# Patient Record
Sex: FEMALE | Race: WHITE | NOT HISPANIC OR LATINO | Employment: FULL TIME | URBAN - METROPOLITAN AREA
[De-identification: names, ages, dates, MRNs, and addresses within clinical notes are randomized per-mention and may not be internally consistent; named-entity substitution may affect disease eponyms.]

---

## 2021-07-22 ENCOUNTER — OFFICE VISIT (OUTPATIENT)
Dept: OBGYN | Facility: CLINIC | Age: 41
End: 2021-07-22
Payer: COMMERCIAL

## 2021-07-22 VITALS
WEIGHT: 240.2 LBS | HEART RATE: 109 BPM | BODY MASS INDEX: 40.02 KG/M2 | SYSTOLIC BLOOD PRESSURE: 138 MMHG | HEIGHT: 65 IN | DIASTOLIC BLOOD PRESSURE: 88 MMHG

## 2021-07-22 DIAGNOSIS — Z12.4 CERVICAL CANCER SCREENING: ICD-10-CM

## 2021-07-22 DIAGNOSIS — E11.69 TYPE 2 DIABETES MELLITUS WITH OTHER SPECIFIED COMPLICATION, WITHOUT LONG-TERM CURRENT USE OF INSULIN (H): ICD-10-CM

## 2021-07-22 DIAGNOSIS — Z01.419 WELL WOMAN EXAM WITH ROUTINE GYNECOLOGICAL EXAM: Primary | ICD-10-CM

## 2021-07-22 DIAGNOSIS — Z12.31 ENCOUNTER FOR SCREENING MAMMOGRAM FOR BREAST CANCER: ICD-10-CM

## 2021-07-22 DIAGNOSIS — N97.9 FEMALE INFERTILITY: ICD-10-CM

## 2021-07-22 PROCEDURE — 99386 PREV VISIT NEW AGE 40-64: CPT | Performed by: OBSTETRICS & GYNECOLOGY

## 2021-07-22 PROCEDURE — 87624 HPV HI-RISK TYP POOLED RSLT: CPT | Performed by: OBSTETRICS & GYNECOLOGY

## 2021-07-22 PROCEDURE — G0123 SCREEN CERV/VAG THIN LAYER: HCPCS | Performed by: OBSTETRICS & GYNECOLOGY

## 2021-07-22 RX ORDER — GLIPIZIDE 5 MG/1
5 TABLET ORAL
COMMUNITY
End: 2022-01-26

## 2021-07-22 RX ORDER — METFORMIN HYDROCHLORIDE 500 MG/5ML
SOLUTION ORAL
COMMUNITY
End: 2022-02-04

## 2021-07-22 ASSESSMENT — MIFFLIN-ST. JEOR: SCORE: 1755.42

## 2021-07-22 NOTE — PROGRESS NOTES
Sandy is a 41 year old G0 who presents for annual exam.     Menses are regular q 28-30 days and normal lasting 5 days.  Menses flow: normal.  Patient's last menstrual period was 2021 (exact date).. Using none for contraception.  She is currently considering pregnancy.  She would like to discuss getting pregnant.  Her and her  have been having regular unprotected intercourse since 2016.    Patient sees internal medicine physician at [facility] for her diabetes management. Patient los her mother recently to Bile duct cancer, which has been difficult for her and visit to [facility] today triggered a lot of emotions for her.    GYNECOLOGIC HISTORY:  Menarche: 15  Age at first intercourse: 17  Sandy is sexually active with male partner(s) and is currently in monogamous relationship with .    History sexually transmitted infections:No STD history and HPV  STI testing offered?  Declined  NICANOR exposure: No  History of abnormal Pap smear: Yes, had HPV when she was 21 years old.  Family history of breast CA: NOPlease explain):   Family history of uterine/ovarian CA: No    Family history of colon CA: No    HEALTH MAINTENANCE:  Cholesterol: (No results found for: CHOL History of abnormal lipids: No  Mammo: never . History of abnormal Mammo:no  Regular Self Breast Exams: No  Calcium/Vitamin D intake: source:  dietary supplement(s) Adequate? Yes  TSH: states she had normal testing recently through [facility]    HISTORY:  OB History    Para Term  AB Living   0 0 0 0 0 0   SAB TAB Ectopic Multiple Live Births   0 0 0 0 0     Past Medical History:   Diagnosis Date     Abnormal Pap smear of cervix      Diabetes (H)      History of human papillomavirus infection      Past Surgical History:   Procedure Laterality Date     COLPOSCOPY       TONSILLECTOMY       Family History   Problem Relation Age of Onset     Cancer Mother      Diabetes Mother      Heart Disease Mother      Diabetes Father      Heart Disease  "Father      Cancer Father      Cancer Maternal Grandmother      Diabetes Maternal Grandmother      Diabetes Paternal Grandmother      Social History   Denies any tobacco or drug use.  Consumes 1-2 drinks per month      Current Outpatient Medications:      glipiZIDE (GLUCOTROL) 5 MG tablet, Take 5 mg by mouth 2 times daily (before meals), Disp: , Rfl:      metFORMIN (GLUCOPHAGE) 500 MG/5ML SOLN solution, 1000 BID, Disp: , Rfl:      Semaglutide (RYBELSUS PO), ONCE A DAY, Disp: , Rfl:    No Known Allergies    Past medical, surgical, social and family history were reviewed and updated in [facility].      EXAM:  /88 (BP Location: Right arm, Patient Position: Sitting, Cuff Size: Adult Regular)   Pulse 109   Ht 1.651 m (5' 5\")   Wt 109 kg (240 lb 3.2 oz)   LMP 07/21/2021 (Exact Date)   BMI 39.97 kg/m     BMI: Body mass index is 39.97 kg/m .  Constitutional: healthy, alert and no distress  Head: Normocephalic. No masses, lesions, tenderness or abnormalities  Neck: Neck supple. Trachea midline. No adenopathy. Thyroid symmetric  Cardiovascular: RRR.   Respiratory: clear to auscultation bilatearlly  Breast: deferred  Gastrointestinal: Abdomen soft, non-tender, non-distended.   :  Normal appearing external genitalia, normal appearing vaginal mucosa, normal appearing cervix.    Musculoskeletal: extremities normal  Skin: no suspicious lesions or rashes  Psychiatric: Affect appropriate, cooperative,mentation appears normal.     ASSESSMENT:  41 year old female with satisfactory annual exam    (Z01.419) Well woman exam with routine gynecological exam  (primary encounter diagnosis)  COUNSELING:   Reviewed preventive health counseling, as reflected in patient instructions       Regular exercise       Healthy diet/nutrition       Family planning   reports that she has never smoked. She has never used smokeless tobacco.    Body mass index is 39.97 kg/m .  Weight management plan: Discussed healthy diet and exercise " guidelines    (Z12.4) Cervical cancer screening  Comment: patient due for pap smear, collected  Plan: Pap imaged thin layer screen with HPV -         recommended age 30 - 65 years    (N97.9) Female infertility  Comment: Patient meets criteria for infertility as she has had regular timed intercourse >6 months without conception.  Discussed age related fertility changes.  Patient and partner are very certain they would like to get pregnant, thus recommended consultation with reproductive endocrinology and infertility specialist.  Plan: Infertility/AI Referral    (E11.69) Type 2 diabetes mellitus with other specified complication, without long-term current use of insulin (H)  Comment: Patient's diabetes is managed by internal medicine at [facility].  Discussed that her age, diabetes, and elevated body mass index would put her in the category of high risk pregnancy.  Discussed pre-conception counseling with MFM to discuss further details, which she is interested in.  Plan: [facility] REFERRAL-PRECONCEPTION        Continue management of diabetes with internal medicine    (Z12.31) Encounter for screening mammogram for breast cancer  Comment: patient due for mammogram  Plan: *MA Screening Digital Bilateral    Anabell Elliott MD

## 2021-07-22 NOTE — PROGRESS NOTES
"[facility]- OBGYN    CC:     S:***Sandy Person is a 41 year old G***P*** who presents today for ***.  Patient reports ***.      OBGYN Hx:   G***P***  Patient's last menstrual period was 07/21/2021 (exact date).  Menses:***  Sexually active with ***  STD Hx:***  Pap hx:***    PMH: ***  PSH:***  Meds:***  Allergies:***  SH:***  FH:***    ROS:   C:     NEGATIVE for fever, chills, change in weight  I:       NEGATIVE for worrisome rashes, moles or lesions  E:     NEGATIVE for vision changes or irritation  E/M: NEGATIVE for ear, mouth and throat problems  R:     NEGATIVE for significant cough or SOB  CV:   NEGATIVE for chest pain, palpitations or peripheral edema  GI:     NEGATIVE for nausea, abdominal pain, heartburn, or change in bowel habits  :   NEGATIVE for frequency, dysuria, hematuria, vaginal discharge, or irregular bleeding  M:     NEGATIVE for significant arthralgias or myalgia  N:      NEGATIVE for weakness, dizziness or paresthesias  E:      NEGATIVE for temperature intolerance, skin/hair changes  P:      NEGATIVE for changes in mood or affect.    O: Patient Vitals for the past 24 hrs:   BP Pulse Weight   07/22/21 1556 (!) 143/91 109 109 kg (240 lb 3.2 oz)   ]  Exam:  {female exam complete:517953::\"GENERAL: healthy, alert and no distress\",\"EYES: Eyes grossly normal to inspection, PERRL and conjunctivae and sclerae normal\",\"HENT: ear canals and TM's normal, nose and mouth without ulcers or lesions\",\"NECK: no adenopathy, no asymmetry, masses, or scars and thyroid normal to palpation\",\"RESP: lungs clear to auscultation - no rales, rhonchi or wheezes\",\"BREAST: normal without masses, tenderness or nipple discharge and no palpable axillary masses or adenopathy\",\"CV: regular rate and rhythm, normal S1 S2, no S3 or S4, no murmur, click or rub, no peripheral edema and peripheral pulses strong\",\"ABDOMEN: soft, nontender, no hepatosplenomegaly, no masses and bowel sounds normal\",\"MS: no gross " "musculoskeletal defects noted, no edema\",\"SKIN: no suspicious lesions or rashes\",\"NEURO: Normal strength and tone, mentation intact and speech normal\",\"PSYCH: mentation appears normal, affect normal/bright\"}    Imaging and Labs:***    A&P:       "

## 2021-07-22 NOTE — PATIENT INSTRUCTIONS
[facility]  [email or web address]    [place] Location:  [address], [address]  [place] [address]     [place] Location:  [address], [address]  [place], [place] [address] [phone]     *Dr. Deepak Lima and Dr. Cathy Burgos      [facility]   [email or web address]     [place] location:  [facility]  [address], [address]  [place], [place] [id number] [phone]     [place] location:  [facility]  [address], [address]  [place], [place] [id number] [phone]  __________________________________________________    [facility]  [address] [place] [address], [address]  [place]  [address]  [phone]  __________________________________________________    Patient Education     Prevention Guidelines, Women Ages 40 to 49  Screening tests and vaccines are an important part of managing your health. A screening test is done to find diseases in people who don't have any symptoms. The goal is to find a disease early so lifestyle changes and checkups can reduce the risk of disease. Or the goal may be to detect it early to treat it most effectively. Screening tests are not used to diagnose a disease. But they are used to see if more testing is needed. Health counseling is important, too. Below are guidelines for these, for women ages 40 to 49. Talk with your healthcare provider to make sure you re up-to-date on what you need.  Screening Who needs it How often   Type 2 diabetes or prediabetes All women beginning at age 45 and women without symptoms at any age who are overweight or obese and have 1 or more additional risk factors for diabetes At least every 3 years1   Type 2 diabetes or prediabetes All women diagnosed with gestational diabetes Lifelong testing every 3 years   Type 2 diabetes All women with prediabetes Every year   Alcohol misuse All women in  this age group At routine exams   Blood pressure All women in this age group Yearly checkup if your blood pressure is normal  Normal blood pressure is less than 120/80 mm Hg  If your blood pressure reading is higher than normal, follow the advice of your healthcare provider   Breast cancer All women at average risk in this age group Screening with a mammogram can start at age 40.2 Talk with your healthcare provider to help you decide when to start screening. At age 45 start yearly mammograms.3   Cervical cancer All women in this age group, except women who have had a complete hysterectomy Pap test every 3 years or Pap test plus human papilloma virus (HPV) test every 5 years   Colorectal cancer Women age 45 years and older at average risk Multiple tests are available and are used at different times. Possible tests include:    Flexible sigmoidoscopy every 5 years, or    Colonoscopy every 10 years, or    CT colonography (virtual colonoscopy) every 5 years, or    Yearly fecal occult blood test, or    Yearly fecal immunochemical test every year, or    Stool DNA test, every 3 years  If you choose a test other than a colonoscopy and have an abnormal test result, you will need to follow-up with a colonoscopy. Screening advice varies among expert groups. Talk with your healthcare provider about which tests are best for you.  Some people should be screened using a different schedule because of their personal or family health history. Talk with your healthcare provider about your health history.   Chlamydia Women at increased risk for infection At routine exams if you're at risk or have symptoms   Depression All women in this age group At routine exams   Gonorrhea Sexually active women at increased risk for infection At routine exams   Hepatitis C Anyone at increased risk; 1 time for those born between 1945 and 1965 At routine exams   High cholesterol or triglycerides All women ages 45 and older who are at risk for coronary  artery disease; younger women, talk with your healthcare provider At least every 5 years   HIV All women At routine exams. Those with risk factors for HIV should be tested at least annually.   Obesity All women in this age group At routine exams   Syphilis Women at increased risk for infection-talk with your healthcare provider At routine exams   Tuberculosis Women at increased risk for infection-talk with your healthcare provider Ask your healthcare provider   Vision All women in this age group Complete exam at age 40 and eye exams every 2 to 4 years. If you have a chronic disease, ask your healthcare provider how often you should have your eyes examined.4   Vaccine Who needs it How often   Chickenpox (varicella) All women in this age group who have no record of this infection or vaccine 2 doses; the second dose should be given at least 4 weeks after the first dose   Hepatitis A Women at increased risk for infection-talk with your healthcare provider 2 doses given 6 months apart   Hepatitis B Women at increased risk for infection-talk with your healthcare provider 3 doses over 6 months; second dose should be given 1 month after the first dose; the third dose should be given at least 2 months after the second dose and at least 4 months after the first dose   Haemophilus influenzae Type B (HIB) Women at increased risk 1 to 3 doses   Influenza (flu) All women in this age group Once a year   Measles, mumps, rubella (MMR) All women in this age group who have no record of these infections or vaccines 1 or 2 doses   Meningococcal Women at increased risk for infection-talk with your healthcare provider 1 or more doses   Pneumococcal conjugate vaccine (PCV13) and pneumococcal polysaccharide vaccine (PPSV23) Women at increased risk for infection-talk with your healthcare provider 1 or 2 doses   Tetanus/diphtheria/pertussis (Td/Tdap) booster All women in this age group A 1-time dose of Tdap instead of a Td booster after age  18, then Td every 10 years   Counseling Who needs it How often   BRCA gene mutation testing for breast and ovarian cancer susceptibility Women with increased risk for having gene mutation When your risk is known   Breast cancer and chemoprevention Women at high risk for breast cancer When your risk is known   Diet and exercise Women who are overweight or obese When diagnosed, and then at routine exams   Domestic violence Women at the age in which they are able to have children At routine exams   Sexually transmitted infection prevention Women at increased risk for infection-talk with your healthcare provider At routine exams   Use of tobacco and the health effects it can cause All women in this age group Every exam   1 American Diabetes Association  2 American College of Obstetricians and Gynecologists   3 American Cancer Society  4 American Academy of Ophthalmology  [employer] last reviewed this educational content on 11/1/2017 2000-2021 [employer]. All rights reserved. This information is not intended as a substitute for professional medical care. Always follow your healthcare professional's instructions.

## 2021-07-23 ENCOUNTER — CARE COORDINATION (OUTPATIENT)
Dept: MATERNAL FETAL MEDICINE | Facility: CLINIC | Age: 41
End: 2021-07-23

## 2021-07-28 LAB
BKR LAB AP GYN ADEQUACY: NORMAL
BKR LAB AP GYN INTERPRETATION: NORMAL
BKR LAB AP HPV REFLEX: NORMAL
BKR LAB AP LMP: NORMAL
BKR LAB AP PREVIOUS ABNORMAL: NORMAL
PATH REPORT.COMMENTS IMP SPEC: NORMAL
PATH REPORT.RELEVANT HX SPEC: NORMAL

## 2021-07-29 LAB
HUMAN PAPILLOMA VIRUS 16 DNA: NEGATIVE
HUMAN PAPILLOMA VIRUS 18 DNA: NEGATIVE
HUMAN PAPILLOMA VIRUS FINAL DIAGNOSIS: NORMAL
HUMAN PAPILLOMA VIRUS OTHER HR: NEGATIVE

## 2021-08-02 ENCOUNTER — TRANSCRIBE ORDERS (OUTPATIENT)
Dept: MATERNAL FETAL MEDICINE | Facility: CLINIC | Age: 41
End: 2021-08-02

## 2021-08-02 DIAGNOSIS — Z31.69 ENCOUNTER FOR PRECONCEPTION CONSULTATION: Primary | ICD-10-CM

## 2021-08-07 ENCOUNTER — HEALTH MAINTENANCE LETTER (OUTPATIENT)
Age: 41
End: 2021-08-07

## 2021-09-03 ENCOUNTER — TRANSFERRED RECORDS (OUTPATIENT)
Dept: HEALTH INFORMATION MANAGEMENT | Facility: CLINIC | Age: 41
End: 2021-09-03
Payer: COMMERCIAL

## 2021-09-11 ENCOUNTER — MEDICAL CORRESPONDENCE (OUTPATIENT)
Dept: HEALTH INFORMATION MANAGEMENT | Facility: CLINIC | Age: 41
End: 2021-09-11

## 2021-09-21 ENCOUNTER — MEDICAL CORRESPONDENCE (OUTPATIENT)
Dept: HEALTH INFORMATION MANAGEMENT | Facility: CLINIC | Age: 41
End: 2021-09-21

## 2021-10-02 ENCOUNTER — HEALTH MAINTENANCE LETTER (OUTPATIENT)
Age: 41
End: 2021-10-02

## 2021-11-01 ENCOUNTER — TELEPHONE (OUTPATIENT)
Dept: MATERNAL FETAL MEDICINE | Facility: CLINIC | Age: 41
End: 2021-11-01

## 2021-11-01 NOTE — TELEPHONE ENCOUNTER
MFM received preconception referral from [facility]. Pt cancelled scheduled appt. Schedulers made attempts to get pt rescheduled and left messages. No return call from pt. Referring clinic notified. Removing order.     - Tj MAC

## 2021-11-08 ENCOUNTER — MEDICAL CORRESPONDENCE (OUTPATIENT)
Dept: HEALTH INFORMATION MANAGEMENT | Facility: CLINIC | Age: 41
End: 2021-11-08
Payer: COMMERCIAL

## 2021-11-08 ENCOUNTER — TRANSFERRED RECORDS (OUTPATIENT)
Dept: HEALTH INFORMATION MANAGEMENT | Facility: CLINIC | Age: 41
End: 2021-11-08
Payer: COMMERCIAL

## 2021-11-09 ENCOUNTER — TRANSCRIBE ORDERS (OUTPATIENT)
Dept: OTHER | Age: 41
End: 2021-11-09
Payer: COMMERCIAL

## 2021-11-09 DIAGNOSIS — E11.9 DIABETES MELLITUS, TYPE 2 (H): Primary | ICD-10-CM

## 2021-11-27 ENCOUNTER — HEALTH MAINTENANCE LETTER (OUTPATIENT)
Age: 41
End: 2021-11-27

## 2021-11-30 ENCOUNTER — ALLIED HEALTH/NURSE VISIT (OUTPATIENT)
Dept: EDUCATION SERVICES | Facility: CLINIC | Age: 41
End: 2021-11-30
Attending: INTERNAL MEDICINE
Payer: COMMERCIAL

## 2021-11-30 DIAGNOSIS — E11.9 DIABETES MELLITUS, TYPE 2 (H): ICD-10-CM

## 2021-11-30 PROCEDURE — G0108 DIAB MANAGE TRN  PER INDIV: HCPCS | Performed by: REGISTERED NURSE

## 2021-11-30 NOTE — TELEPHONE ENCOUNTER
RECORDS RECEIVED FROM: External   DATE RECEIVED: 12.9.21   NOTES (FOR ALL VISITS) STATUS DETAILS   OFFICE NOTES from referring provider recieved 11.30.21 Amirah Valera   OFFICE NOTES from other specialist recieved 12.6.21 amirah    ED NOTES N/A    OPERATIVE REPORT  (thyroid, pituitary, adrenal, parathyroid) N/A    MEDICATION LIST Internal    IMAGING      DEXASCAN N/A    MRI (BRAIN) N/A    XR (Chest) N/A    CT (HEAD/NECK/CHEST/ABDOMEN) N/A    NUCLEAR  N/A    ULTRASOUND (HEAD/NECK) N/A    LABS     DIABETES: HBGA1C, CREATININE, FASTING LIPIDS, MICROALBUMIN URINE, POTASSIUM, TSH, T4    THYROID: TSH, T4, CBC, THYRODLONULIN, TOTAL T3, FREE T4, CALCITONIN, CEA Internal/recieved Amirah- 9.3.21         Action 11.30.21 MJ   Action Taken Sent request to Amirah for all related med recs.     12.2.21 sv- 2nd request sent to Amirah   12.6.21 sv- 3rd Request sent- called and LVM about records request

## 2021-11-30 NOTE — PROGRESS NOTES
Diabetes Self-Management Education & Support    Sandy Person presents today for education related to Type 2 diabetes    Patient is being treated with:  oral agents, diet and exercise  She is accompanied by self    Year of diagnosis: Thinks she has had diabetes for about 10 years.  Referring provider:  Marquita Valera MD at [facility]  Living Situation: Lives with   Employment: She is a  at the [employer].  Works fulltime.      PATIENT CONCERNS RELATED TO DIABETES SELF MANAGEMENT:   Beginning IVF procedures.  Expecting first implantation at the end of January, 2022.   She has worked really hard to get her A1C under control and her most recent one was 5.7% within the past month    ASSESSMENT:    Taking Medication:     Current Diabetes Management per Patient:  Taking diabetes medications?   yes:     Diabetes Medication(s)     Biguanides       metFORMIN (GLUCOPHAGE) 500 MG/5ML SOLN solution    1000 BID    Sulfonylureas       glipiZIDE (GLUCOTROL) 5 MG tablet    Take 5 mg by mouth 2 times daily (before meals)    Incretin Mimetic Agents (GLP-1 Receptor Agonists)       Semaglutide (RYBELSUS PO)    ONCE A DAY        Dr. Valera stopped the Rybelsus at her most recent visit, in preparation for pregnancy.     Monitoring    Patient glucose self monitoring as follows: 1-2 times per day  BG meter: True Result  meter  Has only checked a couple of times.  Had stopped checking altogether when A1C dropped to 5.7%, but now noticing that since the Rybelsus was stopped, her fasting glucose seems to be higher and she just had a reading of 200 mg/dL about 2 hours after eating.      Patient's most recent No results found for: A1C   Patient's A1C goal: <7.0    Activity:  For the past six months to a year, she has been Gnosticism about working out every morning for about an hour and has been working with a .  Because she is starting hormone therapy, she's been advised to back off the most  strenuous exercise, so she's been riding a stationery bike and walking instead.  She feels that her glucoses have started to rise even in the past couple of days related to less exercise and stopping the Rybelsus.      Healthy Eating:   Patient currently eats 3 meals 3 snacks per day.  Breakfast usually consists of eggs and/or egg whites + 1 slice WW bread and some meier.  Lunch is generally meat, rice and vegetables, or an open face sandwich.  Dinner is generally some sort of protein, some starch, usually rice (but limits to 1/2 to 1 cup), and a vegetable.  She generally tries to include protein in her snacks and often will have a protein shake as a snack.  She is wondering how much carbohydrate she should be eating.  Her tendency is to try to eat low carb high protein, but finds that if she has too little carbohydrate, she often feels very low energy later in the evening.      Problem Solving:      Patient is at risk of hypoglycemia?: YES  Hospitalizations for hyper or hypoglycemia: No    EDUCATION and INSTRUCTION PROVIDED AT THIS VISIT:       Discussed typical plan for pregnant women with pre-existing type 2 diabetes.  Explained that she will likely be started on insulin early in the pregnancy and that she will be taking two types of insulin, long acting and rapid acting.  Discussed that her current eating pattern (meal, snack, meal, snack, meal, snack) is fine for now, but when insulin is started, she should consider consolidating her food into three meals, which can be covered with rapid acting insulin.      Discussed current dietary intake, which is fairly well balanced.  She is not drinking any sugared beverages.  Encouraged her to add more vegetables to her diet, and suggested that she try to aim for about 130-150 grams of CHO daily.  Explained that a visit with our dietitian would be a good idea, but she may want to wait until she is actually pregnant.  She is taking a pre- vitamin.      Discussed  testing.  Suggested that prior to her appointment with our endocrinologist on December 9, that she check her fasting and 2 hours after each meal, before a snack.  Also asked her to record her food prior to this appointment.  She and her  are planning to go to [place] for a long vacation prior to IVF implantation.      Patient-stated goal written and given to Sandy Person.  Verbalized and demonstrated understanding of instructions.     PLAN:  See patient instructions  AVS printed and given to patient    FOLLOW-UP:        Time spent with patient at today's visit was 60 minutes.      Any diabetes medication dose changes were made via the CDE Protocol and Collaborative Practice Agreement with [place] and [facility].  A copy of this encounter was provided to patient's referring provider.

## 2021-12-07 ENCOUNTER — MYC MEDICAL ADVICE (OUTPATIENT)
Dept: ENDOCRINOLOGY | Facility: CLINIC | Age: 41
End: 2021-12-07
Payer: COMMERCIAL

## 2021-12-07 DIAGNOSIS — E11.9 TYPE 2 DIABETES MELLITUS WITHOUT COMPLICATION, WITHOUT LONG-TERM CURRENT USE OF INSULIN (H): Primary | ICD-10-CM

## 2021-12-08 NOTE — PROGRESS NOTES
Sandy is a 41 year old who is being evaluated via a billable video visit.      How would you like to obtain your AVS? Bukupe  If the video visit is dropped, the invitation should be resent by: Send to e-mail at: [email or web address]  Will anyone else be joining your video visit? No    Outcome for 12/08/21 12:26 PM: Glucose Readings sent via [employer]

## 2021-12-09 ENCOUNTER — PRE VISIT (OUTPATIENT)
Dept: ENDOCRINOLOGY | Facility: CLINIC | Age: 41
End: 2021-12-09

## 2021-12-09 ENCOUNTER — VIRTUAL VISIT (OUTPATIENT)
Dept: ENDOCRINOLOGY | Facility: CLINIC | Age: 41
End: 2021-12-09
Payer: COMMERCIAL

## 2021-12-09 DIAGNOSIS — E11.9 TYPE 2 DIABETES MELLITUS WITHOUT COMPLICATION, WITHOUT LONG-TERM CURRENT USE OF INSULIN (H): Primary | ICD-10-CM

## 2021-12-09 DIAGNOSIS — N97.9 FEMALE INFERTILITY: ICD-10-CM

## 2021-12-09 DIAGNOSIS — Z31.83 PATIENT UNDERGOING IN VITRO FERTILIZATION: ICD-10-CM

## 2021-12-09 PROCEDURE — 99205 OFFICE O/P NEW HI 60 MIN: CPT | Mod: 95 | Performed by: STUDENT IN AN ORGANIZED HEALTH CARE EDUCATION/TRAINING PROGRAM

## 2021-12-09 NOTE — LETTER
12/9/2021       RE: Sandy Person  [address] [facility] [address]     Dear Colleague,    Thank you for referring your patient, Sandy Person, to the [facility] ENDOCRINOLOGY CLINIC [place] at [facility]. Please see a copy of my visit note below.    Sandy is a 41 year old who is being evaluated via a billable video visit.      How would you like to obtain your AVS? Owned it  If the video visit is dropped, the invitation should be resent by: Send to e-mail at: [email or web address]  Will anyone else be joining your video visit? No    Outcome for 12/08/21 12:26 PM: Glucose Readings sent via [employer]nex[employer]        Endocrinology Clinic Visit 12/9/2021    NAME:  Sandy Person  PCP:  Lisa Hutson  MRN:  [id number]  Reason for Consult: DM2  Requesting Provider:  Marquita Valera    Video-Visit Details     Type of service:  Video Visit     Video Start Time: 8:08 am  Video End Time: 8:56 am     I spent a total of 90 minutes on the date of encounter reviewing medical records, evaluating the patient, coordinating care and documenting in the EHR, as detailed above.        Platform used for Video Visit: [email or web address]      Chief Complaint     DM2 management    History of Present Illness     Sandy Person is a 41 year old female who is seen in clinic for DM2 management prior to IVF.      The patient was diagnosed with type 2 diabetes for about 10 years.      Previous A1C in records reviewed: she reported A1c usually in good range 6-7 except during covid pandemic.She had a1c of 8.7% during covid pandemic, she did lifestyle changes with diet and exercise. March 2021, also started on rybelsus,. She lost 20lbs. Rybelsus was stopped 2 weeks ago by her fertility provider. a1c 5/2021 was 6.7%. Most recent a1c was 9/2021 5.8% while on metofrmin, glipizide and Rybelsus.  Other DM meds that she tried bydureon for 2-3 years.    She is planning for IVF Jan 2022 ( implantation), first time. She  and her  has been trying to get pregnant for 5 years. She had normal regular periods off OCP. She reported that her work up at the IVF clinic is negative. She was told in her 20s she might have PCOS but nothing was done.    Weight is : stable for the past few month:  gained 10 lbs during covid and lost 20 lbs in preparation for IVF while on rybelsus. No readings in our records.  Wt Readings from Last 4 Encounters:   07/22/21 109 kg (240 lb 3.2 oz)       Diabetes Care/Complications:   Eyes: no DR last eye exam a year ago  Kidneys: last checked 5/2021, no DN, last BMP wnl 2/2021  Nerves: no symptoms  Smoking: no  Blood Pressure:  Controlled, 120/87 at home  Lipids: lpids checked on 2/2021. . contraindication to statin, trying to get pregnant  Macrovascular: no  Hypoglycemia: she was having lows on 5 mg glipizide      Previous DM related Hospitalization:No    Current treatment strategy:   - metformin 2g daily  - glipizide 2.5 mg, increase to 5 mg this week    Blood Glucose Monitoring:     She has been checkin 2-3 times a day. Fasting and 2 hours post meal. She reported that her fasting has always been high 150s.    12/8/21 7:34 AM Glucose Fasting/Wake Up 207 mg/dL  12/7/21 3:59 PM Glucose After Lunch 133 mg/dL  12/7/21 7:36 AM Glucose Fasting/Wake Up 166 mg/dL  12/6/21 2:22 PM Glucose Before Lunch 129 mg/dL went for a short walk before eating  12/6/21 1:00 PM Glucose Before Lunch 197 mg/dL  12/6/21 7:21 AM Glucose Fasting/Wake Up 158 mg/dL  12/5/21 7:44 AM Glucose Fasting/Wake Up 157 mg/dL  12/4/21 11:01 AM Glucose After Breakfast 185 mg/dL  11/30/21 7:23 AM Glucose Fasting/Wake Up 178 mg/dL  11/29/21 9:21 PM Glucose Before Bed 132 mg/dL  11/29/21 9:20 PM Glucose After Breakfast 138 mg/dL  11/28/21 10:31 PM Glucose Before Bed 201 mg/dL  11/28/21 5:09 PM Glucose Before Snack 200 mg/dL  11/27/21 12:31 PM Glucose After Lunch 138 mg/dL  11/19/21 6:21 AM Glucose Fasting/Wake Up 171 mg/dL  11/18/21 9:17 PM  Glucose Before Bed 106 mg/dL  11/18/21 12:18 PM Glucose Before Lunch 117 mg/dL  11/14/21 12:31 PM Glucose Before Lunch 109 mg/dL  11/11/21 6:46 AM Glucose Fasting/Wake Up 141 mg/dL    Diet: 6 meals a day. A lot of rice (  is   Breakfast: banana, coffee then eggs and meier with toast after work out  Snack: string cheese , peanut butter, crackers, yogurt  Lunch: rice with meat and veggies  Dinner: it varies, meat with veggies and rice  Snack:no bedtime snack    Exercise: she was working out 5 times a day but now on hormones she is tired and she is on ly biking or walking 15 minutes couple days a week    Social: She is a  at the [employer]. Lives with her . No kids. Does not smoke , no excessive alcohol.    Problem List     Infertility  DM2  obesity    Medications     Current Outpatient Medications   Medication     glipiZIDE (GLUCOTROL) 5 MG tablet     metFORMIN (GLUCOPHAGE) 500 MG/5ML SOLN solution     Semaglutide (RYBELSUS PO)     No current facility-administered medications for this visit.        Allergies     No Known Allergies    Medical / Surgical History     Past Medical History:   Diagnosis Date     Abnormal Pap smear of cervix      Diabetes (H)      History of human papillomavirus infection      Past Surgical History:   Procedure Laterality Date     COLPOSCOPY       TONSILLECTOMY         Social History     Social History     Socioeconomic History     Marital status: Single     Spouse name: Not on file     Number of children: Not on file     Years of education: Not on file     Highest education level: Not on file   Occupational History     Not on file   Tobacco Use     Smoking status: Never Smoker     Smokeless tobacco: Never Used   Vaping Use     Vaping Use: Never used   Substance and Sexual Activity     Alcohol use: Yes     Comment: SOCIAL     Drug use: Never     Sexual activity: Yes     Birth control/protection: None   Other Topics Concern     Not on file   Social History Narrative      Not on file     Social Determinants of Health     Financial Resource Strain: Not on file   Food Insecurity: Not on file   Transportation Needs: Not on file   Physical Activity: Not on file   Stress: Not on file   Social Connections: Not on file   Intimate Partner Violence: Not on file   Housing Stability: Not on file       Family History   Reviewed with patient  Family History   Problem Relation Age of Onset     Cancer Mother      Diabetes Mother      Heart Disease Mother      Diabetes Father      Heart Disease Father      Cancer Father      Cancer Maternal Grandmother      Diabetes Maternal Grandmother      Diabetes Paternal Grandmother        ROS     12 ROS completed, pertinent positive and negative in HPI    Physical Exam   There were no vitals taken for this visit.     GENERAL: Healthy, alert and no distress  EYES: Eyes grossly normal to inspection.  No discharge or erythema, or obvious scleral/conjunctival abnormalities.  RESP: No audible wheeze, cough, or visible cyanosis.  No visible retractions or increased work of breathing.    SKIN: Visible skin clear. No significant rash, abnormal pigmentation or lesions.  NEURO: Cranial nerves grossly intact.  Mentation and speech appropriate for age.  PSYCH: Mentation appears normal, affect normal/bright, judgement and insight intact, normal speech and appearance well-groomed.    Labs/Imaging     Pertinent Labs were reviewed and updated in [facility].  Radiology Results were  reviewed and updated in EPIC.    Summary of recent findings:   No results found for: A1C    No results found for: TSH, T4    No results found for: CR    No results for input(s): CHOL, HDL, LDL, TRIG, CHOLHDLRATIO in the last 50456 hours.    No results found for: [id number], [id number], [id number]    I personally reviewed the patient's outside records from [facility] EMR, Care Everywhere and faxed records. Summary of pertinent findings in [facility].    Impression / Plan     1.  Controlled DM 2  2.  Morbid  obesity  2.  Patient in process of IVF  3.  I infertility    Sandy is aiming for today glycemic control during her IVF process and prior to implantation in pregnancy.  She had controlled type 2 diabetes and morbid obesity with BMI of 42.  GLP-1 has been stopped in preparation for pregnancy.  She is currently on Metformin and glipizide only.  I reviewed her blood sugar logs with her, probably reflective of an A1c closer to 7.  Last A1c was 5.7% on September 2021 reported by patient.  Worsening glycemic control is due to decrease in exercise activity change in diet and stopping GLP-1a.  I reviewed with her blood sugar targets and nonpregnant type 2 diabetes versus pregnant with type 2 diabetes.  We discussed CGM use, she reported that she might have a high co-pay to get a CGM now.  We reviewed general nutrition recommendation in type 2 diabetes, we discussed low-carb diet, she is motivated to work with nutrition and CDE.    Plan:  -Continue Metformin 2 g daily  -Increase glipizide to 10 mg daily  -Continue blood sugar logs      Test and/or medications prescribed today:  Orders Placed This Encounter   Procedures     Hemoglobin A1c         Follow up: 1 month      Ishan Duenas MD  Endocrinology, Diabetes and Metabolism  [facility]    Review of the result(s) of each unique test - refer to HPI  90 minutes spent on the date of the encounter doing chart review, history and exam, documentation and further activities per the note

## 2021-12-09 NOTE — PROGRESS NOTES
Endocrinology Clinic Visit 12/9/2021    NAME:  Sandy Person  PCP:  Lisa Hutson  MRN:  [id number]  Reason for Consult: DM2  Requesting Provider:  Marquita Valera    Video-Visit Details     Type of service:  Video Visit     Video Start Time: 8:08 am  Video End Time: 8:56 am     I spent a total of 90 minutes on the date of encounter reviewing medical records, evaluating the patient, coordinating care and documenting in the EHR, as detailed above.        Platform used for Video Visit: BrightLine      Chief Complaint     DM2 management    History of Present Illness     Sandy Person is a 41 year old female who is seen in clinic for DM2 management prior to IVF.      The patient was diagnosed with type 2 diabetes for about 10 years.      Previous A1C in records reviewed: she reported A1c usually in good range 6-7 except during covid pandemic.She had a1c of 8.7% during covid pandemic, she did lifestyle changes with diet and exercise. March 2021, also started on rybelsus,. She lost 20lbs. Rybelsus was stopped 2 weeks ago by her fertility provider. a1c 5/2021 was 6.7%. Most recent a1c was 9/2021 5.8% while on metofrmin, glipizide and Rybelsus.  Other DM meds that she tried bydureon for 2-3 years.    She is planning for IVF Jan 2022 ( implantation), first time. She and her  has been trying to get pregnant for 5 years. She had normal regular periods off OCP. She reported that her work up at the IVF clinic is negative. She was told in her 20s she might have PCOS but nothing was done.    Weight is : stable for the past few month:  gained 10 lbs during covid and lost 20 lbs in preparation for IVF while on rybelsus. No readings in our records.  Wt Readings from Last 4 Encounters:   07/22/21 109 kg (240 lb 3.2 oz)       Diabetes Care/Complications:   Eyes: no DR last eye exam a year ago  Kidneys: last checked 5/2021, no DN, last BMP wnl 2/2021  Nerves: no symptoms  Smoking: no  Blood Pressure:  Controlled, 120/87 at  home  Lipids: lpids checked on 2/2021. . contraindication to statin, trying to get pregnant  Macrovascular: no  Hypoglycemia: she was having lows on 5 mg glipizide      Previous DM related Hospitalization:No    Current treatment strategy:   - metformin 2g daily  - glipizide 2.5 mg, increase to 5 mg this week    Blood Glucose Monitoring:     She has been checkin 2-3 times a day. Fasting and 2 hours post meal. She reported that her fasting has always been high 150s.    12/8/21 7:34 AM Glucose Fasting/Wake Up 207 mg/dL  12/7/21 3:59 PM Glucose After Lunch 133 mg/dL  12/7/21 7:36 AM Glucose Fasting/Wake Up 166 mg/dL  12/6/21 2:22 PM Glucose Before Lunch 129 mg/dL went for a short walk before eating  12/6/21 1:00 PM Glucose Before Lunch 197 mg/dL  12/6/21 7:21 AM Glucose Fasting/Wake Up 158 mg/dL  12/5/21 7:44 AM Glucose Fasting/Wake Up 157 mg/dL  12/4/21 11:01 AM Glucose After Breakfast 185 mg/dL  11/30/21 7:23 AM Glucose Fasting/Wake Up 178 mg/dL  11/29/21 9:21 PM Glucose Before Bed 132 mg/dL  11/29/21 9:20 PM Glucose After Breakfast 138 mg/dL  11/28/21 10:31 PM Glucose Before Bed 201 mg/dL  11/28/21 5:09 PM Glucose Before Snack 200 mg/dL  11/27/21 12:31 PM Glucose After Lunch 138 mg/dL  11/19/21 6:21 AM Glucose Fasting/Wake Up 171 mg/dL  11/18/21 9:17 PM Glucose Before Bed 106 mg/dL  11/18/21 12:18 PM Glucose Before Lunch 117 mg/dL  11/14/21 12:31 PM Glucose Before Lunch 109 mg/dL  11/11/21 6:46 AM Glucose Fasting/Wake Up 141 mg/dL    Diet: 6 meals a day. A lot of rice (  is   Breakfast: banana, coffee then eggs and meier with toast after work out  Snack: string cheese , peanut butter, crackers, yogurt  Lunch: rice with meat and veggies  Dinner: it varies, meat with veggies and rice  Snack:no bedtime snack    Exercise: she was working out 5 times a day but now on hormones she is tired and she is on ly biking or walking 15 minutes couple days a week    Social: She is a  at [employer] . Lives  with her . No kids. Does not smoke , no excessive alcohol.    Problem List     Infertility  DM2  obesity    Medications     Current Outpatient Medications   Medication     glipiZIDE (GLUCOTROL) 5 MG tablet     metFORMIN (GLUCOPHAGE) 500 MG/5ML SOLN solution     Semaglutide (RYBELSUS PO)     No current facility-administered medications for this visit.        Allergies     No Known Allergies    Medical / Surgical History     Past Medical History:   Diagnosis Date     Abnormal Pap smear of cervix      Diabetes (H)      History of human papillomavirus infection      Past Surgical History:   Procedure Laterality Date     COLPOSCOPY       TONSILLECTOMY         Social History     Social History     Socioeconomic History     Marital status: Single     Spouse name: Not on file     Number of children: Not on file     Years of education: Not on file     Highest education level: Not on file   Occupational History     Not on file   Tobacco Use     Smoking status: Never Smoker     Smokeless tobacco: Never Used   Vaping Use     Vaping Use: Never used   Substance and Sexual Activity     Alcohol use: Yes     Comment: SOCIAL     Drug use: Never     Sexual activity: Yes     Birth control/protection: None   Other Topics Concern     Not on file   Social History Narrative     Not on file     Social Determinants of Health     Financial Resource Strain: Not on file   Food Insecurity: Not on file   Transportation Needs: Not on file   Physical Activity: Not on file   Stress: Not on file   Social Connections: Not on file   Intimate Partner Violence: Not on file   Housing Stability: Not on file       Family History   Reviewed with patient  Family History   Problem Relation Age of Onset     Cancer Mother      Diabetes Mother      Heart Disease Mother      Diabetes Father      Heart Disease Father      Cancer Father      Cancer Maternal Grandmother      Diabetes Maternal Grandmother      Diabetes Paternal Grandmother        ROS     12 ROS  completed, pertinent positive and negative in HPI    Physical Exam   There were no vitals taken for this visit.     GENERAL: Healthy, alert and no distress  EYES: Eyes grossly normal to inspection.  No discharge or erythema, or obvious scleral/conjunctival abnormalities.  RESP: No audible wheeze, cough, or visible cyanosis.  No visible retractions or increased work of breathing.    SKIN: Visible skin clear. No significant rash, abnormal pigmentation or lesions.  NEURO: Cranial nerves grossly intact.  Mentation and speech appropriate for age.  PSYCH: Mentation appears normal, affect normal/bright, judgement and insight intact, normal speech and appearance well-groomed.    Labs/Imaging     Pertinent Labs were reviewed and updated in [facility].  Radiology Results were  reviewed and updated in EPIC.    Summary of recent findings:   No results found for: A1C    No results found for: TSH, T4    No results found for: CR    No results for input(s): CHOL, HDL, LDL, TRIG, CHOLHDLRATIO in the last 61537 hours.    No results found for: [id number], [id number], [id number]    I personally reviewed the patient's outside records from [facility] EMR, Care Everywhere and faxed records. Summary of pertinent findings in \[facility]\"".    Impression / Plan     1.  Controlled DM 2  2.  Morbid obesity  2.  Patient in process of IVF  3.  I infertility    Sandy is aiming for today glycemic control during her IVF process and prior to implantation in pregnancy.  She had controlled type 2 diabetes and morbid obesity with BMI of 42.  GLP-1 has been stopped in preparation for pregnancy.  She is currently on Metformin and glipizide only.  I reviewed her blood sugar logs with her, probably reflective of an A1c closer to 7.  Last A1c was 5.7% on September 2021 reported by patient.  Worsening glycemic control is due to decrease in exercise activity change in diet and stopping GLP-1a.  I reviewed with her blood sugar targets and nonpregnant type 2 diabetes versus pregnant  with type 2 diabetes.  We discussed CGM use, she reported that she might have a high co-pay to get a CGM now.  We reviewed general nutrition recommendation in type 2 diabetes, we discussed low-carb diet, she is motivated to work with nutrition and CDE.    Plan:  -Continue Metformin 2 g daily  -Increase glipizide to 10 mg daily  -Continue blood sugar logs      Test and/or medications prescribed today:  Orders Placed This Encounter   Procedures     Hemoglobin A1c         Follow up: 1 month      Ishan Duenas MD  Endocrinology, Diabetes and Metabolism  [facility]    Review of the result(s) of each unique test - refer to HPI  90 minutes spent on the date of the encounter doing chart review, history and exam, documentation and further activities per the note

## 2021-12-09 NOTE — PATIENT INSTRUCTIONS
It was nice meeting you!      Continue metformin 2 g daily  Increase glipizide to 10 mg daily  Continue working on diet and exercise  Get a1c checked at your convenience. You need a lab appointment.   cp

## 2021-12-10 RX ORDER — GLIPIZIDE 10 MG/1
10 TABLET, FILM COATED, EXTENDED RELEASE ORAL DAILY
Qty: 90 TABLET | Refills: 1 | Status: SHIPPED | OUTPATIENT
Start: 2021-12-10 | End: 2022-05-31

## 2021-12-16 ENCOUNTER — LAB (OUTPATIENT)
Dept: LAB | Facility: CLINIC | Age: 41
End: 2021-12-16
Payer: COMMERCIAL

## 2021-12-16 DIAGNOSIS — E11.9 TYPE 2 DIABETES MELLITUS WITHOUT COMPLICATION, WITHOUT LONG-TERM CURRENT USE OF INSULIN (H): ICD-10-CM

## 2021-12-16 LAB — HBA1C MFR BLD: 7.2 % (ref 0–5.6)

## 2021-12-16 PROCEDURE — 83036 HEMOGLOBIN GLYCOSYLATED A1C: CPT

## 2021-12-16 PROCEDURE — 36415 COLL VENOUS BLD VENIPUNCTURE: CPT

## 2022-01-26 ENCOUNTER — OFFICE VISIT (OUTPATIENT)
Dept: DERMATOLOGY | Facility: CLINIC | Age: 42
End: 2022-01-26
Payer: COMMERCIAL

## 2022-01-26 DIAGNOSIS — L91.8 SKIN TAG: ICD-10-CM

## 2022-01-26 DIAGNOSIS — R21 RASH: ICD-10-CM

## 2022-01-26 DIAGNOSIS — L82.0 INFLAMED SEBORRHEIC KERATOSIS: Primary | ICD-10-CM

## 2022-01-26 PROCEDURE — 17110 DESTRUCTION B9 LES UP TO 14: CPT | Performed by: DERMATOLOGY

## 2022-01-26 PROCEDURE — 11200 RMVL SKIN TAGS UP TO&INC 15: CPT | Mod: XS | Performed by: DERMATOLOGY

## 2022-01-26 PROCEDURE — 99202 OFFICE O/P NEW SF 15 MIN: CPT | Mod: 25 | Performed by: DERMATOLOGY

## 2022-01-26 ASSESSMENT — PAIN SCALES - GENERAL: PAINLEVEL: NO PAIN (0)

## 2022-01-26 NOTE — LETTER
1/26/2022       RE: Sandy Person  [address] [facility] [address]     Dear Colleague,    Thank you for referring your patient, Sandy Person, to the [facility] DERMATOLOGY CLINIC [place] at [facility]. Please see a copy of my visit note below.    [facility] Dermatology Note  Encounter Date: Jan 26, 2022  Office Visit     Dermatology Problem List:  #. Pink patch right shin, previously had been on left as well. Ddx psoriasis v eczema   #. Inflamed acrochordons s/p cryo 1/26/2022  #. ISKs 2x, right cheek s/p cryo 1/26/2022  ____________________________________________    Assessment & Plan:    # Pink patch right shin, previously had been on left as well.  Based on the patient's description, certainly could be psoriasis. Ddx eczema. Advised her to reach out if this reoccurs.    # Inflamed acrochordons, 2x around left eye and 1x around right eye  - Cryo today, see procedure note below    # Inflamed seborrheic keratosis 2x, right cheek  Discussed the natural history and benign nature of this lesion. Reassurance provided that no additional treatment is necessary.   - Cryo today, see procedure note below.    Procedures Performed:   - Cryotherapy procedure note, location(s): see above. After verbal consent and discussion of risks and benefits including, but not limited to, dyspigmentation/scar, blister, and pain, 3 skin tags and 2 ISKs were treated with 1-2 mm freeze border for 1-2 cycles with liquid nitrogen. Post cryotherapy instructions were provided.    Follow-up: prn for new or changing lesions    Staff and Scribe:     Provider Disclosure:   The documentation recorded by the scribe accurately reflects the services I personally performed and the decisions made by me.    Ivanna Owen MD    Department of Dermatology  [facility]: Phone:  [phone], Fax: [phone]  1/26/2022       Scribe Disclosure:  I, Yani Rojas, am serving as a scribe to document services personally performed by Ivanna Owen MD based on data collection and the provider's statements to me.   ____________________________________________    CC: Derm Problem (possible psoriasis on the shin, skin tags around the eyes, new spots on the face)    HPI:  Ms. Sandy Person is a(n) 41 year old female who presents today as a new patient for a skin check.     Today, the patient reports two pink patches on her bilateral shin for two years which she assumed to be psoriasis and triggered by stress. She reports that the patches on her left shin is milder than her right shin. Denies any pain and interventions. However, it was itchy, dry and cracking.    Additionally, the patient reports new skin tags on her bilateral eyes and spots on her right cheek which she associated with her periods. Denies any family history of skin cancer.Patient has diabetes. Mother has a history myasthenia gravis.    Patient is otherwise feeling well, without additional skin concerns.    Labs Reviewed:  N/A    Physical Exam:  Vitals: There were no vitals taken for this visit.  SKIN: Focused examination of face and lower legs was performed.  - Faint pink patch at site of prior rash on the right shin   - There are inflamed skin colored pedunculated papules around the left eye x2 and right x1.  - There two waxy stuck on tan to brown papules with overlying erythema on the right cheek.   - No other lesions of concern on areas examined.     Medications:  Current Outpatient Medications   Medication     glipiZIDE (GLIPIZIDE XL) 10 MG 24 hr tablet     metFORMIN (GLUCOPHAGE) 500 MG/5ML SOLN solution     glipiZIDE (GLUCOTROL) 5 MG tablet     Semaglutide (RYBELSUS PO)     No current facility-administered medications for this visit.      Past Medical History:   There is no problem list on file for this patient.    Past  Medical History:   Diagnosis Date     Abnormal Pap smear of cervix      Diabetes (H)      History of human papillomavirus infection         CC Referred Self, MD  No address on file on close of this encounter.

## 2022-01-26 NOTE — NURSING NOTE
Dermatology Rooming Note    Sandy Person's goals for this visit include:   Chief Complaint   Patient presents with     Derm Problem     possible psoriasis on the shin, skin tags around the eyes, new spots on the face     Miladys [place] CMA on 1/26/2022 at 7:47 AM

## 2022-01-26 NOTE — PROGRESS NOTES
[facility] Dermatology Note  Encounter Date: Jan 26, 2022  Office Visit     Dermatology Problem List:  #. Pink patch right shin, previously had been on left as well. Ddx psoriasis v eczema   #. Inflamed acrochordons s/p cryo 1/26/2022  #. ISKs 2x, right cheek s/p cryo 1/26/2022  ____________________________________________    Assessment & Plan:    # Pink patch right shin, previously had been on left as well.  Based on the patient's description, certainly could be psoriasis. Ddx eczema. Advised her to reach out if this reoccurs.    # Inflamed acrochordons, 2x around left eye and 1x around right eye  - Cryo today, see procedure note below    # Inflamed seborrheic keratosis 2x, right cheek  Discussed the natural history and benign nature of this lesion. Reassurance provided that no additional treatment is necessary.   - Cryo today, see procedure note below.    Procedures Performed:   - Cryotherapy procedure note, location(s): see above. After verbal consent and discussion of risks and benefits including, but not limited to, dyspigmentation/scar, blister, and pain, 3 skin tags and 2 ISKs were treated with 1-2 mm freeze border for 1-2 cycles with liquid nitrogen. Post cryotherapy instructions were provided.    Follow-up: prn for new or changing lesions    Staff and Scribe:     Provider Disclosure:   The documentation recorded by the scribe accurately reflects the services I personally performed and the decisions made by me.    Ivanna Owen MD    Department of Dermatology  [facility]: Phone: [phone], Fax: [phone]  1/26/2022       Scribe Disclosure:  I, Yani Rojas, am serving as a scribe to document services personally performed by Ivanna Owen MD based on data collection and the provider's statements to me.   ____________________________________________    CC: Derm Problem (possible  psoriasis on the shin, skin tags around the eyes, new spots on the face)    HPI:  Ms. Sandy Person is a(n) 41 year old female who presents today as a new patient for a skin check.     Today, the patient reports two pink patches on her bilateral shin for two years which she assumed to be psoriasis and triggered by stress. She reports that the patches on her left shin is milder than her right shin. Denies any pain and interventions. However, it was itchy, dry and cracking.    Additionally, the patient reports new skin tags on her bilateral eyes and spots on her right cheek which she associated with her periods. Denies any family history of skin cancer.Patient has diabetes. Mother has a history myasthenia gravis.    Patient is otherwise feeling well, without additional skin concerns.    Labs Reviewed:  N/A    Physical Exam:  Vitals: There were no vitals taken for this visit.  SKIN: Focused examination of face and lower legs was performed.  - Faint pink patch at site of prior rash on the right shin   - There are inflamed skin colored pedunculated papules around the left eye x2 and right x1.  - There two waxy stuck on tan to brown papules with overlying erythema on the right cheek.   - No other lesions of concern on areas examined.     Medications:  Current Outpatient Medications   Medication     glipiZIDE (GLIPIZIDE XL) 10 MG 24 hr tablet     metFORMIN (GLUCOPHAGE) 500 MG/5ML SOLN solution     glipiZIDE (GLUCOTROL) 5 MG tablet     Semaglutide (RYBELSUS PO)     No current facility-administered medications for this visit.      Past Medical History:   There is no problem list on file for this patient.    Past Medical History:   Diagnosis Date     Abnormal Pap smear of cervix      Diabetes (H)      History of human papillomavirus infection         CC Referred Self, MD  No address on file on close of this encounter.

## 2022-01-31 NOTE — PROGRESS NOTES
Sandy is a 41 year old who is being evaluated via a billable video visit.      How would you like to obtain your AVS? [employer]  If the video visit is dropped, the invitation should be resent by: Text to cell phone: [phone]  Will anyone else be joining your video visit? No      Outcome for 01/31/22 4:22 PM: [employer] message sent  SIMON Humphreys  Outcome for 02/03/22 9:11 AM: Left Voicemail     Outcome for 02/03/22 9:59 AM: Glucose Readings sent via [employer]  SIMON Humphreys

## 2022-02-04 ENCOUNTER — VIRTUAL VISIT (OUTPATIENT)
Dept: ENDOCRINOLOGY | Facility: CLINIC | Age: 42
End: 2022-02-04
Payer: COMMERCIAL

## 2022-02-04 ENCOUNTER — TELEPHONE (OUTPATIENT)
Dept: ENDOCRINOLOGY | Facility: CLINIC | Age: 42
End: 2022-02-04

## 2022-02-04 DIAGNOSIS — E11.9 TYPE 2 DIABETES MELLITUS WITHOUT COMPLICATION, WITHOUT LONG-TERM CURRENT USE OF INSULIN (H): Primary | ICD-10-CM

## 2022-02-04 PROCEDURE — 99215 OFFICE O/P EST HI 40 MIN: CPT | Mod: 95 | Performed by: PHYSICIAN ASSISTANT

## 2022-02-04 RX ORDER — METFORMIN HYDROCHLORIDE 500 MG/5ML
SOLUTION ORAL
Status: CANCELLED | OUTPATIENT
Start: 2022-02-04

## 2022-02-04 RX ORDER — INSULIN GLARGINE 100 [IU]/ML
INJECTION, SOLUTION SUBCUTANEOUS
Qty: 15 ML | Refills: 3 | Status: SHIPPED | OUTPATIENT
Start: 2022-02-04 | End: 2022-02-09

## 2022-02-04 RX ORDER — PEN NEEDLE, DIABETIC 32GX 5/32"
NEEDLE, DISPOSABLE MISCELLANEOUS
Qty: 100 EACH | Refills: 3 | Status: SHIPPED | OUTPATIENT
Start: 2022-02-04 | End: 2022-10-12

## 2022-02-04 NOTE — PROGRESS NOTES
Due to the COVID 19 pandemic this visit was converted to a video visit in order to help prevent spread of infection in this patient and the general population.    Time of start: 10:30 am  Time of end: 10:50 am  Total duration of video visit:  20 minutes.    HPI  Sandy Person is a 41 year old female with type 2 diabetes mellitus. Video visit for diabetes follow up today.  Pt was seen by Dr. Ishan Duenas in Dec 2021.  Pt gives a hx of type 2 diabetes dx 10 years ago.  She has no known retinopathy, nephropathy or neuropathy.  She has a hx of obesity and is undergoing IVF.  She had been taking Rybelsus which was discontinued in late Nov 2021 by her fertility provider.   She was instructed to remain on Metformin 1000 mg BID and Glipizide 10 mg each am.  Her most recent A1C was 7.2 % on 12/16/2021.  For her diabetes, she is currently taking Metformin 500 mg 2 tab po BID and Glipizide 10 mg in am.  I do not have her glucose meter today, but she provided me with blood sugar readings today which I scanned in her note below.  Her FBS values are in the 180 range.  No hypoglycemia.  On ROS today, she states she has been working on decreasing carbs in her diet and eating healthy and remaining active. Tearful during our visit today.  She is undergoing IVF and states she will be having another stim test and  harvest done . She hopes to have an embryo implanted in May 2022.  LMP was 1/25/2022.  Pt denies blurred vision, n/v, SOB at rest, cough, fever or chills.  She denies chest pain, abd pain, diarrhea, dysuria or hematuria.  No sx of neuropathy and she denies foot ulcers.  Sandy received the COVID vaccines and COVID booster.        Diabetes Care  Retinopathy: none; pt seen by Oph in Spring 2021 without retinopathy per patient.  Nephropathy:none; will check urine microalbuminuria.  Neuropathy:none.  Foot Exam:no exam.  Taking aspirin: no.  Lipids: no LDL.  CAD: no.  Mental health: no hx of depression.  Insulin: starting basal  insulin at hs today.  DM meds: Metformin and Glipizide.  Testing: glucose meter. Will order Freestyle Libre2 sensor today since she will be starting on insulin    ROS  See under HPI.    Allergies  No Known Allergies    Medications  Current Outpatient Medications   Medication Sig Dispense Refill     glipiZIDE (GLIPIZIDE XL) 10 MG 24 hr tablet Take 1 tablet (10 mg) by mouth daily 90 tablet 1   Metformin 500 mg 2 tab po BID.      Family History  family history includes Cancer in her father, maternal grandmother, and mother; Diabetes in her father, maternal grandmother, mother, and paternal grandmother; Heart Disease in her father and mother.    Social History   reports that she has never smoked. She has never used smokeless tobacco. She reports current alcohol use. She reports that she does not use drugs.     Past Medical History  Past Medical History:   Diagnosis Date     Abnormal Pap smear of cervix      Diabetes (H)      History of human papillomavirus infection    Obesity.    Past Surgical History:   Procedure Laterality Date     COLPOSCOPY       TONSILLECTOMY         Physical Exam    See under HPI.    RESULTS  Hemoglobin A1C   Date Value Ref Range Status   12/16/2021 7.2 (H) 0.0 - 5.6 % Final     Comment:     Normal <5.7%   Prediabetes 5.7-6.4%    Diabetes 6.5% or higher     Note: Adopted from ADA consensus guidelines.           ASSESSMENT/PLAN:    1.  TYPE 2 DIABETES MELLITUS: Sandy's fasting blood sugars are high. She states she has a long hx of high FBS values.  I add Lantus 4 units subcutaneous at hs today. Suspect this dose will need to be increased.  Pt instructed to send me her blood sugar readings next Tuesday 2/8/2022 for review and Lantus will be increased if needed.  She is to remain on Metformin 500 mg 2 tab po BID and Glipizide 10 mg in am.  Since she is now using insulin, I placed an order for a Freestyle Libre2 sensor today and asked her to check her fasting blood sugar each am and bs premeals  daily.  She has been working hard on reducing carbs in her diet and eating healthy and remaining active. I reassured her that she is doing a good job and that by adding Lantus at bedtime, her blood sugar values should improved.  I also asked Chaparrita JOSEPH to reach out to patient since I started her on insulin today.  Sandy was seen by Oph in Spring 2021 without retinopathy per patient.  She denies hx of nephropathy. Will check urine microalbuminuria and creat/GFR.  No sx of neuropathy.  I have no vitals today.  Sandy received the COVID vaccines and COVID booster.  She also received the flu vaccine this season.    2.  WEIGHT: Encouraged her to continue to make healthy food choices and remain active.    3.  INFERTILITY: Undergoing IVF treatments as above.  LMP was 1/25/2022.    4. FOLLOW UP: with me on 3/18/2022 at 9:30 am.  She is to send me her blood sugar readings via [employer] next Tuesday 2/8/2022 and Lantus dose will be increased if needed.  A1C,creat/GFR, urine microalbuminuria, TSH and AST/ALT ordered today.    Time spent reviewing chart,labs and glucose data today = 10 minutes.  Time for video visit today = 20 minutes.  Time for documentation today = 15 minutes.    TOTAL TIME FOR VISIT TODAY = 45 minutes.    Vesta Arceo PA-C      Answers for HPI/ROS submitted by the patient on 1/31/2022  General Symptoms: No  Skin Symptoms: No  HENT Symptoms: No  EYE SYMPTOMS: No  HEART SYMPTOMS: No  LUNG SYMPTOMS: No  INTESTINAL SYMPTOMS: No  URINARY SYMPTOMS: No  GYNECOLOGIC SYMPTOMS: No  BREAST SYMPTOMS: No  SKELETAL SYMPTOMS: No  BLOOD SYMPTOMS: No  NERVOUS SYSTEM SYMPTOMS: No  MENTAL HEALTH SYMPTOMS: No

## 2022-02-04 NOTE — TELEPHONE ENCOUNTER
PA Initiation    Medication: Freestyle Althea 2 PA Pending  Insurance Company: [employer] - Phone [phone] Fax [phone]  Pharmacy Filling the Rx:    Filling Pharmacy Phone:    Filling Pharmacy Fax:    Start Date: 2/4/2022

## 2022-02-09 ENCOUNTER — MYC MEDICAL ADVICE (OUTPATIENT)
Dept: EDUCATION SERVICES | Facility: CLINIC | Age: 42
End: 2022-02-09
Payer: COMMERCIAL

## 2022-02-09 DIAGNOSIS — E11.9 TYPE 2 DIABETES MELLITUS WITHOUT COMPLICATION, WITHOUT LONG-TERM CURRENT USE OF INSULIN (H): ICD-10-CM

## 2022-02-09 DIAGNOSIS — E11.9 DIABETES MELLITUS, TYPE 2 (H): Primary | ICD-10-CM

## 2022-02-09 RX ORDER — INSULIN GLARGINE 100 [IU]/ML
INJECTION, SOLUTION SUBCUTANEOUS
Qty: 15 ML | Refills: 3 | COMMUNITY
Start: 2022-02-09 | End: 2022-03-18

## 2022-02-09 NOTE — TELEPHONE ENCOUNTER
Might be covered as DME. I'll send the scripts in [place] to [facility] to look into this.

## 2022-02-09 NOTE — TELEPHONE ENCOUNTER
PRIOR AUTHORIZATION DENIED    Medication: Freestyle Althea 2 PA Denied    Denial Date: 2/4/2022    Denial Rational:     Appeal Information:

## 2022-02-18 ENCOUNTER — TELEPHONE (OUTPATIENT)
Dept: ENDOCRINOLOGY | Facility: CLINIC | Age: 42
End: 2022-02-18
Payer: COMMERCIAL

## 2022-02-18 ENCOUNTER — CARE COORDINATION (OUTPATIENT)
Dept: EDUCATION SERVICES | Facility: CLINIC | Age: 42
End: 2022-02-18
Payer: COMMERCIAL

## 2022-02-18 DIAGNOSIS — E11.9 DIABETES MELLITUS, TYPE 2 (H): Primary | ICD-10-CM

## 2022-02-18 NOTE — TELEPHONE ENCOUNTER
Diabetes Educator Note:     Called Sandy about some problems she's been having with getting the Althea 2 covered.      She states that she got a call from [facility] and they are sending the reader and the sensors.     Discussed whether or not she wants to use the reader or would prefer to use her phone.  She states that she would prefer to use her phone.  Discussed that if she is using her phone, she would not be able to use her reader to scan the same sensor.  The Althea 2 can only be scanned with one device per sensor.      Discussed skin adhesion.  Sending order for Sure Prep to pharmacy for coverage.     She states that she bought a [employer] blood glucose meter before her insurance changed and is wondering if she can get a better quality meter.  Order for testing supplies also sent to pharmacy for meter that is covered by insurance.      Instructions sent for linking her phone morena with the [employer] cloud.      She'll call if she has any other questions.       LULI PazN, RN, [facility]  Certified Diabetes Care and   [facility] and Diabetes   Clinics and Surgery [place]  Clinic [id number]  Phone [phone]

## 2022-02-18 NOTE — PROGRESS NOTES
Diabetes Educator Note:     Sure Prep not carried by [facility].   Substitute Skin Tac for better sensor adhesion.     Chaparrita De Jesus, BSN, RN, [facility]  Certified Diabetes Care and   [facility] and Diabetes  [facility] [place]  Clinic 6-370  Phone [phone]

## 2022-03-06 ENCOUNTER — LAB (OUTPATIENT)
Dept: LAB | Facility: CLINIC | Age: 42
End: 2022-03-06
Payer: COMMERCIAL

## 2022-03-06 DIAGNOSIS — E11.9 TYPE 2 DIABETES MELLITUS WITHOUT COMPLICATION, WITHOUT LONG-TERM CURRENT USE OF INSULIN (H): ICD-10-CM

## 2022-03-06 LAB
ALT SERPL W P-5'-P-CCNC: 37 U/L (ref 0–50)
AST SERPL W P-5'-P-CCNC: 23 U/L (ref 0–45)
CREAT SERPL-MCNC: 0.66 MG/DL (ref 0.52–1.04)
CREAT UR-MCNC: 58 MG/DL
GFR SERPL CREATININE-BSD FRML MDRD: >90 ML/MIN/1.73M2
HBA1C MFR BLD: 6.8 % (ref 0–5.6)
MICROALBUMIN UR-MCNC: 7 MG/L
MICROALBUMIN/CREAT UR: 12.07 MG/G CR (ref 0–25)
TSH SERPL DL<=0.005 MIU/L-ACNC: 1.17 MU/L (ref 0.4–4)

## 2022-03-06 PROCEDURE — 84460 ALANINE AMINO (ALT) (SGPT): CPT

## 2022-03-06 PROCEDURE — 83036 HEMOGLOBIN GLYCOSYLATED A1C: CPT

## 2022-03-06 PROCEDURE — 84443 ASSAY THYROID STIM HORMONE: CPT

## 2022-03-06 PROCEDURE — 82043 UR ALBUMIN QUANTITATIVE: CPT

## 2022-03-06 PROCEDURE — 82565 ASSAY OF CREATININE: CPT

## 2022-03-06 PROCEDURE — 36415 COLL VENOUS BLD VENIPUNCTURE: CPT

## 2022-03-06 PROCEDURE — 84450 TRANSFERASE (AST) (SGOT): CPT

## 2022-03-11 ASSESSMENT — ENCOUNTER SYMPTOMS
NAIL CHANGES: 0
POOR WOUND HEALING: 0
SKIN CHANGES: 0

## 2022-03-17 NOTE — PROGRESS NOTES
[facility] Dermatology Note  Encounter Date: Mar 18, 2022  Office Visit     Dermatology Problem List:  1. Pink patch right shin, previously had been on left as well. Ddx psoriasis v eczema   2. Inflamed acrochordons s/p cryo  3. ISKs 2x, right cheek s/p cryo  ____________________________________________    Assessment & Plan:     # Inflamed acrochordons, x1 around left eye and x1 right eye  - Cryo today, see procedure note below    # ISK, left lower eyelid  - Cryo today, see procedure below    Procedures Performed:   - Cryotherapy procedure note, location(s): see above. After verbal consent and discussion of risks and benefits including, but not limited to, dyspigmentation/scar, blister, and pain, 3 lesion(s) was(were) treated with 1-2 mm freeze border for 1-2 cycles with liquid nitrogen. Post cryotherapy instructions were provided.    Follow-up: prn for new or changing lesions    Staff and Scribe:     Scribe Disclosure:  I, Manjinder Hendrickson, am serving as a scribe to document services personally performed by Ivanna Owen MD based on data collection and the provider's statements to me.     Provider Disclosure:   The documentation recorded by the scribe accurately reflects the services I personally performed and the decisions made by me.    Ivanna Owen MD    Department of Dermatology  [facility]: Phone: [phone], Fax: [phone]  3/21/2022     ____________________________________________    CC: RECHECK (Sandy is following up for skin tags around the eyes, reports no changes)    HPI:  Ms. Sandy Person is a(n) 41 year old female who presents today as a return patient for follow-up. Last seen by me on 1/26/2021, at which time patient underwent cryo for treatment of inflamed acrochordons and inflamed seborrheic keratoses.    Today, patient reports that her seborrheic keratoses went away  after freezing, but the skin tags are still present. Maybe a little smaller.    Patient is otherwise feeling well, without additional skin concerns.    Labs Reviewed:  N/A    Physical Exam:  Vitals: There were no vitals taken for this visit.  SKIN: Focused examination of face was performed.  - There are inflamed skin colored pedunculated papules around the left eye x1 and around the right eye x1.  - There is a tan to brown waxy stuck on papule with surrounding erythema on the left lower eyelid.    - No other lesions of concern on areas examined.     Medications:  Current Outpatient Medications   Medication     blood glucose (NO BRAND SPECIFIED) lancets standard     blood glucose (NO BRAND SPECIFIED) test strip     blood glucose monitoring (NO BRAND SPECIFIED) meter device kit     Continuous Blood Gluc Sensor (FREESTYLE SATURNINO 2 SENSOR) MISC     glipiZIDE (GLIPIZIDE XL) 10 MG 24 hr tablet     insulin glargine (LANTUS SOLOSTAR) 100 UNIT/ML pen     insulin pen needle (BD MELISSA U/F) 32G X 4 MM miscellaneous     metFORMIN (GLUCOPHAGE) 500 MG tablet     Ostomy Supplies (SKIN TAC ADHESIVE BARRIER WIPE) MISC     No current facility-administered medications for this visit.      Past Medical History:   There is no problem list on file for this patient.    Past Medical History:   Diagnosis Date     Abnormal Pap smear of cervix      Diabetes (H)      History of human papillomavirus infection         CC No referring provider defined for this encounter. on close of this encounter.

## 2022-03-18 ENCOUNTER — OFFICE VISIT (OUTPATIENT)
Dept: DERMATOLOGY | Facility: CLINIC | Age: 42
End: 2022-03-18
Payer: COMMERCIAL

## 2022-03-18 ENCOUNTER — VIRTUAL VISIT (OUTPATIENT)
Dept: ENDOCRINOLOGY | Facility: CLINIC | Age: 42
End: 2022-03-18
Payer: COMMERCIAL

## 2022-03-18 DIAGNOSIS — L82.0 INFLAMED SEBORRHEIC KERATOSIS: ICD-10-CM

## 2022-03-18 DIAGNOSIS — E11.9 TYPE 2 DIABETES MELLITUS WITHOUT COMPLICATION, WITHOUT LONG-TERM CURRENT USE OF INSULIN (H): ICD-10-CM

## 2022-03-18 DIAGNOSIS — L91.8 INFLAMED SKIN TAG: Primary | ICD-10-CM

## 2022-03-18 PROCEDURE — 17110 DESTRUCTION B9 LES UP TO 14: CPT | Performed by: DERMATOLOGY

## 2022-03-18 PROCEDURE — 99215 OFFICE O/P EST HI 40 MIN: CPT | Mod: 95 | Performed by: PHYSICIAN ASSISTANT

## 2022-03-18 RX ORDER — INSULIN GLARGINE 100 [IU]/ML
INJECTION, SOLUTION SUBCUTANEOUS
Qty: 15 ML | Refills: 3 | Status: SHIPPED | OUTPATIENT
Start: 2022-03-18 | End: 2022-05-12

## 2022-03-18 ASSESSMENT — PAIN SCALES - GENERAL: PAINLEVEL: NO PAIN (0)

## 2022-03-18 NOTE — PATIENT INSTRUCTIONS
Cryotherapy    What is it?    Use of a very cold liquid, such as liquid nitrogen, to freeze and destroy abnormal skin cells that need to be removed    What should I expect?    Tenderness and redness    A small blister that might grow and fill with dark purple blood. There may be crusting.    More than one treatment may be needed if the lesions do not go away.    How do I care for the treated area?    Gently wash the area with your hands when bathing.    Use a thin layer of Vaseline to help with healing. You may use a Band-Aid.     The area should heal within 7-10 days and may leave behind a pink or lighter color.     Do not use an antibiotic or Neosporin ointment.     You may take acetaminophen (Tylenol) for pain.     Call your doctor if you have:    Severe pain    Signs of infection (warmth, redness, cloudy yellow drainage, and or a bad smell)    Questions or concerns    Who should I call with questions?       [facility]: [phone]       [facility]: [phone]       For urgent needs outside of business hours call the [facility] at [phone] and ask for the dermatology resident on call

## 2022-03-18 NOTE — LETTER
3/18/2022       RE: Sandy Person  [address] [place] [facility] [address]     Dear Colleague,    Thank you for referring your patient, Sandy Person, to the [facility] [place] [facility]. Please see a copy of my visit note below.    [facility] Dermatology Note  Encounter Date: Mar 18, 2022  Office Visit     Dermatology Problem List:  1. Pink patch right shin, previously had been on left as well. Ddx psoriasis v eczema   2. Inflamed acrochordons s/p cryo  3. ISKs 2x, right cheek s/p cryo  ____________________________________________    Assessment & Plan:     # Inflamed acrochordons, x1 around left eye and x1 right eye  - Cryo today, see procedure note below    # ISK, left lower eyelid  - Cryo today, see procedure below    Procedures Performed:   - Cryotherapy procedure note, location(s): see above. After verbal consent and discussion of risks and benefits including, but not limited to, dyspigmentation/scar, blister, and pain, 3 lesion(s) was(were) treated with 1-2 mm freeze border for 1-2 cycles with liquid nitrogen. Post cryotherapy instructions were provided.    Follow-up: prn for new or changing lesions    Staff and Scribe:     Scribe Disclosure:  I, Manjinder Hendrickson, am serving as a scribe to document services personally performed by Ivanna Owen MD based on data collection and the provider's statements to me.     Provider Disclosure:   The documentation recorded by the scribe accurately reflects the services I personally performed and the decisions made by me.    Ivanna Owen MD    Department of Dermatology  [facility]: Phone: [phone], Fax: [phone]  3/21/2022     ____________________________________________    CC: RECHECK (Sandy is following up for skin tags around the eyes, reports no  changes)    HPI:  Ms. Sandy Person is a(n) 41 year old female who presents today as a return patient for follow-up. Last seen by me on 1/26/2021, at which time patient underwent cryo for treatment of inflamed acrochordons and inflamed seborrheic keratoses.    Today, patient reports that her seborrheic keratoses went away after freezing, but the skin tags are still present. Maybe a little smaller.    Patient is otherwise feeling well, without additional skin concerns.    Labs Reviewed:  N/A    Physical Exam:  Vitals: There were no vitals taken for this visit.  SKIN: Focused examination of face was performed.  - There are inflamed skin colored pedunculated papules around the left eye x1 and around the right eye x1.  - There is a tan to brown waxy stuck on papule with surrounding erythema on the left lower eyelid.    - No other lesions of concern on areas examined.     Medications:  Current Outpatient Medications   Medication     blood glucose (NO BRAND SPECIFIED) lancets standard     blood glucose (NO BRAND SPECIFIED) test strip     blood glucose monitoring (NO BRAND SPECIFIED) meter device kit     Continuous Blood Gluc Sensor (FREESTYLE SATURNINO 2 SENSOR) MISC     glipiZIDE (GLIPIZIDE XL) 10 MG 24 hr tablet     insulin glargine (LANTUS SOLOSTAR) 100 UNIT/ML pen     insulin pen needle (BD MELISSA U/F) 32G X 4 MM miscellaneous     metFORMIN (GLUCOPHAGE) 500 MG tablet     Ostomy Supplies (SKIN TAC ADHESIVE BARRIER WIPE) MISC     No current facility-administered medications for this visit.      Past Medical History:   There is no problem list on file for this patient.    Past Medical History:   Diagnosis Date     Abnormal Pap smear of cervix      Diabetes (H)      History of human papillomavirus infection         CC No referring provider defined for this encounter. on close of this encounter.

## 2022-03-18 NOTE — PROGRESS NOTES
Due to the COVID 19 pandemic this visit was converted to a video visit in order to help prevent spread of infection in this patient and the general population.    Time of start: 9:30 am  Time of end: 9:48 am  Total duration of video visit: 18 minutes.    HPI  Sandy Person is a 41 year old female with type 2 diabetes mellitus. Video visit for diabetes follow up today.  Pt was seen by Dr. Ishan Duenas in Dec 2021 and me in 2/2022.  Pt gives a hx of type 2 diabetes dx 10 years ago.  She has no known retinopathy, nephropathy or neuropathy.  She has a hx of obesity and is undergoing IVF.  She had been taking Rybelsus which was discontinued in late Nov 2021 by her fertility provider.   She was instructed to remain on Metformin 1000 mg BID and Glipizide 10 mg each am.  Last visit, I added low dose Lantus at bedtime due to high overnight/fasting blood sugar values which has been helpful.  For her diabetes, she is currently taking Metformin 500 mg 2 tab po BID, Glipizide 10 mg in am and Lantus 7 units subcutaneous at bedtime.  Most recent A1C was 6.8 % on 3/6/2022. Previous A1C was 7.2 % on 12/16/2021.  I reviewed and scanned her Freestyle Libre2 sensor download data in her her chart below.  Her blood sugar control is good at this time with an average glucose 135 with SD 37 and estimated A1C 6.5 %.  Her blood sugar is in target 88 % of the time and above target 12 % of the time. No hypoglycemia.  On ROS today, she states she has been working on decreasing carbs in her diet and eating healthy and remaining active.   Pt was in good spirits during our visit today.  She is undergoing IVF. She hopes to have an embryo implanted in May 2022.  LMP was 2/24/2022.  She had mild rash in neck area that has resolved.  Pt denies blurred vision, n/v, SOB at rest, cough, fever or chills.  She denies chest pain, abd pain, diarrhea, dysuria or hematuria.  No sx of neuropathy and she denies foot ulcers.  Sandy received the COVID vaccines and  COVID booster.    Diabetes Care  Retinopathy: none; pt seen by [facility] in Spring 2021 without retinopathy per patient.  Nephropathy:none; urine microalbuminuria negative in 3/2022.  Neuropathy:none.  Foot Exam:no exam.  Taking aspirin: no.  Lipids: no LDL.  CAD: no.  Mental health: no hx of depression.  Insulin: Basal insulin.  DM meds: Metformin and Glipizide.  Testing:  Freestyle Libre2 sensor.            ROS  See under HPI.    Allergies  No Known Allergies    Medications  Current Outpatient Medications   Medication Sig Dispense Refill     blood glucose (NO BRAND SPECIFIED) lancets standard Use to test blood sugar 4 times daily or as directed. 100 each 3     blood glucose (NO BRAND SPECIFIED) test strip Use to test blood sugar 4 times daily or as directed. 400 strip 3     blood glucose monitoring (NO BRAND SPECIFIED) meter device kit Use to test blood sugar 4 times daily or as directed. 1 kit 0     Continuous Blood Gluc Sensor (FREESTYLE SATURNINO 2 SENSOR) MISC 1 each every 14 days 1 each every 14 days. Change every 14 days. 2 each 5     glipiZIDE (GLIPIZIDE XL) 10 MG 24 hr tablet Take 1 tablet (10 mg) by mouth daily 90 tablet 1     insulin glargine (LANTUS SOLOSTAR) 100 UNIT/ML pen Inject 5 units subcutaneous at hs. 15 mL 3     insulin pen needle (BD MELISSA U/F) 32G X 4 MM miscellaneous Use with insulin. 100 each 3     metFORMIN (GLUCOPHAGE) 500 MG tablet Take 2 tabs po BID with food. 360 tablet 1     Ostomy Supplies (SKIN TAC ADHESIVE BARRIER WIPE) MISC 1 each every 14 days Apply to skin prior to placing Saturnino sensor. 50 each 3   Metformin 500 mg 2 tab po BID.      Family History  family history includes Cancer in her father, maternal grandmother, and mother; Diabetes in her father, maternal grandmother, mother, and paternal grandmother; Heart Disease in her father and mother.    Social History   reports that she has never smoked. She has never used smokeless tobacco. She reports current alcohol use. She reports that she  does not use drugs.     Past Medical History  Past Medical History:   Diagnosis Date     Abnormal Pap smear of cervix      Diabetes (H)      History of human papillomavirus infection    Obesity.    Past Surgical History:   Procedure Laterality Date     COLPOSCOPY       TONSILLECTOMY         Physical Exam    See under HPI.    RESULTS  Creatinine   Date Value Ref Range Status   03/06/2022 0.66 0.52 - 1.04 mg/dL Final     GFR Estimate   Date Value Ref Range Status   03/06/2022 >90 >60 mL/min/1.73m2 Final     Comment:     Effective December 21, 2021 eGFRcr in adults is calculated using the 2021 CKD-EPI creatinine equation which includes age and gender (Morteza et al., NEJ, DOI: 10.1056/VUAKuy2012613)     Hemoglobin A1C   Date Value Ref Range Status   03/06/2022 6.8 (H) 0.0 - 5.6 % Final     Comment:     Normal <5.7%   Prediabetes 5.7-6.4%    Diabetes 6.5% or higher     Note: Adopted from ADA consensus guidelines.     ALT   Date Value Ref Range Status   03/06/2022 37 0 - 50 U/L Final     AST   Date Value Ref Range Status   03/06/2022 23 0 - 45 U/L Final     TSH   Date Value Ref Range Status   03/06/2022 1.17 0.40 - 4.00 mU/L Final         ASSESSMENT/PLAN:    1.  TYPE 2 DIABETES MELLITUS: Sandy's glycemic control is good at this time.  A1C 6.8 % on 3/6/2022.  Continue Lantus 7 units subcutaneous at bedtime and current dose of Glipizide and Metformin.  Most recent creat was 0.66 with GFR > 90 mL/min in March 2022.   She will continue to monitor her blood sugar with her Freestyle Libre2 sensor.  Sandy was seen by Oph in Spring 2021 without retinopathy per patient.  She denies hx of nephropathy. Her urine microalbuminuria and creat/GFR were normal in 3/2022.  No sx of neuropathy. Denies foot ulcers.  I have no vitals today.  Sandy received the COVID vaccines and COVID booster.  She also received the flu vaccine this season.    2.  WEIGHT: Encouraged her to continue to make healthy food choices and remain active.    3.   INFERTILITY: Undergoing IVF treatments as above.  LMP was 2/24/2022.    4. FOLLOW UP: with me in 3 months.      Time spent reviewing chart,labs and Freestyle Althea sensor download today = 10 minutes.  Time for video visit today = 18 minutes.  Time for documentation today = 15 minutes.    TOTAL TIME FOR VISIT TODAY = 43 minutes.    Vesta Arceo PA-C      Answers for HPI/ROS submitted by the patient on 3/11/2022  General Symptoms: No  Skin Symptoms: Yes  HENT Symptoms: No  EYE SYMPTOMS: No  HEART SYMPTOMS: No  LUNG SYMPTOMS: No  INTESTINAL SYMPTOMS: No  URINARY SYMPTOMS: No  GYNECOLOGIC SYMPTOMS: No  BREAST SYMPTOMS: No  SKELETAL SYMPTOMS: No  BLOOD SYMPTOMS: No  NERVOUS SYSTEM SYMPTOMS: No  MENTAL HEALTH SYMPTOMS: No  Changes in hair: No  Changes in moles/birth marks: No  Itching: Yes  Rashes: Yes  Changes in nails: No  Acne: No  Hair in places you don't want it: No  Change in facial hair: No  Warts: No  Non-healing sores: No  Scarring: No  Flaking of skin: No  Color changes of hands/feet in cold : No  Sun sensitivity: No  Skin thickening: No

## 2022-03-18 NOTE — PROGRESS NOTES
Sandy is a 41 year old who is being evaluated via a billable video visit.      How would you like to obtain your AVS? Senor Sirloinhart  If the video visit is dropped, the invitation should be resent by: Text to cell phone: [phone]   Will anyone else be joining your video visit? No

## 2022-03-18 NOTE — NURSING NOTE
Dermatology Rooming Note    Sandy Person's goals for this visit include:   Chief Complaint   Patient presents with     RECHECK     Sandy is following up for skin tags around the eyes, reports no changes     Carly Owens, EMT

## 2022-03-18 NOTE — LETTER
3/18/2022       RE: Sandy Person  [address] [facility] [address]     Dear Colleague,    Thank you for referring your patient, Sandy Person, to the [facility] [place] at [facility]. Please see a copy of my visit note below.    Outcome for 03/15/22 12:09 PM: Althea emailed to provider    SIMON Salmon    Due to the COVID 19 pandemic this visit was converted to a video visit in order to help prevent spread of infection in this patient and the general population.    Time of start: 9:30 am  Time of end: 9:48 am  Total duration of video visit: 18 minutes.    HPI  Sandy Person is a 41 year old female with type 2 diabetes mellitus. Video visit for diabetes follow up today.  Pt was seen by Dr. Ishan Duenas in Dec 2021 and me in 2/2022.  Pt gives a hx of type 2 diabetes dx 10 years ago.  She has no known retinopathy, nephropathy or neuropathy.  She has a hx of obesity and is undergoing IVF.  She had been taking Rybelsus which was discontinued in late Nov 2021 by her fertility provider.   She was instructed to remain on Metformin 1000 mg BID and Glipizide 10 mg each am.  Last visit, I added low dose Lantus at bedtime due to high overnight/fasting blood sugar values which has been helpful.  For her diabetes, she is currently taking Metformin 500 mg 2 tab po BID, Glipizide 10 mg in am and Lantus 7 units subcutaneous at bedtime.  Most recent A1C was 6.8 % on 3/6/2022. Previous A1C was 7.2 % on 12/16/2021.  I reviewed and scanned her Freestyle Libre2 sensor download data in her her chart below.  Her blood sugar control is good at this time with an average glucose 135 with SD 37 and estimated A1C 6.5 %.  Her blood sugar is in target 88 % of the time and above target 12 % of the time. No hypoglycemia.  On ROS today, she states she has been working on decreasing carbs in her diet and eating healthy and remaining active.   Pt was in good  spirits during our visit today.  She is undergoing IVF. She hopes to have an embryo implanted in May 2022.  LMP was 2/24/2022.  She had mild rash in neck area that has resolved.  Pt denies blurred vision, n/v, SOB at rest, cough, fever or chills.  She denies chest pain, abd pain, diarrhea, dysuria or hematuria.  No sx of neuropathy and she denies foot ulcers.  Sandy received the COVID vaccines and COVID booster.    Diabetes Care  Retinopathy: none; pt seen by O[facility] in Spring 2021 without retinopathy per patient.  Nephropathy:none; urine microalbuminuria negative in 3/2022.  Neuropathy:none.  Foot Exam:no exam.  Taking aspirin: no.  Lipids: no LDL.  CAD: no.  Mental health: no hx of depression.  Insulin: Basal insulin.  DM meds: Metformin and Glipizide.  Testing:  Freestyle Libre2 sensor.            ROS  See under HPI.    Allergies  No Known Allergies    Medications  Current Outpatient Medications   Medication Sig Dispense Refill     blood glucose (NO BRAND SPECIFIED) lancets standard Use to test blood sugar 4 times daily or as directed. 100 each 3     blood glucose (NO BRAND SPECIFIED) test strip Use to test blood sugar 4 times daily or as directed. 400 strip 3     blood glucose monitoring (NO BRAND SPECIFIED) meter device kit Use to test blood sugar 4 times daily or as directed. 1 kit 0     Continuous Blood Gluc Sensor (FREESTYLE SATURNINO 2 SENSOR) MISC 1 each every 14 days 1 each every 14 days. Change every 14 days. 2 each 5     glipiZIDE (GLIPIZIDE XL) 10 MG 24 hr tablet Take 1 tablet (10 mg) by mouth daily 90 tablet 1     insulin glargine (LANTUS SOLOSTAR) 100 UNIT/ML pen Inject 5 units subcutaneous at hs. 15 mL 3     insulin pen needle (BD MELISSA U/F) 32G X 4 MM miscellaneous Use with insulin. 100 each 3     metFORMIN (GLUCOPHAGE) 500 MG tablet Take 2 tabs po BID with food. 360 tablet 1     Ostomy Supplies (SKIN TAC ADHESIVE BARRIER WIPE) MISC 1 each every 14 days Apply to skin prior to placing Saturnino sensor. 50 each 3    Metformin 500 mg 2 tab po BID.      Family History  family history includes Cancer in her father, maternal grandmother, and mother; Diabetes in her father, maternal grandmother, mother, and paternal grandmother; Heart Disease in her father and mother.    Social History   reports that she has never smoked. She has never used smokeless tobacco. She reports current alcohol use. She reports that she does not use drugs.     Past Medical History  Past Medical History:   Diagnosis Date     Abnormal Pap smear of cervix      Diabetes (H)      History of human papillomavirus infection    Obesity.    Past Surgical History:   Procedure Laterality Date     COLPOSCOPY       TONSILLECTOMY         Physical Exam    See under HPI.    RESULTS  Creatinine   Date Value Ref Range Status   03/06/2022 0.66 0.52 - 1.04 mg/dL Final     GFR Estimate   Date Value Ref Range Status   03/06/2022 >90 >60 mL/min/1.73m2 Final     Comment:     Effective December 21, 2021 eGFRcr in adults is calculated using the 2021 CKD-EPI creatinine equation which includes age and gender (Morteza et al., NE, DOI: 10.1056/QKRWtn0986480)     Hemoglobin A1C   Date Value Ref Range Status   03/06/2022 6.8 (H) 0.0 - 5.6 % Final     Comment:     Normal <5.7%   Prediabetes 5.7-6.4%    Diabetes 6.5% or higher     Note: Adopted from ADA consensus guidelines.     ALT   Date Value Ref Range Status   03/06/2022 37 0 - 50 U/L Final     AST   Date Value Ref Range Status   03/06/2022 23 0 - 45 U/L Final     TSH   Date Value Ref Range Status   03/06/2022 1.17 0.40 - 4.00 mU/L Final         ASSESSMENT/PLAN:    1.  TYPE 2 DIABETES MELLITUS: Sandy's glycemic control is good at this time.  A1C 6.8 % on 3/6/2022.  Continue Lantus 7 units subcutaneous at bedtime and current dose of Glipizide and Metformin.  Most recent creat was 0.66 with GFR > 90 mL/min in March 2022.   She will continue to monitor her blood sugar with her Freestyle Libre2 sensor.  Sandy was seen by Oph in Spring 2021  without retinopathy per patient.  She denies hx of nephropathy. Her urine microalbuminuria and creat/GFR were normal in 3/2022.  No sx of neuropathy. Denies foot ulcers.  I have no vitals today.  Sandy received the COVID vaccines and COVID booster.  She also received the flu vaccine this season.    2.  WEIGHT: Encouraged her to continue to make healthy food choices and remain active.    3.  INFERTILITY: Undergoing IVF treatments as above.  LMP was 2/24/2022.    4. FOLLOW UP: with me in 3 months.      Time spent reviewing chart,labs and Freestyle Althea sensor download today = 10 minutes.  Time for video visit today = 18 minutes.  Time for documentation today = 15 minutes.    TOTAL TIME FOR VISIT TODAY = 43 minutes.    Vesta Arceo PA-C      Answers for HPI/ROS submitted by the patient on 3/11/2022  General Symptoms: No  Skin Symptoms: Yes  HENT Symptoms: No  EYE SYMPTOMS: No  HEART SYMPTOMS: No  LUNG SYMPTOMS: No  INTESTINAL SYMPTOMS: No  URINARY SYMPTOMS: No  GYNECOLOGIC SYMPTOMS: No  BREAST SYMPTOMS: No  SKELETAL SYMPTOMS: No  BLOOD SYMPTOMS: No  NERVOUS SYSTEM SYMPTOMS: No  MENTAL HEALTH SYMPTOMS: No  Changes in hair: No  Changes in moles/birth marks: No  Itching: Yes  Rashes: Yes  Changes in nails: No  Acne: No  Hair in places you don't want it: No  Change in facial hair: No  Warts: No  Non-healing sores: No  Scarring: No  Flaking of skin: No  Color changes of hands/feet in cold : No  Sun sensitivity: No  Skin thickening: No        Sandy is a 41 year old who is being evaluated via a billable video visit.      How would you like to obtain your AVS? MyChart  If the video visit is dropped, the invitation should be resent by: Text to cell phone: [phone]   Will anyone else be joining your video visit? No

## 2022-03-18 NOTE — NURSING NOTE
Patient denies any changes since echeck-in regarding medication and allergies and states all information entered during echeck-in remains accurate.

## 2022-03-21 ENCOUNTER — TELEPHONE (OUTPATIENT)
Dept: ENDOCRINOLOGY | Facility: CLINIC | Age: 42
End: 2022-03-21
Payer: COMMERCIAL

## 2022-03-22 DIAGNOSIS — E11.9 TYPE 2 DIABETES MELLITUS WITHOUT COMPLICATION, WITHOUT LONG-TERM CURRENT USE OF INSULIN (H): Primary | ICD-10-CM

## 2022-03-22 RX ORDER — INSULIN GLARGINE 100 [IU]/ML
INJECTION, SOLUTION SUBCUTANEOUS
Qty: 10 ML | Refills: 3 | Status: SHIPPED | OUTPATIENT
Start: 2022-03-22 | End: 2022-10-12

## 2022-05-11 ENCOUNTER — OFFICE VISIT (OUTPATIENT)
Dept: URGENT CARE | Facility: URGENT CARE | Age: 42
End: 2022-05-11
Payer: COMMERCIAL

## 2022-05-11 VITALS
SYSTOLIC BLOOD PRESSURE: 148 MMHG | OXYGEN SATURATION: 96 % | WEIGHT: 230 LBS | DIASTOLIC BLOOD PRESSURE: 98 MMHG | BODY MASS INDEX: 38.27 KG/M2 | HEART RATE: 97 BPM | TEMPERATURE: 97.9 F

## 2022-05-11 DIAGNOSIS — U07.1 INFECTION DUE TO 2019 NOVEL CORONAVIRUS: ICD-10-CM

## 2022-05-11 DIAGNOSIS — Z20.822 PERSON UNDER INVESTIGATION FOR COVID-19: Primary | ICD-10-CM

## 2022-05-11 DIAGNOSIS — R05.9 COUGH: ICD-10-CM

## 2022-05-11 LAB
DEPRECATED S PYO AG THROAT QL EIA: NEGATIVE
FLUAV AG SPEC QL IA: NEGATIVE
FLUBV AG SPEC QL IA: NEGATIVE
GROUP A STREP BY PCR: NOT DETECTED
SARS-COV-2 RNA RESP QL NAA+PROBE: POSITIVE

## 2022-05-11 PROCEDURE — 99203 OFFICE O/P NEW LOW 30 MIN: CPT | Mod: CS | Performed by: PHYSICIAN ASSISTANT

## 2022-05-11 PROCEDURE — 87804 INFLUENZA ASSAY W/OPTIC: CPT | Performed by: PHYSICIAN ASSISTANT

## 2022-05-11 PROCEDURE — U0005 INFEC AGEN DETEC AMPLI PROBE: HCPCS | Performed by: PHYSICIAN ASSISTANT

## 2022-05-11 PROCEDURE — 87651 STREP A DNA AMP PROBE: CPT | Performed by: PHYSICIAN ASSISTANT

## 2022-05-11 PROCEDURE — U0003 INFECTIOUS AGENT DETECTION BY NUCLEIC ACID (DNA OR RNA); SEVERE ACUTE RESPIRATORY SYNDROME CORONAVIRUS 2 (SARS-COV-2) (CORONAVIRUS DISEASE [COVID-19]), AMPLIFIED PROBE TECHNIQUE, MAKING USE OF HIGH THROUGHPUT TECHNOLOGIES AS DESCRIBED BY CMS-2020-01-R: HCPCS | Performed by: PHYSICIAN ASSISTANT

## 2022-05-11 NOTE — PATIENT INSTRUCTIONS
Follow up immediately with severe headache, chest pain, or shortness of breath    Rest, isolate for results, hydrate, follow up if worsening or new symptoms  Unvaccinated household members to isolate until test results, if positive isolate for 2 weeks and follow up for testing if symptoms occur         Patient Education     Coronavirus Disease 2019 (COVID-19): Caring for Yourself or Others   If you or a household member have symptoms of COVID-19, follow the guidelines below. This will help you manage symptoms and keep the virus from spreading.  If you have symptoms of COVID-19  Stay home and contact your care team. They will tell you what to do.  Don t panic. Keep in mind that other illnesses can cause similar symptoms.  Stay away from work, school, and public places.  Limit physical contact with others in your home. Limit visitors. No kissing.  Clean surfaces you touch with disinfectant.  If you need to cough or sneeze, do it into a tissue. Then throw the tissue into the trash. If you don't have tissues, cough or sneeze into the bend of your elbow.  Don t share food or personal items with people in your household. This includes items like eating and drinking utensils, towels, and bedding.  Wear a cloth face mask around other people. During a public health emergency, medical face masks may be reserved for healthcare workers. You may need to make a cloth face mask of your own. You can do this using a bandana, T-shirt, or other cloth. The CDC has instructions on how to make a face mask. Wear the mask so that it covers both your nose and mouth.  If you need to go to a hospital or clinic, call ahead to let them know. Expect the care team to wear masks, gowns, gloves, and eye protection. You may need to come to a different entrance or wait in a separate room.  Follow all instructions from your care team.    If you ve been diagnosed with COVID-19  Stay home and away from others, including other people in your home. (This  is called self-isolation.) Don t leave home unless you need to get medical care. Don t go to work, school, or public places. Don t use buses, taxis, or other public transportation.  Follow all instructions from your care team.  If you need to go to a hospital or clinic, call ahead to let them know. Expect the care team to wear masks, gowns, gloves, and eye protection. You may need to come to a different entrance or wait in a separate room.  Wear a face mask over your nose and mouth. This is to protect others from your germs. If you can t wear a mask, your caregivers should wear one. You may need to make your own mask using a bandana, T-shirt, or other cloth. See the CDC s instructions on how to make a face mask.  Have no contact with pets and other animals.  Don t share food or personal items with people in your household. This includes items like eating and drinking utensils, towels, and bedding.  If you need to cough or sneeze, do it into a tissue. Then throw the tissue into the trash. If you don't have tissues, cough or sneeze into the bend of your elbow.  Wash your hands often.    Self-care at home   At this time, there is no medicine approved to prevent or treat COVID-19. The FDA has approved an antiviral medicine (called remdesivir) for people being treated in the hospital. This is for people 12 years and older who weigh more than about 88 pounds (40 kgs). In certain cases, it may also be used for people younger than 12 years or who weigh less than about 88 pounds (40 kgs)..  Currently, treatment is mostly aimed at helping your body while it fights the virus.  Getting extra rest.  Drink extra fluids 6 to 8 glasses of liquids each day), unless a doctor has told you not to. Ask your care team which fluids are best for you. Avoid fluids that contain caffeine or alcohol.  Taking over-the-counter (OTC) medicine to reduce pain and fever. Your care team will tell you which medicine to use.  If you ve been in the  hospital for COVID-19, follow your care team s instructions. They will tell you when to stop self-isolation. They may also have you change positions to help your breathing (such as lying on your belly).  If a test showed that you have COVID-19, you may be asked to donate plasma after you ve recovered. (This is called COVID-19 convalescent plasma donation.) The plasma may contain antibodies to help fight the virus in others. Experts don't know if the plasma will work well as a treatment. Research continues, and the FDA has approved it for emergency use in certain people with serious or life-threatening COVID-19. For more information, talk to your care team.  Caring for a sick person   Follow all instructions from the care team.  Wash your hands often.  Wear protective clothing as advised.  Make sure the sick person wears a mask. If they can't wear a mask, don t stay in the same room with them. If you must be in the same room, wear a face mask. Make sure the mask covers both the nose and mouth.  Keep track of the sick person s symptoms.  Clean surfaces often with disinfectant. This includes phones, kitchen counters, fridge door handles, bathroom surfaces, and others.  Don t let anyone share household items with the sick person. This includes eating and drinking tools, towels, sheets, or blankets.  Clean fabrics and laundry well.  Keep other people and pets away from the sick person.    When you can stop self-isolation  When you are sick with COVID-19, you should stay away from other people. This is called self-isolation. The rules for ending self-isolation depend on your health in general.  If you are normally healthy:  You can stop self-isolation when all 3 of these are true:  You ve had no fever--and no medicine that reduces fever--for at least 24 hours. And   Your symptoms are better, such as cough or trouble breathing. And   At least 10 days have passed since your symptoms first started.  Talk with your care team  before you leave home. They may tell you it s okay to leave, or they may give you different advice. You do not need to be re-tested.  If you have a weak immune system, or you ve had severe COVID-19:  Follow your care team s instructions. You may be asked to self-isolate for 10 days to 20 days after your symptoms first started. Your care team may want to re-test you for COVID-19.  Note: If you re being treated for cancer, have an immune disorder (such as HIV), or have had a transplant (organ or bone marrow), you may have a weak immune system.  If you've already had COVID-19 once:  If you had the virus over 3 months ago, and you ve been exposed again, treat it like you've never had COVID-19. Stay home, limit your contact with others, call your care team, and watch for symptoms.  If it s been less than 3 months since you had the virus, you re symptom-free, and you ve been exposed again: You don t need to self-isolate or be re-tested. But if you develop new symptoms that can t be linked to another illness, please self-isolate and contact your care team.  When you return to public settings  When you are well enough to go outside your home, follow the CDC s guidance on cloth face masks.  Anyone over age 2 should wear a face mask in public, especially when it's hard to socially distance. This includes public transit, public protests and marches, and crowded stores, bars, and restaurants.  Face masks are most likely to reduce the spread of COVID-19 when they are widely used by people who are out in the public.  Certain people should not wear a face covering. These include:  Children under 2 years old  Anyone with a health, developmental, or mental health condition that can be made worse by wearing a mask  Anyone who is unconscious or unable to remove the face covering without help. See the CDC's guidance on who should not wear a face mask.  When to call your care team  Call your care team right away if a sick person has any  of these:  Trouble breathing  Pain or pressure in chest  If a sick person has any of these, call 911:  Trouble breathing that gets worse  Pain or pressure in chest that gets worse  Blue tint to lips or face  Fast or irregular heartbeat  Confusion or trouble waking  Fainting or loss of consciousness  Coughing up blood  Going home from the hospital   If you have COVID-19 and were recently in the hospital:  Follow the instructions above for self-care and isolation.  Follow the hospital care team s instructions.  Ask questions if anything is unclear to you. Write down answers so you remember them.  Date last modified: 11/23/2020  [employer] last reviewed this educational content on 4/1/2020  This information has been modified by your health care provider with permission from the publisher.    7515-2049 [employer]. [address] [place] [address], [place] [address]. All rights reserved. This information is not intended as a substitute for professional medical care. Always follow your healthcare professional's instructions.

## 2022-05-11 NOTE — PROGRESS NOTES
SUBJECTIVE:   Sandy Person is a 42 year old female presenting with a chief complaint of chills and sore throat.  Onset of symptoms was 1 day(s) ago.  Course of illness is same.    Severity moderate  Current and Associated symptoms: nausea, cough  Treatment measures tried include Fluids and Rest.  Predisposing factors include None.    Past Medical History:   Diagnosis Date     Abnormal Pap smear of cervix      Diabetes (H)      History of human papillomavirus infection      Current Outpatient Medications   Medication Sig Dispense Refill     blood glucose (NO BRAND SPECIFIED) lancets standard Use to test blood sugar 4 times daily or as directed. 100 each 3     blood glucose (NO BRAND SPECIFIED) test strip Use to test blood sugar 4 times daily or as directed. 400 strip 3     blood glucose monitoring (NO BRAND SPECIFIED) meter device kit Use to test blood sugar 4 times daily or as directed. 1 kit 0     Continuous Blood Gluc Sensor (FREESTYLE SATURNINO 2 SENSOR) MISC 1 each every 14 days 1 each every 14 days. Change every 14 days. 2 each 5     insulin glargine (SEMGLEE) 100 UNIT/ML pen Inject 7 units subcutaneous at hs. Please dispense VIAL 10 mL 3     insulin pen needle (BD MELISSA U/F) 32G X 4 MM miscellaneous Use with insulin. 100 each 3     metFORMIN (GLUCOPHAGE) 500 MG tablet Take 2 tabs po BID with food. 360 tablet 1     Ostomy Supplies (SKIN TAC ADHESIVE BARRIER WIPE) MISC 1 each every 14 days Apply to skin prior to placing Saturnino sensor. 50 each 3     fish oil-omega-3 fatty acids 1000 MG capsule Take 2 g by mouth daily       glipiZIDE (GLIPIZIDE XL) 10 MG 24 hr tablet Take 1 tablet (10 mg) by mouth daily 90 tablet 1     nirmatrelvir and ritonavir (PAXLOVID) therapy pack Take 3 tablets by mouth 2 times daily 30 each 0     Prenatal Vit-Fe Fumarate-FA (PRENATAL VITAMIN) 27-0.8 MG TABS        Social History     Tobacco Use     Smoking status: Never Smoker     Smokeless tobacco: Never Used   Substance Use Topics      Alcohol use: Yes     Comment: 1-2 drinks per month       ROS:  Review of systems negative except as stated above.    OBJECTIVE:  BP (!) 148/98 (BP Location: Right arm, Patient Position: Sitting, Cuff Size: Adult Regular)   Pulse 97   Temp 97.9  F (36.6  C) (Oral)   Wt 104.3 kg (230 lb)   SpO2 96%   BMI 38.27 kg/m    GENERAL APPEARANCE: healthy, alert and no distress  HENT: ear canals and TM's normal.  Nose and mouth without ulcers, erythema or lesions  RESP: lungs clear to auscultation - no rales, rhonchi or wheezes  CV: regular rates and rhythm, normal S1 S2, no murmur noted  ABDOMEN:  soft, nontender, no HSM or masses and bowel sounds normal  NEURO: Normal strength and tone, sensory exam grossly normal,  normal speech and mentation  SKIN: no suspicious lesions or rashes      Results for orders placed or performed in visit on 05/11/22   Symptomatic; Unknown COVID-19 Virus (Coronavirus) by PCR Nose     Status: Abnormal    Specimen: Nose; Swab   Result Value Ref Range    SARS CoV2 PCR Positive (A) Negative, Testing sent to reference lab. Results will be returned via unsolicited result    Narrative    Testing was performed using the Aptima SARS-CoV-2 Assay on the  [employer] Instrument System. Additional information about this  Emergency Use Authorization (EUA) assay can be found via the Lab  Guide. This test should be ordered for the detection of SARS-CoV-2 in  individuals who meet SARS-CoV-2 clinical and/or epidemiological  criteria. Test performance is unknown in asymptomatic patients. This  test is for in vitro diagnostic use under the FDA EUA for  laboratories certified under CLIA to perform high complexity testing.  This test has not been FDA cleared or approved. A negative result  does not rule out the presence of PCR inhibitors in the specimen or  target RNA in concentration below the limit of detection for the  assay. The possibility of a false negative should be considered if  the patient's recent exposure  or clinical presentation suggests  COVID-19. This test was validated by the [facility]. This laboratory is certified under  the Clinical Laboratory Improvement Amendments of 1988 (CLIA-88) as  qualified to perform high complexity laboratory testing.   Streptococcus A Rapid Screen w/Reflex to PCR - Clinic Collect     Status: Normal    Specimen: Throat; Swab   Result Value Ref Range    Group A Strep antigen Negative Negative   Group A Streptococcus PCR Throat Swab     Status: Normal    Specimen: Throat; Swab   Result Value Ref Range    Group A strep by PCR Not Detected Not Detected    Narrative    The Xpert Xpress Strep A test, performed on the [employer], is a rapid, qualitative in vitro diagnostic test for the detection of Streptococcus pyogenes (Group A ß-hemolytic Streptococcus, Strep A) in throat swab specimens from patients with signs and symptoms of pharyngitis. The Xpert Xpress Strep A test can be used as an aid in the diagnosis of Group A Streptococcal pharyngitis. The assay is not intended to monitor treatment for Group A Streptococcus infections. The Xpert Xpress Strep A test utilizes an automated real-time polymerase chain reaction (PCR) to detect Streptococcus pyogenes DNA.   Influenza A/B antigen     Status: Normal    Specimen: Nasopharyngeal; Swab   Result Value Ref Range    Influenza A antigen Negative Negative    Influenza B antigen Negative Negative    Narrative    Test results must be correlated with clinical data. If necessary, results should be confirmed by a molecular assay or viral culture.       ASSESSMENT:  (Z20.822) Person under investigation for COVID-19  (primary encounter diagnosis)  (U07.1) Infection due to 2019 novel coronavirus  (R05.9) Cough  Plan: Streptococcus A Rapid Screen w/Reflex to PCR -         Clinic Collect, Symptomatic; Unknown COVID-19         Virus (Coronavirus) by PCR Nose, Group A         Streptococcus  PCR Throat Swab, Influenza A/B         antigen      Covid-19  Pt was evaluated during a global COVID-19 pandemic, which necessitated consideration that the patient might be at risk for infection with the SARS-CoV-2 virus that causes COVID-19.   Applicable protocols for evaluation were followed during the patient's care.   COVID-19 was considered as part of the patient's evaluation. The plan for testing is:  a test was ordered during this visit.    No severe headache, chest pain, shortness of breath  No additional infectious symptoms  Rest, isolate for results, hydrate, follow up if worsening or new symptoms  Unvaccinated HH members to isolate until test results, if positive isolate for 2 weeks and follow up for testing if symptoms occur  Red flags and emergent follow up discussed, and understood by patient  Follow up with PCP if symptoms worsen or fail to improve    Surgical mask, shield, gloves worn by provider      Patient Instructions   Follow up immediately with severe headache, chest pain, or shortness of breath    Rest, isolate for results, hydrate, follow up if worsening or new symptoms  Unvaccinated household members to isolate until test results, if positive isolate for 2 weeks and follow up for testing if symptoms occur         Patient Education     Coronavirus Disease 2019 (COVID-19): Caring for Yourself or Others   If you or a household member have symptoms of COVID-19, follow the guidelines below. This will help you manage symptoms and keep the virus from spreading.  If you have symptoms of COVID-19    Stay home and contact your care team. They will tell you what to do.    Don t panic. Keep in mind that other illnesses can cause similar symptoms.    Stay away from work, school, and public places.    Limit physical contact with others in your home. Limit visitors. No kissing.  Clean surfaces you touch with disinfectant.  If you need to cough or sneeze, do it into a tissue. Then throw the tissue into the  trash. If you don't have tissues, cough or sneeze into the bend of your elbow.  Don t share food or personal items with people in your household. This includes items like eating and drinking utensils, towels, and bedding.  Wear a cloth face mask around other people. During a public health emergency, medical face masks may be reserved for healthcare workers. You may need to make a cloth face mask of your own. You can do this using a bandana, T-shirt, or other cloth. The [facility] has instructions on how to make a face mask. Wear the mask so that it covers both your nose and mouth.  If you need to go to a hospital or clinic, call ahead to let them know. Expect the care team to wear masks, gowns, gloves, and eye protection. You may need to come to a different entrance or wait in a separate room.  Follow all instructions from your care team.    If you ve been diagnosed with COVID-19    Stay home and away from others, including other people in your home. (This is called self-isolation.) Don t leave home unless you need to get medical care. Don t go to work, school, or public places. Don t use buses, taxis, or other public transportation.    Follow all instructions from your care team.    If you need to go to a hospital or clinic, call ahead to let them know. Expect the care team to wear masks, gowns, gloves, and eye protection. You may need to come to a different entrance or wait in a separate room.    Wear a face mask over your nose and mouth. This is to protect others from your germs. If you can t wear a mask, your caregivers should wear one. You may need to make your own mask using a bandana, T-shirt, or other cloth. See the CDC s instructions on how to make a face mask.    Have no contact with pets and other animals.    Don t share food or personal items with people in your household. This includes items like eating and drinking utensils, towels, and bedding.    If you need to cough or sneeze, do it into a tissue. Then  throw the tissue into the trash. If you don't have tissues, cough or sneeze into the bend of your elbow.    Wash your hands often.    Self-care at home   At this time, there is no medicine approved to prevent or treat COVID-19. The FDA has approved an antiviral medicine (called remdesivir) for people being treated in the hospital. This is for people 12 years and older who weigh more than about 88 pounds (40 kgs). In certain cases, it may also be used for people younger than 12 years or who weigh less than about 88 pounds (40 kgs)..  Currently, treatment is mostly aimed at helping your body while it fights the virus.    Getting extra rest.    Drink extra fluids 6 to 8 glasses of liquids each day), unless a doctor has told you not to. Ask your care team which fluids are best for you. Avoid fluids that contain caffeine or alcohol.    Taking over-the-counter (OTC) medicine to reduce pain and fever. Your care team will tell you which medicine to use.  If you ve been in the hospital for COVID-19, follow your care team s instructions. They will tell you when to stop self-isolation. They may also have you change positions to help your breathing (such as lying on your belly).  If a test showed that you have COVID-19, you may be asked to donate plasma after you ve recovered. (This is called COVID-19 convalescent plasma donation.) The plasma may contain antibodies to help fight the virus in others. Experts don't know if the plasma will work well as a treatment. Research continues, and the FDA has approved it for emergency use in certain people with serious or life-threatening COVID-19. For more information, talk to your care team.  Caring for a sick person     Follow all instructions from the care team.    Wash your hands often.    Wear protective clothing as advised.    Make sure the sick person wears a mask. If they can't wear a mask, don t stay in the same room with them. If you must be in the same room, wear a face mask.  Make sure the mask covers both the nose and mouth.    Keep track of the sick person s symptoms.    Clean surfaces often with disinfectant. This includes phones, kitchen counters, fridge door handles, bathroom surfaces, and others.    Don t let anyone share household items with the sick person. This includes eating and drinking tools, towels, sheets, or blankets.    Clean fabrics and laundry well.    Keep other people and pets away from the sick person.    When you can stop self-isolation  When you are sick with COVID-19, you should stay away from other people. This is called self-isolation. The rules for ending self-isolation depend on your health in general.  If you are normally healthy:  You can stop self-isolation when all 3 of these are true:    You ve had no fever--and no medicine that reduces fever--for at least 24 hours. And     Your symptoms are better, such as cough or trouble breathing. And     At least 10 days have passed since your symptoms first started.  Talk with your care team before you leave home. They may tell you it s okay to leave, or they may give you different advice. You do not need to be re-tested.  If you have a weak immune system, or you ve had severe COVID-19:  Follow your care team s instructions. You may be asked to self-isolate for 10 days to 20 days after your symptoms first started. Your care team may want to re-test you for COVID-19.  Note: If you re being treated for cancer, have an immune disorder (such as HIV), or have had a transplant (organ or bone marrow), you may have a weak immune system.  If you've already had COVID-19 once:  If you had the virus over 3 months ago, and you ve been exposed again, treat it like you've never had COVID-19. Stay home, limit your contact with others, call your care team, and watch for symptoms.  If it s been less than 3 months since you had the virus, you re symptom-free, and you ve been exposed again: You don t need to self-isolate or be  re-tested. But if you develop new symptoms that can t be linked to another illness, please self-isolate and contact your care team.  When you return to public settings  When you are well enough to go outside your home, follow the CDC s guidance on cloth face masks.    Anyone over age 2 should wear a face mask in public, especially when it's hard to socially distance. This includes public transit, public protests and marches, and crowded stores, bars, and restaurants.    Face masks are most likely to reduce the spread of COVID-19 when they are widely used by people who are out in the public.  Certain people should not wear a face covering. These include:    Children under 2 years old    Anyone with a health, developmental, or mental health condition that can be made worse by wearing a mask    Anyone who is unconscious or unable to remove the face covering without help. See the CDC's guidance on who should not wear a face mask.  When to call your care team  Call your care team right away if a sick person has any of these:    Trouble breathing    Pain or pressure in chest  If a sick person has any of these, call 911:    Trouble breathing that gets worse    Pain or pressure in chest that gets worse    Blue tint to lips or face    Fast or irregular heartbeat    Confusion or trouble waking    Fainting or loss of consciousness    Coughing up blood  Going home from the hospital   If you have COVID-19 and were recently in the hospital:    Follow the instructions above for self-care and isolation.    Follow the hospital care team s instructions.    Ask questions if anything is unclear to you. Write down answers so you remember them.  Date last modified: 11/23/2020  [employer] last reviewed this educational content on 4/1/2020  This information has been modified by your health care provider with permission from the publisher.    8821-7946 [email or web address]. [address] [place] [id number], [place] [address]. All rights reserved.  This information is not intended as a substitute for professional medical care. Always follow your healthcare professional's instructions.

## 2022-05-12 ENCOUNTER — VIRTUAL VISIT (OUTPATIENT)
Dept: FAMILY MEDICINE | Facility: CLINIC | Age: 42
End: 2022-05-12
Payer: COMMERCIAL

## 2022-05-12 ENCOUNTER — TELEPHONE (OUTPATIENT)
Dept: NURSING | Facility: CLINIC | Age: 42
End: 2022-05-12
Payer: COMMERCIAL

## 2022-05-12 DIAGNOSIS — U07.1 INFECTION DUE TO 2019 NOVEL CORONAVIRUS: Primary | ICD-10-CM

## 2022-05-12 PROCEDURE — 99203 OFFICE O/P NEW LOW 30 MIN: CPT | Mod: 95 | Performed by: PHYSICIAN ASSISTANT

## 2022-05-12 RX ORDER — CHLORAL HYDRATE 500 MG
2 CAPSULE ORAL DAILY
COMMUNITY
End: 2022-10-12

## 2022-05-12 RX ORDER — PNV NO.95/FERROUS FUM/FOLIC AC 28MG-0.8MG
1 TABLET ORAL DAILY
Status: ON HOLD | COMMUNITY
End: 2023-01-31

## 2022-05-12 NOTE — PATIENT INSTRUCTIONS
The FDA has authorized the emergency use of certain medications for patients who are at high risk for progression to severe illness or death from COVID 19. These medications are investigational, and therefore, information is limited as they are still being studied.        COVID-19 Outpatient Treatments    Important: You can NOT be prescribed Molnupiravir or PAXLOVID if you will start taking the medicine more than 5 days after your symptoms have started.      PAXLOVID: https://www.fda.gov/media/608171/download      Please isolate according to CDC guidelines:  https://www.cdc.gov/coronavirus/2019-ncov/your-health/quarantine-isolation.html      PAXLOVID (nimatrelvir/ritonavir)  How it works  Two medicines (nirmatrelvir and ritonavir) are taken together. They stop the virus from growing. Less amount of virus is easier for your body to fight.  Benefits  Lowers risk of hospitalization and death from COVID-19.  How to take  Medicine comes in a daily container with both medicine tablets. Take by mouth twice daily (once in the morning, once at night) for 5 days.  The number of tablets to take varies by patient.  Don't chew or break capsules. Swallow hole.  When to take  Take as soon as possible after positive COVID-19 test result, and within 5 days of your first symptoms.  Who can take it  Patients must be 18 years or older, weigh at least 88 pounds and have tested positive for COVID-19.  Possible side effects  Can cause altered sense of taste, diarhea (loose, watery stools), high blood pressure, muscle aches.  Medication interactions  Several medicines may interact with PAXLOVID and may cause serious side effects.  Tell your care team about all the medicines you take, including prescription and over-the-counter medicines, vitamins and herbal supplements.  Your provider will review your medicines to make sure that you can safely take PAXLOVID.  Cautions  PAXLOVID is not recommended for patients with severe kidney or liver  disease. If you have mild kidney disease, the dose may need to be changed.  If you are pregnant or breastfeeding, talk to your care team about your options.  If you are sexually active, use effective birth control while taking PAXLOVID.          For informational purposes only. Not to replace the advice of your health care provider. Copyright   2022 [facility]. All rights reserved. Clinically reviewed by Charlotte Hernandez. [employer] 544256 - 01/22.

## 2022-05-12 NOTE — TELEPHONE ENCOUNTER
Coronavirus (COVID-19) Notification    Caller Name (Patient, parent, daughter/son, grandparent, etc)  patient    Reason for call  Notify of Positive Coronavirus (COVID-19) lab results, assess symptoms,  review [facility] recommendations    Lab Result    Lab test:  2019-nCoV rRt-PCR or SARS-CoV-2 PCR    Oropharyngeal AND/OR nasopharyngeal swabs is POSITIVE for 2019-nCoV RNA/SARS-COV-2 PCR (COVID-19 virus)      Gather patient reported symptoms   Assessment   Current Symptoms at time of phone call, reported by patient: (if no symptoms, document: No symptoms] Fatigue, sore throat, shortness of breath, cough- refused triage nurse   Date of symptom(s) onset (if applicable) 5/10/22     If at time of call, Patients symptoms have worsened, the Patient should contact 911 or have someone drive them to Emergency Dept promptly:      If Patient calling 911, inform 911 personal that you have tested positive for the Coronavirus (COVID-19).  Place mask on and await 911 to arrive.    If Emergency Dept, If possible, please have another adult drive you to the Emergency Dept but you need to wear mask when in contact with other people.      Treatment Options:   Patient classified as COVID treatment eligible by Epic high risk algorithm: Yes  Is the patient symptomatic at the time of result notification? Yes. Was the onset of symptoms within the last 5 days? Yes.   There are now oral medications available for the treatment of COVID-19.  Taking one of these medications within the first five days of symptoms (when people may not yet feel severely ill) has been shown to make people feel better, prevent them from getting sicker, and preventing hospitalization and death.   Does the patient agree to have a visit with a provider to discuss treatment options? Yes. Is the patient seen at [facility]?  No: Warm transfer caller to [phone] to be scheduled with a virtual urgent provider.  During transfer, instruct  on  appropriate time frame for visit     Review information with Patient    Your result was positive. This means you have COVID-19 (coronavirus).    How can I protect others?    These guidelines are for isolating before returning to work, school or .    If you DO have symptoms    Stay home and away from others     For at least 5 days after your symptoms started, AND    You are fever free for 24 hours (with no medicine that reduces fever), AND    Your other symptoms are better    Wear a mask for 10 full days anytime you are around others    If you DON'T have symptoms    Stay home and away from others for at least 5 days after your positive test    Wear a mask for 10 full days anytime you are around others    There may be different guidelines for healthcare facilities.  Please check with the specific sites before arriving.    If you have been told by a doctor that you were severely ill with COVID-19 or are immunocompromised, you should isolate for at least 10 days.    You should not go back to work until you meet the guidelines above for ending your home isolation. You don't need to be retested for COVID-19 before going back to work--studies show that you won't spread the virus if it's been at least 10 days since your symptoms started (or 20 days, if you have a weak immune system).    Employers, schools, and daycares: This is an official notice for this person's medical guidelines for returning in-person.  They must meet the above guidelines before going back to work, school or  in person.    You will receive a positive COVID-19 letter via [employer] or the mail soon with additional self-care information (exception, no letters will be sent to presurgical/preprocedure patients).    Would you like me to review some of that information with you now?  No    If you were tested for an upcoming procedure, please contact your provider for next steps.    Noelle Lamb

## 2022-05-12 NOTE — PROGRESS NOTES
Sandy is a 41 year old who is being evaluated via a billable video visit.      How would you like to obtain your AVS? MyChart  If the video visit is dropped, the invitation should be resent byWill anyone else be joining your video visit?      Video Start Time: 2:33 PM    Assessment and Plan:     (U07.1) Infection due to 2019 novel coronavirus  (primary encounter diagnosis)  Comment: mild symptoms, fully vaccinated w/booster, higher risk due to DM and BMI, currently undergoing fertility treatment  Plan: nirmatrelvir and ritonavir (PAXLOVID) therapy         pack  Discussed when to be seen promptly  She will discuss potential implications for fertility treatment and timing of next IVF with her ob    MASSBP score: high risk due to DM and BMI  Patient qualifies for COVID-19 treatment intervention.  Appropriate counseling was performed given EUA of drug and investigational phase/limited studies.   Full discussion with patient regarding medication options/risks/benefits/common side effects/potential drug interactions.  Discussed Paxlovid has significant risk for drug interactions. We reviewed all prescription/OTC/supplements patient is taking and patient was asked to HOLD the following: none    Avoid alcohol while on paxlovid (and for three days after last dose) due to increased risk of liver inflammation/jaundice.    Counseling provided on contraceptive management:    Paxlovid may reduce the efficacy of combined hormonal contraceptives and women should use an additional contraceptive method while on medication and for one cycle after last dose.  Please call the pharmacy prior to getting there to ensure they have your medication in stock and so they can review the medication in more detail with you.   Call if any questions/concerns.   If worsening symptoms including but not limited to persistent shortness of breath or chest pain, patient instructed to go to emergency room.         Estefania Galdamez,  JADE Jacobson   Sandy is a 41 year old who presents for the following health issues     HPI     She tested positive for covid yesterday   She is fully vaccinated and boosted  Symptoms started 5/10/22  She has congestion, cough, body aches  Yesterday she felt short of breath but today it has resolved  She denies fever/chills, shortness of breath, leg swelling     COVID-19 Symptom Review  How many days ago did these symptoms start? 3 days    Are any of the following symptoms significant for you?  New or worsening difficulty breathing? Yes    Please describe what kind of difficulty you are having breathing:Mild dyspnea (able to do ADLs without difficulty, mild shortness of breath with activities such as climbing one or two flights of stairs or walking briskly)    Worsening cough? Yes, I am coughing up mucus.    Fever or chills? Yes, I felt feverish or had chills.    Headache: YES    Sore throat: YES    Chest pain: no    Diarrhea: day 1-2, gone since    Body aches? YES    What treatments has patient tried? Acetaminophen, Cough syrup    Does patient live in a nursing home, group home, or shelter? no  Does patient have a way to get food/medications during quarantined? Yes, I have a friend or family member who can help me.            Review of Systems   See above      Objective           Vitals:  No vitals were obtained today due to virtual visit.    Physical Exam   GENERAL: Healthy, alert and no distress  EYES: Eyes grossly normal to inspection.  No discharge or erythema, or obvious scleral/conjunctival abnormalities.  RESP: No audible wheeze, cough, or visible cyanosis.  No visible retractions or increased work of breathing.    SKIN: Visible skin clear. No significant rash, abnormal pigmentation or lesions.  NEURO: Cranial nerves grossly intact.  Mentation and speech appropriate for age.  PSYCH: Mentation appears normal, affect normal/bright, judgement and insight intact, normal speech and appearance  well-groomed.          Video-Visit Details    Type of service:  Video Visit    Video End Time:2:44 PM    Originating Location (pt. Location): Home    Distant Location (provider location):  [facility]     Platform used for Video Visit: Debbie

## 2022-05-31 DIAGNOSIS — E11.9 TYPE 2 DIABETES MELLITUS WITHOUT COMPLICATION, WITHOUT LONG-TERM CURRENT USE OF INSULIN (H): ICD-10-CM

## 2022-05-31 RX ORDER — GLIPIZIDE 10 MG/1
10 TABLET, FILM COATED, EXTENDED RELEASE ORAL DAILY
Qty: 90 TABLET | Refills: 1 | Status: SHIPPED | OUTPATIENT
Start: 2022-05-31 | End: 2022-10-05

## 2022-05-31 NOTE — TELEPHONE ENCOUNTER
glipiZIDE (GLIPIZIDE XL) 10 MG 24 hr tablet    3/18/2022  [facility] Vesta Lopez PA-C    Endocrinology, Diabetes, and Metabolism

## 2022-06-06 NOTE — PROGRESS NOTES
Sandy Person  is being evaluated via a billable video visit.      How would you like to obtain your AVS? MyChart  For the video visit, send the invitation by: Text to cell phone: [phone]  Will anyone else be joining your video visit? No      Outcome for 06/06/22 3:22 PM: Data uploaded on SIMON Knox  Outcome for 06/10/22 9:07 AM: Althea emailed to provider  SIMON Humphreys

## 2022-06-14 ENCOUNTER — TELEPHONE (OUTPATIENT)
Dept: ENDOCRINOLOGY | Facility: CLINIC | Age: 42
End: 2022-06-14

## 2022-06-14 ENCOUNTER — VIRTUAL VISIT (OUTPATIENT)
Dept: ENDOCRINOLOGY | Facility: CLINIC | Age: 42
End: 2022-06-14
Payer: COMMERCIAL

## 2022-06-14 DIAGNOSIS — E11.9 TYPE 2 DIABETES MELLITUS WITHOUT COMPLICATION, WITHOUT LONG-TERM CURRENT USE OF INSULIN (H): Primary | ICD-10-CM

## 2022-06-14 PROCEDURE — 99214 OFFICE O/P EST MOD 30 MIN: CPT | Mod: 95 | Performed by: PHYSICIAN ASSISTANT

## 2022-06-14 NOTE — PROGRESS NOTES
Due to the COVID 19 pandemic this visit was converted to a video visit in order to help prevent spread of infection in this patient and the general population.    Time of start: 7:54 am  Time of end: 8:07 am   Total duration of video visit: 13 minutes.    HPI  Sandy Person is a 42 year old female with type 2 diabetes mellitus. Video visit for diabetes follow up today.  Pt gives a hx of type 2 diabetes dx 10 years ago.  She has no known retinopathy, nephropathy or neuropathy.  She has a hx of obesity and is undergoing IVF.  Since her last visit, she tested + for COVID19  On 5/11/2022 with mild symptoms which she states has resolved.  She had been taking Rybelsus which was discontinued in late Nov 2021 by her fertility provider.   She was instructed to remain on Metformin 1000 mg BID and Glipizide 10 mg each am.  Earlier this year, I  added low dose Lantus at bedtime due to high overnight/fasting blood sugar values which has been helpful.  For her diabetes, she is currently taking Metformin 500 mg 2 tab po BID, Glipizide 10 mg in am and Lantus 7 units subcutaneous at bedtime.  Most recent A1C was 6.8 % on 3/6/2022. Previous A1C was 7.2 % on 12/16/2021.  I reviewed and scanned her Freestyle Libre2 sensor download data in her her chart below.  Her blood sugar control is good at this time with an average glucose 134 with SD 42 and estimated A1C 6.5 %.  Her blood sugar is in target 86 % of the time and above target 12 % of the time.  On ROS today, she states she has been working on decreasing carbs in her diet and eating healthy and active.  She is undergoing IVF. She hopes to have an embryo implanted in Aug 2022.  LMP was 5/16/2022.  Pt denies blurred vision, n/v, SOB at rest, cough, fever or chills.  She denies chest pain, abd pain, diarrhea, dysuria or hematuria.  No sx of neuropathy and she denies foot ulcers.  Sandy received the COVID vaccines and COVID booster.    Diabetes Care  Retinopathy: none; pt seen by O[facility] in  Spring 2021 without retinopathy per patient.  Nephropathy:none; urine microalbuminuria negative in 3/2022.  Neuropathy:none.  Foot Exam:no exam.  Taking aspirin: no.  Lipids: no LDL.  CAD: no.  Mental health: no hx of depression.  Insulin: Basal insulin.  DM meds: Metformin and Glipizide.  Testing:  Freestyle Libre2 sensor.          ROS  See under HPI.    Allergies  No Known Allergies    Medications  Current Outpatient Medications   Medication Sig Dispense Refill     blood glucose (NO BRAND SPECIFIED) lancets standard Use to test blood sugar 4 times daily or as directed. 100 each 3     blood glucose (NO BRAND SPECIFIED) test strip Use to test blood sugar 4 times daily or as directed. 400 strip 3     blood glucose monitoring (NO BRAND SPECIFIED) meter device kit Use to test blood sugar 4 times daily or as directed. 1 kit 0     Continuous Blood Gluc Sensor (FREESTYLE SATURNINO 2 SENSOR) MISC 1 each every 14 days 1 each every 14 days. Change every 14 days. 2 each 5     fish oil-omega-3 fatty acids 1000 MG capsule Take 2 g by mouth daily       glipiZIDE (GLIPIZIDE XL) 10 MG 24 hr tablet Take 1 tablet (10 mg) by mouth daily 90 tablet 1     insulin glargine (SEMGLEE) 100 UNIT/ML pen Inject 7 units subcutaneous at hs. Please dispense VIAL 10 mL 3     insulin pen needle (BD MELISSA U/F) 32G X 4 MM miscellaneous Use with insulin. 100 each 3     metFORMIN (GLUCOPHAGE) 500 MG tablet Take 2 tabs po BID with food. 360 tablet 1     nirmatrelvir and ritonavir (PAXLOVID) therapy pack Take 3 tablets by mouth 2 times daily 30 each 0     Ostomy Supplies (SKIN TAC ADHESIVE BARRIER WIPE) MISC 1 each every 14 days Apply to skin prior to placing Saturnino sensor. 50 each 3     Prenatal Vit-Fe Fumarate-FA (PRENATAL VITAMIN) 27-0.8 MG TABS      Metformin 500 mg 2 tab po BID.      Family History  family history includes Cancer in her father, maternal grandmother, and mother; Diabetes in her father, maternal grandmother, mother, and paternal  grandmother; Heart Disease in her father and mother.    Social History   reports that she has never smoked. She has never used smokeless tobacco. She reports current alcohol use. She reports that she does not use drugs.     Past Medical History  Past Medical History:   Diagnosis Date     Abnormal Pap smear of cervix      Diabetes (H)      History of human papillomavirus infection    Obesity.    Past Surgical History:   Procedure Laterality Date     COLPOSCOPY       TONSILLECTOMY         Physical Exam    See under HPI.    RESULTS  Creatinine   Date Value Ref Range Status   03/06/2022 0.66 0.52 - 1.04 mg/dL Final     GFR Estimate   Date Value Ref Range Status   03/06/2022 >90 >60 mL/min/1.73m2 Final     Comment:     Effective December 21, 2021 eGFRcr in adults is calculated using the 2021 CKD-EPI creatinine equation which includes age and gender (Morteza et al., NE, DOI: 10.1056/PDMQmu3188569)     Hemoglobin A1C   Date Value Ref Range Status   03/06/2022 6.8 (H) 0.0 - 5.6 % Final     Comment:     Normal <5.7%   Prediabetes 5.7-6.4%    Diabetes 6.5% or higher     Note: Adopted from ADA consensus guidelines.     ALT   Date Value Ref Range Status   03/06/2022 37 0 - 50 U/L Final     AST   Date Value Ref Range Status   03/06/2022 23 0 - 45 U/L Final     TSH   Date Value Ref Range Status   03/06/2022 1.17 0.40 - 4.00 mU/L Final         ASSESSMENT/PLAN:    1.  TYPE 2 DIABETES MELLITUS: Sandy's glycemic control is good at this time.  Estimated A1C 6.5 % on her DexcomG6 sensor download data.  If her blood sugar is < 100 at bedtime, I asked her to hold her Lantus.  Continue Lantus 7 units subcutaneous at bedtime and current dose of Glipizide and Metformin.  Most recent creat was 0.66 with GFR > 90 mL/min in March 2022.   She will continue to monitor her blood sugar with her Freestyle Libre2 sensor.  Sandy was seen by Oph in Spring 2021 without retinopathy per patient. Reminded her to schedule her annual diabetic eye exam.  She  denies hx of nephropathy. Her urine microalbuminuria and creat/GFR were normal in 3/2022.  No sx of neuropathy. Denies foot ulcers.  No vitals today.  Sandy received the COVID vaccines and COVID booster.  She also received the flu vaccine this season.    2.  WEIGHT: Encouraged her to continue to make healthy food choices and remain active.    3.  INFERTILITY: Undergoing IVF treatments as above.  LMP was 5/16/2022.    4. FOLLOW UP: with me in 3 months.  A1C ordered today.    Time spent reviewing chart,labs and Freestyle Althea sensor download today = 10 minutes.  Time for video visit today = 13 minutes.  Time for documentation today = 15 minutes.    TOTAL TIME FOR VISIT TODAY = 38  minutes.    Vesta Arceo PA-C            Answers for HPI/ROS submitted by the patient on 6/12/2022  General Symptoms: No  Skin Symptoms: No  HENT Symptoms: No  EYE SYMPTOMS: No  HEART SYMPTOMS: No  LUNG SYMPTOMS: No  INTESTINAL SYMPTOMS: No  URINARY SYMPTOMS: No  GYNECOLOGIC SYMPTOMS: No  BREAST SYMPTOMS: No  SKELETAL SYMPTOMS: No  BLOOD SYMPTOMS: No  NERVOUS SYSTEM SYMPTOMS: No  MENTAL HEALTH SYMPTOMS: No

## 2022-06-14 NOTE — LETTER
6/14/2022       RE: Sandy Person  [address] [facility] [address]     Dear Colleague,    Thank you for referring your patient, Sandy Person, to the [facility] [place] at [facility]. Please see a copy of my visit note below.    Sandy Person  is being evaluated via a billable video visit.      How would you like to obtain your AVS? MyChart  For the video visit, send the invitation by: Text to cell phone: [phone]  Will anyone else be joining your video visit? No      Outcome for 06/06/22 3:22 PM: Data uploaded on SIMON Knox  Outcome for 06/10/22 9:07 AM: Althea emailed to provider  SIMON Humphreys            Due to the COVID 19 pandemic this visit was converted to a video visit in order to help prevent spread of infection in this patient and the general population.    Time of start: 7:54 am  Time of end: 8:07 am   Total duration of video visit: 13 minutes.    HPI  Sandy Person is a 42 year old female with type 2 diabetes mellitus. Video visit for diabetes follow up today.  Pt gives a hx of type 2 diabetes dx 10 years ago.  She has no known retinopathy, nephropathy or neuropathy.  She has a hx of obesity and is undergoing IVF.  Since her last visit, she tested + for COVID19  On 5/11/2022 with mild symptoms which she states has resolved.  She had been taking Rybelsus which was discontinued in late Nov 2021 by her fertility provider.   She was instructed to remain on Metformin 1000 mg BID and Glipizide 10 mg each am.  Earlier this year, I  added low dose Lantus at bedtime due to high overnight/fasting blood sugar values which has been helpful.  For her diabetes, she is currently taking Metformin 500 mg 2 tab po BID, Glipizide 10 mg in am and Lantus 7 units subcutaneous at bedtime.  Most recent A1C was 6.8 % on 3/6/2022. Previous A1C was 7.2 % on 12/16/2021.  I reviewed and scanned her Freestyle Libre2 sensor  download data in her her chart below.  Her blood sugar control is good at this time with an average glucose 134 with SD 42 and estimated A1C 6.5 %.  Her blood sugar is in target 86 % of the time and above target 12 % of the time.  On ROS today, she states she has been working on decreasing carbs in her diet and eating healthy and active.  She is undergoing IVF. She hopes to have an embryo implanted in Aug 2022.  LMP was 5/16/2022.  Pt denies blurred vision, n/v, SOB at rest, cough, fever or chills.  She denies chest pain, abd pain, diarrhea, dysuria or hematuria.  No sx of neuropathy and she denies foot ulcers.  Sandy received the COVID vaccines and COVID booster.    Diabetes Care  Retinopathy: none; pt seen by Oph in Spring 2021 without retinopathy per patient.  Nephropathy:none; urine microalbuminuria negative in 3/2022.  Neuropathy:none.  Foot Exam:no exam.  Taking aspirin: no.  Lipids: no LDL.  CAD: no.  Mental health: no hx of depression.  Insulin: Basal insulin.  DM meds: Metformin and Glipizide.  Testing:  Freestyle Libre2 sensor.          ROS  See under HPI.    Allergies  No Known Allergies    Medications  Current Outpatient Medications   Medication Sig Dispense Refill     blood glucose (NO BRAND SPECIFIED) lancets standard Use to test blood sugar 4 times daily or as directed. 100 each 3     blood glucose (NO BRAND SPECIFIED) test strip Use to test blood sugar 4 times daily or as directed. 400 strip 3     blood glucose monitoring (NO BRAND SPECIFIED) meter device kit Use to test blood sugar 4 times daily or as directed. 1 kit 0     Continuous Blood Gluc Sensor (FREESTYLE SATURNINO 2 SENSOR) [facility] 1 each every 14 days 1 each every 14 days. Change every 14 days. 2 each 5     fish oil-omega-3 fatty acids 1000 MG capsule Take 2 g by mouth daily       glipiZIDE (GLIPIZIDE XL) 10 MG 24 hr tablet Take 1 tablet (10 mg) by mouth daily 90 tablet 1     insulin glargine (SEMGLEE) 100 UNIT/ML pen Inject 7 units subcutaneous at  hs. Please dispense VIAL 10 mL 3     insulin pen needle (BD MELISSA U/F) 32G X 4 MM miscellaneous Use with insulin. 100 each 3     metFORMIN (GLUCOPHAGE) 500 MG tablet Take 2 tabs po BID with food. 360 tablet 1     nirmatrelvir and ritonavir (PAXLOVID) therapy pack Take 3 tablets by mouth 2 times daily 30 each 0     Ostomy Supplies (SKIN TAC ADHESIVE BARRIER WIPE) MISC 1 each every 14 days Apply to skin prior to placing Althea sensor. 50 each 3     Prenatal Vit-Fe Fumarate-FA (PRENATAL VITAMIN) 27-0.8 MG TABS      Metformin 500 mg 2 tab po BID.      Family History  family history includes Cancer in her father, maternal grandmother, and mother; Diabetes in her father, maternal grandmother, mother, and paternal grandmother; Heart Disease in her father and mother.    Social History   reports that she has never smoked. She has never used smokeless tobacco. She reports current alcohol use. She reports that she does not use drugs.     Past Medical History  Past Medical History:   Diagnosis Date     Abnormal Pap smear of cervix      Diabetes (H)      History of human papillomavirus infection    Obesity.    Past Surgical History:   Procedure Laterality Date     COLPOSCOPY       TONSILLECTOMY         Physical Exam    See under HPI.    RESULTS  Creatinine   Date Value Ref Range Status   03/06/2022 0.66 0.52 - 1.04 mg/dL Final     GFR Estimate   Date Value Ref Range Status   03/06/2022 >90 >60 mL/min/1.73m2 Final     Comment:     Effective December 21, 2021 eGFRcr in adults is calculated using the 2021 CKD-EPI creatinine equation which includes age and gender (Morteza et al., NEJ, DOI: 10.1056/XQCHgb8724745)     Hemoglobin A1C   Date Value Ref Range Status   03/06/2022 6.8 (H) 0.0 - 5.6 % Final     Comment:     Normal <5.7%   Prediabetes 5.7-6.4%    Diabetes 6.5% or higher     Note: Adopted from ADA consensus guidelines.     ALT   Date Value Ref Range Status   03/06/2022 37 0 - 50 U/L Final     AST   Date Value Ref Range Status    03/06/2022 23 0 - 45 U/L Final     TSH   Date Value Ref Range Status   03/06/2022 1.17 0.40 - 4.00 mU/L Final         ASSESSMENT/PLAN:    1.  TYPE 2 DIABETES MELLITUS: Sandy's glycemic control is good at this time.  Estimated A1C 6.5 % on her DexcomG6 sensor download data.  If her blood sugar is < 100 at bedtime, I asked her to hold her Lantus.  Continue Lantus 7 units subcutaneous at bedtime and current dose of Glipizide and Metformin.  Most recent creat was 0.66 with GFR > 90 mL/min in March 2022.   She will continue to monitor her blood sugar with her Freestyle Libre2 sensor.  Sandy was seen by Oph in Spring 2021 without retinopathy per patient. Reminded her to schedule her annual diabetic eye exam.  She denies hx of nephropathy. Her urine microalbuminuria and creat/GFR were normal in 3/2022.  No sx of neuropathy. Denies foot ulcers.  No vitals today.  Sandy received the COVID vaccines and COVID booster.  She also received the flu vaccine this season.    2.  WEIGHT: Encouraged her to continue to make healthy food choices and remain active.    3.  INFERTILITY: Undergoing IVF treatments as above.  LMP was 5/16/2022.    4. FOLLOW UP: with me in 3 months.  A1C ordered today.    Time spent reviewing chart,labs and Freestyle Althea sensor download today = 10 minutes.  Time for video visit today = 13 minutes.  Time for documentation today = 15 minutes.    TOTAL TIME FOR VISIT TODAY = 38  minutes.    Vesta Arceo PA-C      Answers for HPI/ROS submitted by the patient on 6/12/2022  General Symptoms: No  Skin Symptoms: No  HENT Symptoms: No  EYE SYMPTOMS: No  HEART SYMPTOMS: No  LUNG SYMPTOMS: No  INTESTINAL SYMPTOMS: No  URINARY SYMPTOMS: No  GYNECOLOGIC SYMPTOMS: No  BREAST SYMPTOMS: No  SKELETAL SYMPTOMS: No  BLOOD SYMPTOMS: No  NERVOUS SYSTEM SYMPTOMS: No  MENTAL HEALTH SYMPTOMS: No

## 2022-06-14 NOTE — NURSING NOTE
Patient denies any changes since echeck-in regarding medication and allergies and states all information entered during echeck-in remains accurate.  Mayelin Riley on 6/14/2022 at 7:41 AM

## 2022-06-14 NOTE — TELEPHONE ENCOUNTER
Attempted to reach patient to schedule follow up in the Endocrinology Clinic. No answer, LM on VM to call office back.    Schedule with EDGAR Branch in 3 months. Mayelin Riley on 6/14/2022 at 10:08 AM

## 2022-06-20 ENCOUNTER — TELEPHONE (OUTPATIENT)
Dept: ENDOCRINOLOGY | Facility: CLINIC | Age: 42
End: 2022-06-20
Payer: COMMERCIAL

## 2022-07-09 ENCOUNTER — HEALTH MAINTENANCE LETTER (OUTPATIENT)
Age: 42
End: 2022-07-09

## 2022-07-29 DIAGNOSIS — E11.9 TYPE 2 DIABETES MELLITUS WITHOUT COMPLICATION, WITHOUT LONG-TERM CURRENT USE OF INSULIN (H): ICD-10-CM

## 2022-08-02 ENCOUNTER — LAB (OUTPATIENT)
Dept: LAB | Facility: CLINIC | Age: 42
End: 2022-08-02
Payer: COMMERCIAL

## 2022-08-02 DIAGNOSIS — E11.9 TYPE 2 DIABETES MELLITUS WITHOUT COMPLICATION, WITHOUT LONG-TERM CURRENT USE OF INSULIN (H): ICD-10-CM

## 2022-08-02 LAB — HBA1C MFR BLD: 6.3 % (ref 0–5.6)

## 2022-08-02 PROCEDURE — 36415 COLL VENOUS BLD VENIPUNCTURE: CPT

## 2022-08-02 PROCEDURE — 83036 HEMOGLOBIN GLYCOSYLATED A1C: CPT

## 2022-08-12 DIAGNOSIS — E11.9 TYPE 2 DIABETES MELLITUS WITHOUT COMPLICATION, WITHOUT LONG-TERM CURRENT USE OF INSULIN (H): ICD-10-CM

## 2022-08-13 NOTE — TELEPHONE ENCOUNTER
"Continuous Blood Gluc Sensor (FREESTYLE SATURNINO 2 SENSOR) [facility]    6/14/2022  [facility] [place]     Vesta Arceo PA-C    Endocrinology, Diabetes, and Metabolism    \" FOLLOW UP: with me in 3 months    Nv: 9/9/22    "

## 2022-08-23 ENCOUNTER — TRANSFERRED RECORDS (OUTPATIENT)
Dept: HEALTH INFORMATION MANAGEMENT | Facility: CLINIC | Age: 42
End: 2022-08-23

## 2022-08-23 LAB — RETINOPATHY: NEGATIVE

## 2022-09-01 ENCOUNTER — TELEPHONE (OUTPATIENT)
Dept: ENDOCRINOLOGY | Facility: CLINIC | Age: 42
End: 2022-09-01

## 2022-09-01 NOTE — PROGRESS NOTES
Sandy Person  is being evaluated via a billable video visit.    How would you like to obtain your AVS? Keelr  For the video visit, send the invitation by: Text to cell phone: [phone]  Will anyone else be joining your video visit? No      Outcome for 09/01/22 8:55 AM: Data uploaded on SIMON Hobson  Outcome for 09/08/22 8:22 AM: Vidable message sent. Need current data. Althea data from 8/31/22.   Mayelin Riley, SIMON  Outcome for 09/08/22 8:43 AM: Althea emailed to provider. Spoke with patient. She has not had any sensors to wear since 8/31 and hasnt checked BG manually.   Mayelin Riley, VF

## 2022-09-01 NOTE — TELEPHONE ENCOUNTER
Eye Exam from Dr Guthrie, OP at [facility]. Copy labeled to scan.   Rina Weller RN on 9/1/2022 at 10:04 AM

## 2022-09-04 ENCOUNTER — HEALTH MAINTENANCE LETTER (OUTPATIENT)
Age: 42
End: 2022-09-04

## 2022-09-09 ENCOUNTER — VIRTUAL VISIT (OUTPATIENT)
Dept: ENDOCRINOLOGY | Facility: CLINIC | Age: 42
End: 2022-09-09
Payer: COMMERCIAL

## 2022-09-09 ENCOUNTER — TELEPHONE (OUTPATIENT)
Dept: ENDOCRINOLOGY | Facility: CLINIC | Age: 42
End: 2022-09-09

## 2022-09-09 DIAGNOSIS — E11.9 TYPE 2 DIABETES MELLITUS WITHOUT COMPLICATION, WITHOUT LONG-TERM CURRENT USE OF INSULIN (H): Primary | ICD-10-CM

## 2022-09-09 PROCEDURE — 99214 OFFICE O/P EST MOD 30 MIN: CPT | Mod: 95 | Performed by: PHYSICIAN ASSISTANT

## 2022-09-09 NOTE — LETTER
9/9/2022       RE: Sandy Person  [address] [facility] [address]     Dear Colleague,    Thank you for referring your patient, Sandy Person, to the [facility] ENDOCRINOLOGY CLINIC [place] at [facility]. Please see a copy of my visit note below.    Sandy Person  is being evaluated via a billable video visit.    How would you like to obtain your AVS? The Editorialist  For the video visit, send the invitation by: Text to cell phone: [phone]  Will anyone else be joining your video visit? No      Outcome for 09/01/22 8:55 AM: Data uploaded on [place]  eBttye Malhotra VF  Outcome for 09/08/22 8:22 AM: [email or web address] message sent. Need current data. Althea data from 8/31/22.   Mayelin Riley VF  Outcome for 09/08/22 8:43 AM: Althea emailed to provider. Spoke with patient. She has not had any sensors to wear since 8/31 and hasnt checked BG manually.   Mayelin Riley, VF            Due to the COVID 19 pandemic this visit was converted to a video visit in order to help prevent spread of infection in this patient and the general population.    Time of start: 7:57 am  Time of end: 8:13 am  Total duration of video visit: 16 minutes.    HPI  Sandy Person is a 42 year old female with type 2 diabetes mellitus. Video visit for diabetes follow up today.  Pt gives a hx of type 2 diabetes dx 10 years ago.  She has no known retinopathy, nephropathy or neuropathy.  She has a hx of obesity and is undergoing IVF.  Sandy tested + for COVID in May 2022 with mild symptoms which she states has resolved.  She had been taking Rybelsus which was discontinued in late Nov 2021 by her fertility provider.   She was instructed to remain on Metformin 1000 mg BID and Glipizide 10 mg each am.  Earlier this year, I  added low dose Lantus at bedtime due to high overnight/fasting blood sugar values which has been helpful.  For her diabetes, she is currently taking Metformin 500 mg 2 tab po BID,  Glipizide 10 mg in am and Semglee 7 units subcutaneous at bedtime.  Most recent A1C was 6.3 % on 8/2/2022 and her A1C was 6.8 % on 3/6/2022. Previous A1C was 7.2 % on 12/16/2021.  I reviewed and scanned her Freestyle Libre2 sensor download data in her her chart below.  Her current RX for Freestyle Libre2 sensors is pending. Not sure if her insurance will continue to cover the sensors.  The Freestyle LIbre2 sensor download data is from 8/18-8/31/2022.  Her average glucose was 124 with SD 23.  Her blood sugar is in target 95 % of the time and above target 5 % of the time. No hypoglycemia.  On ROS today, she states she has been working on decreasing carbs in her diet and eating healthy and remains active.  She walks during the week and hikes on weekends. Also doing some exercises to increase her strength.  She is undergoing IVF. She plans to have an embryo implanted later this month.  LMP was 9/4/2022.  Pt denies blurred vision, n/v, SOB at rest, cough, fever or chills.  She denies chest pain, abd pain, diarrhea, dysuria or hematuria.  No sx of neuropathy and she denies foot ulcers.    Diabetes Care  Retinopathy: none; pt seen by Oph 2 weeks ago. No retinopathy per patient.  Nephropathy:none; urine microalbuminuria negative in 3/2022.  Neuropathy:none.  Foot Exam:no exam.  Taking aspirin: no.  Lipids: no LDL.  CAD: no.  Mental health: no hx of depression.  Insulin: Basal insulin.  DM meds: Metformin and Glipizide.  Testing:  Freestyle Libre2 sensor.          ROS  See under HPI.    Allergies  No Known Allergies    Medications  Current Outpatient Medications   Medication Sig Dispense Refill     blood glucose (NO BRAND SPECIFIED) lancets standard Use to test blood sugar 4 times daily or as directed. 100 each 3     blood glucose (NO BRAND SPECIFIED) test strip Use to test blood sugar 4 times daily or as directed. 400 strip 3     blood glucose monitoring (NO BRAND SPECIFIED) meter device kit Use to test blood sugar 4 times  daily or as directed. 1 kit 0     Continuous Blood Gluc Sensor (FREESTYLE SATURNINO 2 SENSOR) MISC 1 Device every 14 days please keep appt 9/9/22. 2 each 5     fish oil-omega-3 fatty acids 1000 MG capsule Take 2 g by mouth daily       glipiZIDE (GLIPIZIDE XL) 10 MG 24 hr tablet Take 1 tablet (10 mg) by mouth daily 90 tablet 1     insulin glargine (SEMGLEE) 100 UNIT/ML pen Inject 7 units subcutaneous at hs. Please dispense VIAL 10 mL 3     insulin pen needle (BD MELISSA U/F) 32G X 4 MM miscellaneous Use with insulin. 100 each 3     metFORMIN (GLUCOPHAGE) 500 MG tablet Take 2 tablets (1,000 mg) by mouth 2 times daily (with meals) TAKE 2 TABLETS BY MOUTH TWICE DAILY WITH FOOD 360 tablet 0     Ostomy Supplies (SKIN TAC ADHESIVE BARRIER WIPE) MISC 1 each every 14 days Apply to skin prior to placing Saturnino sensor. 50 each 3     Prenatal Vit-Fe Fumarate-FA (PRENATAL VITAMIN) 27-0.8 MG TABS      Metformin 500 mg 2 tab po BID.      Family History  family history includes Cancer in her father, maternal grandmother, and mother; Diabetes in her father, maternal grandmother, mother, and paternal grandmother; Heart Disease in her father and mother.    Social History   reports that she has never smoked. She has never used smokeless tobacco. She reports current alcohol use. She reports that she does not use drugs.     Past Medical History  Past Medical History:   Diagnosis Date     Abnormal Pap smear of cervix      Diabetes (H)      History of human papillomavirus infection    Obesity.    Past Surgical History:   Procedure Laterality Date     COLPOSCOPY       TONSILLECTOMY         Physical Exam    See under HPI.    RESULTS  Creatinine   Date Value Ref Range Status   03/06/2022 0.66 0.52 - 1.04 mg/dL Final     GFR Estimate   Date Value Ref Range Status   03/06/2022 >90 >60 mL/min/1.73m2 Final     Comment:     Effective December 21, 2021 eGFRcr in adults is calculated using the 2021 CKD-EPI creatinine equation which includes age and gender  (Morteza edgar al., [facility], DOI: 10.1056/AGDNxi3504452)     Hemoglobin A1C   Date Value Ref Range Status   08/02/2022 6.3 (H) 0.0 - 5.6 % Final     Comment:     Normal <5.7%   Prediabetes 5.7-6.4%    Diabetes 6.5% or higher     Note: Adopted from ADA consensus guidelines.     ALT   Date Value Ref Range Status   03/06/2022 37 0 - 50 U/L Final     AST   Date Value Ref Range Status   03/06/2022 23 0 - 45 U/L Final     TSH   Date Value Ref Range Status   03/06/2022 1.17 0.40 - 4.00 mU/L Final         ASSESSMENT/PLAN:    1.  TYPE 2 DIABETES MELLITUS: Sandy's glycemic control is good at this time.  Recent A1C was 6.3 % on 8/2/2022.   Continue Semglee 7 units subcutaneous at bedtime and current dose of Glipizide and Metformin.  Most recent creat was 0.66 with GFR > 90 mL/min in March 2022.   She will continue to monitor her blood sugar.  Sandy was seen by Oph  2 weeks ago without retinopathy per patient.   She denies hx of nephropathy. Her urine microalbuminuria and creat/GFR were normal in 3/2022.  No sx of neuropathy. Denies foot ulcers.  No vitals today.    2.  WEIGHT: Encouraged her to continue to make healthy food choices and remain active.    3.  INFERTILITY: Undergoing IVF treatments as above.  LMP was 9/4/2022.    4. FOLLOW UP: with me in 3 months.    Time spent reviewing chart,labs and Freestyle Althea sensor download today = 8 minutes.  Time for video visit today = 16  minutes.  Time for documentation today = 15 minutes.    TOTAL TIME FOR VISIT TODAY = 39 minutes.    Vesta Arceo PA-C      Answers for HPI/ROS submitted by the patient on 9/2/2022  General Symptoms: No  Skin Symptoms: No  HENT Symptoms: No  EYE SYMPTOMS: No  HEART SYMPTOMS: No  LUNG SYMPTOMS: No  INTESTINAL SYMPTOMS: No  URINARY SYMPTOMS: No  GYNECOLOGIC SYMPTOMS: No  BREAST SYMPTOMS: No  SKELETAL SYMPTOMS: No  BLOOD SYMPTOMS: No  NERVOUS SYSTEM SYMPTOMS: No  MENTAL HEALTH SYMPTOMS: No

## 2022-09-09 NOTE — PROGRESS NOTES
Due to the COVID 19 pandemic this visit was converted to a video visit in order to help prevent spread of infection in this patient and the general population.    Time of start: 7:57 am  Time of end: 8:13 am  Total duration of video visit: 16 minutes.    HPI  Sandy Person is a 42 year old female with type 2 diabetes mellitus. Video visit for diabetes follow up today.  Pt gives a hx of type 2 diabetes dx 10 years ago.  She has no known retinopathy, nephropathy or neuropathy.  She has a hx of obesity and is undergoing IVF.  Sandy tested + for COVID in May 2022 with mild symptoms which she states has resolved.  She had been taking Rybelsus which was discontinued in late Nov 2021 by her fertility provider.   She was instructed to remain on Metformin 1000 mg BID and Glipizide 10 mg each am.  Earlier this year, I  added low dose Lantus at bedtime due to high overnight/fasting blood sugar values which has been helpful.  For her diabetes, she is currently taking Metformin 500 mg 2 tab po BID, Glipizide 10 mg in am and Semglee 7 units subcutaneous at bedtime.  Most recent A1C was 6.3 % on 8/2/2022 and her A1C was 6.8 % on 3/6/2022. Previous A1C was 7.2 % on 12/16/2021.  I reviewed and scanned her Freestyle Libre2 sensor download data in her her chart below.  Her current RX for Freestyle Libre2 sensors is pending. Not sure if her insurance will continue to cover the sensors.  The Freestyle LIbre2 sensor download data is from 8/18-8/31/2022.  Her average glucose was 124 with SD 23.  Her blood sugar is in target 95 % of the time and above target 5 % of the time. No hypoglycemia.  On ROS today, she states she has been working on decreasing carbs in her diet and eating healthy and remains active.  She walks during the week and hikes on weekends. Also doing some exercises to increase her strength.  She is undergoing IVF. She plans to have an embryo implanted later this month.  LMP was 9/4/2022.  Pt denies blurred vision, n/v, SOB  at rest, cough, fever or chills.  She denies chest pain, abd pain, diarrhea, dysuria or hematuria.  No sx of neuropathy and she denies foot ulcers.    Diabetes Care  Retinopathy: none; pt seen by Oph 2 weeks ago. No retinopathy per patient.  Nephropathy:none; urine microalbuminuria negative in 3/2022.  Neuropathy:none.  Foot Exam:no exam.  Taking aspirin: no.  Lipids: no LDL.  CAD: no.  Mental health: no hx of depression.  Insulin: Basal insulin.  DM meds: Metformin and Glipizide.  Testing:  Freestyle Libre2 sensor.          ROS  See under HPI.    Allergies  No Known Allergies    Medications  Current Outpatient Medications   Medication Sig Dispense Refill     blood glucose (NO BRAND SPECIFIED) lancets standard Use to test blood sugar 4 times daily or as directed. 100 each 3     blood glucose (NO BRAND SPECIFIED) test strip Use to test blood sugar 4 times daily or as directed. 400 strip 3     blood glucose monitoring (NO BRAND SPECIFIED) meter device kit Use to test blood sugar 4 times daily or as directed. 1 kit 0     Continuous Blood Gluc Sensor (FREESTYLE SATURNINO 2 SENSOR) MISC 1 Device every 14 days please keep appt 9/9/22. 2 each 5     fish oil-omega-3 fatty acids 1000 MG capsule Take 2 g by mouth daily       glipiZIDE (GLIPIZIDE XL) 10 MG 24 hr tablet Take 1 tablet (10 mg) by mouth daily 90 tablet 1     insulin glargine (SEMGLEE) 100 UNIT/ML pen Inject 7 units subcutaneous at hs. Please dispense VIAL 10 mL 3     insulin pen needle (BD MELISSA U/F) 32G X 4 MM miscellaneous Use with insulin. 100 each 3     metFORMIN (GLUCOPHAGE) 500 MG tablet Take 2 tablets (1,000 mg) by mouth 2 times daily (with meals) TAKE 2 TABLETS BY MOUTH TWICE DAILY WITH FOOD 360 tablet 0     Ostomy Supplies (SKIN TAC ADHESIVE BARRIER WIPE) MISC 1 each every 14 days Apply to skin prior to placing Saturnino sensor. 50 each 3     Prenatal Vit-Fe Fumarate-FA (PRENATAL VITAMIN) 27-0.8 MG TABS      Metformin 500 mg 2 tab po BID.      Family  History  family history includes Cancer in her father, maternal grandmother, and mother; Diabetes in her father, maternal grandmother, mother, and paternal grandmother; Heart Disease in her father and mother.    Social History   reports that she has never smoked. She has never used smokeless tobacco. She reports current alcohol use. She reports that she does not use drugs.     Past Medical History  Past Medical History:   Diagnosis Date     Abnormal Pap smear of cervix      Diabetes (H)      History of human papillomavirus infection    Obesity.    Past Surgical History:   Procedure Laterality Date     COLPOSCOPY       TONSILLECTOMY         Physical Exam    See under HPI.    RESULTS  Creatinine   Date Value Ref Range Status   03/06/2022 0.66 0.52 - 1.04 mg/dL Final     GFR Estimate   Date Value Ref Range Status   03/06/2022 >90 >60 mL/min/1.73m2 Final     Comment:     Effective December 21, 2021 eGFRcr in adults is calculated using the 2021 CKD-EPI creatinine equation which includes age and gender (Morteza et al., [facility], DOI: 10.1056/YXDLaf9522143)     Hemoglobin A1C   Date Value Ref Range Status   08/02/2022 6.3 (H) 0.0 - 5.6 % Final     Comment:     Normal <5.7%   Prediabetes 5.7-6.4%    Diabetes 6.5% or higher     Note: Adopted from ADA consensus guidelines.     ALT   Date Value Ref Range Status   03/06/2022 37 0 - 50 U/L Final     AST   Date Value Ref Range Status   03/06/2022 23 0 - 45 U/L Final     TSH   Date Value Ref Range Status   03/06/2022 1.17 0.40 - 4.00 mU/L Final         ASSESSMENT/PLAN:    1.  TYPE 2 DIABETES MELLITUS: Sandy's glycemic control is good at this time.  Recent A1C was 6.3 % on 8/2/2022.   Continue Semglee 7 units subcutaneous at bedtime and current dose of Glipizide and Metformin.  Most recent creat was 0.66 with GFR > 90 mL/min in March 2022.   She will continue to monitor her blood sugar.  Sandy was seen by Oph  2 weeks ago without retinopathy per patient.   She denies hx of nephropathy. Her  urine microalbuminuria and creat/GFR were normal in 3/2022.  No sx of neuropathy. Denies foot ulcers.  No vitals today.    2.  WEIGHT: Encouraged her to continue to make healthy food choices and remain active.    3.  INFERTILITY: Undergoing IVF treatments as above.  LMP was 9/4/2022.    4. FOLLOW UP: with me in 3 months.    Time spent reviewing chart,labs and Freestyle Althea sensor download today = 8 minutes.  Time for video visit today = 16  minutes.  Time for documentation today = 15 minutes.    TOTAL TIME FOR VISIT TODAY = 39 minutes.    Vesta Areco PA-C      Answers for HPI/ROS submitted by the patient on 9/2/2022  General Symptoms: No  Skin Symptoms: No  HENT Symptoms: No  EYE SYMPTOMS: No  HEART SYMPTOMS: No  LUNG SYMPTOMS: No  INTESTINAL SYMPTOMS: No  URINARY SYMPTOMS: No  GYNECOLOGIC SYMPTOMS: No  BREAST SYMPTOMS: No  SKELETAL SYMPTOMS: No  BLOOD SYMPTOMS: No  NERVOUS SYSTEM SYMPTOMS: No  MENTAL HEALTH SYMPTOMS: No

## 2022-10-05 DIAGNOSIS — E11.9 TYPE 2 DIABETES MELLITUS WITHOUT COMPLICATION, WITHOUT LONG-TERM CURRENT USE OF INSULIN (H): Primary | ICD-10-CM

## 2022-10-06 ENCOUNTER — TELEPHONE (OUTPATIENT)
Dept: ENDOCRINOLOGY | Facility: CLINIC | Age: 42
End: 2022-10-06

## 2022-10-06 NOTE — TELEPHONE ENCOUNTER
Spoke to pt over the phone 10/06/2022 and scheduled pt for appointments with Dr. Duenas and JAKE. Appointment with Vesta Arceo scheduled previously.

## 2022-10-06 NOTE — TELEPHONE ENCOUNTER
----- Message from Vesta Arceo PA-C sent at 10/5/2022  4:47 PM CDT -----  Please schedule pt with me- DM who is pregnant.  She will also need to see CDE- she has seen Chaparrita De Jesus CDE in the past.  Please also schedule pt with Dr. Ishan Perea.  Thanks deandre arceo

## 2022-10-09 ASSESSMENT — ENCOUNTER SYMPTOMS
VOMITING: 0
CONSTIPATION: 1
RECTAL PAIN: 0
RECTAL PAIN: 0
BOWEL INCONTINENCE: 0
ABDOMINAL PAIN: 0
HEARTBURN: 0
BLOATING: 1
CONSTIPATION: 1
DIARRHEA: 0
HEARTBURN: 0
NAUSEA: 1
JAUNDICE: 0
BLOOD IN STOOL: 0
ABDOMINAL PAIN: 0
BLOOD IN STOOL: 0
JAUNDICE: 0
DIARRHEA: 0
BLOATING: 1
NAUSEA: 1
BOWEL INCONTINENCE: 0
VOMITING: 0

## 2022-10-11 NOTE — PROGRESS NOTES
Outcome for 10/11/22 11:04 AM: Data uploaded on SIMON [place]  Outcome for 10/17/22 3:08 PM: Althea emailed to provider  SIMON [place]

## 2022-10-12 ENCOUNTER — TELEPHONE (OUTPATIENT)
Dept: ENDOCRINOLOGY | Facility: CLINIC | Age: 42
End: 2022-10-12

## 2022-10-12 ENCOUNTER — VIRTUAL VISIT (OUTPATIENT)
Dept: ENDOCRINOLOGY | Facility: CLINIC | Age: 42
End: 2022-10-12
Payer: COMMERCIAL

## 2022-10-12 DIAGNOSIS — E11.9 TYPE 2 DIABETES MELLITUS WITHOUT COMPLICATION, WITHOUT LONG-TERM CURRENT USE OF INSULIN (H): ICD-10-CM

## 2022-10-12 PROCEDURE — 99215 OFFICE O/P EST HI 40 MIN: CPT | Mod: 95 | Performed by: PHYSICIAN ASSISTANT

## 2022-10-12 RX ORDER — PEN NEEDLE, DIABETIC 32GX 5/32"
NEEDLE, DISPOSABLE MISCELLANEOUS
Qty: 250 EACH | Refills: 3 | Status: SHIPPED | OUTPATIENT
Start: 2022-10-12 | End: 2023-02-22

## 2022-10-12 RX ORDER — ESTRADIOL 0.1 MG/D
FILM, EXTENDED RELEASE TRANSDERMAL
COMMUNITY
Start: 2022-09-07 | End: 2022-11-18

## 2022-10-12 RX ORDER — PROGESTERONE 50 MG/ML
INJECTION, SOLUTION INTRAMUSCULAR
COMMUNITY
Start: 2022-09-22 | End: 2022-11-18

## 2022-10-12 RX ORDER — INSULIN ASPART 100 [IU]/ML
INJECTION, SOLUTION INTRAVENOUS; SUBCUTANEOUS
Qty: 15 ML | Refills: 3 | Status: SHIPPED | OUTPATIENT
Start: 2022-10-12 | End: 2022-11-02

## 2022-10-12 RX ORDER — ASPIRIN 81 MG/1
81 TABLET, CHEWABLE ORAL DAILY
Status: ON HOLD | COMMUNITY
End: 2023-01-31

## 2022-10-12 NOTE — LETTER
10/12/2022       RE: Sandy Person  [address] [facility] [address]     Dear Colleague,    Thank you for referring your patient, Sandy Person, to the [facility] ENDOCRINOLOGY CLINIC [place] at [facility]. Please see a copy of my visit note below.    Outcome for 10/10/22 2:15 PM: Althea emailed to provider  Mayelin Riley, ISMON        Sandy Person is being evaluated via a billable video visit.        How would you like to obtain your AVS? MyChart  For the video visit, send the invitation by: Send to e-mail at: [email or web address]  Will anyone else be joining your video visit? No      Due to the COVID 19 pandemic this visit was converted to a video visit in order to help prevent spread of infection in this patient and the general population.    Time of start: 10:00 am  Time of end: 10:20 am  Total duration of video visit: 20 minutes.    HPI  Sandy Person is a 42 year old female with type 2 diabetes mellitus. Video visit for diabetes follow up today.  Since our last visit, Sandy reports she is currently 5 weeks pregnant following IVF.  Pt gives a hx of type 2 diabetes dx 10 years ago.  She has no known retinopathy, nephropathy or neuropathy.  She has a hx of obesity and questionable hx of PCOS.  Sandy tested + for COVID in May 2022 with mild symptoms which she states has resolved.  She had been taking Rybelsus which was discontinued in late Nov 2021 by her fertility provider.   She was instructed to remain on Metformin 1000 mg BID and Glipizide 10 mg each am.  Earlier this year, I  added low dose Lantus at bedtime due to high overnight/fasting blood sugar values which has been helpful.  For her diabetes, she is currently taking Metformin 500 mg 2 tab po BID and Levemir 10 units subcutaneous at hs.  Most recent A1C was 6.3 % on 8/2/2022 and her A1C was 6.8 % on 3/6/2022. Previous A1C was 7.2 % on 12/16/2021.  I reviewed and scanned her Freestyle Libre2 sensor  download data in her her chart below.  Her blood sugar readings are high at this time.  On ROS today, she states she has been eating very few carbs.  Mild nausea. No vomiting.  No headaches, dysuria or vaginal spotting.  Constipation.  Pt denies blurred vision, SOB at rest, cough, fever or chills.  She denies chest pain, abd pain, diarrhea,  sx of neuropathy and she denies foot ulcers.    Diabetes Care  Retinopathy: none; pt seen by Oph in Aug 2022. No retinopathy per patient.  Nephropathy:none; urine microalbuminuria negative in 3/2022.  Neuropathy:none.  Foot Exam:no exam.  Taking aspirin: no.  Lipids: no LDL.  CAD: no.  Mental health: no hx of depression.  Insulin: Basal insulin.  Increasing basal insulin to BID and starting meal time insulin today.   DM meds: Metformin.  Testing:  Freestyle Libre2 sensor.                  ROS  See under HPI.    Allergies  No Known Allergies    Medications  Current Outpatient Medications   Medication Sig Dispense Refill     aspirin (ASA) 81 MG chewable tablet Take 81 mg by mouth daily       blood glucose (NO BRAND SPECIFIED) lancets standard Use to test blood sugar 4 times daily or as directed. 100 each 3     blood glucose (NO BRAND SPECIFIED) test strip Use to test blood sugar 4 times daily or as directed. 400 strip 3     blood glucose monitoring (NO BRAND SPECIFIED) meter device kit Use to test blood sugar 4 times daily or as directed. 1 kit 0     Continuous Blood Gluc Sensor (FREESTYLE SATURNINO 2 SENSOR) MISC 1 Device every 14 days please keep appt 9/9/22. 2 each 5     estradiol (VIVELLE-DOT) 0.1 MG/24HR bi-weekly patch        insulin detemir (LEVEMIR PEN) 100 UNIT/ML pen Inject 12 units subcutaneous BID. 15 mL 3     insulin pen needle (BD MELISSA U/F) 32G X 4 MM miscellaneous Use with insulin. 250 each 3     metFORMIN (GLUCOPHAGE) 500 MG tablet Take 2 tablets (1,000 mg) by mouth 2 times daily (with meals) TAKE 2 TABLETS BY MOUTH TWICE DAILY WITH FOOD 360 tablet 0     NOVOLOG  FLEXPEN 100 UNIT/ML soln Inject with meals and for correction. Pt pregnant and may use up to 50 units / day. 15 mL 3     Prenatal Vit-Fe Fumarate-FA (PRENATAL VITAMIN) 27-0.8 MG TABS        progesterone (ENDOMETRIN) 100 MG vaginal insert        progesterone, in sesame oil, 50 MG/ML injection      Metformin 500 mg 2 tab po BID.      Family History  family history includes Cancer in her father, maternal grandmother, and mother; Diabetes in her father, maternal grandmother, mother, and paternal grandmother; Heart Disease in her father and mother.    Social History   reports that she has never smoked. She has never used smokeless tobacco. She reports current alcohol use. She reports that she does not use drugs.     Past Medical History  Past Medical History:   Diagnosis Date     Abnormal Pap smear of cervix      Diabetes (H)      History of human papillomavirus infection    Obesity.    Past Surgical History:   Procedure Laterality Date     COLPOSCOPY       TONSILLECTOMY         Physical Exam    See under HPI.    RESULTS  Creatinine   Date Value Ref Range Status   03/06/2022 0.66 0.52 - 1.04 mg/dL Final     GFR Estimate   Date Value Ref Range Status   03/06/2022 >90 >60 mL/min/1.73m2 Final     Comment:     Effective December 21, 2021 eGFRcr in adults is calculated using the 2021 CKD-EPI creatinine equation which includes age and gender (Morteza et al., [place], DOI: 10.1056/LBKJjz3729586)     Hemoglobin A1C   Date Value Ref Range Status   08/02/2022 6.3 (H) 0.0 - 5.6 % Final     Comment:     Normal <5.7%   Prediabetes 5.7-6.4%    Diabetes 6.5% or higher     Note: Adopted from ADA consensus guidelines.     ALT   Date Value Ref Range Status   03/06/2022 37 0 - 50 U/L Final     AST   Date Value Ref Range Status   03/06/2022 23 0 - 45 U/L Final     TSH   Date Value Ref Range Status   03/06/2022 1.17 0.40 - 4.00 mU/L Final         ASSESSMENT/PLAN:    1.  TYPE 2 DIABETES MELLITUS: Sandy's blood sugar values are too high. She is  currently 5 weeks pregnant.  Increase Levemir 12 units BID and start meal time insulin today.  I have asked the CDE staff to see patient today to review use of meal time insulin. I sent an order for Novolog flexpen to pt's pharmacy today.  Continue Metformin for now.  Most recent creat was 0.66 with GFR > 90 mL/min in March 2022.   She will continue to monitor her blood sugar using the Freestyle Libre2 sensor.  I reviewed blood sugar goals during pregnancy with patient today. She is aware that we would like to see her FBS 95 or less, but > 70 and her 1 hr postmeal blood sugar 140 or less and her 2 hr postmeal blood sugar 120 or less.  Sandy was seen by Oph in Aug 2022  without retinopathy per patient.   She denies hx of nephropathy. Her urine microalbuminuria and creat/GFR were normal in 3/2022.  No sx of neuropathy. Denies foot ulcers.  No vitals today. I asked her to check her BP at home and report BP readings to her OB staff.    2.  WEIGHT: Encouraged her to continue to make healthy food choices and remain active.    3.  PREGNANCY: Pt states she is 5 weeks pregnant.    4. FOLLOW UP: Weekly.  Pt to have visit with CDE today.  She has an appt with Dr. Ishan Duenas on 10/18/2022 and Rosalba Merrill on 10/28/2022.  I have requested weekly follow up with me after 10/28/2022.    Time spent reviewing chart,labs and Freestyle Althea sensor download today = 8 minutes.  Time for video visit today = 20 minutes.  Time for documentation today = 15 minutes.    TOTAL TIME FOR VISIT TODAY = 43 minutes.    Vesta Arceo PA-C    Answers for HPI/ROS submitted by the patient on 10/9/2022  General Symptoms: No  Skin Symptoms: No  HENT Symptoms: No  EYE SYMPTOMS: No  HEART SYMPTOMS: No  LUNG SYMPTOMS: No  INTESTINAL SYMPTOMS: Yes  URINARY SYMPTOMS: No  GYNECOLOGIC SYMPTOMS: No  BREAST SYMPTOMS: No  SKELETAL SYMPTOMS: No  BLOOD SYMPTOMS: No  NERVOUS SYSTEM SYMPTOMS: No  MENTAL HEALTH SYMPTOMS: No  Heart burn or indigestion: No  Nausea:  Yes  Vomiting: No  Abdominal pain: No  Bloating: Yes  Constipation: Yes  Diarrhea: No  Blood in stool: No  Black stools: No  Rectal or Anal pain: No  Fecal incontinence: No  Yellowing of skin or eyes: No  Vomit with blood: No  Change in stools: No

## 2022-10-12 NOTE — PROGRESS NOTES
Sandy Person is being evaluated via a billable video visit.        How would you like to obtain your AVS? MyChart  For the video visit, send the invitation by: Send to e-mail at: [email or web address]  Will anyone else be joining your video visit? No

## 2022-10-12 NOTE — TELEPHONE ENCOUNTER
----- Message from Vesta Arceo PA-C sent at 10/12/2022 10:46 AM CDT -----  Please call pt's pharmacy and be sure they fill her RX for Levemir- she needs to use Levemir since she is pregnant.  Thanks deandre arceo

## 2022-10-12 NOTE — PROGRESS NOTES
Due to the COVID 19 pandemic this visit was converted to a video visit in order to help prevent spread of infection in this patient and the general population.    Time of start: 10:00 am  Time of end: 10:20 am  Total duration of video visit: 20 minutes.    HPI  Sandy Person is a 42 year old female with type 2 diabetes mellitus. Video visit for diabetes follow up today.  Since our last visit, Sandy reports she is currently 5 weeks pregnant following IVF.  Pt gives a hx of type 2 diabetes dx 10 years ago.  She has no known retinopathy, nephropathy or neuropathy.  She has a hx of obesity and questionable hx of PCOS.  Sandy tested + for COVID in May 2022 with mild symptoms which she states has resolved.  She had been taking Rybelsus which was discontinued in late Nov 2021 by her fertility provider.   She was instructed to remain on Metformin 1000 mg BID and Glipizide 10 mg each am.  Earlier this year, I  added low dose Lantus at bedtime due to high overnight/fasting blood sugar values which has been helpful.  For her diabetes, she is currently taking Metformin 500 mg 2 tab po BID and Levemir 10 units subcutaneous at hs.  Most recent A1C was 6.3 % on 8/2/2022 and her A1C was 6.8 % on 3/6/2022. Previous A1C was 7.2 % on 12/16/2021.  I reviewed and scanned her Freestyle Libre2 sensor download data in her her chart below.  Her blood sugar readings are high at this time.  On ROS today, she states she has been eating very few carbs.  Mild nausea. No vomiting.  No headaches, dysuria or vaginal spotting.  Constipation.  Pt denies blurred vision, SOB at rest, cough, fever or chills.  She denies chest pain, abd pain, diarrhea,  sx of neuropathy and she denies foot ulcers.    Diabetes Care  Retinopathy: none; pt seen by Oph in Aug 2022. No retinopathy per patient.  Nephropathy:none; urine microalbuminuria negative in 3/2022.  Neuropathy:none.  Foot Exam:no exam.  Taking aspirin: no.  Lipids: no LDL.  CAD: no.  Mental health: no hx  of depression.  Insulin: Basal insulin.  Increasing basal insulin to BID and starting meal time insulin today.   DM meds: Metformin.  Testing:  Freestyle Libre2 sensor.                  ROS  See under HPI.    Allergies  No Known Allergies    Medications  Current Outpatient Medications   Medication Sig Dispense Refill     aspirin (ASA) 81 MG chewable tablet Take 81 mg by mouth daily       blood glucose (NO BRAND SPECIFIED) lancets standard Use to test blood sugar 4 times daily or as directed. 100 each 3     blood glucose (NO BRAND SPECIFIED) test strip Use to test blood sugar 4 times daily or as directed. 400 strip 3     blood glucose monitoring (NO BRAND SPECIFIED) meter device kit Use to test blood sugar 4 times daily or as directed. 1 kit 0     Continuous Blood Gluc Sensor (FREESTYLE SATURNINO 2 SENSOR) MISC 1 Device every 14 days please keep appt 9/9/22. 2 each 5     estradiol (VIVELLE-DOT) 0.1 MG/24HR bi-weekly patch        insulin detemir (LEVEMIR PEN) 100 UNIT/ML pen Inject 12 units subcutaneous BID. 15 mL 3     insulin pen needle (BD MELISSA U/F) 32G X 4 MM miscellaneous Use with insulin. 250 each 3     metFORMIN (GLUCOPHAGE) 500 MG tablet Take 2 tablets (1,000 mg) by mouth 2 times daily (with meals) TAKE 2 TABLETS BY MOUTH TWICE DAILY WITH FOOD 360 tablet 0     NOVOLOG FLEXPEN 100 UNIT/ML soln Inject with meals and for correction. Pt pregnant and may use up to 50 units / day. 15 mL 3     Prenatal Vit-Fe Fumarate-FA (PRENATAL VITAMIN) 27-0.8 MG TABS        progesterone (ENDOMETRIN) 100 MG vaginal insert        progesterone, in sesame oil, 50 MG/ML injection      Metformin 500 mg 2 tab po BID.      Family History  family history includes Cancer in her father, maternal grandmother, and mother; Diabetes in her father, maternal grandmother, mother, and paternal grandmother; Heart Disease in her father and mother.    Social History   reports that she has never smoked. She has never used smokeless tobacco. She reports  current alcohol use. She reports that she does not use drugs.     Past Medical History  Past Medical History:   Diagnosis Date     Abnormal Pap smear of cervix      Diabetes (H)      History of human papillomavirus infection    Obesity.    Past Surgical History:   Procedure Laterality Date     COLPOSCOPY       TONSILLECTOMY         Physical Exam    See under HPI.    RESULTS  Creatinine   Date Value Ref Range Status   03/06/2022 0.66 0.52 - 1.04 mg/dL Final     GFR Estimate   Date Value Ref Range Status   03/06/2022 >90 >60 mL/min/1.73m2 Final     Comment:     Effective December 21, 2021 eGFRcr in adults is calculated using the 2021 CKD-EPI creatinine equation which includes age and gender (Morteza et al., NE, DOI: 10.1056/EMRCkv5146492)     Hemoglobin A1C   Date Value Ref Range Status   08/02/2022 6.3 (H) 0.0 - 5.6 % Final     Comment:     Normal <5.7%   Prediabetes 5.7-6.4%    Diabetes 6.5% or higher     Note: Adopted from ADA consensus guidelines.     ALT   Date Value Ref Range Status   03/06/2022 37 0 - 50 U/L Final     AST   Date Value Ref Range Status   03/06/2022 23 0 - 45 U/L Final     TSH   Date Value Ref Range Status   03/06/2022 1.17 0.40 - 4.00 mU/L Final         ASSESSMENT/PLAN:    1.  TYPE 2 DIABETES MELLITUS: Sandy's blood sugar values are too high. She is currently 5 weeks pregnant.  Increase Levemir 12 units BID and start meal time insulin today.  I have asked the CDE staff to see patient today to review use of meal time insulin. I sent an order for Novolog flexpen to pt's pharmacy today.  Continue Metformin for now.  Most recent creat was 0.66 with GFR > 90 mL/min in March 2022.   She will continue to monitor her blood sugar using the Freestyle Libre2 sensor.  I reviewed blood sugar goals during pregnancy with patient today. She is aware that we would like to see her FBS 95 or less, but > 70 and her 1 hr postmeal blood sugar 140 or less and her 2 hr postmeal blood sugar 120 or less.  Sandy was seen by  Oph in Aug 2022  without retinopathy per patient.   She denies hx of nephropathy. Her urine microalbuminuria and creat/GFR were normal in 3/2022.  No sx of neuropathy. Denies foot ulcers.  No vitals today. I asked her to check her BP at home and report BP readings to her OB staff.    2.  WEIGHT: Encouraged her to continue to make healthy food choices and remain active.    3.  PREGNANCY: Pt states she is 5 weeks pregnant.    4. FOLLOW UP: Weekly.  Pt to have visit with CDE today.  She has an appt with Dr. Ishan Duenas on 10/18/2022 and Rosalba Merrill on 10/28/2022.  I have requested weekly follow up with me after 10/28/2022.    Time spent reviewing chart,labs and Freestyle Althea sensor download today = 8 minutes.  Time for video visit today = 20 minutes.  Time for documentation today = 15 minutes.    TOTAL TIME FOR VISIT TODAY = 43 minutes.    Vesta Arceo PA-C    Answers for HPI/ROS submitted by the patient on 10/9/2022  General Symptoms: No  Skin Symptoms: No  HENT Symptoms: No  EYE SYMPTOMS: No  HEART SYMPTOMS: No  LUNG SYMPTOMS: No  INTESTINAL SYMPTOMS: Yes  URINARY SYMPTOMS: No  GYNECOLOGIC SYMPTOMS: No  BREAST SYMPTOMS: No  SKELETAL SYMPTOMS: No  BLOOD SYMPTOMS: No  NERVOUS SYSTEM SYMPTOMS: No  MENTAL HEALTH SYMPTOMS: No  Heart burn or indigestion: No  Nausea: Yes  Vomiting: No  Abdominal pain: No  Bloating: Yes  Constipation: Yes  Diarrhea: No  Blood in stool: No  Black stools: No  Rectal or Anal pain: No  Fecal incontinence: No  Yellowing of skin or eyes: No  Vomit with blood: No  Change in stools: No

## 2022-10-12 NOTE — TELEPHONE ENCOUNTER
LVM with Pharmacy regarding Levemir . Clinic number provided for questions.   Rina Weller RN on 10/12/2022 at 11:06 AM        Message  Received: Today  Vesta Arceo PA-C [facility] Triage-  Please call pt's pharmacy and be sure they fill her RX for Levemir- she needs to use Levemir since she is pregnant.       RE    Outpatient Medication Detail     Disp Refills Start End TRAY   insulin detemir (LEVEMIR PEN) 100 UNIT/ML pen 15 mL 3 10/12/2022  No   Sig: Inject 12 units subcutaneous BID.   Sent to pharmacy as: Insulin Detemir 100 UNIT/ML Subcutaneous Solution Pen-injector (LEVEMIR PEN)   Class: E-Prescribe   Order: [id number]   E-Prescribing Status: Receipt confirmed by pharmacy (10/12/2022 10:37 AM CDT)     Printout Tracking    External Result Report     Medication Administration Instructions    Inject 12 units subcutaneous BID.     Pharmacy    [id number] - [place] - [address] AT [address] [place] [address]     Pharmacy Contact    Telephone Fax   [phone]     Pharmacy Address and Hours    Address Hours   [address] [address]

## 2022-10-13 ENCOUNTER — VIRTUAL VISIT (OUTPATIENT)
Dept: EDUCATION SERVICES | Facility: CLINIC | Age: 42
End: 2022-10-13
Payer: COMMERCIAL

## 2022-10-13 ENCOUNTER — MYC MEDICAL ADVICE (OUTPATIENT)
Dept: EDUCATION SERVICES | Facility: CLINIC | Age: 42
End: 2022-10-13

## 2022-10-13 DIAGNOSIS — O24.119 PREGNANCY COMPLICATED BY PRE-EXISTING TYPE 2 DIABETES: Primary | ICD-10-CM

## 2022-10-13 PROCEDURE — G0108 DIAB MANAGE TRN  PER INDIV: HCPCS | Mod: 95

## 2022-10-13 RX ORDER — PROCHLORPERAZINE 25 MG/1
1 SUPPOSITORY RECTAL
Qty: 1 EACH | Refills: 4 | Status: SHIPPED | OUTPATIENT
Start: 2022-10-13 | End: 2023-12-05

## 2022-10-13 RX ORDER — THERMOMETER, ELECTRONIC,ORAL
EACH MISCELLANEOUS
Qty: 50 STRIP | Refills: 3 | Status: SHIPPED | OUTPATIENT
Start: 2022-10-13 | End: 2023-05-16

## 2022-10-13 RX ORDER — PROCHLORPERAZINE 25 MG/1
1 SUPPOSITORY RECTAL
Qty: 3 EACH | Refills: 11 | Status: SHIPPED | OUTPATIENT
Start: 2022-10-13 | End: 2023-08-10

## 2022-10-13 NOTE — PROGRESS NOTES
Video-Visit Details    Type of service:  Video Visit  Patient consented to video visit  Video Start Time:  8  Video End Time:   840a  Originating Location (pt. Location): Home  Distant Location (provider location):   [facility] [place] [facility] [place]   Platform used for Video Visit: [facility]      Diabetes Self-Management Training -Pregnancy Complicated by Diabetes    SUBJECTIVE:  Sandy Person presents today for  education related to  Pregnancy complicated by Type 2 Diabetes  She is accompanied by self  Patient concerns: has no specific complaints and has been feeling well.    There were no vitals taken for this visit.    No results found for: GLC    History   Smoking Status     Never   Smokeless Tobacco     Never       SOCIO/ECONOMIC HISTORY:  Lives with: spouse and brother in law  Recent family changes/social stressors: IVF  Language(s) spoken at home: English    ASSESSMENT:       Due date unknown approx 5 weeks gestation [id number]    Previous pregnancies? No    Problems in past pregnancy no past pregnancies  Profession  Researcher [employer]  Prepregnancy weight 230  Height:  5'4    Medical conditions None Family hx of Type has had Type 2 for 10 yrs   Medications Metformin 1000mg BID Progesterone Estradial    Past hospitalizations None  Special dietary considerations (Islam restrictions or food intolerance or allergies)    Exercise habits  Walking 15-20 mins per day exercise limitations due to IVF for 8 weeks    Problems in current pregnancy No             Medications reviewed and updated today.    Educational topics covered today:  Pt had 1x1 virtual visit, pt is 5 weeks gestation after IVF. This is pts first pregnancy, she has Type 2 DM x 10 yrs.  Pt was started on Levemir 12u BID and now today we started pt on Novolog. Pathophysiology of diabetes in pregnancy, Risks and Complications,, Blood Glucose Goals, Logging and Interpreting Glucose Results also discussed. We reviewed carb counting and  label reading. Pt advised to eat 30 grams of carb at breakfast and 45-60 at lunch and dinner and 15 gms at snacks. Pt is using a Althea to monitor BG;s. She is having issues with sensors staying on.  Today we ordered Dexcom G6 to her pharmacy. Pt will start Novolog at meals, pt to take 1 unit for every 15 grams of carb + correction factor of 1 unit for every 50 pts above 150. We discussed site rotation and storage of insulin. Also discussed how to treat a low BG.  Pt will follow up with endo team weekly.        PLAN:  Check glucose 4 times daily, before breakfast and 1 hour after each meal. Or scan sensor  Goals fasting 65-95   1 hour post meals under 140    Start Novolog 1 unit for every 15 grams of carb at meals + correction of 1 unit for every 50pts above 150 (starting at 150).    150-200 +1  201-250 +2  251-300 +3  301-350 +4    Inject Novolog 15 mins before meals    Carb plan:  Breakfast 30 grams  Snack 15 gms  Lunch 45-60 gms  Snack 15 gms  Dinner 45-30 gms  Snack 15 gms    Dexcom G6 sensors/transmitter were sent to your pharmacy       FOLLOW-UP:  Follow up weekly with endo team virtual visits.     Kaylin KLEIN, RN, [facility]  Certified Diabetes Care and   [facility] Endocrinology and Diabetes  [facility]  [address] [place] [address]  Phone [phone]        Time spent was 30 minutes  Encounter type: Individual    Any diabetes medication dose changes were made via the CDE Protocol and Collaborative Practice Agreement with endo team.  A copy of this encounter was provided to patient's referring provider.

## 2022-10-17 ENCOUNTER — TELEPHONE (OUTPATIENT)
Dept: ENDOCRINOLOGY | Facility: CLINIC | Age: 42
End: 2022-10-17

## 2022-10-17 NOTE — TELEPHONE ENCOUNTER
Spoke to pt over the phone 10/17/2022 and scheduled every other visit with Vesta Arceo to be a visit with Treasure Jain as she had openings at similar times which worked for the pt. Pt also scheduled with Dr. Duenas every three months throughout preg.

## 2022-10-18 ENCOUNTER — VIRTUAL VISIT (OUTPATIENT)
Dept: ENDOCRINOLOGY | Facility: CLINIC | Age: 42
End: 2022-10-18
Payer: COMMERCIAL

## 2022-10-18 DIAGNOSIS — O24.111 PRE-EXISTING TYPE 2 DIABETES MELLITUS DURING PREGNANCY IN FIRST TRIMESTER: Primary | ICD-10-CM

## 2022-10-18 PROCEDURE — 99215 OFFICE O/P EST HI 40 MIN: CPT | Mod: 95 | Performed by: STUDENT IN AN ORGANIZED HEALTH CARE EDUCATION/TRAINING PROGRAM

## 2022-10-18 NOTE — LETTER
10/18/2022       RE: Sandy Person  [address] [facility] [address]     Dear Colleague,    Thank you for referring your patient, Sandy Person, to the [facility]OCR[facility] [place] [facility]. Please see a copy of my visit note below.    Outcome for 10/11/22 11:04 AM: Data uploaded on SIMON [place]  Outcome for 10/17/22 3:08 PM: Althea emailed to provider  SIMON Salmon          Sandy is a 42 year old who is being evaluated via a billable video visit.      How would you like to obtain your AVS? MyChart  If the video visit is dropped, the invitation should be resent by: Text to cell phone: [phone]  Will anyone else be joining your video visit? No          Endocrinology Clinic Visit 10/18/2022    Video-Visit Details     Type of service:  Video Visit     Video Start Time: 4:05 pm  Video End Time: 4:35 pm     I spent a total of 50 minutes on the date of encounter reviewing medical records, evaluating the patient, coordinating care and documenting in the EHR, as detailed above.        Patient location: at home  Provider location: off site  Platform used for Video Visit: [facility]    NAME:  Sandy Person  PCP:  Lisa Hutson  MRN:  [id number]  Requesting Provider:  Referred Self    Chief Complaint     Chief Complaint   Patient presents with     Video Visit       History of Present Illness     Sandy Person is a 42 year old female who is seen in video visit for DM2 management in pregnancy. I last saw her 12/2022. She has been following with Vesta meza.      The patient was diagnosed with type 2 diabetes for about 10 years.       Previous A1C in records reviewed: she reported A1c usually in good range 6-7 except during covid pandemic.She had a1c of 8.7% during covid pandemic, she did lifestyle changes with diet and exercise. March 2021, also started on rybelsus,. She lost 20lbs. Rybelsus was stopped 11/2021 by her  fertility provider. a1c 5/2021 was 6.7%.   Other DM meds that she tried bydureon for 2-3 years.    She is s/p IVF , she is now 6 weeks pregnant. She has mild nausea. She is on progesterone and estrogen, following with fertility clinic.    She was following with otilia meza, was started on lantus prior to pregnancy with good control, last a1c 6.3 on 8/2022. She was switched to levemir on 10/7/22 hen she found out about pregnancy.  Last week on 10/13 meal time insulin was added.       Diabetes Care/Complications:   Eyes: no DR last eye exam a year ago  Kidneys: last urne microalb checked 3/2022 negative. Cr 3/2022  Nerves: no symptoms  Smoking: no  Blood Pressure:  Controlled, 120/87 at home  Lipids: lpids checked on 2/2021. .   Macrovascular: no     Current treatment strategy:   levemir 15 unit(s) bid  novolog 1u:15 g of CHO  Correction novolog 1u for 50 above 150.  On average breakfast 3u , lunch 3-5 unit(s), 3-5 unit(s) dinner    Diet: 3 meals, no snack  Drinks: no sugary drinks              Social: She is a  at the  of . Lives with her . No kids. Does not smoke , no excessive alcohol  Problem List     There is no problem list on file for this patient.       Medications     Current Outpatient Medications   Medication     acetone urine (RELION KETONE TEST) test strip     aspirin (ASA) 81 MG chewable tablet     blood glucose (NO BRAND SPECIFIED) lancets standard     blood glucose (NO BRAND SPECIFIED) test strip     blood glucose monitoring (NO BRAND SPECIFIED) meter device kit     Continuous Blood Gluc Sensor (DEXCOM G6 SENSOR) MISC     Continuous Blood Gluc Sensor (FREESTYLE SATURNINO 2 SENSOR) MISC     Continuous Blood Gluc Transmit (DEXCOM G6 TRANSMITTER) MISC     estradiol (VIVELLE-DOT) 0.1 MG/24HR bi-weekly patch     insulin detemir (LEVEMIR PEN) 100 UNIT/ML pen     insulin pen needle (BD MELISSA U/F) 32G X 4 MM miscellaneous     metFORMIN (GLUCOPHAGE) 500 MG tablet     NOVOLOG FLEXPEN  100 UNIT/ML soln     Prenatal Vit-Fe Fumarate-FA (PRENATAL VITAMIN) 27-0.8 MG TABS     progesterone (ENDOMETRIN) 100 MG vaginal insert     progesterone, in sesame oil, 50 MG/ML injection     No current facility-administered medications for this visit.        Allergies     No Known Allergies    Medical / Surgical History     Past Medical History:   Diagnosis Date     Abnormal Pap smear of cervix      Diabetes (H)      History of human papillomavirus infection      Past Surgical History:   Procedure Laterality Date     COLPOSCOPY       TONSILLECTOMY         Social History     Social History     Socioeconomic History     Marital status: Single     Spouse name: Not on file     Number of children: Not on file     Years of education: Not on file     Highest education level: Not on file   Occupational History     Not on file   Tobacco Use     Smoking status: Never     Smokeless tobacco: Never   Vaping Use     Vaping Use: Never used   Substance and Sexual Activity     Alcohol use: Yes     Comment: 1-2 drinks per month     Drug use: Never     Sexual activity: Yes     Birth control/protection: None   Other Topics Concern     Not on file   Social History Narrative     Not on file     Social Determinants of Health     Financial Resource Strain: Not on file   Food Insecurity: Not on file   Transportation Needs: Not on file   Physical Activity: Not on file   Stress: Not on file   Social Connections: Not on file   Intimate Partner Violence: Not on file   Housing Stability: Not on file       Family History     Family History   Problem Relation Age of Onset     Cancer Mother      Diabetes Mother      Heart Disease Mother      Diabetes Father      Heart Disease Father      Cancer Father      Cancer Maternal Grandmother      Diabetes Maternal Grandmother      Diabetes Paternal Grandmother        ROS     12 ROS completed, pertinent positive and negative in HPI  Answers for HPI/ROS submitted by the patient on 10/9/2022  General  Symptoms: No  Skin Symptoms: No  HENT Symptoms: No  EYE SYMPTOMS: No  HEART SYMPTOMS: No  LUNG SYMPTOMS: No  INTESTINAL SYMPTOMS: Yes  URINARY SYMPTOMS: No  GYNECOLOGIC SYMPTOMS: No  BREAST SYMPTOMS: No  SKELETAL SYMPTOMS: No  BLOOD SYMPTOMS: No  NERVOUS SYSTEM SYMPTOMS: No  MENTAL HEALTH SYMPTOMS: No  Heart burn or indigestion: No  Nausea: Yes  Vomiting: No  Abdominal pain: No  Bloating: Yes  Constipation: Yes  Diarrhea: No  Blood in stool: No  Black stools: No  Rectal or Anal pain: No  Fecal incontinence: No  Yellowing of skin or eyes: No  Vomit with blood: No  Change in stools: No      Physical Exam   There were no vitals taken for this visit.   GENERAL: Healthy, alert and no distress  EYES: Eyes grossly normal to inspection.  No discharge or erythema, or obvious scleral/conjunctival abnormalities.  RESP: No audible wheeze, cough, or visible cyanosis.  No visible retractions or increased work of breathing.    SKIN: Visible skin clear. No significant rash, abnormal pigmentation or lesions.  NEURO: Cranial nerves grossly intact.  Mentation and speech appropriate for age.  PSYCH: Mentation appears normal, affect normal/bright, judgement and insight intact, normal speech and appearance well-groomed.     Labs/Imaging     Pertinent Labs were reviewed and updated in EPIC and discussed briefly.  Radiology Results were  reviewed and updated in EPIC and discussed briefly.    Summary of recent findings:   Lab Results   Component Value Date    A1C 6.3 08/02/2022    A1C 6.8 03/06/2022    A1C 7.2 12/16/2021       TSH   Date Value Ref Range Status   03/06/2022 1.17 0.40 - 4.00 mU/L Final       Creatinine   Date Value Ref Range Status   03/06/2022 0.66 0.52 - 1.04 mg/dL Final       No results for input(s): CHOL, HDL, LDL, TRIG, CHOLHDLRATIO in the last 42515 hours.    No results found for: [id number], [id number], [id number]    I personally reviewed the patient's outside records from [employer] EMR. Summary of pertinent findings in  HPI.    Impression / Plan     1. Type 2 DM in first trimester pregnancy    - Glucose Control- none of the numbers are at goal for the past week while on prandial insulin:    Increase levemir to 18 units twice a day  Increase novolog to 1u per 12 g CHO with every meal  Stop metformin    Check your blood sugar fasting, 1 OR 2 hours post your meals      - Reviewed blood sugar goals in pregnancy. Glucose goals during pregnancy according to the American Diabetes Association:  Fasting glucose 70-95 mg/dL AND  One-hour postprandial glucose 110-140 mg/dL or  Two-hour postprandial glucose 100-120 mg/dL  - Encouraged at least 30min of activity daily.  - She is uptodate with ophthalmology evaluation. Instructed to return postpartum  - We reviewed risks of uncontrolled hyperglycemia during pregnancy, including but not limited to: gestational hypertension/preeclampsia, increased cesarian section rate, macrosomia, shoulder dystocia, still birth, and long term risk of development of diabetes in grown child    RTC- every 4 weeks until delivery. Follow weekly with diabetes educator.    Test and/or medications prescribed today:  No orders of the defined types were placed in this encounter.      Ishan Duenas MD  Endocrinology, Diabetes and Metabolism  [facility]       ambulatory

## 2022-10-18 NOTE — PROGRESS NOTES
Endocrinology Clinic Visit 10/18/2022    Video-Visit Details     Type of service:  Video Visit     Video Start Time: 4:05 pm  Video End Time: 4:35 pm     I spent a total of 50 minutes on the date of encounter reviewing medical records, evaluating the patient, coordinating care and documenting in the EHR, as detailed above.        Patient location: at home  Provider location: off site  Platform used for Video Visit: belem    NAME:  Sandy Person  PCP:  Lisa Hutson  MRN:  [id number]  Requesting Provider:  Referred Self    Chief Complaint     Chief Complaint   Patient presents with     Video Visit       History of Present Illness     Sandy Person is a 42 year old female who is seen in video visit for DM2 management in pregnancy. I last saw her 12/2022. She has been following with Vesta meza.      The patient was diagnosed with type 2 diabetes for about 10 years.       Previous A1C in records reviewed: she reported A1c usually in good range 6-7 except during covid pandemic.She had a1c of 8.7% during covid pandemic, she did lifestyle changes with diet and exercise. March 2021, also started on rybelsus,. She lost 20lbs. Rybelsus was stopped 11/2021 by her fertility provider. a1c 5/2021 was 6.7%.   Other DM meds that she tried bydureon for 2-3 years.    She is s/p IVF , she is now 6 weeks pregnant. She has mild nausea. She is on progesterone and estrogen, following with fertility clinic.    She was following with vesta meza, was started on lantus prior to pregnancy with good control, last a1c 6.3 on 8/2022. She was switched to levemir on 10/7/22 hen she found out about pregnancy.  Last week on 10/13 meal time insulin was added.       Diabetes Care/Complications:   Eyes: no DR last eye exam a year ago  Kidneys: last urne microalb checked 3/2022 negative. Cr 3/2022  Nerves: no symptoms  Smoking: no  Blood Pressure:  Controlled, 120/87 at home  Lipids: lpids checked on 2/2021. .   Macrovascular:  no     Current treatment strategy:   levemir 15 unit(s) bid  novolog 1u:15 g of CHO  Correction novolog 1u for 50 above 150.  On average breakfast 3u , lunch 3-5 unit(s), 3-5 unit(s) dinner    Diet: 3 meals, no snack  Drinks: no sugary drinks              Social: She is a  at the [employer]. Lives with her . No kids. Does not smoke , no excessive alcohol  Problem List     There is no problem list on file for this patient.       Medications     Current Outpatient Medications   Medication     acetone urine (RELION KETONE TEST) test strip     aspirin (ASA) 81 MG chewable tablet     blood glucose (NO BRAND SPECIFIED) lancets standard     blood glucose (NO BRAND SPECIFIED) test strip     blood glucose monitoring (NO BRAND SPECIFIED) meter device kit     Continuous Blood Gluc Sensor (DEXCOM G6 SENSOR) MISC     Continuous Blood Gluc Sensor (FREESTYLE SATURNINO 2 SENSOR) MISC     Continuous Blood Gluc Transmit (DEXCOM G6 TRANSMITTER) MISC     estradiol (VIVELLE-DOT) 0.1 MG/24HR bi-weekly patch     insulin detemir (LEVEMIR PEN) 100 UNIT/ML pen     insulin pen needle (BD MELISSA U/F) 32G X 4 MM miscellaneous     metFORMIN (GLUCOPHAGE) 500 MG tablet     NOVOLOG FLEXPEN 100 UNIT/ML soln     Prenatal Vit-Fe Fumarate-FA (PRENATAL VITAMIN) 27-0.8 MG TABS     progesterone (ENDOMETRIN) 100 MG vaginal insert     progesterone, in sesame oil, 50 MG/ML injection     No current facility-administered medications for this visit.        Allergies     No Known Allergies    Medical / Surgical History     Past Medical History:   Diagnosis Date     Abnormal Pap smear of cervix      Diabetes (H)      History of human papillomavirus infection      Past Surgical History:   Procedure Laterality Date     COLPOSCOPY       TONSILLECTOMY         Social History     Social History     Socioeconomic History     Marital status: Single     Spouse name: Not on file     Number of children: Not on file     Years of education: Not on file     Highest  education level: Not on file   Occupational History     Not on file   Tobacco Use     Smoking status: Never     Smokeless tobacco: Never   Vaping Use     Vaping Use: Never used   Substance and Sexual Activity     Alcohol use: Yes     Comment: 1-2 drinks per month     Drug use: Never     Sexual activity: Yes     Birth control/protection: None   Other Topics Concern     Not on file   Social History Narrative     Not on file     Social Determinants of Health     Financial Resource Strain: Not on file   Food Insecurity: Not on file   Transportation Needs: Not on file   Physical Activity: Not on file   Stress: Not on file   Social Connections: Not on file   Intimate Partner Violence: Not on file   Housing Stability: Not on file       Family History     Family History   Problem Relation Age of Onset     Cancer Mother      Diabetes Mother      Heart Disease Mother      Diabetes Father      Heart Disease Father      Cancer Father      Cancer Maternal Grandmother      Diabetes Maternal Grandmother      Diabetes Paternal Grandmother        ROS     12 ROS completed, pertinent positive and negative in HPI  Answers for HPI/ROS submitted by the patient on 10/9/2022  General Symptoms: No  Skin Symptoms: No  HENT Symptoms: No  EYE SYMPTOMS: No  HEART SYMPTOMS: No  LUNG SYMPTOMS: No  INTESTINAL SYMPTOMS: Yes  URINARY SYMPTOMS: No  GYNECOLOGIC SYMPTOMS: No  BREAST SYMPTOMS: No  SKELETAL SYMPTOMS: No  BLOOD SYMPTOMS: No  NERVOUS SYSTEM SYMPTOMS: No  MENTAL HEALTH SYMPTOMS: No  Heart burn or indigestion: No  Nausea: Yes  Vomiting: No  Abdominal pain: No  Bloating: Yes  Constipation: Yes  Diarrhea: No  Blood in stool: No  Black stools: No  Rectal or Anal pain: No  Fecal incontinence: No  Yellowing of skin or eyes: No  Vomit with blood: No  Change in stools: No      Physical Exam   There were no vitals taken for this visit.   GENERAL: Healthy, alert and no distress  EYES: Eyes grossly normal to inspection.  No discharge or erythema, or  obvious scleral/conjunctival abnormalities.  RESP: No audible wheeze, cough, or visible cyanosis.  No visible retractions or increased work of breathing.    SKIN: Visible skin clear. No significant rash, abnormal pigmentation or lesions.  NEURO: Cranial nerves grossly intact.  Mentation and speech appropriate for age.  PSYCH: Mentation appears normal, affect normal/bright, judgement and insight intact, normal speech and appearance well-groomed.     Labs/Imaging     Pertinent Labs were reviewed and updated in EPIC and discussed briefly.  Radiology Results were  reviewed and updated in EPIC and discussed briefly.    Summary of recent findings:   Lab Results   Component Value Date    A1C 6.3 08/02/2022    A1C 6.8 03/06/2022    A1C 7.2 12/16/2021       TSH   Date Value Ref Range Status   03/06/2022 1.17 0.40 - 4.00 mU/L Final       Creatinine   Date Value Ref Range Status   03/06/2022 0.66 0.52 - 1.04 mg/dL Final       No results for input(s): CHOL, HDL, LDL, TRIG, CHOLHDLRATIO in the last 10762 hours.    No results found for: [id number], [id number], [id number]    I personally reviewed the patient's outside records from [employer] EMR. Summary of pertinent findings in HPI.    Impression / Plan     1. Type 2 DM in first trimester pregnancy    - Glucose Control- none of the numbers are at goal for the past week while on prandial insulin:    Increase levemir to 18 units twice a day  Increase novolog to 1u per 12 g CHO with every meal  Stop metformin    Check your blood sugar fasting, 1 OR 2 hours post your meals      - Reviewed blood sugar goals in pregnancy. Glucose goals during pregnancy according to the American Diabetes Association:  Fasting glucose 70-95 mg/dL AND  One-hour postprandial glucose 110-140 mg/dL or  Two-hour postprandial glucose 100-120 mg/dL  - Encouraged at least 30min of activity daily.  - She is uptodate with ophthalmology evaluation. Instructed to return postpartum  - We reviewed risks of uncontrolled  hyperglycemia during pregnancy, including but not limited to: gestational hypertension/preeclampsia, increased cesarian section rate, macrosomia, shoulder dystocia, still birth, and long term risk of development of diabetes in grown child    RTC- every 4 weeks until delivery. Follow weekly with diabetes educator.          Test and/or medications prescribed today:  No orders of the defined types were placed in this encounter.            Ishan Duenas MD  Endocrinology, Diabetes and Metabolism  [facility]

## 2022-10-18 NOTE — PROGRESS NOTES
Sandy is a 42 year old who is being evaluated via a billable video visit.      How would you like to obtain your AVS? Tictailhart  If the video visit is dropped, the invitation should be resent by: Text to cell phone: [phone]  Will anyone else be joining your video visit? No

## 2022-10-18 NOTE — PATIENT INSTRUCTIONS
Increase levemir to 18 units twice a day  Increase novolog to 1u per 12 g CHO with every meal  Stop metformin    Check your blood sugar fasting, 1 OR 2 hours post your meals    Glucose goals during pregnancy according to the American Diabetes Association:  Fasting glucose 70-95 mg/dL AND  One-hour postprandial glucose 110-140 mg/dL or  Two-hour postprandial glucose 100-120 mg/dL

## 2022-10-26 ASSESSMENT — ENCOUNTER SYMPTOMS
DIARRHEA: 0
BLOATING: 0
ABDOMINAL PAIN: 0
NAUSEA: 1
JAUNDICE: 0
HEARTBURN: 0
BLOOD IN STOOL: 0
VOMITING: 0
RECTAL PAIN: 0
CONSTIPATION: 1
BOWEL INCONTINENCE: 0

## 2022-10-26 NOTE — PROGRESS NOTES
Outcome for 10/26/22 8:31 AM: Data uploaded on SIMON Knox  Outcome for 11/01/22 9:42 AM: Althea emailed to provider  SIMON Polo   Outcome for 11/02/22 10:43 AM: Dexcom emailed to provider  SIMON Polo      Sandy Person is being evaluated via a billable video visit.        How would you like to obtain your AVS? [employer]art  For the video visit, send the invitation by: Text to cell phone: [phone]  Will anyone else be joining your video visit? No

## 2022-10-28 ENCOUNTER — VIRTUAL VISIT (OUTPATIENT)
Dept: EDUCATION SERVICES | Facility: CLINIC | Age: 42
End: 2022-10-28
Payer: COMMERCIAL

## 2022-10-28 DIAGNOSIS — O24.119 PREGNANCY COMPLICATED BY PRE-EXISTING TYPE 2 DIABETES: Primary | ICD-10-CM

## 2022-10-28 PROCEDURE — G0108 DIAB MANAGE TRN  PER INDIV: HCPCS | Mod: 95 | Performed by: NUTRITIONIST

## 2022-10-30 NOTE — PROGRESS NOTES
Sandy is a 42 year old who is being evaluated via a billable video visit.      How would you like to obtain your AVS? MyChart  If the video visit is dropped, the invitation should be resent by: Send to e-mail at: [email or web address]  Will anyone else be joining your video visit? No      Video-Visit Details    Video Start Time: 830am    Type of service:  Video Visit    Video End Time:930am    Originating Location (pt. Location): Home        Distant Location (provider location):  Off-site    Platform used for Video Visit: [facility]    Diabetes Self-Management Education & Support    Presents for:  Pregnancy complicated by type 2 diabetes    ASSESSMENT:  Ketones: have been negative.   Althea 2 report:                  Glucose is not at goal fasting or post prandial.    Opportunities for ongoing education and support in diabetes-self management were discussed.    Pt verbalized understanding of concepts discussed and recommendations provided today.       Type 2 Diabetes. Advised to check glucose at least 6 times a day, before each meal and 1 hour after the start of each meal. Patient is using althea 2 sensor and switching tomorrow to dexcom. Advised to check urine ketones daily in the morning until negative for 7 days in a row, then reduce to checking once every 7 days. Ketones have been negative so patient will not test just one time per week. Reviewed carbohydrate counting, label reading, and meal plan of 30-45g grams carb at breakfast, 45-60g grams of carb at lunch, 45-60g grams of carb at dinner, and 15-30g grams of carb at snacks. Patient is following meal plan though wasn't sure if she should be doing snacks or not. She is counting carbs. Encouraged balanced meals, including protein and fat with carb at all meals and snacks. and Reviewed bolus insulin dosing and timing: current regimen is Insulin to carb ratio is 1 unit of insulin to 12 grams at breakfast, 12 grams at lunch, 12 grams at dinner, x grams at snacks. and  Correction factor: 1 unit for every 50 mg/dL >150.     PLAN:  Increase Levemir to 21 BID  Increase insulin to carb ratio to 1:10g  Diet as noted above  Continue sensor for glucose data      See Care Plan for co-developed, patient-stated behavior change goals.    Follow up:  Vesta Arceo 11/2  Kaylin Valdez 11/9  Vesta Arceo 11/18  Rosalba 11/22    SUBJECTIVE/OBJECTIVE:    Patient Problem List and Family Medical History reviewed for relevant medical history, current medical status, and diabetes risk factors.    Vitals:  There were no vitals taken for this visit.    Pre pregnancy weight: 235#, current weight 230 lbs related to watching diet    Estimated Date of Delivery: Data Unavailable    Labs:  Lab Results   Component Value Date    A1C 6.3 08/02/2022     No results found for: GLC  No results found for: LDL  No results found for: HDL]  GFR Estimate   Date Value Ref Range Status   03/06/2022 >90 >60 mL/min/1.73m2 Final     Comment:     Effective December 21, 2021 eGFRcr in adults is calculated using the 2021 CKD-EPI creatinine equation which includes age and gender (Morteza et al., NEJ, DOI: 10.1056/QJBNwk4367854)     No results found for: GFRESTBLACK  Lab Results   Component Value Date    CR 0.66 03/06/2022     No results found for: MICROALBUMIN    Last 3 BP:   BP Readings from Last 3 Encounters:   05/11/22 (!) 148/98   07/22/21 138/88       History   Smoking Status     Never   Smokeless Tobacco     Never       Healthy Eating  Non fat plain yogurt with granola and pb or 2 toast with pb turkey sausage, eggs  Salad for lunch pre packaged kind with chicken (usually lightly breaded) or a sandwich or soup and sandwich  Dinner veggies, quinoa or millet, and a protein  She is getting the recommended carbs, watching them closely.   Having mostly nuts for a snack, almonds    Taking insulin 15 minutes before eating.     Patient would like to keep a food journal for follow up. So will plan to do that.     Taking  Medications  Diabetes Medication(s)     Biguanides       metFORMIN (GLUCOPHAGE) 500 MG tablet    Take 2 tablets (1,000 mg) by mouth 2 times daily (with meals) TAKE 2 TABLETS BY MOUTH TWICE DAILY WITH FOOD    Insulin       insulin detemir (LEVEMIR PEN) 100 UNIT/ML pen    Inject 12 units subcutaneous BID.     NOVOLOG FLEXPEN 100 UNIT/ML soln    Inject with meals and for correction. Pt pregnant and may use up to 50 units / day.        Metformin has been discontinued by Endo    Healthy Coping    Patient Activation Measure Survey Score:  No flowsheet data found.    Time Spent: 60 minutes  Encounter Type: Individual    Any diabetes medication dose changes were made via the CDE Protocol per the patient's referring provider. A copy of this encounter was shared with the provider.

## 2022-11-02 ENCOUNTER — VIRTUAL VISIT (OUTPATIENT)
Dept: ENDOCRINOLOGY | Facility: CLINIC | Age: 42
End: 2022-11-02
Payer: COMMERCIAL

## 2022-11-02 DIAGNOSIS — E11.9 TYPE 2 DIABETES MELLITUS WITHOUT COMPLICATION, WITHOUT LONG-TERM CURRENT USE OF INSULIN (H): ICD-10-CM

## 2022-11-02 PROCEDURE — 99215 OFFICE O/P EST HI 40 MIN: CPT | Mod: 95 | Performed by: PHYSICIAN ASSISTANT

## 2022-11-02 RX ORDER — INSULIN ASPART 100 [IU]/ML
INJECTION, SOLUTION INTRAVENOUS; SUBCUTANEOUS
Qty: 80 ML | Refills: 3 | Status: SHIPPED | OUTPATIENT
Start: 2022-11-02 | End: 2023-02-08

## 2022-11-02 NOTE — NURSING NOTE
Please remove boone 2 sensor and metformin off patients list as she no no longer using them .  Taylor Myhre,VF

## 2022-11-02 NOTE — PROGRESS NOTES
Due to the COVID 19 pandemic this visit was converted to a video visit in order to help prevent spread of infection in this patient and the general population.    Time of start: 10:32 am  Time of end: 10:56 am  Total duration of video visit: 24 minutes.  Provider's location: offsite.    HPI  Sandy Person is a 42 year old female with type 2 diabetes mellitus. Video visit for diabetes follow up today.  Sandy reports she is currently 8 weeks pregnant following IVF.  Her ARNAV is 6/14/2022.  Pt was seen by Dr. Ishan Duenas on 10/18/2023 and Rosalba Merrill RD on 10/28/2022.  Pt gives a hx of type 2 diabetes dx 10 years ago.  She has no known retinopathy, nephropathy or neuropathy.  She has a hx of obesity and questionable hx of PCOS.  Sandy tested + for COVID in May 2022 with mild symptoms which she states has resolved.  She had been taking Rybelsus which was discontinued in late Nov 2021 by her fertility provider.   She was instructed to remain on Metformin 1000 mg BID and Glipizide 10 mg each am.  Earlier this year, I  added low dose Lantus at bedtime due to high overnight/fasting blood sugar values which has been helpful.  Most recently, she found out she was pregnant and I have her take Levemir BID and Novolog for food coverage.  Her Metformin was discontinued on 10/18/2022.  For her diabetes, she is currently taking Levemir 21 units subcutaneous BID and Novolog 1 unit/10 gms CHO with meals with correction insulin ( 1 unit/50 for BG > 150 ).  Most recent A1C was 6.3 % on 8/2/2022 and her A1C was 6.8 % on 3/6/2022. Previous A1C was 7.2 % on 12/16/2021.  I reviewed and scanned her DexcomG6 sensor download data in her her chart below.  Her fasting blood sugars and postprandial blood sugars are above target.  Her FBS was 139 this am and in the 160 range fasting yesterday.  Most of her 1 hr postmeal blood sugars are > 140.  No hypoglycemia.  On ROS today, she states she has been eating very few carbs and walking.   Mild  nausea. No vomiting.  No headaches, dysuria or vaginal spotting.  Constipation.  Breasts are tender.  Pt denies blurred vision, SOB at rest, cough, fever or chills.  She denies chest pain, abd pain, diarrhea,  sx of neuropathy and she denies foot ulcers.    Diabetes Care  Retinopathy: none; pt seen by Oph in Aug 2022. No retinopathy per patient.  Nephropathy:none; urine microalbuminuria negative in 3/2022.  Neuropathy:none.  Foot Exam:no exam.  Taking aspirin: no.  Lipids: no LDL.  CAD: no.  Mental health: no hx of depression.  Insulin: Levemir BID and meal time insulin with correction.   DM meds: none.  Testing:  DexcomG6 sensor.                                                  ROS  See under HPI.    Allergies  No Known Allergies    Medications  Current Outpatient Medications   Medication Sig Dispense Refill     acetone urine (RELION KETONE TEST) test strip Test urine for ketones daily for 1 week if small or negative reduce to 2x week 50 strip 3     aspirin (ASA) 81 MG chewable tablet Take 81 mg by mouth daily       blood glucose (NO BRAND SPECIFIED) lancets standard Use to test blood sugar 4 times daily or as directed. 100 each 3     blood glucose (NO BRAND SPECIFIED) test strip Use to test blood sugar 4 times daily or as directed. 400 strip 3     blood glucose monitoring (NO BRAND SPECIFIED) meter device kit Use to test blood sugar 4 times daily or as directed. 1 kit 0     Continuous Blood Gluc Sensor (DEXCOM G6 SENSOR) MISC 1 each every 10 days Change every 10 days. 3 each 11     Continuous Blood Gluc Transmit (DEXCOM G6 TRANSMITTER) MISC 1 each every 3 months Change every 3 months. 1 each 4     estradiol (VIVELLE-DOT) 0.1 MG/24HR bi-weekly patch        insulin detemir (LEVEMIR PEN) 100 UNIT/ML pen Inject 12 units subcutaneous BID. 15 mL 3     insulin pen needle (BD MELISSA U/F) 32G X 4 MM miscellaneous Use with insulin. 250 each 3     NOVOLOG FLEXPEN 100 UNIT/ML soln Inject with meals and for correction. Pt  pregnant and may use up to 50 units / day. 15 mL 3     Prenatal Vit-Fe Fumarate-FA (PRENATAL VITAMIN) 27-0.8 MG TABS        progesterone (ENDOMETRIN) 100 MG vaginal insert        progesterone, in sesame oil, 50 MG/ML injection        Continuous Blood Gluc Sensor (FREESTYLE SATURNINO 2 SENSOR) MISC 1 Device every 14 days please keep appt 9/9/22. 2 each 5     metFORMIN (GLUCOPHAGE) 500 MG tablet Take 2 tablets (1,000 mg) by mouth 2 times daily (with meals) TAKE 2 TABLETS BY MOUTH TWICE DAILY WITH FOOD (Patient not taking: Reported on 11/2/2022) 360 tablet 0   Metformin 500 mg 2 tab po BID.      Family History  family history includes Cancer in her father, maternal grandmother, and mother; Diabetes in her father, maternal grandmother, mother, and paternal grandmother; Heart Disease in her father and mother.    Social History   reports that she has never smoked. She has never used smokeless tobacco. She reports current alcohol use. She reports that she does not use drugs.     Past Medical History  Past Medical History:   Diagnosis Date     Abnormal Pap smear of cervix      Diabetes (H)      History of human papillomavirus infection    Obesity.    Past Surgical History:   Procedure Laterality Date     COLPOSCOPY       TONSILLECTOMY         Physical Exam    See under HPI.    RESULTS  Creatinine   Date Value Ref Range Status   03/06/2022 0.66 0.52 - 1.04 mg/dL Final     GFR Estimate   Date Value Ref Range Status   03/06/2022 >90 >60 mL/min/1.73m2 Final     Comment:     Effective December 21, 2021 eGFRcr in adults is calculated using the 2021 CKD-EPI creatinine equation which includes age and gender (Morteza et al., NE, DOI: 10.1056/WCSHxt1355127)     Hemoglobin A1C   Date Value Ref Range Status   08/02/2022 6.3 (H) 0.0 - 5.6 % Final     Comment:     Normal <5.7%   Prediabetes 5.7-6.4%    Diabetes 6.5% or higher     Note: Adopted from ADA consensus guidelines.     ALT   Date Value Ref Range Status   03/06/2022 37 0 - 50 U/L  Final     AST   Date Value Ref Range Status   03/06/2022 23 0 - 45 U/L Final     TSH   Date Value Ref Range Status   03/06/2022 1.17 0.40 - 4.00 mU/L Final       ASSESSMENT/PLAN:    1.  TYPE 2 DIABETES MELLITUS: Sandy's blood sugar values remain above target.  Increase Levemir 24 units BID and change Novolog I/C ratio to 1:6 ( was 1:10 ) with correction 1 unit/50 for BG > 140 with meals.  Pt will send me a message to review her DexcomG6 sensor data this coming Friday and next Monday.  She has an appt with the CDE on 11/9/2022.  She will continue to monitor her blood sugar using the DexcomG6 sensor.  I reviewed blood sugar goals during pregnancy with patient today. She is aware that we would like to see her FBS 95 or less, but > 70 and her 1 hr postmeal blood sugar 140 or less and her 2 hr postmeal blood sugar 120 or less.  Sandy was seen by Oph in Aug 2022  without retinopathy per patient.   She denies hx of nephropathy. Her urine microalbuminuria and creat/GFR were normal in 3/2022.  No sx of neuropathy. Denies foot ulcers.  No vitals today. I asked her to check her BP at home and report BP readings to her OB tomorrow. She states she has a visit with the OB RN tomorrow.    2.  WEIGHT: Encouraged her to continue to make healthy food choices and remain active.    3.  PREGNANCY: Pt states she is 8 weeks pregnant.  ARNAV is 6/14/2023.    4. FOLLOW UP:  She is to send me a reminder this Friday to check her DexcomG6 sensor data.  She needs to be seen weekly.    Time spent reviewing chart,labs and DexcomG6 sensor download today = 8 minutes.  Time for video visit today = 24 minutes.  Time for documentation today = 15 minutes.    TOTAL TIME FOR VISIT TODAY = 47 minutes.    Vesta Arceo PA-C    Answers for HPI/ROS submitted by the patient on 10/26/2022  General Symptoms: No  Skin Symptoms: No  HENT Symptoms: No  EYE SYMPTOMS: No  HEART SYMPTOMS: No  LUNG SYMPTOMS: No  INTESTINAL SYMPTOMS: Yes  URINARY SYMPTOMS: No  GYNECOLOGIC  SYMPTOMS: No  BREAST SYMPTOMS: No  SKELETAL SYMPTOMS: No  BLOOD SYMPTOMS: No  NERVOUS SYSTEM SYMPTOMS: No  MENTAL HEALTH SYMPTOMS: No  Heart burn or indigestion: No  Nausea: Yes  Vomiting: No  Abdominal pain: No  Bloating: No  Constipation: Yes  Diarrhea: No  Blood in stool: No  Black stools: No  Rectal or Anal pain: No  Fecal incontinence: No  Yellowing of skin or eyes: No  Vomit with blood: No  Change in stools: No

## 2022-11-02 NOTE — LETTER
11/2/2022       RE: Sandy Person  [address] [facility] [address]     Dear Colleague,    Thank you for referring your patient, Sandy Person, to the [facility] ENDO[facility]OLOG[facility] [place] at [facility]. Please see a copy of my visit note below.    Outcome for 10/26/22 8:31 AM: Data uploaded on SIMON Knox  Outcome for 11/01/22 9:42 AM: Althea emailed to provider  SIMON Polo   Outcome for 11/02/22 10:43 AM: Dexcom emailed to provider  SIMON Polo      Sandy Person is being evaluated via a billable video visit.        How would you like to obtain your AVS? Verenium  For the video visit, send the invitation by: Text to cell phone: [phone]  Will anyone else be joining your video visit? No            Due to the COVID 19 pandemic this visit was converted to a video visit in order to help prevent spread of infection in this patient and the general population.    Time of start: 10:32 am  Time of end: 10:56 am  Total duration of video visit: 24 minutes.  Provider's location: offsite.    HPI  Sandy Person is a 42 year old female with type 2 diabetes mellitus. Video visit for diabetes follow up today.  Sandy reports she is currently 8 weeks pregnant following IVF.  Her ARNAV is 6/14/2022.  Pt was seen by Dr. Ishan Duenas on 10/18/2023 and Rosalba Merrill RD on 10/28/2022.  Pt gives a hx of type 2 diabetes dx 10 years ago.  She has no known retinopathy, nephropathy or neuropathy.  She has a hx of obesity and questionable hx of PCOS.  Sandy tested + for COVID in May 2022 with mild symptoms which she states has resolved.  She had been taking Rybelsus which was discontinued in late Nov 2021 by her fertility provider.   She was instructed to remain on Metformin 1000 mg BID and Glipizide 10 mg each am.  Earlier this year, I  added low dose Lantus at bedtime due to high overnight/fasting blood sugar values which has been  helpful.  Most recently, she found out she was pregnant and I have her take Levemir BID and Novolog for food coverage.  Her Metformin was discontinued on 10/18/2022.  For her diabetes, she is currently taking Levemir 21 units subcutaneous BID and Novolog 1 unit/10 gms CHO with meals with correction insulin ( 1 unit/50 for BG > 150 ).  Most recent A1C was 6.3 % on 8/2/2022 and her A1C was 6.8 % on 3/6/2022. Previous A1C was 7.2 % on 12/16/2021.  I reviewed and scanned her DexcomG6 sensor download data in her her chart below.  Her fasting blood sugars and postprandial blood sugars are above target.  Her FBS was 139 this am and in the 160 range fasting yesterday.  Most of her 1 hr postmeal blood sugars are > 140.  No hypoglycemia.  On ROS today, she states she has been eating very few carbs and walking.   Mild nausea. No vomiting.  No headaches, dysuria or vaginal spotting.  Constipation.  Breasts are tender.  Pt denies blurred vision, SOB at rest, cough, fever or chills.  She denies chest pain, abd pain, diarrhea,  sx of neuropathy and she denies foot ulcers.    Diabetes Care  Retinopathy: none; pt seen by Oph in Aug 2022. No retinopathy per patient.  Nephropathy:none; urine microalbuminuria negative in 3/2022.  Neuropathy:none.  Foot Exam:no exam.  Taking aspirin: no.  Lipids: no LDL.  CAD: no.  Mental health: no hx of depression.  Insulin: Levemir BID and meal time insulin with correction.   DM meds: none.  Testing:  DexcomG6 sensor.                                                  ROS  See under HPI.    Allergies  No Known Allergies    Medications  Current Outpatient Medications   Medication Sig Dispense Refill     acetone urine (RELION KETONE TEST) test strip Test urine for ketones daily for 1 week if small or negative reduce to 2x week 50 strip 3     aspirin (ASA) 81 MG chewable tablet Take 81 mg by mouth daily       blood glucose (NO BRAND SPECIFIED) lancets standard Use to test blood sugar 4 times daily or as  directed. 100 each 3     blood glucose (NO BRAND SPECIFIED) test strip Use to test blood sugar 4 times daily or as directed. 400 strip 3     blood glucose monitoring (NO BRAND SPECIFIED) meter device kit Use to test blood sugar 4 times daily or as directed. 1 kit 0     Continuous Blood Gluc Sensor (DEXCOM G6 SENSOR) MISC 1 each every 10 days Change every 10 days. 3 each 11     Continuous Blood Gluc Transmit (DEXCOM G6 TRANSMITTER) MISC 1 each every 3 months Change every 3 months. 1 each 4     estradiol (VIVELLE-DOT) 0.1 MG/24HR bi-weekly patch        insulin detemir (LEVEMIR PEN) 100 UNIT/ML pen Inject 12 units subcutaneous BID. 15 mL 3     insulin pen needle (BD MELISSA U/F) 32G X 4 MM miscellaneous Use with insulin. 250 each 3     NOVOLOG FLEXPEN 100 UNIT/ML soln Inject with meals and for correction. Pt pregnant and may use up to 50 units / day. 15 mL 3     Prenatal Vit-Fe Fumarate-FA (PRENATAL VITAMIN) 27-0.8 MG TABS        progesterone (ENDOMETRIN) 100 MG vaginal insert        progesterone, in sesame oil, 50 MG/ML injection        Continuous Blood Gluc Sensor (FREESTYLE SATURNINO 2 SENSOR) MISC 1 Device every 14 days please keep appt 9/9/22. 2 each 5     metFORMIN (GLUCOPHAGE) 500 MG tablet Take 2 tablets (1,000 mg) by mouth 2 times daily (with meals) TAKE 2 TABLETS BY MOUTH TWICE DAILY WITH FOOD (Patient not taking: Reported on 11/2/2022) 360 tablet 0   Metformin 500 mg 2 tab po BID.      Family History  family history includes Cancer in her father, maternal grandmother, and mother; Diabetes in her father, maternal grandmother, mother, and paternal grandmother; Heart Disease in her father and mother.    Social History   reports that she has never smoked. She has never used smokeless tobacco. She reports current alcohol use. She reports that she does not use drugs.     Past Medical History  Past Medical History:   Diagnosis Date     Abnormal Pap smear of cervix      Diabetes (H)      History of human papillomavirus  infection    Obesity.    Past Surgical History:   Procedure Laterality Date     COLPOSCOPY       TONSILLECTOMY         Physical Exam    See under HPI.    RESULTS  Creatinine   Date Value Ref Range Status   03/06/2022 0.66 0.52 - 1.04 mg/dL Final     GFR Estimate   Date Value Ref Range Status   03/06/2022 >90 >60 mL/min/1.73m2 Final     Comment:     Effective December 21, 2021 eGFRcr in adults is calculated using the 2021 CKD-EPI creatinine equation which includes age and gender (Morteza et al., NE, DOI: 10.1056/FNIZad4758659)     Hemoglobin A1C   Date Value Ref Range Status   08/02/2022 6.3 (H) 0.0 - 5.6 % Final     Comment:     Normal <5.7%   Prediabetes 5.7-6.4%    Diabetes 6.5% or higher     Note: Adopted from ADA consensus guidelines.     ALT   Date Value Ref Range Status   03/06/2022 37 0 - 50 U/L Final     AST   Date Value Ref Range Status   03/06/2022 23 0 - 45 U/L Final     TSH   Date Value Ref Range Status   03/06/2022 1.17 0.40 - 4.00 mU/L Final       ASSESSMENT/PLAN:    1.  TYPE 2 DIABETES MELLITUS: Sandy's blood sugar values remain above target.  Increase Levemir 24 units BID and change Novolog I/C ratio to 1:6 ( was 1:10 ) with correction 1 unit/50 for BG > 140 with meals.  Pt will send me a message to review her DexcomG6 sensor data this coming Friday and next Monday.  She has an appt with the CDE on 11/9/2022.  She will continue to monitor her blood sugar using the DexcomG6 sensor.  I reviewed blood sugar goals during pregnancy with patient today. She is aware that we would like to see her FBS 95 or less, but > 70 and her 1 hr postmeal blood sugar 140 or less and her 2 hr postmeal blood sugar 120 or less.  Sandy was seen by Oph in Aug 2022  without retinopathy per patient.   She denies hx of nephropathy. Her urine microalbuminuria and creat/GFR were normal in 3/2022.  No sx of neuropathy. Denies foot ulcers.  No vitals today. I asked her to check her BP at home and report BP readings to her OB tomorrow.  She states she has a visit with the OB RN tomorrow.    2.  WEIGHT: Encouraged her to continue to make healthy food choices and remain active.    3.  PREGNANCY: Pt states she is 8 weeks pregnant.  ARNAV is 6/14/2023.    4. FOLLOW UP:  She is to send me a reminder this Friday to check her DexcomG6 sensor data.  She needs to be seen weekly.    Time spent reviewing chart,labs and DexcomG6 sensor download today = 8 minutes.  Time for video visit today = 24 minutes.  Time for documentation today = 15 minutes.    TOTAL TIME FOR VISIT TODAY = 47 minutes.    Vesta Arceo PA-C    Answers for HPI/ROS submitted by the patient on 10/26/2022  General Symptoms: No  Skin Symptoms: No  HENT Symptoms: No  EYE SYMPTOMS: No  HEART SYMPTOMS: No  LUNG SYMPTOMS: No  INTESTINAL SYMPTOMS: Yes  URINARY SYMPTOMS: No  GYNECOLOGIC SYMPTOMS: No  BREAST SYMPTOMS: No  SKELETAL SYMPTOMS: No  BLOOD SYMPTOMS: No  NERVOUS SYSTEM SYMPTOMS: No  MENTAL HEALTH SYMPTOMS: No  Heart burn or indigestion: No  Nausea: Yes  Vomiting: No  Abdominal pain: No  Bloating: No  Constipation: Yes  Diarrhea: No  Blood in stool: No  Black stools: No  Rectal or Anal pain: No  Fecal incontinence: No  Yellowing of skin or eyes: No  Vomit with blood: No  Change in stools: No

## 2022-11-03 ENCOUNTER — PRENATAL OFFICE VISIT (OUTPATIENT)
Dept: NURSING | Facility: CLINIC | Age: 42
End: 2022-11-03
Payer: COMMERCIAL

## 2022-11-03 VITALS — BODY MASS INDEX: 39.27 KG/M2 | HEIGHT: 64 IN | WEIGHT: 230 LBS

## 2022-11-03 DIAGNOSIS — O36.80X0 PREGNANCY WITH INCONCLUSIVE FETAL VIABILITY: Primary | ICD-10-CM

## 2022-11-03 DIAGNOSIS — O09.91 SUPERVISION OF HIGH RISK PREGNANCY IN FIRST TRIMESTER: ICD-10-CM

## 2022-11-03 PROBLEM — O09.90 SUPERVISION OF HIGH-RISK PREGNANCY: Status: ACTIVE | Noted: 2022-11-03

## 2022-11-03 PROCEDURE — 99207 PR NO CHARGE NURSE ONLY: CPT

## 2022-11-03 NOTE — PROGRESS NOTES
SUBJECTIVE:     HPI:    This is a 42 year old female patient,  who presents for her first obstetrical visit.    ARNAV: 2023, by Embryo Transfer.  She is 8w1d weeks. Since her positive pregnancy test, she has experienced  breast tenderness.    Additional History: AMA, IVF, DM 2, sees endo  Checks BP at home most recent 124/80 121/84    Have you travelled during the pregnancy?No  Have your sexual partner(s) travelled during the pregnancy?No    HISTORY:   Planned Pregnancy: Yes-IVF  Marital Status: -Colin  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Living in Household: Spouse    Past History:  Her past medical history   Past Medical History:   Diagnosis Date     Abnormal Pap smear of cervix      Diabetes (H)      History of human papillomavirus infection    .      She has a history of  IVF    Since her last LMP she denies use of alcohol, tobacco and street drugs.    Past medical, surgical, social and family history were reviewed and updated in [facility].        Current Outpatient Medications   Medication     acetone urine (RELION KETONE TEST) test strip     aspirin (ASA) 81 MG chewable tablet     blood glucose (NO BRAND SPECIFIED) lancets standard     blood glucose (NO BRAND SPECIFIED) test strip     blood glucose monitoring (NO BRAND SPECIFIED) meter device kit     Continuous Blood Gluc Sensor (DEXCOM G6 SENSOR) MISC     Continuous Blood Gluc Transmit (DEXCOM G6 TRANSMITTER) MISC     estradiol (VIVELLE-DOT) 0.1 MG/24HR bi-weekly patch     insulin detemir (LEVEMIR PEN) 100 UNIT/ML pen     insulin pen needle (BD MELISSA U/F) 32G X 4 MM miscellaneous     NOVOLOG FLEXPEN 100 UNIT/ML soln     Prenatal Vit-Fe Fumarate-FA (PRENATAL VITAMIN) 27-0.8 MG TABS     progesterone (ENDOMETRIN) 100 MG vaginal insert     progesterone, in sesame oil, 50 MG/ML injection     No current facility-administered medications for this visit.       ROS:   12 point review of systems negative other than symptoms noted below or in the HPI.  Breast:  "Tenderness    Nurse phone visit completed. Prenatal book and folder containing standard educational hand-outs and brochures will be given at the next visit to patient. Information in folder reviewed over the phone and sent via [employer] Questions answered. Information given on optional screening available to assess chromosomal anomalies. Pt unsure if NIPS is needed due to IVF testing. NOT ORDERED. Records from [facility] have been requested. Pt advised to call the clinic if she has any questions or concerns related to her pregnancy. Prenatal labs future ordered.   Berenice Estrada RN on 11/3/2022 at 4:55 PM        No results found for: PAP  PHQ-9 score:    PHQ 11/3/2022   PHQ-9 Total Score 0   Q9: Thoughts of better off dead/self-harm past 2 weeks Not at all               MAKENNA-7 SCORE 11/3/2022   Total Score 1 (minimal anxiety)   Total Score 1             Patient supplied answers from flow sheet for:  Prenatal OB Questionnaire.                                OBJECTIVE:     EXAM:  Ht 1.626 m (5' 4\")   Wt 104.3 kg (230 lb)   BMI 39.48 kg/m   Body mass index is 39.48 kg/m .      "

## 2022-11-04 ENCOUNTER — TELEPHONE (OUTPATIENT)
Dept: ENDOCRINOLOGY | Facility: CLINIC | Age: 42
End: 2022-11-04

## 2022-11-04 NOTE — TELEPHONE ENCOUNTER
11/4/2022  Type 2 DM 8 weeks pregnant.  Called pt to let her know I reviewed her DexcomG6 sensor download data for the past 2 days since insulin changes were made on 11/2/2022.  Current insulin regimen:  Levemir 24 units subcutaneous BID.  Novolog 1 unit/6 gms CHO with meals with correction insulin ( 1 unit/50 for BG > 140 ).    Changes today:   Change Novolog I/C ratio with breakfast to 1:4 ( was 1:6 ).  No other changes.  If her blood sugars are above target she is to notify us.  Pt has appt with CDE next Wednesday.                Vesta Arceo PA-C

## 2022-11-09 ENCOUNTER — VIRTUAL VISIT (OUTPATIENT)
Dept: EDUCATION SERVICES | Facility: CLINIC | Age: 42
End: 2022-11-09
Payer: COMMERCIAL

## 2022-11-09 DIAGNOSIS — O09.91 SUPERVISION OF HIGH RISK PREGNANCY IN FIRST TRIMESTER: Primary | ICD-10-CM

## 2022-11-09 DIAGNOSIS — E11.9 DIABETES MELLITUS, TYPE 2 (H): ICD-10-CM

## 2022-11-09 PROCEDURE — G0108 DIAB MANAGE TRN  PER INDIV: HCPCS | Mod: 95

## 2022-11-09 NOTE — PROGRESS NOTES
Sandy is a 42 year old who is being evaluated via a billable video visit.      How would you like to obtain your AVS? MyChart  If the video visit is dropped, the invitation should be resent by: Send to e-mail at: [email or web address]  Will anyone else be joining your video visit? No      Video-Visit Details    Video Start Time: 11:00 AM    Type of service:  Video Visit    Video End Time:11:30 AM    Originating Location (pt. Location): Home        Distant Location (provider location):  Off-site    Platform used for Video Visit: "[employer]    Diabetes Self-Management Education & Support    Presents for:  Pregnancy complicated by type 2 diabetes    ASSESSMENT:         Type 2 Diabetes. Patient is 9 weeks gestation. Concerns today include elevated BG even with insulin after eating. She is able to lower glucose with 20 minute walk after eating however not always feasible. Current regimen: Levemir 24 units BID, Novolog IC 1:4 for breakfast, 1:6 for lunch and dinner. ISF 1:50>140. Reviewed fasting glucose goal 65-95, 1 hour post prandial <140, 2 hours post prandial <120. Glucose not at goal fasting or post prandial, highs in evening. Breakfast is around 7:30 AM with carb goal of 30 g, lunch=45-50 gram carbs, dinner around 5:30-6:30 with 45-50 gram carbs. Typically does not snack, occasional almonds.     Pt verbalized understanding of concepts discussed and recommendations provided today  Opportunities for ongoing education and support in diabetes-self management were discussed.    PLAN:  *Increase evening Levemir to 30 units from 24 (evening dose only-remain on 24 units for AM dose)  *Change breakfast carb ratio to 1:3 from 1:4. Lunch carb ratio 1:6, change dinner carb ratio to 1:4 from 1:6.  *Follow up with Rosalba Merrill 11/14  *Take Novolog 20-25 minutes prior to eating    Jeanette Salgado, MEHNAZ Diabetes Educator  Diabetes Education Department  [facility]  [phone]      Insulin adjustments completed by  Kaylin Valdez RN CDCES    See Care Plan for co-developed, patient-stated behavior change goals.      SUBJECTIVE/OBJECTIVE:    Patient Problem List and Family Medical History reviewed for relevant medical history, current medical status, and diabetes risk factors.    Vitals:  There were no vitals taken for this visit.    Pre pregnancy weight: 235#, current weight 230 lbs related to watching diet    Estimated Date of Delivery: Jun 14, 2023    Labs:  Lab Results   Component Value Date    A1C 6.3 08/02/2022     No results found for: GLC  No results found for: LDL  No results found for: HDL]  GFR Estimate   Date Value Ref Range Status   03/06/2022 >90 >60 mL/min/1.73m2 Final     Comment:     Effective December 21, 2021 eGFRcr in adults is calculated using the 2021 CKD-EPI creatinine equation which includes age and gender (Morteza et al., NE, DOI: 10.1056/BSHZup8437382)     No results found for: GFRESTBLACK  Lab Results   Component Value Date    CR 0.66 03/06/2022     No results found for: MICROALBUMIN    Last 3 BP:   BP Readings from Last 3 Encounters:   05/11/22 (!) 148/98   07/22/21 138/88       History   Smoking Status     Never   Smokeless Tobacco     Never       Healthy Eating  Non fat plain yogurt with granola and pb or 2 toast with pb turkey sausage, eggs  Salad for lunch pre packaged kind with chicken (usually lightly breaded) or a sandwich or soup and sandwich  Dinner veggies, quinoa or millet, and a protein  She is getting the recommended carbs, watching them closely.   Having mostly nuts for a snack, almonds    Patient would like to keep a food journal for follow up. So will plan to do that.     Taking Medications  Diabetes Medication(s)     Insulin       insulin detemir (LEVEMIR PEN) 100 UNIT/ML pen    Inject 24 units subcutaneous BID and additional titration will be needed.     NOVOLOG FLEXPEN 100 UNIT/ML soln    Inject with meals and for correction. Pt pregnant and may use up to 80 units / day.         Metformin has been discontinued by Endo    Healthy Coping    Patient Activation Measure Survey Score:  No flowsheet data found.    Time Spent: 30 minutes  Encounter Type: Individual    Any diabetes medication dose changes were made via the CDE Protocol per the patient's referring provider. A copy of this encounter was shared with the provider.

## 2022-11-10 ENCOUNTER — MYC MEDICAL ADVICE (OUTPATIENT)
Dept: EDUCATION SERVICES | Facility: CLINIC | Age: 42
End: 2022-11-10

## 2022-11-10 DIAGNOSIS — O24.419 GESTATIONAL DIABETES: Primary | ICD-10-CM

## 2022-11-11 NOTE — PROGRESS NOTES
Outcome for 11/11/22 11:58 AM: Data uploaded on Dexcom  SIMON Polo  Outcome for 11/16/22 4:06 PM: Dexcom emailed to provider  SIMON Sandy

## 2022-11-14 ENCOUNTER — VIRTUAL VISIT (OUTPATIENT)
Dept: EDUCATION SERVICES | Facility: CLINIC | Age: 42
End: 2022-11-14
Payer: COMMERCIAL

## 2022-11-14 DIAGNOSIS — O24.419 GESTATIONAL DIABETES: Primary | ICD-10-CM

## 2022-11-14 PROCEDURE — G0108 DIAB MANAGE TRN  PER INDIV: HCPCS | Mod: 95 | Performed by: NUTRITIONIST

## 2022-11-15 ASSESSMENT — ANXIETY QUESTIONNAIRES
2. NOT BEING ABLE TO STOP OR CONTROL WORRYING: NOT AT ALL
GAD7 TOTAL SCORE: 1
8. IF YOU CHECKED OFF ANY PROBLEMS, HOW DIFFICULT HAVE THESE MADE IT FOR YOU TO DO YOUR WORK, TAKE CARE OF THINGS AT HOME, OR GET ALONG WITH OTHER PEOPLE?: NOT DIFFICULT AT ALL
5. BEING SO RESTLESS THAT IT IS HARD TO SIT STILL: NOT AT ALL
1. FEELING NERVOUS, ANXIOUS, OR ON EDGE: NOT AT ALL
7. FEELING AFRAID AS IF SOMETHING AWFUL MIGHT HAPPEN: NOT AT ALL
7. FEELING AFRAID AS IF SOMETHING AWFUL MIGHT HAPPEN: NOT AT ALL
3. WORRYING TOO MUCH ABOUT DIFFERENT THINGS: NOT AT ALL
4. TROUBLE RELAXING: SEVERAL DAYS
6. BECOMING EASILY ANNOYED OR IRRITABLE: NOT AT ALL
GAD7 TOTAL SCORE: 1
GAD7 TOTAL SCORE: 1
IF YOU CHECKED OFF ANY PROBLEMS ON THIS QUESTIONNAIRE, HOW DIFFICULT HAVE THESE PROBLEMS MADE IT FOR YOU TO DO YOUR WORK, TAKE CARE OF THINGS AT HOME, OR GET ALONG WITH OTHER PEOPLE: NOT DIFFICULT AT ALL

## 2022-11-15 ASSESSMENT — PATIENT HEALTH QUESTIONNAIRE - PHQ9
SUM OF ALL RESPONSES TO PHQ QUESTIONS 1-9: 0
10. IF YOU CHECKED OFF ANY PROBLEMS, HOW DIFFICULT HAVE THESE PROBLEMS MADE IT FOR YOU TO DO YOUR WORK, TAKE CARE OF THINGS AT HOME, OR GET ALONG WITH OTHER PEOPLE: NOT DIFFICULT AT ALL
SUM OF ALL RESPONSES TO PHQ QUESTIONS 1-9: 0

## 2022-11-18 ENCOUNTER — VIRTUAL VISIT (OUTPATIENT)
Dept: ENDOCRINOLOGY | Facility: CLINIC | Age: 42
End: 2022-11-18
Payer: COMMERCIAL

## 2022-11-18 DIAGNOSIS — O24.111 PRE-EXISTING TYPE 2 DIABETES MELLITUS DURING PREGNANCY IN FIRST TRIMESTER: Primary | ICD-10-CM

## 2022-11-18 DIAGNOSIS — E11.9 TYPE 2 DIABETES MELLITUS WITHOUT COMPLICATION, WITHOUT LONG-TERM CURRENT USE OF INSULIN (H): ICD-10-CM

## 2022-11-18 PROCEDURE — 99214 OFFICE O/P EST MOD 30 MIN: CPT | Mod: 95 | Performed by: PHYSICIAN ASSISTANT

## 2022-11-18 NOTE — LETTER
11/18/2022       RE: Sandy Person  [address] [facility] [address]     Dear Colleague,    Thank you for referring your patient, Sandy Person, to the [facility] [place] at [facility]. Please see a copy of my visit note below.    Outcome for 11/11/22 11:58 AM: Data uploaded on Dexcom  SIMON Polo  Outcome for 11/16/22 4:06 PM: Dexcom emailed to provider  SIMON Sandy          Due to the COVID 19 pandemic this visit was converted to a video visit in order to help prevent spread of infection in this patient and the general population.    Time of start: 8:30 am  Time of end: 8:40 am  Total duration of video visit: 10 minutes.  Provider's location: offsite.    HPI  Sandy Person is a 42 year old female with type 2 diabetes mellitus. Video visit for diabetes follow up today.  Sandy reports she is currently [id number]  pregnant following IVF.  Her ARNAV is 6/14/2023.  Pt was seen by Dr. Ishan Duenas on 10/18/2023 and CDE/RD.  Pt gives a hx of type 2 diabetes dx 10 years ago.  She has no known retinopathy, nephropathy or neuropathy.  She has a hx of obesity and questionable hx of PCOS.  Sandy tested + for COVID in May 2022 with mild symptom.  She had been taking Rybelsus which was discontinued in late Nov 2021 by her fertility provider.   She was instructed to remain on Metformin 1000 mg BID and Glipizide 10 mg each am.  Earlier this year, I  added low dose Lantus at bedtime due to high overnight/fasting blood sugar values which has been helpful.  Most recently, she found out she was pregnant and I have her take Levemir BID and Novolog for food coverage.  Her Metformin was discontinued on 10/18/2022.  For her diabetes, she is currently taking Levemir 24 units subcutaneous each am and 33 units subcutaneous each pm and Novolog 1 unit/3 gms CHO with breakfast and 1 unit/4 gms CHO with lunch and dinner, plus correction.  Most  recent A1C was 6.3 % on 8/2/2022 and her A1C was 6.8 % on 3/6/2022. Previous A1C was 7.2 % on 12/16/2021.  I reviewed and scanned her DexcomG6 sensor download data in her her chart below.  Her fasting blood sugars and postprandial blood sugars are good at this time.  Her FBS was 109 this am.  She walks in the evening which also helps lower her postdinner blood sugar.  On ROS today, she has mild nausea and constipation.  No vomiting.  No dysuria or vaginal spotting.  Pt denies blurred vision, SOB at rest, cough, fever or chills.  She denies chest pain, abd pain, diarrhea,  sx of neuropathy and she denies foot ulcers.    Diabetes Care  Retinopathy: none; pt seen by Oph in Aug 2022. No retinopathy per patient.  Nephropathy:none; urine microalbuminuria negative in 3/2022.  Neuropathy:none.  Foot Exam:no exam.  Taking aspirin: no.  Lipids: no LDL.  CAD: no.  Mental health: no hx of depression.  Insulin: Levemir BID and meal time insulin with correction.   DM meds: none.  Testing:  DexcomG6 sensor.                                        ROS  See under HPI.    Allergies  No Known Allergies    Medications  Current Outpatient Medications   Medication Sig Dispense Refill     acetone urine (RELION KETONE TEST) test strip Test urine for ketones daily for 1 week if small or negative reduce to 2x week 50 strip 3     aspirin (ASA) 81 MG chewable tablet Take 81 mg by mouth daily       blood glucose (NO BRAND SPECIFIED) lancets standard Use to test blood sugar 4 times daily or as directed. 100 each 3     blood glucose (NO BRAND SPECIFIED) test strip Use to test blood sugar 4 times daily or as directed. 400 strip 3     blood glucose monitoring (NO BRAND SPECIFIED) meter device kit Use to test blood sugar 4 times daily or as directed. 1 kit 0     Continuous Blood Gluc Sensor (DEXCOM G6 SENSOR) MISC 1 each every 10 days Change every 10 days. 3 each 11     Continuous Blood Gluc Transmit (DEXCOM G6 TRANSMITTER) MISC 1 each every 3 months  Change every 3 months. 1 each 4     estradiol (VIVELLE-DOT) 0.1 MG/24HR bi-weekly patch        insulin detemir (LEVEMIR PEN) 100 UNIT/ML pen Inject 24 units subcutaneous BID and additional titration will be needed. 40 mL 3     insulin pen needle (32G X 4 MM) 32G X 4 MM miscellaneous Use 6 pen needles daily or as directed. 200 each 3     insulin pen needle (BD MELISSA U/F) 32G X 4 MM miscellaneous Use with insulin. 250 each 3     NOVOLOG FLEXPEN 100 UNIT/ML soln Inject with meals and for correction. Pt pregnant and may use up to 80 units / day. 80 mL 3     Prenatal Vit-Fe Fumarate-FA (PRENATAL VITAMIN) 27-0.8 MG TABS        progesterone (ENDOMETRIN) 100 MG vaginal insert        progesterone, in sesame oil, 50 MG/ML injection      Metformin 500 mg 2 tab po BID.      Family History  family history includes Cancer in her father, maternal grandmother, and mother; Diabetes in her father, maternal grandmother, mother, and paternal grandmother; Heart Disease in her father and mother.    Social History   reports that she has never smoked. She has never used smokeless tobacco. She reports that she does not currently use alcohol. She reports that she does not use drugs.     Past Medical History  Past Medical History:   Diagnosis Date     Abnormal Pap smear of cervix      Diabetes (H)      History of human papillomavirus infection    Obesity.    Past Surgical History:   Procedure Laterality Date     COLPOSCOPY       TONSILLECTOMY         Physical Exam    See under HPI.    RESULTS  Creatinine   Date Value Ref Range Status   03/06/2022 0.66 0.52 - 1.04 mg/dL Final     GFR Estimate   Date Value Ref Range Status   03/06/2022 >90 >60 mL/min/1.73m2 Final     Comment:     Effective December 21, 2021 eGFRcr in adults is calculated using the 2021 CKD-EPI creatinine equation which includes age and gender (Morteza edgar al., NEJM, DOI: 10.1056/LUHBsn2878585)     Hemoglobin A1C   Date Value Ref Range Status   08/02/2022 6.3 (H) 0.0 - 5.6 % Final      Comment:     Normal <5.7%   Prediabetes 5.7-6.4%    Diabetes 6.5% or higher     Note: Adopted from ADA consensus guidelines.     ALT   Date Value Ref Range Status   03/06/2022 37 0 - 50 U/L Final     AST   Date Value Ref Range Status   03/06/2022 23 0 - 45 U/L Final     TSH   Date Value Ref Range Status   03/06/2022 1.17 0.40 - 4.00 mU/L Final       ASSESSMENT/PLAN:    1.  TYPE 2 DIABETES MELLITUS: Sandy's blood sugar values are stable at this time.  Continue current insulin doses.  She is to continue to monitor her blood sugar frequently with use of her DexcomG6 sensor.  She has an appt with the CDE on 11/9/2022.  I reviewed blood sugar goals during pregnancy with patient today. She is aware that we would like to see her FBS 95 or less, but > 70 and her 1 hr postmeal blood sugar 140 or less and her 2 hr postmeal blood sugar 120 or less.  Sandy was seen by Oph in Aug 2022  without retinopathy per patient.   She denies hx of nephropathy. Her urine microalbuminuria and creat/GFR were normal in 3/2022.  No sx of neuropathy. Denies foot ulcers.  No vitals today. I asked her to check her BP at home and report BP readings to her OB tomorrow.     2.  WEIGHT: Encouraged her to continue to make healthy food choices and remain active.    3.  PREGNANCY: Pt is [id number] pregnant.  ARNAV is 6/14/2023.    4. FOLLOW UP:  With me or CDE/RD weekly.  A1C and TSH ordered today.      Time spent reviewing chart,labs and DexcomG6 sensor download today = 8 minutes.  Time for video visit today =10 minutes.  Time for documentation today = 15 minutes.    TOTAL TIME FOR VISIT TODAY = 34 minutes.    Vesta Arceo PA-C      Answers for HPI/ROS submitted by the patient on 11/15/2022  General Symptoms: No  Skin Symptoms: No  HENT Symptoms: No  EYE SYMPTOMS: No  HEART SYMPTOMS: No  LUNG SYMPTOMS: No  INTESTINAL SYMPTOMS: No  URINARY SYMPTOMS: No  GYNECOLOGIC SYMPTOMS: No  BREAST SYMPTOMS: No  SKELETAL SYMPTOMS: No  BLOOD SYMPTOMS: No  NERVOUS  SYSTEM SYMPTOMS: No  MENTAL HEALTH SYMPTOMS: No

## 2022-11-18 NOTE — PROGRESS NOTES
Due to the COVID 19 pandemic this visit was converted to a video visit in order to help prevent spread of infection in this patient and the general population.    Time of start: 8:30 am  Time of end: 8:40 am  Total duration of video visit: 10 minutes.  Provider's location: offsite.    HPI  Sandy Person is a 42 year old female with type 2 diabetes mellitus. Video visit for diabetes follow up today.  Sandy reports she is currently [id number]  pregnant following IVF.  Her ARNAV is 6/14/2023.  Pt was seen by Dr. Ishan Duenas on 10/18/2023 and CDE/RD.  Pt gives a hx of type 2 diabetes dx 10 years ago.  She has no known retinopathy, nephropathy or neuropathy.  She has a hx of obesity and questionable hx of PCOS.  Sandy tested + for COVID in May 2022 with mild symptom.  She had been taking Rybelsus which was discontinued in late Nov 2021 by her fertility provider.   She was instructed to remain on Metformin 1000 mg BID and Glipizide 10 mg each am.  Earlier this year, I  added low dose Lantus at bedtime due to high overnight/fasting blood sugar values which has been helpful.  Most recently, she found out she was pregnant and I have her take Levemir BID and Novolog for food coverage.  Her Metformin was discontinued on 10/18/2022.  For her diabetes, she is currently taking Levemir 24 units subcutaneous each am and 33 units subcutaneous each pm and Novolog 1 unit/3 gms CHO with breakfast and 1 unit/4 gms CHO with lunch and dinner, plus correction.  Most recent A1C was 6.3 % on 8/2/2022 and her A1C was 6.8 % on 3/6/2022. Previous A1C was 7.2 % on 12/16/2021.  I reviewed and scanned her DexcomG6 sensor download data in her her chart below.  Her fasting blood sugars and postprandial blood sugars are good at this time.  Her FBS was 109 this am.  She walks in the evening which also helps lower her postdinner blood sugar.  On ROS today, she has mild nausea and constipation.  No vomiting.  No dysuria or vaginal spotting.  Pt denies  blurred vision, SOB at rest, cough, fever or chills.  She denies chest pain, abd pain, diarrhea,  sx of neuropathy and she denies foot ulcers.    Diabetes Care  Retinopathy: none; pt seen by Oph in Aug 2022. No retinopathy per patient.  Nephropathy:none; urine microalbuminuria negative in 3/2022.  Neuropathy:none.  Foot Exam:no exam.  Taking aspirin: no.  Lipids: no LDL.  CAD: no.  Mental health: no hx of depression.  Insulin: Levemir BID and meal time insulin with correction.   DM meds: none.  Testing:  DexcomG6 sensor.                                        ROS  See under HPI.    Allergies  No Known Allergies    Medications  Current Outpatient Medications   Medication Sig Dispense Refill     acetone urine (RELION KETONE TEST) test strip Test urine for ketones daily for 1 week if small or negative reduce to 2x week 50 strip 3     aspirin (ASA) 81 MG chewable tablet Take 81 mg by mouth daily       blood glucose (NO BRAND SPECIFIED) lancets standard Use to test blood sugar 4 times daily or as directed. 100 each 3     blood glucose (NO BRAND SPECIFIED) test strip Use to test blood sugar 4 times daily or as directed. 400 strip 3     blood glucose monitoring (NO BRAND SPECIFIED) meter device kit Use to test blood sugar 4 times daily or as directed. 1 kit 0     Continuous Blood Gluc Sensor (DEXCOM G6 SENSOR) MISC 1 each every 10 days Change every 10 days. 3 each 11     Continuous Blood Gluc Transmit (DEXCOM G6 TRANSMITTER) MISC 1 each every 3 months Change every 3 months. 1 each 4     estradiol (VIVELLE-DOT) 0.1 MG/24HR bi-weekly patch        insulin detemir (LEVEMIR PEN) 100 UNIT/ML pen Inject 24 units subcutaneous BID and additional titration will be needed. 40 mL 3     insulin pen needle (32G X 4 MM) 32G X 4 MM miscellaneous Use 6 pen needles daily or as directed. 200 each 3     insulin pen needle (BD MELISSA U/F) 32G X 4 MM miscellaneous Use with insulin. 250 each 3     NOVOLOG FLEXPEN 100 UNIT/ML soln Inject with meals  and for correction. Pt pregnant and may use up to 80 units / day. 80 mL 3     Prenatal Vit-Fe Fumarate-FA (PRENATAL VITAMIN) 27-0.8 MG TABS        progesterone (ENDOMETRIN) 100 MG vaginal insert        progesterone, in sesame oil, 50 MG/ML injection      Metformin 500 mg 2 tab po BID.      Family History  family history includes Cancer in her father, maternal grandmother, and mother; Diabetes in her father, maternal grandmother, mother, and paternal grandmother; Heart Disease in her father and mother.    Social History   reports that she has never smoked. She has never used smokeless tobacco. She reports that she does not currently use alcohol. She reports that she does not use drugs.     Past Medical History  Past Medical History:   Diagnosis Date     Abnormal Pap smear of cervix      Diabetes (H)      History of human papillomavirus infection    Obesity.    Past Surgical History:   Procedure Laterality Date     COLPOSCOPY       TONSILLECTOMY         Physical Exam    See under HPI.    RESULTS  Creatinine   Date Value Ref Range Status   03/06/2022 0.66 0.52 - 1.04 mg/dL Final     GFR Estimate   Date Value Ref Range Status   03/06/2022 >90 >60 mL/min/1.73m2 Final     Comment:     Effective December 21, 2021 eGFRcr in adults is calculated using the 2021 CKD-EPI creatinine equation which includes age and gender (Morteza et al., NEJ, DOI: 10.1056/SBYCzc7431585)     Hemoglobin A1C   Date Value Ref Range Status   08/02/2022 6.3 (H) 0.0 - 5.6 % Final     Comment:     Normal <5.7%   Prediabetes 5.7-6.4%    Diabetes 6.5% or higher     Note: Adopted from ADA consensus guidelines.     ALT   Date Value Ref Range Status   03/06/2022 37 0 - 50 U/L Final     AST   Date Value Ref Range Status   03/06/2022 23 0 - 45 U/L Final     TSH   Date Value Ref Range Status   03/06/2022 1.17 0.40 - 4.00 mU/L Final       ASSESSMENT/PLAN:    1.  TYPE 2 DIABETES MELLITUS: Sandy's blood sugar values are stable at this time.  Continue current insulin  doses.  She is to continue to monitor her blood sugar frequently with use of her DexcomG6 sensor.  She has an appt with the CDE on 11/9/2022.  I reviewed blood sugar goals during pregnancy with patient today. She is aware that we would like to see her FBS 95 or less, but > 70 and her 1 hr postmeal blood sugar 140 or less and her 2 hr postmeal blood sugar 120 or less.  Sandy was seen by Oph in Aug 2022  without retinopathy per patient.   She denies hx of nephropathy. Her urine microalbuminuria and creat/GFR were normal in 3/2022.  No sx of neuropathy. Denies foot ulcers.  No vitals today. I asked her to check her BP at home and report BP readings to her OB tomorrow.     2.  WEIGHT: Encouraged her to continue to make healthy food choices and remain active.    3.  PREGNANCY: Pt is [id number] pregnant.  ARNAV is 6/14/2023.    4. FOLLOW UP:  With me or CDE/RD weekly.  A1C and TSH ordered today.      Time spent reviewing chart,labs and DexcomG6 sensor download today = 8 minutes.  Time for video visit today =10 minutes.  Time for documentation today = 15 minutes.    TOTAL TIME FOR VISIT TODAY = 34 minutes.    Vesta Arceo PA-C      Answers for HPI/ROS submitted by the patient on 11/15/2022  General Symptoms: No  Skin Symptoms: No  HENT Symptoms: No  EYE SYMPTOMS: No  HEART SYMPTOMS: No  LUNG SYMPTOMS: No  INTESTINAL SYMPTOMS: No  URINARY SYMPTOMS: No  GYNECOLOGIC SYMPTOMS: No  BREAST SYMPTOMS: No  SKELETAL SYMPTOMS: No  BLOOD SYMPTOMS: No  NERVOUS SYSTEM SYMPTOMS: No  MENTAL HEALTH SYMPTOMS: No

## 2022-11-20 LAB
ABO/RH(D): NORMAL
ANTIBODY SCREEN: NEGATIVE
SPECIMEN EXPIRATION DATE: NORMAL

## 2022-11-20 NOTE — PROGRESS NOTES
Sandy is a 42 year old who is being evaluated via a billable video visit.      How would you like to obtain your AVS? MyChart  If the video visit is dropped, the invitation should be resent by: Text to cell phone: [phone]  Will anyone else be joining your video visit? No      Video-Visit Details    Video Start Time: 830am    Type of service:  Video Visit    Video End Time:9am    Originating Location (pt. Location): Home        Distant Location (provider location):  On-site    Platform used for Video Visit: Kaleidoscope    Diabetes Self-Management Education & Support    Presents for:  Pregnancy complicated by type 2 diabetes    ASSESSMENT:  Fasting glucose not at goal  Post prandial highs after dinner    Patient is taking levemir 24 units before breakfast and 30 units at bedtime  Insulin to carb ratio 1:3 breakfast, 1:6 lunch, 1:4 dinner, 1:6 snacks though she is not doing much snacking    Opportunities for ongoing education and support in diabetes-self management were discussed.    Pt verbalized understanding of concepts discussed and recommendations provided today.       INTERVENTION:                    PLAN:  Increase bedtime levemir to 33 unit  Increase insulin to carb ratio to 1 unit for each 4 grams carb at lunch  Continue to follow meal plan    See Care Plan for co-developed, patient-stated behavior change goals.    Follow up in one week  AVS printed and provided to patient today.      Patient Problem List and Family Medical History reviewed for relevant medical history, current medical status, and diabetes risk factors.    Vitals:  There were no vitals taken for this visit.    Estimated Date of Delivery: Jun 14, 2023    Labs:  Lab Results   Component Value Date    A1C 6.3 08/02/2022     No results found for: GLC  No results found for: LDL  No results found for: HDL]  GFR Estimate   Date Value Ref Range Status   03/06/2022 >90 >60 mL/min/1.73m2 Final     Comment:     Effective December 21, 2021 eGFRcr in adults  is calculated using the 2021 CKD-EPI creatinine equation which includes age and gender (Morteza edgar al., NEJM, DOI: 10.1056/[id number])     No results found for: GFRESTBLACK  Lab Results   Component Value Date    CR 0.66 03/06/2022     No results found for: MICROALBUMIN    Last 3 BP:   BP Readings from Last 3 Encounters:   05/11/22 (!) 148/98   07/22/21 138/88       History   Smoking Status     Never   Smokeless Tobacco     Never       Healthy Eating  Doing her best to follow meal plan  Counting carbohydrate    Checking for urine ketones? Yes, negative    Taking Medications  Diabetes Medication(s)     Insulin       insulin detemir (LEVEMIR PEN) 100 UNIT/ML pen    Inject 24 units subcutaneous in am and 33 units subcutaneous in pm.     NOVOLOG FLEXPEN 100 UNIT/ML soln    Inject with meals and for correction. Pt pregnant and may use up to 80 units / day.          Patient Activation Measure Survey Score:  No flowsheet data found.      Time Spent: 30 minutes  Encounter Type: Individual    Any diabetes medication dose changes were made via the CDE Protocol per the patient's referring provider. A copy of this encounter was shared with the provider.

## 2022-11-21 ENCOUNTER — ANCILLARY PROCEDURE (OUTPATIENT)
Dept: ULTRASOUND IMAGING | Facility: CLINIC | Age: 42
End: 2022-11-21
Payer: COMMERCIAL

## 2022-11-21 ENCOUNTER — TELEPHONE (OUTPATIENT)
Dept: ENDOCRINOLOGY | Facility: CLINIC | Age: 42
End: 2022-11-21

## 2022-11-21 ENCOUNTER — PRENATAL OFFICE VISIT (OUTPATIENT)
Dept: OBGYN | Facility: CLINIC | Age: 42
End: 2022-11-21
Payer: COMMERCIAL

## 2022-11-21 VITALS — SYSTOLIC BLOOD PRESSURE: 126 MMHG | BODY MASS INDEX: 40.68 KG/M2 | DIASTOLIC BLOOD PRESSURE: 72 MMHG | WEIGHT: 237 LBS

## 2022-11-21 DIAGNOSIS — O36.80X0 PREGNANCY WITH INCONCLUSIVE FETAL VIABILITY: ICD-10-CM

## 2022-11-21 DIAGNOSIS — O09.811 PREGNANCY RESULTING FROM IN VITRO FERTILIZATION IN FIRST TRIMESTER: Primary | ICD-10-CM

## 2022-11-21 DIAGNOSIS — Z11.3 SCREEN FOR STD (SEXUALLY TRANSMITTED DISEASE): ICD-10-CM

## 2022-11-21 DIAGNOSIS — O24.111 PRE-EXISTING TYPE 2 DIABETES MELLITUS DURING PREGNANCY IN FIRST TRIMESTER: ICD-10-CM

## 2022-11-21 DIAGNOSIS — O09.91 SUPERVISION OF HIGH RISK PREGNANCY IN FIRST TRIMESTER: ICD-10-CM

## 2022-11-21 DIAGNOSIS — Z11.8 SCREENING FOR CHLAMYDIAL DISEASE: ICD-10-CM

## 2022-11-21 LAB
ALBUMIN UR-MCNC: NEGATIVE MG/DL
APPEARANCE UR: CLEAR
BILIRUB UR QL STRIP: NEGATIVE
COLOR UR AUTO: YELLOW
ERYTHROCYTE [DISTWIDTH] IN BLOOD BY AUTOMATED COUNT: 12.4 % (ref 10–15)
GLUCOSE UR STRIP-MCNC: NEGATIVE MG/DL
HBA1C MFR BLD: 6.6 % (ref 0–5.6)
HCT VFR BLD AUTO: 40 % (ref 35–47)
HGB BLD-MCNC: 13.1 G/DL (ref 11.7–15.7)
HGB UR QL STRIP: NEGATIVE
KETONES UR STRIP-MCNC: NEGATIVE MG/DL
LEUKOCYTE ESTERASE UR QL STRIP: NEGATIVE
MCH RBC QN AUTO: 29.4 PG (ref 26.5–33)
MCHC RBC AUTO-ENTMCNC: 32.8 G/DL (ref 31.5–36.5)
MCV RBC AUTO: 90 FL (ref 78–100)
NITRATE UR QL: NEGATIVE
PH UR STRIP: 6 [PH] (ref 5–7)
PLATELET # BLD AUTO: 224 10E3/UL (ref 150–450)
RBC # BLD AUTO: 4.45 10E6/UL (ref 3.8–5.2)
SP GR UR STRIP: 1.01 (ref 1–1.03)
UROBILINOGEN UR STRIP-ACNC: 0.2 E.U./DL
WBC # BLD AUTO: 13 10E3/UL (ref 4–11)

## 2022-11-21 PROCEDURE — 87389 HIV-1 AG W/HIV-1&-2 AB AG IA: CPT | Performed by: OBSTETRICS & GYNECOLOGY

## 2022-11-21 PROCEDURE — 85027 COMPLETE CBC AUTOMATED: CPT | Performed by: OBSTETRICS & GYNECOLOGY

## 2022-11-21 PROCEDURE — 83036 HEMOGLOBIN GLYCOSYLATED A1C: CPT | Performed by: OBSTETRICS & GYNECOLOGY

## 2022-11-21 PROCEDURE — 86901 BLOOD TYPING SEROLOGIC RH(D): CPT | Performed by: OBSTETRICS & GYNECOLOGY

## 2022-11-21 PROCEDURE — 81003 URINALYSIS AUTO W/O SCOPE: CPT | Performed by: OBSTETRICS & GYNECOLOGY

## 2022-11-21 PROCEDURE — 84443 ASSAY THYROID STIM HORMONE: CPT | Performed by: OBSTETRICS & GYNECOLOGY

## 2022-11-21 PROCEDURE — 87491 CHLMYD TRACH DNA AMP PROBE: CPT | Performed by: OBSTETRICS & GYNECOLOGY

## 2022-11-21 PROCEDURE — 87591 N.GONORRHOEAE DNA AMP PROB: CPT | Performed by: OBSTETRICS & GYNECOLOGY

## 2022-11-21 PROCEDURE — 86803 HEPATITIS C AB TEST: CPT | Performed by: OBSTETRICS & GYNECOLOGY

## 2022-11-21 PROCEDURE — 86850 RBC ANTIBODY SCREEN: CPT | Performed by: OBSTETRICS & GYNECOLOGY

## 2022-11-21 PROCEDURE — 99207 PR FIRST OB VISIT: CPT | Performed by: OBSTETRICS & GYNECOLOGY

## 2022-11-21 PROCEDURE — 86762 RUBELLA ANTIBODY: CPT | Performed by: OBSTETRICS & GYNECOLOGY

## 2022-11-21 PROCEDURE — 76817 TRANSVAGINAL US OBSTETRIC: CPT | Performed by: OBSTETRICS & GYNECOLOGY

## 2022-11-21 PROCEDURE — 86900 BLOOD TYPING SEROLOGIC ABO: CPT | Performed by: OBSTETRICS & GYNECOLOGY

## 2022-11-21 PROCEDURE — 36415 COLL VENOUS BLD VENIPUNCTURE: CPT | Performed by: OBSTETRICS & GYNECOLOGY

## 2022-11-21 PROCEDURE — 87340 HEPATITIS B SURFACE AG IA: CPT | Performed by: OBSTETRICS & GYNECOLOGY

## 2022-11-21 PROCEDURE — 87086 URINE CULTURE/COLONY COUNT: CPT | Performed by: OBSTETRICS & GYNECOLOGY

## 2022-11-21 PROCEDURE — 86780 TREPONEMA PALLIDUM: CPT | Performed by: OBSTETRICS & GYNECOLOGY

## 2022-11-21 NOTE — PROGRESS NOTES
HPI:     This is a 42 year old female patient,  who presents for her first obstetrical visit.     ARNAV: 2023, by Embryo Transfer.  She is 10w5d weeks. Since her positive pregnancy test, she has experienced  breast tenderness.     Additional History: Maximiliano are here for prenatal care. She conceived via IVF. Her sonogram today shows normal interval growth. She has type II diabetes and was changed from metformin and Glipizide to Levemir and Novolog with pregnancy. Her last hemoglobin A1C was 6.3. She had preimplantation testing on her fetus and does not desire NIPT. 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 She has been having significant fatigue during pregnancy and constipation as well. She is looking to be taken off exercise restrictions now that she is past 10 weeks gestation.      Have you travelled during the pregnancy?No  Have your sexual partner(s) travelled during the pregnancy?No     HISTORY:   Planned Pregnancy: Yes-IVF  Marital Status: -Colin  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Living in Household: Spouse     Past History:  Her past medical history   Past Medical History   Past Medical History:   Diagnosis Date     Abnormal Pap smear of cervix       Diabetes (H)       History of human papillomavirus infection        .       Patient Active Problem List    Diagnosis Date Noted     Supervision of high-risk pregnancy 2022     Priority: Medium     She has a history of  IVF     Since her last LMP she denies use of alcohol, tobacco and street drugs.     Past medical, surgical, social and family history were reviewed and updated in EPIC.               Current Outpatient Medications   Medication     acetone urine (RELION KETONE TEST) test strip     aspirin (ASA) 81 MG chewable tablet     blood glucose (NO BRAND SPECIFIED) lancets standard     blood glucose (NO BRAND SPECIFIED) test strip     blood glucose monitoring (NO BRAND  SPECIFIED) meter device kit     Continuous Blood Gluc Sensor (DEXCOM G6 SENSOR) MISC     Continuous Blood Gluc Transmit (DEXCOM G6 TRANSMITTER) MISC     estradiol (VIVELLE-DOT) 0.1 MG/24HR bi-weekly patch     insulin detemir (LEVEMIR PEN) 100 UNIT/ML pen     insulin pen needle (BD MELISSA U/F) 32G X 4 MM miscellaneous     NOVOLOG FLEXPEN 100 UNIT/ML soln     Prenatal Vit-Fe Fumarate-FA (PRENATAL VITAMIN) 27-0.8 MG TABS     progesterone (ENDOMETRIN) 100 MG vaginal insert     progesterone, in sesame oil, 50 MG/ML injection      No current facility-administered medications for this visit.         ROS:   12 point review of systems negative other than symptoms noted below or in the HPI.  Breast: Tenderness           OBJECTIVE:     EXAM:  /72   Wt 107.5 kg (237 lb)   BMI 40.68 kg/m   Body mass index is 40.68 kg/m .    GENERAL: healthy, alert and no distress  EYES: Eyes grossly normal to inspection, PERRL and conjunctivae and sclerae normal  HENT: ear canals and TM's normal, nose and mouth without ulcers or lesions  NECK: no adenopathy, no asymmetry, masses, or scars and thyroid normal to palpation  RESP: lungs clear to auscultation - no rales, rhonchi or wheezes  BREAST: normal without masses, tenderness or nipple discharge and no palpable axillary masses or adenopathy  CV: regular rate and rhythm, normal S1 S2, no S3 or S4, no murmur, click or rub, no peripheral edema and peripheral pulses strong  ABDOMEN: soft, nontender, no hepatosplenomegaly, no masses and bowel sounds normal  MS: no gross musculoskeletal defects noted, no edema  SKIN: no suspicious lesions or rashes  NEURO: Normal strength and tone, mentation intact and speech normal  PSYCH: mentation appears normal, affect normal/bright    ASSESSMENT/PLAN:       ICD-10-CM    1. Supervision of high risk pregnancy in first trimester  O09.91 Urine Culture Aerobic Bacterial     *UA reflex to Microscopic     Hemoglobin A1c     ABO/Rh type and screen     Hepatitis B  surface antigen     CBC with platelets     HIV Antigen Antibody Combo     Rubella Antibody IgG Quantitative     Treponema Abs w Reflex to RPR and Titer     Hepatitis C antibody     CANCELED: Hemoglobin A1c      2. Pre-existing type 2 diabetes mellitus during pregnancy in first trimester  O24.111 Hemoglobin A1c     TSH      3. Screen for STD (sexually transmitted disease)  Z11.3 NEISSERIA GONORRHOEA PCR      4. Screening for chlamydial disease  Z11.8 CHLAMYDIA TRACHOMATIS PCR          42 year old , On 2022 patient is 10w5d weeks of pregnancy with ARNAV of 2023, by Embryo Transfer    Discussed as follows:First trimester counseling given. I recommend re-starting ASA at 81 mg for the prevention of preeclampsia. Prenatal labs today. Recommend first and second trimester ultrasounds with MFM as well. Discussed fetal echoardiogram which would be recommended at the time of her level II sonogram.     Next Steps:   - Follow up in 2-4 weeks for return OB visit.  - Recommended weight gain for pregnancy: < 15 lbs.       Niurka Polanco MD

## 2022-11-21 NOTE — PATIENT INSTRUCTIONS
Clinical Risk Assessment for Preeclampsia from The American [employer] (Committee Opinion #[id number]):    High Risk for Preeclampsia: Low Dose ASA to begin at 12 weeks.  History of preeclampsia  Multifetal Gestation  Chronic Hypertension  Type 1 or Type 2 Diabetes  Renal Disease  Autoimmune Disease    Moderate Risk for Preeclampsia: Low Dose ASA to begin at 12 weeks if more than one present.  Nulliparity  Obesity (BMI>30)  Family History of Preeclampsia (mother or sister)  Sociodemographic Characteristics (, Low SES)  Age 35 years or older  Personal Risk factors ( Low birth weight, SGA, Adverse pregnancy outcome, 10 year pregnancy interval)

## 2022-11-22 ENCOUNTER — VIRTUAL VISIT (OUTPATIENT)
Dept: EDUCATION SERVICES | Facility: CLINIC | Age: 42
End: 2022-11-22
Payer: COMMERCIAL

## 2022-11-22 ENCOUNTER — TRANSCRIBE ORDERS (OUTPATIENT)
Dept: ULTRASOUND IMAGING | Facility: CLINIC | Age: 42
End: 2022-11-22

## 2022-11-22 DIAGNOSIS — O09.91 SUPERVISION OF HIGH RISK PREGNANCY IN FIRST TRIMESTER: Primary | ICD-10-CM

## 2022-11-22 DIAGNOSIS — O26.90 PREGNANCY RELATED CONDITION, ANTEPARTUM: Primary | ICD-10-CM

## 2022-11-22 LAB
HBV SURFACE AG SERPL QL IA: NONREACTIVE
HCV AB SERPL QL IA: NONREACTIVE
HIV 1+2 AB+HIV1 P24 AG SERPL QL IA: NONREACTIVE
TSH SERPL DL<=0.005 MIU/L-ACNC: 0.46 UIU/ML (ref 0.3–4.2)

## 2022-11-22 PROCEDURE — G0108 DIAB MANAGE TRN  PER INDIV: HCPCS | Mod: 95 | Performed by: NUTRITIONIST

## 2022-11-22 NOTE — PROGRESS NOTES
Sandy is a 42 year old who is being evaluated via a billable video visit.      How would you like to obtain your AVS? MyChart  If the video visit is dropped, the invitation should be resent by: Text to cell phone: [phone]  Will anyone else be joining your video visit? No      Video-Visit Details    Video Start Time: 1030am    Type of service:  Video Visit    Video End Time:11am    Originating Location (pt. Location): Home        Distant Location (provider location):  On-site    Platform used for Video Visit: [employer]    Diabetes Self-Management Education & Support    Sandy Person presents today for education related to Pregnancy complicated by pre-existing Type 2 diabetes    Patient is being treated with:  insulin  She is accompanied by self    ASSESSMENT:    Taking Medication:     Current Diabetes Management per Patient:  Taking diabetes medications?   yes:     Diabetes Medication(s)     Insulin       insulin detemir (LEVEMIR PEN) 100 UNIT/ML pen    Inject 24 units subcutaneous in am and 33 units subcutaneous in pm.     NOVOLOG FLEXPEN 100 UNIT/ML soln    Inject with meals and for correction. Pt pregnant and may use up to 80 units / day.      , Problems taking diabetes medications regularly? No     Sandy is currently taking 24 units levemir in the am and 33 units in the pm  She is taking novolog with meals and snacks  1 unit(s):3g carb with breakfast  1 unit:4 g carb with lunch and dinner  1 unit:6g carb with snacks  Correction 1/50/150    Monitoring          Patient's most recent   Lab Results   Component Value Date    A1C 6.6 11/21/2022      Healthy Eating:   Doing well with carb counting  Following meal plan that is consistent carb  11/20 - breaded chicken, ice cream, crackers prior to post prandial high   Reports some constipation, taking miralax    Problem Solving:      Patient is at risk of hypoglycemia?: YES  Hospitalizations for hyper or hypoglycemia: No    EDUCATION and INSTRUCTION PROVIDED AT THIS  VISIT:    Reviewed dexcom report with patient and diet.   Fasting glucose is above goal of 95 with most running over 100.    Patient-stated goal written and given to Sandy Person.  Verbalized and demonstrated understanding of instructions.     PLAN:  Continue dexcom for glucose monitoring  Continue carb counting and insulin to carb with correction  Continue 24 units levemir in the am  Increase pm levemir to 36 units    FOLLOW-UP:    Weekly as scheduled or before with any concerns or questions    Time spent with patient at today's visit was 30 minutes.      Any diabetes medication dose changes were made via the CDE Protocol and Collaborative Practice Agreement with [place] and  [facility].  A copy of this encounter was provided to patient's referring provider.

## 2022-11-23 LAB
BACTERIA UR CULT: NORMAL
C TRACH DNA SPEC QL NAA+PROBE: NEGATIVE
N GONORRHOEA DNA SPEC QL NAA+PROBE: NEGATIVE
T PALLIDUM AB SER QL: NONREACTIVE

## 2022-11-23 NOTE — PROGRESS NOTES
Sandy Person  is being evaluated via a billable video visit.      How would you like to obtain your AVS? MyChart  For the video visit, send the invitation by: Send to e-mail at: [email or web address]  Will anyone else be joining your video visit? No      Outcome for 11/23/22 3:12 PM: Data uploaded on DexSIMON Al[place]  Outcome for 11/29/22 11:06 AM: Dexcom emailed to provider  SIMON Sandy

## 2022-11-26 LAB
RUBV IGG SERPL QL IA: 5.41 INDEX
RUBV IGG SERPL QL IA: POSITIVE

## 2022-11-30 ENCOUNTER — VIRTUAL VISIT (OUTPATIENT)
Dept: ENDOCRINOLOGY | Facility: CLINIC | Age: 42
End: 2022-11-30
Payer: COMMERCIAL

## 2022-11-30 DIAGNOSIS — E11.9 TYPE 2 DIABETES MELLITUS WITHOUT COMPLICATION, WITHOUT LONG-TERM CURRENT USE OF INSULIN (H): ICD-10-CM

## 2022-11-30 DIAGNOSIS — O24.111 PRE-EXISTING TYPE 2 DIABETES MELLITUS DURING PREGNANCY IN FIRST TRIMESTER: Primary | ICD-10-CM

## 2022-11-30 PROCEDURE — 99214 OFFICE O/P EST MOD 30 MIN: CPT | Mod: 95 | Performed by: PHYSICIAN ASSISTANT

## 2022-11-30 PROCEDURE — 95251 CONT GLUC MNTR ANALYSIS I&R: CPT | Performed by: PHYSICIAN ASSISTANT

## 2022-11-30 NOTE — PROGRESS NOTES
Due to the COVID 19 pandemic this visit was converted to a video visit in order to help prevent spread of infection in this patient and the general population.    Time of start: 10:30 am  Time of end: 10:46 am  Total duration of video visit: 16 minutes.  Provider's location: offsite.    HPI  Sandy Person is a 42 year old female with type 2 diabetes mellitus. Video visit for diabetes follow up today.  Sandy reports she is currently 12 weeks  pregnant following IVF.  Her ARNAV is 6/14/2023.  Pt was seen by Dr. Ishan Duenas on 10/18/2023 and CDE/RD.  Pt gives a hx of type 2 diabetes dx 10 years ago.  She has no known retinopathy, nephropathy or neuropathy.  She has a hx of obesity and questionable hx of PCOS.  aSndy tested + for COVID in May 2022 with mild symptom.  She had been taking Rybelsus which was discontinued in late Nov 2021 by her fertility provider.   She was instructed to remain on Metformin 1000 mg BID and Glipizide 10 mg each am.  Earlier this year, I  added low dose Lantus at bedtime due to high overnight/fasting blood sugar values which has been helpful.  Pt found out she was pregnant and I have her take Levemir BID and Novolog for food coverage.  Her Metformin was discontinued on 10/18/2022.  For her diabetes, she is currently taking Levemir 24 units subcutaneous each am and 36 units subcutaneous each pm and Novolog 1 unit/3 gms CHO with breakfast and 1 unit/4 gms CHO with lunch and dinner, plus correction. Pt uses 1:6 with snacks.  Most recent A1C was  6.6 % on 11/21/2022 and A1C was 6.3 % on 8/2/2022.  I reviewed and scanned her DexcomG6 sensor download data in her her chart below.  Her DexcomG6 sensor shows low blood sugars. Pt states she was asymptomatic and checked her blood sugar with a capillary check and her blood sugar was > 70.  On ROS today, she has mild nausea and constipation.  No vomiting.  No dysuria or vaginal spotting.  Pt denies blurred vision, SOB at rest, cough, fever or chills.  She  denies chest pain, abd pain, diarrhea,  sx of neuropathy and she denies foot ulcers.    Diabetes Care  Retinopathy: none; pt seen by Oph in Aug 2022. No retinopathy per patient.  Nephropathy:none; urine microalbuminuria negative in 3/2022.  Neuropathy:none.  Foot Exam:no exam.  Taking aspirin: no.  Lipids: no LDL.  CAD: no.  Mental health: no hx of depression.  Insulin: Levemir BID and meal time insulin with correction.   DM meds: none.  Testing:  DexcomG6 sensor.                                        ROS  See under HPI.    Allergies  No Known Allergies    Medications  Current Outpatient Medications   Medication Sig Dispense Refill     acetone urine (RELION KETONE TEST) test strip Test urine for ketones daily for 1 week if small or negative reduce to 2x week 50 strip 3     aspirin (ASA) 81 MG chewable tablet Take 81 mg by mouth daily       blood glucose (NO BRAND SPECIFIED) lancets standard Use to test blood sugar 4 times daily or as directed. 100 each 3     blood glucose (NO BRAND SPECIFIED) test strip Use to test blood sugar 4 times daily or as directed. 400 strip 3     blood glucose monitoring (NO BRAND SPECIFIED) meter device kit Use to test blood sugar 4 times daily or as directed. 1 kit 0     Continuous Blood Gluc Sensor (DEXCOM G6 SENSOR) MISC 1 each every 10 days Change every 10 days. 3 each 11     Continuous Blood Gluc Transmit (DEXCOM G6 TRANSMITTER) MISC 1 each every 3 months Change every 3 months. 1 each 4     insulin detemir (LEVEMIR PEN) 100 UNIT/ML pen Inject 24 units subcutaneous in am and 33 units subcutaneous in pm. 40 mL 3     insulin pen needle (32G X 4 MM) 32G X 4 MM miscellaneous Use 6 pen needles daily or as directed. 200 each 3     insulin pen needle (BD MELISSA U/F) 32G X 4 MM miscellaneous Use with insulin. 250 each 3     NOVOLOG FLEXPEN 100 UNIT/ML soln Inject with meals and for correction. Pt pregnant and may use up to 80 units / day. 80 mL 3     Prenatal Vit-Fe Fumarate-FA (PRENATAL  VITAMIN) 27-0.8 MG TABS      Metformin 500 mg 2 tab po BID.      Family History  family history includes Cancer in her father, maternal grandmother, and mother; Diabetes in her father, maternal grandmother, mother, and paternal grandmother; Heart Disease in her father and mother.    Social History   reports that she has never smoked. She has never used smokeless tobacco. She reports that she does not currently use alcohol. She reports that she does not use drugs.     Past Medical History  Past Medical History:   Diagnosis Date     Abnormal Pap smear of cervix      Diabetes (H)      History of human papillomavirus infection    Obesity.    Past Surgical History:   Procedure Laterality Date     COLPOSCOPY       TONSILLECTOMY         Physical Exam    See under HPI.    RESULTS  Creatinine   Date Value Ref Range Status   03/06/2022 0.66 0.52 - 1.04 mg/dL Final     GFR Estimate   Date Value Ref Range Status   03/06/2022 >90 >60 mL/min/1.73m2 Final     Comment:     Effective December 21, 2021 eGFRcr in adults is calculated using the 2021 CKD-EPI creatinine equation which includes age and gender (Morteza et al., [facility], DOI: 10.1056/ZJADht6488464)     Hemoglobin A1C   Date Value Ref Range Status   11/21/2022 6.6 (H) 0.0 - 5.6 % Final     Comment:     Normal <5.7%   Prediabetes 5.7-6.4%    Diabetes 6.5% or higher     Note: Adopted from ADA consensus guidelines.     ALT   Date Value Ref Range Status   03/06/2022 37 0 - 50 U/L Final     AST   Date Value Ref Range Status   03/06/2022 23 0 - 45 U/L Final     TSH   Date Value Ref Range Status   11/21/2022 0.46 0.30 - 4.20 uIU/mL Final   03/06/2022 1.17 0.40 - 4.00 mU/L Final       ASSESSMENT/PLAN:    1.  TYPE 2 DIABETES MELLITUS: Will continue current insulin doses.   She is to continue to monitor her blood sugar frequently with use of her DexcomG6 sensor and if her sensor reads a blood sugar < 70 she will confirm this with a capillary finder stick bs reading.  I reviewed blood sugar  goals during pregnancy with patient today. She is aware that we would like to see her FBS 95 or less, but > 70 and her 1 hr postmeal blood sugar 140 or less and her 2 hr postmeal blood sugar 120 or less.  Sandy was seen by Oph in Aug 2022  without retinopathy per patient.   She denies hx of nephropathy. Her urine microalbuminuria and creat/GFR were normal in 3/2022.  No sx of neuropathy. Denies foot ulcers.  BP stable per patient.  TSH normal in Nov 2022.     2.  WEIGHT: Encouraged her to continue to make healthy food choices and remain active.    3.  PREGNANCY: Pt is 12 weeks pregnant.  ARNAV is 6/14/2023.    4. FOLLOW UP:  With me or CDE/RD weekly.    Time spent reviewing chart,labs and DexcomG6 sensor download today = 8 minutes.  Time for video visit today =16 minutes.  Time for documentation today = 15 minutes.    TOTAL TIME FOR VISIT TODAY = 39 minutes.    Vesta Arceo PA-C

## 2022-11-30 NOTE — LETTER
11/30/2022       RE: Sandy Person  [address] [facility] [address]     Dear Colleague,    Thank you for referring your patient, Sandy Person, to the [facility] [place] at [facility]. Please see a copy of my visit note below.      Outcome for 11/23/22 3:12 PM: Data uploaded on Dexcom  SIMON Sandy  Outcome for 11/29/22 11:06 AM: Dexcom emailed to provider  SIMON Sandy      Due to the COVID 19 pandemic this visit was converted to a video visit in order to help prevent spread of infection in this patient and the general population.    Time of start: 10:30 am  Time of end: 10:46 am  Total duration of video visit: 16 minutes.  Provider's location: offsite.    HPI  Sandy Person is a 42 year old female with type 2 diabetes mellitus. Video visit for diabetes follow up today.  Sandy reports she is currently 12 weeks  pregnant following IVF.  Her ARNAV is 6/14/2023.  Pt was seen by Dr. Ishan Duenas on 10/18/2023 and CDE/RD.  Pt gives a hx of type 2 diabetes dx 10 years ago.  She has no known retinopathy, nephropathy or neuropathy.  She has a hx of obesity and questionable hx of PCOS.  Sandy tested + for COVID in May 2022 with mild symptom.  She had been taking Rybelsus which was discontinued in late Nov 2021 by her fertility provider.   She was instructed to remain on Metformin 1000 mg BID and Glipizide 10 mg each am.  Earlier this year, I  added low dose Lantus at bedtime due to high overnight/fasting blood sugar values which has been helpful.  Pt found out she was pregnant and I have her take Levemir BID and Novolog for food coverage.  Her Metformin was discontinued on 10/18/2022.  For her diabetes, she is currently taking Levemir 24 units subcutaneous each am and 36 units subcutaneous each pm and Novolog 1 unit/3 gms CHO with breakfast and 1 unit/4 gms CHO with lunch and dinner, plus correction. Pt uses 1:6 with  snacks.  Most recent A1C was  6.6 % on 11/21/2022 and A1C was 6.3 % on 8/2/2022.  I reviewed and scanned her DexcomG6 sensor download data in her her chart below.  Her DexcomG6 sensor shows low blood sugars. Pt states she was asymptomatic and checked her blood sugar with a capillary check and her blood sugar was > 70.  On ROS today, she has mild nausea and constipation.  No vomiting.  No dysuria or vaginal spotting.  Pt denies blurred vision, SOB at rest, cough, fever or chills.  She denies chest pain, abd pain, diarrhea,  sx of neuropathy and she denies foot ulcers.    Diabetes Care  Retinopathy: none; pt seen by Oph in Aug 2022. No retinopathy per patient.  Nephropathy:none; urine microalbuminuria negative in 3/2022.  Neuropathy:none.  Foot Exam:no exam.  Taking aspirin: no.  Lipids: no LDL.  CAD: no.  Mental health: no hx of depression.  Insulin: Levemir BID and meal time insulin with correction.   DM meds: none.  Testing:  DexcomG6 sensor.                                        ROS  See under HPI.    Allergies  No Known Allergies    Medications  Current Outpatient Medications   Medication Sig Dispense Refill     acetone urine (RELION KETONE TEST) test strip Test urine for ketones daily for 1 week if small or negative reduce to 2x week 50 strip 3     aspirin (ASA) 81 MG chewable tablet Take 81 mg by mouth daily       blood glucose (NO BRAND SPECIFIED) lancets standard Use to test blood sugar 4 times daily or as directed. 100 each 3     blood glucose (NO BRAND SPECIFIED) test strip Use to test blood sugar 4 times daily or as directed. 400 strip 3     blood glucose monitoring (NO BRAND SPECIFIED) meter device kit Use to test blood sugar 4 times daily or as directed. 1 kit 0     Continuous Blood Gluc Sensor (DEXCOM G6 SENSOR) MISC 1 each every 10 days Change every 10 days. 3 each 11     Continuous Blood Gluc Transmit (DEXCOM G6 TRANSMITTER) MISC 1 each every 3 months Change every 3 months. 1 each 4     insulin  detemir (LEVEMIR PEN) 100 UNIT/ML pen Inject 24 units subcutaneous in am and 33 units subcutaneous in pm. 40 mL 3     insulin pen needle (32G X 4 MM) 32G X 4 MM miscellaneous Use 6 pen needles daily or as directed. 200 each 3     insulin pen needle (BD MELISSA U/F) 32G X 4 MM miscellaneous Use with insulin. 250 each 3     NOVOLOG FLEXPEN 100 UNIT/ML soln Inject with meals and for correction. Pt pregnant and may use up to 80 units / day. 80 mL 3     Prenatal Vit-Fe Fumarate-FA (PRENATAL VITAMIN) 27-0.8 MG TABS      Metformin 500 mg 2 tab po BID.      Family History  family history includes Cancer in her father, maternal grandmother, and mother; Diabetes in her father, maternal grandmother, mother, and paternal grandmother; Heart Disease in her father and mother.    Social History   reports that she has never smoked. She has never used smokeless tobacco. She reports that she does not currently use alcohol. She reports that she does not use drugs.     Past Medical History  Past Medical History:   Diagnosis Date     Abnormal Pap smear of cervix      Diabetes (H)      History of human papillomavirus infection    Obesity.    Past Surgical History:   Procedure Laterality Date     COLPOSCOPY       TONSILLECTOMY         Physical Exam    See under HPI.    RESULTS  Creatinine   Date Value Ref Range Status   03/06/2022 0.66 0.52 - 1.04 mg/dL Final     GFR Estimate   Date Value Ref Range Status   03/06/2022 >90 >60 mL/min/1.73m2 Final     Comment:     Effective December 21, 2021 eGFRcr in adults is calculated using the 2021 CKD-EPI creatinine equation which includes age and gender (Morteza et al., [place], DOI: 10.1056/BMAVbi6482773)     Hemoglobin A1C   Date Value Ref Range Status   11/21/2022 6.6 (H) 0.0 - 5.6 % Final     Comment:     Normal <5.7%   Prediabetes 5.7-6.4%    Diabetes 6.5% or higher     Note: Adopted from ADA consensus guidelines.     ALT   Date Value Ref Range Status   03/06/2022 37 0 - 50 U/L Final     AST   Date Value  Ref Range Status   03/06/2022 23 0 - 45 U/L Final     TSH   Date Value Ref Range Status   11/21/2022 0.46 0.30 - 4.20 uIU/mL Final   03/06/2022 1.17 0.40 - 4.00 mU/L Final       ASSESSMENT/PLAN:    1.  TYPE 2 DIABETES MELLITUS: Will continue current insulin doses.   She is to continue to monitor her blood sugar frequently with use of her DexcomG6 sensor and if her sensor reads a blood sugar < 70 she will confirm this with a capillary finder stick bs reading.  I reviewed blood sugar goals during pregnancy with patient today. She is aware that we would like to see her FBS 95 or less, but > 70 and her 1 hr postmeal blood sugar 140 or less and her 2 hr postmeal blood sugar 120 or less.  Sandy was seen by Oph in Aug 2022  without retinopathy per patient.   She denies hx of nephropathy. Her urine microalbuminuria and creat/GFR were normal in 3/2022.  No sx of neuropathy. Denies foot ulcers.  BP stable per patient.  TSH normal in Nov 2022.     2.  WEIGHT: Encouraged her to continue to make healthy food choices and remain active.    3.  PREGNANCY: Pt is 12 weeks pregnant.  ARNAV is 6/14/2023.    4. FOLLOW UP:  With me or CDE/RD weekly.    Time spent reviewing chart,labs and DexcomG6 sensor download today = 8 minutes.  Time for video visit today =16 minutes.  Time for documentation today = 15 minutes.    TOTAL TIME FOR VISIT TODAY = 39 minutes.      Vesta Arceo PA-C

## 2022-12-01 ENCOUNTER — PRE VISIT (OUTPATIENT)
Dept: MATERNAL FETAL MEDICINE | Facility: CLINIC | Age: 42
End: 2022-12-01

## 2022-12-07 ENCOUNTER — VIRTUAL VISIT (OUTPATIENT)
Dept: EDUCATION SERVICES | Facility: CLINIC | Age: 42
End: 2022-12-07
Payer: COMMERCIAL

## 2022-12-07 DIAGNOSIS — O24.112 PREGNANCY COMPLICATED BY PRE-EXISTING TYPE 2 DIABETES IN SECOND TRIMESTER: Primary | ICD-10-CM

## 2022-12-07 PROCEDURE — G0108 DIAB MANAGE TRN  PER INDIV: HCPCS | Mod: 95

## 2022-12-07 NOTE — PROGRESS NOTES
Video-Visit Details    Type of service:  Video Visit  Patient consented to video visit  Video Start Time:  11a  Video End Time:   1130  Originating Location (pt. Location): Home  Distant Location (provider location):   [facility] [place] DIABETES EDUCATION [place]   Platform used for Video Visit: [employer]      Diabetes Self-Management Education & Support    Presents for:  Pregnancy complicated by type 2 diabetes    SUBJECTIVE/OBJECTIVE:    Patient Problem List and Family Medical History reviewed for relevant medical history, current medical status, and diabetes risk factors.    Vitals:  There were no vitals taken for this visit.    Pre pregnancy weight: 235#, current weight 230 lbs related to watching diet    Estimated Date of Delivery: Jun 14, 2023    Labs:  Lab Results   Component Value Date    A1C 6.3 08/02/2022     No results found for: GLC  No results found for: LDL  No results found for: HDL]  GFR Estimate   Date Value Ref Range Status   03/06/2022 >90 >60 mL/min/1.73m2 Final     Comment:     Effective December 21, 2021 eGFRcr in adults is calculated using the 2021 CKD-EPI creatinine equation which includes age and gender (Morteza et al., [facility], DOI: 10.1056/PBOGln3956034)     No results found for: GFRESTBLACK  Lab Results   Component Value Date    CR 0.66 03/06/2022     No results found for: MICROALBUMIN    Last 3 BP:   BP Readings from Last 3 Encounters:   11/21/22 126/72   05/11/22 (!) 148/98   07/22/21 138/88       History   Smoking Status     Never   Smokeless Tobacco     Never       Healthy Eating  Non fat plain yogurt with granola and pb or 2 toast with pb turkey sausage, eggs  Salad for lunch pre packaged kind with chicken (usually lightly breaded) or a sandwich or soup and sandwich  Dinner veggies, quinoa or millet, and a protein    Snacks      Taking Medications  Diabetes Medication(s)     Insulin       insulin detemir (LEVEMIR PEN) 100 UNIT/ML pen    Inject 24 units subcutaneous in am and 36 units  subcutaneous in pm.     NOVOLOG FLEXPEN 100 UNIT/ML soln    Inject with meals and for correction. Pt pregnant and may use up to 80 units / day.        Metformin has been discontinued by Endo              ASSESSMENT:    Type 2 Diabetes. Patient is 13 weeks gestation. She saw EDGAR Stoner last week no changes in insulin. Pt traveled over the weekend and some elevated BG's post meal due to being harder to carb count when not at home.  Overall blood sugars are well controlled. She is getting some post meal elevations at lunch, today we decided to change her I:C ration from 4 to 3 at lunch. Pt is trying to bolus 20-30  mins before meals and finds it helps to reduce the post meal elevation. Pt is also walking 15-20 mins after lunch and dinner.  Pt will continue to follow up weekly with Endo, CDE or PA.    Pt verbalized understanding of concepts discussed and recommendations provided today  Opportunities for ongoing education and support in diabetes-self management were discussed.    PLAN:  *Levemir 24 units in AM and 36u PM (no changes)  *Change Lunch carb ratio to 1:3 units   *Take Novolog 20-25 minutes prior to eating  * Continue to exercise approx 30 mins daily   * Follow up weekly with endo team    Kaylin KLEIN, RN, [facility]  Certified Diabetes Care and   [facility] Endocrinology and Diabetes   [facility]  [address] [place] [address]  Phone [phone]      Time Spent: 30 minutes  Encounter Type: Individual    Any diabetes medication dose changes were made via the CDE Protocol per the patient's referring provider. A copy of this encounter was shared with the provider.

## 2022-12-07 NOTE — PROGRESS NOTES
Sandy Person  is being evaluated via a billable video visit.      How would you like to obtain your AVS? [employer]  For the video visit, send the invitation by: Text to cell phone: [phone]  Will anyone else be joining your video visit? No        Outcome for 12/07/22 9:52 AM: Data uploaded on Dexcom  SIMON Salmon  Outcome for 12/12/22 2:59 PM: Dexcom emailed to provider  SIMON Sandy

## 2022-12-08 ENCOUNTER — HOSPITAL ENCOUNTER (OUTPATIENT)
Dept: ULTRASOUND IMAGING | Facility: CLINIC | Age: 42
Discharge: HOME | End: 2022-12-08
Attending: OBSTETRICS & GYNECOLOGY
Payer: COMMERCIAL

## 2022-12-08 ENCOUNTER — OFFICE VISIT (OUTPATIENT)
Dept: MATERNAL FETAL MEDICINE | Facility: CLINIC | Age: 42
End: 2022-12-08
Attending: OBSTETRICS & GYNECOLOGY
Payer: COMMERCIAL

## 2022-12-08 DIAGNOSIS — O24.111 TYPE 2 DIABETES MELLITUS COMPLICATING PREGNANCY IN FIRST TRIMESTER, ANTEPARTUM: Primary | ICD-10-CM

## 2022-12-08 DIAGNOSIS — O26.90 PREGNANCY RELATED CONDITION, ANTEPARTUM: ICD-10-CM

## 2022-12-08 PROCEDURE — 76813 OB US NUCHAL MEAS 1 GEST: CPT | Mod: 26 | Performed by: OBSTETRICS & GYNECOLOGY

## 2022-12-08 PROCEDURE — 76813 OB US NUCHAL MEAS 1 GEST: CPT

## 2022-12-08 NOTE — PROGRESS NOTES
Please see the imaging tab for details of the ultrasound performed today.    Audrey Aguayo MD  Specialist in Maternal-Fetal Medicine

## 2022-12-14 ENCOUNTER — VIRTUAL VISIT (OUTPATIENT)
Dept: ENDOCRINOLOGY | Facility: CLINIC | Age: 42
End: 2022-12-14
Payer: COMMERCIAL

## 2022-12-14 DIAGNOSIS — O24.111 PRE-EXISTING TYPE 2 DIABETES MELLITUS DURING PREGNANCY IN FIRST TRIMESTER: Primary | ICD-10-CM

## 2022-12-14 PROCEDURE — 99214 OFFICE O/P EST MOD 30 MIN: CPT | Mod: 95 | Performed by: PHYSICIAN ASSISTANT

## 2022-12-14 NOTE — PROGRESS NOTES
Due to the COVID 19 pandemic this visit was converted to a video visit in order to help prevent spread of infection in this patient and the general population.    Time of start: 8:28 am  Time of end: 8:39 am  Total duration of video visit: 11 minutes.  Provider's location: offsite.    HPI  Sandy Person is a 42 year old female with type 2 diabetes mellitus. Video visit for diabetes follow up today.  Sandy reports she is currently 14 weeks pregnant following IVF and doing well.  Her ARNAV is 6/14/2023.  Pt was seen by Dr. Ishan Duenas on 10/18/2023 and CDE/RD.  Pt gives a hx of type 2 diabetes dx 10 years ago.  She has no known retinopathy, nephropathy or neuropathy.  She has a hx of obesity and questionable hx of PCOS.  Sandy tested + for COVID in May 2022 with mild symptom.  She had been taking Rybelsus which was discontinued in late Nov 2021 by her fertility provider.   She was instructed to remain on Metformin 1000 mg BID and Glipizide 10 mg each am.  Earlier this year, I  added low dose Lantus at bedtime due to high overnight/fasting blood sugar values which has been helpful.  Pt found out she was pregnant and I have her take Levemir BID and Novolog for food coverage.  Her Metformin was discontinued on 10/18/2022.  For her diabetes, she is currently taking Levemir 24 units subcutaneous each am and 36 units subcutaneous each pm and Novolog 1 unit/3 gms CHO with breakfast and lunch and 1:4 with dinner, plus correction. Pt uses 1:6 with snacks.  Most recent A1C was  6.6 % on 11/21/2022 and A1C was 6.3 % on 8/2/2022.  I reviewed and scanned her DexcomG6 sensor download data in her her chart below.  Some mild hyperglycemia following her evening meals.  On ROS today,less nausea and no longer constipated.  No dysuria or vaginal spotting.  Pt denies blurred vision, SOB at rest, cough, fever or chills.  She denies chest pain, abd pain, diarrhea,  sx of neuropathy and she denies foot ulcers.    Diabetes Care  Retinopathy:  none; pt seen by Oph in Aug 2022. No retinopathy per patient.  Nephropathy:none; urine microalbuminuria negative in 3/2022.  Neuropathy:none.  Foot Exam:no exam.  Taking aspirin: no.  Lipids: no LDL.  CAD: no.  Mental health: no hx of depression.  Insulin: Levemir BID and meal time insulin with correction.   DM meds: none.  Testing:  DexcomG6 sensor.                                      ROS  See under HPI.    Allergies  No Known Allergies    Medications  Current Outpatient Medications   Medication Sig Dispense Refill     acetone urine (RELION KETONE TEST) test strip Test urine for ketones daily for 1 week if small or negative reduce to 2x week 50 strip 3     aspirin (ASA) 81 MG chewable tablet Take 81 mg by mouth daily       blood glucose (NO BRAND SPECIFIED) lancets standard Use to test blood sugar 4 times daily or as directed. 100 each 3     blood glucose (NO BRAND SPECIFIED) test strip Use to test blood sugar 4 times daily or as directed. 400 strip 3     blood glucose monitoring (NO BRAND SPECIFIED) meter device kit Use to test blood sugar 4 times daily or as directed. 1 kit 0     Continuous Blood Gluc Sensor (DEXCOM G6 SENSOR) MISC 1 each every 10 days Change every 10 days. 3 each 11     Continuous Blood Gluc Transmit (DEXCOM G6 TRANSMITTER) MISC 1 each every 3 months Change every 3 months. 1 each 4     insulin detemir (LEVEMIR PEN) 100 UNIT/ML pen Inject 24 units subcutaneous in am and 36 units subcutaneous in pm. 40 mL 3     insulin pen needle (32G X 4 MM) 32G X 4 MM miscellaneous Use 6 pen needles daily or as directed. 200 each 3     insulin pen needle (BD MELISSA U/F) 32G X 4 MM miscellaneous Use with insulin. 250 each 3     NOVOLOG FLEXPEN 100 UNIT/ML soln Inject with meals and for correction. Pt pregnant and may use up to 80 units / day. 80 mL 3     Prenatal Vit-Fe Fumarate-FA (PRENATAL VITAMIN) 27-0.8 MG TABS      Metformin 500 mg 2 tab po BID.      Family History  family history includes Cancer in her  father, maternal grandmother, and mother; Diabetes in her father, maternal grandmother, mother, and paternal grandmother; Heart Disease in her father and mother.    Social History   reports that she has never smoked. She has never used smokeless tobacco. She reports that she does not currently use alcohol. She reports that she does not use drugs.     Past Medical History  Past Medical History:   Diagnosis Date     Abnormal Pap smear of cervix      Diabetes (H)      History of human papillomavirus infection    Obesity.    Past Surgical History:   Procedure Laterality Date     COLPOSCOPY       TONSILLECTOMY         Physical Exam    See under HPI.    RESULTS  Creatinine   Date Value Ref Range Status   03/06/2022 0.66 0.52 - 1.04 mg/dL Final     GFR Estimate   Date Value Ref Range Status   03/06/2022 >90 >60 mL/min/1.73m2 Final     Comment:     Effective December 21, 2021 eGFRcr in adults is calculated using the 2021 CKD-EPI creatinine equation which includes age and gender (Morteza et al., NE, DOI: 10.1056/HNZOhr0088772)     Hemoglobin A1C   Date Value Ref Range Status   11/21/2022 6.6 (H) 0.0 - 5.6 % Final     Comment:     Normal <5.7%   Prediabetes 5.7-6.4%    Diabetes 6.5% or higher     Note: Adopted from ADA consensus guidelines.     ALT   Date Value Ref Range Status   03/06/2022 37 0 - 50 U/L Final     AST   Date Value Ref Range Status   03/06/2022 23 0 - 45 U/L Final     TSH   Date Value Ref Range Status   11/21/2022 0.46 0.30 - 4.20 uIU/mL Final   03/06/2022 1.17 0.40 - 4.00 mU/L Final       ASSESSMENT/PLAN:    1.  TYPE 2 DIABETES MELLITUS: Only insulin change today is Novolog 1:3 with dinner ( was 1:4 ).  She is to continue to monitor her blood sugar frequently with use of her DexcomG6 sensor.  I reviewed blood sugar goals during pregnancy with patient today. She is aware that we would like to see her FBS 95 or less, but > 70 and her 1 hr postmeal blood sugar 140 or less and her 2 hr postmeal blood sugar 120 or  nakia Delgado was seen by Oph in Aug 2022  without retinopathy per patient.   She denies hx of nephropathy. Her urine microalbuminuria and creat/GFR were normal in 3/2022.  No sx of neuropathy. Denies foot ulcers.  BP stable per patient.  TSH normal in Nov 2022.     2.  WEIGHT: Encouraged her to continue to make healthy food choices and remain active.    3.  PREGNANCY: Pt is 14 weeks pregnant.  ARNAV is 6/14/2023.    4. FOLLOW UP:  With me or CDE/RD weekly.  If her blood sugars are above or below target she is to notify us.    Time spent reviewing chart,labs and DexcomG6 sensor download today = 5 minutes.  Time for video visit today = 11 minutes.  Time for documentation today = 15 minutes.    TOTAL TIME FOR VISIT TODAY = 31 minutes.    Vesta Arceo PA-C        Answers for HPI/ROS submitted by the patient on 12/7/2022  General Symptoms: No  Skin Symptoms: No  HENT Symptoms: No  EYE SYMPTOMS: No  HEART SYMPTOMS: No  LUNG SYMPTOMS: No  INTESTINAL SYMPTOMS: No  URINARY SYMPTOMS: No  GYNECOLOGIC SYMPTOMS: No  BREAST SYMPTOMS: No  SKELETAL SYMPTOMS: No  BLOOD SYMPTOMS: No  NERVOUS SYSTEM SYMPTOMS: No  MENTAL HEALTH SYMPTOMS: No

## 2022-12-14 NOTE — LETTER
12/14/2022       RE: Sandy Person  [address] [facility] [address]     Dear Colleague,    Thank you for referring your patient, Sandy Person, to the [facility]. Please see a copy of my visit note below.    Sandy Person  is being evaluated via a billable video visit.      How would you like to obtain your AVS? [email or web address]  For the video visit, send the invitation by: Text to cell phone: [phone]  Will anyone else be joining your video visit? No        Outcome for 12/07/22 9:52 AM: Data uploaded on Dexcom  SIMON Salmon  Outcome for 12/12/22 2:59 PM: Dexcom emailed to provider  SIMON Sandy        Due to the COVID 19 pandemic this visit was converted to a video visit in order to help prevent spread of infection in this patient and the general population.    Time of start: 8:28 am  Time of end: 8:39 am  Total duration of video visit: 11 minutes.  Provider's location: offsite.    HPI  Sandy Person is a 42 year old female with type 2 diabetes mellitus. Video visit for diabetes follow up today.  Sandy reports she is currently 14 weeks pregnant following IVF and doing well.  Her ARNAV is 6/14/2023.  Pt was seen by Dr. Ishan Duenas on 10/18/2023 and CDE/RD.  Pt gives a hx of type 2 diabetes dx 10 years ago.  She has no known retinopathy, nephropathy or neuropathy.  She has a hx of obesity and questionable hx of PCOS.  Sandy tested + for COVID in May 2022 with mild symptom.  She had been taking Rybelsus which was discontinued in late Nov 2021 by her fertility provider.   She was instructed to remain on Metformin 1000 mg BID and Glipizide 10 mg each am.  Earlier this year, I  added low dose Lantus at bedtime due to high overnight/fasting blood sugar values which has been helpful.  Pt found out she was pregnant and I have her take Levemir BID and Novolog for food coverage.  Her Metformin was discontinued  on 10/18/2022.  For her diabetes, she is currently taking Levemir 24 units subcutaneous each am and 36 units subcutaneous each pm and Novolog 1 unit/3 gms CHO with breakfast and lunch and 1:4 with dinner, plus correction. Pt uses 1:6 with snacks.  Most recent A1C was  6.6 % on 11/21/2022 and A1C was 6.3 % on 8/2/2022.  I reviewed and scanned her DexcomG6 sensor download data in her her chart below.  Some mild hyperglycemia following her evening meals.  On ROS today,less nausea and no longer constipated.  No dysuria or vaginal spotting.  Pt denies blurred vision, SOB at rest, cough, fever or chills.  She denies chest pain, abd pain, diarrhea,  sx of neuropathy and she denies foot ulcers.    Diabetes Care  Retinopathy: none; pt seen by Oph in Aug 2022. No retinopathy per patient.  Nephropathy:none; urine microalbuminuria negative in 3/2022.  Neuropathy:none.  Foot Exam:no exam.  Taking aspirin: no.  Lipids: no LDL.  CAD: no.  Mental health: no hx of depression.  Insulin: Levemir BID and meal time insulin with correction.   DM meds: none.  Testing:  DexcomG6 sensor.                                      ROS  See under HPI.    Allergies  No Known Allergies    Medications  Current Outpatient Medications   Medication Sig Dispense Refill     acetone urine (RELION KETONE TEST) test strip Test urine for ketones daily for 1 week if small or negative reduce to 2x week 50 strip 3     aspirin (ASA) 81 MG chewable tablet Take 81 mg by mouth daily       blood glucose (NO BRAND SPECIFIED) lancets standard Use to test blood sugar 4 times daily or as directed. 100 each 3     blood glucose (NO BRAND SPECIFIED) test strip Use to test blood sugar 4 times daily or as directed. 400 strip 3     blood glucose monitoring (NO BRAND SPECIFIED) meter device kit Use to test blood sugar 4 times daily or as directed. 1 kit 0     Continuous Blood Gluc Sensor (DEXCOM G6 SENSOR) MISC 1 each every 10 days Change every 10 days. 3 each 11     Continuous  Blood Gluc Transmit (DEXCOM G6 TRANSMITTER) MISC 1 each every 3 months Change every 3 months. 1 each 4     insulin detemir (LEVEMIR PEN) 100 UNIT/ML pen Inject 24 units subcutaneous in am and 36 units subcutaneous in pm. 40 mL 3     insulin pen needle (32G X 4 MM) 32G X 4 MM miscellaneous Use 6 pen needles daily or as directed. 200 each 3     insulin pen needle (BD MELISSA U/F) 32G X 4 MM miscellaneous Use with insulin. 250 each 3     NOVOLOG FLEXPEN 100 UNIT/ML soln Inject with meals and for correction. Pt pregnant and may use up to 80 units / day. 80 mL 3     Prenatal Vit-Fe Fumarate-FA (PRENATAL VITAMIN) 27-0.8 MG TABS      Metformin 500 mg 2 tab po BID.      Family History  family history includes Cancer in her father, maternal grandmother, and mother; Diabetes in her father, maternal grandmother, mother, and paternal grandmother; Heart Disease in her father and mother.    Social History   reports that she has never smoked. She has never used smokeless tobacco. She reports that she does not currently use alcohol. She reports that she does not use drugs.     Past Medical History  Past Medical History:   Diagnosis Date     Abnormal Pap smear of cervix      Diabetes (H)      History of human papillomavirus infection    Obesity.    Past Surgical History:   Procedure Laterality Date     COLPOSCOPY       TONSILLECTOMY         Physical Exam    See under HPI.    RESULTS  Creatinine   Date Value Ref Range Status   03/06/2022 0.66 0.52 - 1.04 mg/dL Final     GFR Estimate   Date Value Ref Range Status   03/06/2022 >90 >60 mL/min/1.73m2 Final     Comment:     Effective December 21, 2021 eGFRcr in adults is calculated using the 2021 CKD-EPI creatinine equation which includes age and gender (Morteza et al., NEJM, DOI: 10.1056/SQVGlx8676347)     Hemoglobin A1C   Date Value Ref Range Status   11/21/2022 6.6 (H) 0.0 - 5.6 % Final     Comment:     Normal <5.7%   Prediabetes 5.7-6.4%    Diabetes 6.5% or higher     Note: Adopted from  ADA consensus guidelines.     ALT   Date Value Ref Range Status   03/06/2022 37 0 - 50 U/L Final     AST   Date Value Ref Range Status   03/06/2022 23 0 - 45 U/L Final     TSH   Date Value Ref Range Status   11/21/2022 0.46 0.30 - 4.20 uIU/mL Final   03/06/2022 1.17 0.40 - 4.00 mU/L Final       ASSESSMENT/PLAN:    1.  TYPE 2 DIABETES MELLITUS: Only insulin change today is Novolog 1:3 with dinner ( was 1:4 ).  She is to continue to monitor her blood sugar frequently with use of her DexcomG6 sensor.  I reviewed blood sugar goals during pregnancy with patient today. She is aware that we would like to see her FBS 95 or less, but > 70 and her 1 hr postmeal blood sugar 140 or less and her 2 hr postmeal blood sugar 120 or less.  Sandy was seen by Oph in Aug 2022  without retinopathy per patient.   She denies hx of nephropathy. Her urine microalbuminuria and creat/GFR were normal in 3/2022.  No sx of neuropathy. Denies foot ulcers.  BP stable per patient.  TSH normal in Nov 2022.     2.  WEIGHT: Encouraged her to continue to make healthy food choices and remain active.    3.  PREGNANCY: Pt is 14 weeks pregnant.  ARNAV is 6/14/2023.    4. FOLLOW UP:  With me or CDE/RD weekly.  If her blood sugars are above or below target she is to notify us.    Time spent reviewing chart,labs and DexcomG6 sensor download today = 5 minutes.  Time for video visit today = 11 minutes.  Time for documentation today = 15 minutes.    TOTAL TIME FOR VISIT TODAY = 31 minutes.    Vesta Arceo PA-C        Answers for HPI/ROS submitted by the patient on 12/7/2022  General Symptoms: No  Skin Symptoms: No  HENT Symptoms: No  EYE SYMPTOMS: No  HEART SYMPTOMS: No  LUNG SYMPTOMS: No  INTESTINAL SYMPTOMS: No  URINARY SYMPTOMS: No  GYNECOLOGIC SYMPTOMS: No  BREAST SYMPTOMS: No  SKELETAL SYMPTOMS: No  BLOOD SYMPTOMS: No  NERVOUS SYSTEM SYMPTOMS: No  MENTAL HEALTH SYMPTOMS: No

## 2022-12-20 ENCOUNTER — PRENATAL OFFICE VISIT (OUTPATIENT)
Dept: OBGYN | Facility: CLINIC | Age: 42
End: 2022-12-20
Payer: COMMERCIAL

## 2022-12-20 VITALS — SYSTOLIC BLOOD PRESSURE: 124 MMHG | DIASTOLIC BLOOD PRESSURE: 82 MMHG | WEIGHT: 240 LBS | BODY MASS INDEX: 41.2 KG/M2

## 2022-12-20 DIAGNOSIS — Z36.1 ENCOUNTER FOR ANTENATAL SCREENING FOR RAISED ALPHAFETOPROTEIN LEVEL: ICD-10-CM

## 2022-12-20 DIAGNOSIS — O09.92 SUPERVISION OF HIGH RISK PREGNANCY IN SECOND TRIMESTER: Primary | ICD-10-CM

## 2022-12-20 PROCEDURE — 99207 PR PRENATAL VISIT: CPT | Performed by: OBSTETRICS & GYNECOLOGY

## 2022-12-20 NOTE — PROGRESS NOTES
Prenatal Visit: doing well overall. Normal first trimester sonogram with MFM. Bedside sonogram done for FCA. Blood sugars are trending towards range. Continuing to follow up with Endocrinology. Taking ASA for preeclampsia prevention.  Questions answered today. We discussed the AFP at the next visit (ordered) Will consider doing the cfDNA at that time if she and her  would like to proceed with it.   Niurka Polanco MD

## 2022-12-21 ENCOUNTER — VIRTUAL VISIT (OUTPATIENT)
Dept: EDUCATION SERVICES | Facility: CLINIC | Age: 42
End: 2022-12-21
Payer: COMMERCIAL

## 2022-12-21 DIAGNOSIS — O24.112 PREGNANCY COMPLICATED BY PRE-EXISTING TYPE 2 DIABETES IN SECOND TRIMESTER: Primary | ICD-10-CM

## 2022-12-21 PROCEDURE — G0108 DIAB MANAGE TRN  PER INDIV: HCPCS | Mod: 95

## 2022-12-21 NOTE — PROGRESS NOTES
Type 2 Diabetes in Pregnancy Follow-up    Telephone visit begins at 11 am  Telephone visit ends at 11:30 am    Subjective/Objective:    Sandy Person sent in blood glucose log for review. Last date of communication was: 12/14/22.    Gestational diabetes is being managed with diet, activity and medications    Taking diabetes medications:   yes:     Diabetes Medication(s)     Insulin       insulin detemir (LEVEMIR PEN) 100 UNIT/ML pen    Inject 24 units subcutaneous in am and 36 units subcutaneous in pm.     NOVOLOG FLEXPEN 100 UNIT/ML soln    Inject with meals and for correction. Pt pregnant and may use up to 80 units / day.          Estimated Date of Delivery: Jun 14, 2023    BG/Food Log:           Assessment:    Fasting blood glucoses: 71% in target.  After breakfast 78% in target.  After lunch: 75% in target.  After dinner: 91% in target.    Teaching:  Teaching done on how to make small adjustments to insulin based on sensor readings.  Sandy was very receptive to this information.  She is very data driven.  She's had a low last night and was close the night before that but will hold Levemir at 36 at night.  If she has a low tonight she will pull back to 35 (from 36)    Plan/Response:  No changes to insulin today.    Follow ups are scheduled through June.      Any diabetes medication dose changes were made via the CDE Protocol and Collaborative Practice Agreement with the patient's referring provider. A copy of this encounter was shared with the provider.

## 2022-12-21 NOTE — PROGRESS NOTES
Sandy Person  is being evaluated via a billable video visit.      How would you like to obtain your AVS? [email or web address]  For the video visit, send the invitation by: Text to cell phone: [phone]  Will anyone else be joining your video visit? No      Outcome for 12/21/22 10:56 AM: Data uploaded on Dexcom  SIMON Salmon  Outcome for 12/26/22 1:41 PM: Dexcom emailed to provider  SIMON Sandy

## 2022-12-28 ENCOUNTER — TELEPHONE (OUTPATIENT)
Dept: ENDOCRINOLOGY | Facility: CLINIC | Age: 42
End: 2022-12-28

## 2022-12-28 ENCOUNTER — VIRTUAL VISIT (OUTPATIENT)
Dept: ENDOCRINOLOGY | Facility: CLINIC | Age: 42
End: 2022-12-28
Payer: COMMERCIAL

## 2022-12-28 DIAGNOSIS — O24.111 PRE-EXISTING TYPE 2 DIABETES MELLITUS DURING PREGNANCY IN FIRST TRIMESTER: Primary | ICD-10-CM

## 2022-12-28 PROCEDURE — 99214 OFFICE O/P EST MOD 30 MIN: CPT | Mod: 95 | Performed by: PHYSICIAN ASSISTANT

## 2022-12-28 NOTE — LETTER
12/28/2022       RE: Sandy Person  [address] [facility] [address]     Dear Colleague,    Thank you for referring your patient, Sandy Person, to the [facility] [place] at [facility]. Please see a copy of my visit note below.    Sandy Person  is being evaluated via a billable video visit.      How would you like to obtain your AVS? [employer]  For the video visit, send the invitation by: Text to cell phone: [phone]  Will anyone else be joining your video visit? No      Outcome for 12/21/22 10:56 AM: Data uploaded on Dexcom  SIMON Salmon  Outcome for 12/26/22 1:41 PM: Dexcom emailed to provider  SIMON Sandy        Due to the COVID 19 pandemic this visit was converted to a video visit in order to help prevent spread of infection in this patient and the general population.    Time of start: 8:00 am  Time of end: 8:12 am  Total duration of video visit: 12 minutes.  Provider's location: offsite.  Patient's location: home.    HPI  Sandy Person is a 42 year old female with type 2 diabetes mellitus. Video visit for diabetes follow up today.  Sandy reports she is currently 16 weeks pregnant following IVF and doing well.  Her ARNAV is 6/14/2023.  Pt was seen by Dr. Ishan Duenas on 10/18/2023. She has also been seeing me, CDE and RD frequently.  Pt gives a hx of type 2 diabetes dx 10 years ago.  She has no known retinopathy, nephropathy or neuropathy.  She has a hx of obesity and questionable hx of PCOS.  Sandy tested + for COVID in May 2022 with mild symptom.  She had been taking Rybelsus which was discontinued in late Nov 2021 by her fertility provider.   She was instructed to remain on Metformin 1000 mg BID and Glipizide 10 mg each am.  Earlier this year, I  added low dose Lantus at bedtime due to high overnight/fasting blood sugar values which has been helpful.  Pt found out she was pregnant and I have her take Levemir  BID and Novolog for food coverage.  Her Metformin was discontinued on 10/18/2022.  For her diabetes, she is currently taking Levemir 24 units subcutaneous each am and 36 units subcutaneous each pm and Novolog 1 unit/3 gms CHO with all meals, plus correction. Pt uses 1:6 with snacks.  Most recent A1C was  6.6 % on 11/21/2022 and A1C was 6.3 % on 8/2/2022.  I reviewed and scanned her DexcomG6 sensor download data in her her chart below.  Blood sugar control is good at this time without frequent hypoglycemia.  On ROS today,less nausea and constipation.  No dysuria or vaginal spotting.  Pt denies headaches, blurred vision, SOB at rest, cough, fever or chills.  She denies chest pain, abd pain, diarrhea, sx of neuropathy and she denies foot ulcers.    Diabetes Care  Retinopathy: none; pt seen by Oph in Aug 2022. No retinopathy per patient.  Nephropathy:none; urine microalbuminuria negative in 3/2022.  Neuropathy:none.  Foot Exam:no exam.  Taking aspirin: yes.  Lipids: no LDL.  CAD: no.  Mental health: no hx of depression.  Insulin: Levemir BID and meal time insulin with correction.   DM meds: none.  Testing:  DexcomG6 sensor.                                      ROS  See under HPI.    Allergies  No Known Allergies    Medications  Current Outpatient Medications   Medication Sig Dispense Refill     acetone urine (RELION KETONE TEST) test strip Test urine for ketones daily for 1 week if small or negative reduce to 2x week 50 strip 3     aspirin (ASA) 81 MG chewable tablet Take 81 mg by mouth daily       blood glucose (NO BRAND SPECIFIED) lancets standard Use to test blood sugar 4 times daily or as directed. 100 each 3     blood glucose (NO BRAND SPECIFIED) test strip Use to test blood sugar 4 times daily or as directed. 400 strip 3     blood glucose monitoring (NO BRAND SPECIFIED) meter device kit Use to test blood sugar 4 times daily or as directed. 1 kit 0     Continuous Blood Gluc Sensor (DEXCOM G6 SENSOR) MISC 1 each  every 10 days Change every 10 days. 3 each 11     Continuous Blood Gluc Transmit (DEXCOM G6 TRANSMITTER) MISC 1 each every 3 months Change every 3 months. 1 each 4     insulin detemir (LEVEMIR PEN) 100 UNIT/ML pen Inject 24 units subcutaneous in am and 36 units subcutaneous in pm. 40 mL 3     insulin pen needle (32G X 4 MM) 32G X 4 MM miscellaneous Use 6 pen needles daily or as directed. 200 each 3     insulin pen needle (BD MELISSA U/F) 32G X 4 MM miscellaneous Use with insulin. 250 each 3     NOVOLOG FLEXPEN 100 UNIT/ML soln Inject with meals and for correction. Pt pregnant and may use up to 80 units / day. 80 mL 3     Prenatal Vit-Fe Fumarate-FA (PRENATAL VITAMIN) 27-0.8 MG TABS      Metformin 500 mg 2 tab po BID.      Family History  family history includes Cancer in her father, maternal grandmother, and mother; Diabetes in her father, maternal grandmother, mother, and paternal grandmother; Heart Disease in her father and mother.    Social History   reports that she has never smoked. She has never used smokeless tobacco. She reports that she does not currently use alcohol. She reports that she does not use drugs.     Past Medical History  Past Medical History:   Diagnosis Date     Abnormal Pap smear of cervix      Diabetes (H)      History of human papillomavirus infection    Obesity.    Past Surgical History:   Procedure Laterality Date     COLPOSCOPY       TONSILLECTOMY         Physical Exam    No exam today.    RESULTS  Creatinine   Date Value Ref Range Status   03/06/2022 0.66 0.52 - 1.04 mg/dL Final     GFR Estimate   Date Value Ref Range Status   03/06/2022 >90 >60 mL/min/1.73m2 Final     Comment:     Effective December 21, 2021 eGFRcr in adults is calculated using the 2021 CKD-EPI creatinine equation which includes age and gender (Morteza edgar al., NEJM, DOI: 10.1056/MSOEzy4301897)     Hemoglobin A1C   Date Value Ref Range Status   11/21/2022 6.6 (H) 0.0 - 5.6 % Final     Comment:     Normal <5.7%   Prediabetes  5.7-6.4%    Diabetes 6.5% or higher     Note: Adopted from ADA consensus guidelines.     ALT   Date Value Ref Range Status   03/06/2022 37 0 - 50 U/L Final     AST   Date Value Ref Range Status   03/06/2022 23 0 - 45 U/L Final     TSH   Date Value Ref Range Status   11/21/2022 0.46 0.30 - 4.20 uIU/mL Final   03/06/2022 1.17 0.40 - 4.00 mU/L Final       ASSESSMENT/PLAN:    1.  TYPE 2 DIABETES MELLITUS: Blood sugar control is good at this time.  No change in insulin doses today.  She is to continue to monitor her blood sugar frequently with use of her DexcomG6 sensor.  I reviewed blood sugar goals during pregnancy with patient today. She is aware that we would like to see her FBS 95 or less, but > 70 and her 1 hr postmeal blood sugar 140 or less and her 2 hr postmeal blood sugar 120 or less.  Sandy was seen by Oph in Aug 2022  without retinopathy per patient.   She denies hx of nephropathy. Her urine microalbuminuria and creat/GFR were normal in 3/2022.  No sx of neuropathy. Denies foot ulcers.  BP stable at OB appointments per patient. BP was 124/82 on 12/20/2022.  TSH normal in Nov 2022.     2.  WEIGHT: Encouraged her to continue to make healthy food choices and remain active.    3.  PREGNANCY: Pt is 16 weeks pregnant.  ARNAV is 6/14/2023.    4. FOLLOW UP:  With Kaylin JOSEPH on 1/4/2023 and Dr. Ishan Duenas on 1/11/2022.  Pt has frequent follow up scheduled.  If her blood sugars are above or below target she is to notify us.    Time spent reviewing chart,labs and DexcomG6 sensor download today = 6 minutes.  Time for video visit today = 12 minutes.  Time for documentation today = 15 minutes.    TOTAL TIME FOR VISIT TODAY = 33 minutes.    Vesta Arceo PA-C      Answers for HPI/ROS submitted by the patient on 12/22/2022  General Symptoms: No  Skin Symptoms: No  HENT Symptoms: No  EYE SYMPTOMS: No  HEART SYMPTOMS: No  LUNG SYMPTOMS: No  INTESTINAL SYMPTOMS: No  URINARY SYMPTOMS: No  GYNECOLOGIC SYMPTOMS: No  BREAST  SYMPTOMS: No  SKELETAL SYMPTOMS: No  BLOOD SYMPTOMS: No  NERVOUS SYSTEM SYMPTOMS: No  MENTAL HEALTH SYMPTOMS: No

## 2022-12-28 NOTE — PROGRESS NOTES
Due to the COVID 19 pandemic this visit was converted to a video visit in order to help prevent spread of infection in this patient and the general population.    Time of start: 8:00 am  Time of end: 8:12 am  Total duration of video visit: 12 minutes.  Provider's location: offsite.  Patient's location: home.    \[facility] [place] [facility]\""  Sandy Person is a 42 year old female with type 2 diabetes mellitus. Video visit for diabetes follow up today.  Sandy reports she is currently 16 weeks pregnant following IVF and doing well.  Her ARNAV is 6/14/2023.  Pt was seen by Dr. Ishan Duenas on 10/18/2023. She has also been seeing me, CDE and RD frequently.  Pt gives a hx of type 2 diabetes dx 10 years ago.  She has no known retinopathy, nephropathy or neuropathy.  She has a hx of obesity and questionable hx of PCOS.  Sandy tested + for COVID in May 2022 with mild symptom.  She had been taking Rybelsus which was discontinued in late Nov 2021 by her fertility provider.   She was instructed to remain on Metformin 1000 mg BID and Glipizide 10 mg each am.  Earlier this year, I  added low dose Lantus at bedtime due to high overnight/fasting blood sugar values which has been helpful.  Pt found out she was pregnant and I have her take Levemir BID and Novolog for food coverage.  Her Metformin was discontinued on 10/18/2022.  For her diabetes, she is currently taking Levemir 24 units subcutaneous each am and 36 units subcutaneous each pm and Novolog 1 unit/3 gms CHO with all meals, plus correction. Pt uses 1:6 with snacks.  Most recent A1C was  6.6 % on 11/21/2022 and A1C was 6.3 % on 8/2/2022.  I reviewed and scanned her DexcomG6 sensor download data in her her chart below.  Blood sugar control is good at this time without frequent hypoglycemia.  On ROS today,less nausea and constipation.  No dysuria or vaginal spotting.  Pt denies headaches, blurred vision, SOB at rest, cough, fever or chills.  She denies chest pain, abd pain, diarrhea, sx of neuropathy  and she denies foot ulcers.    Diabetes Care  Retinopathy: none; pt seen by Oph in Aug 2022. No retinopathy per patient.  Nephropathy:none; urine microalbuminuria negative in 3/2022.  Neuropathy:none.  Foot Exam:no exam.  Taking aspirin: yes.  Lipids: no LDL.  CAD: no.  Mental health: no hx of depression.  Insulin: Levemir BID and meal time insulin with correction.   DM meds: none.  Testing:  DexcomG6 sensor.                                      ROS  See under HPI.    Allergies  No Known Allergies    Medications  Current Outpatient Medications   Medication Sig Dispense Refill     acetone urine (RELION KETONE TEST) test strip Test urine for ketones daily for 1 week if small or negative reduce to 2x week 50 strip 3     aspirin (ASA) 81 MG chewable tablet Take 81 mg by mouth daily       blood glucose (NO BRAND SPECIFIED) lancets standard Use to test blood sugar 4 times daily or as directed. 100 each 3     blood glucose (NO BRAND SPECIFIED) test strip Use to test blood sugar 4 times daily or as directed. 400 strip 3     blood glucose monitoring (NO BRAND SPECIFIED) meter device kit Use to test blood sugar 4 times daily or as directed. 1 kit 0     Continuous Blood Gluc Sensor (DEXCOM G6 SENSOR) MISC 1 each every 10 days Change every 10 days. 3 each 11     Continuous Blood Gluc Transmit (DEXCOM G6 TRANSMITTER) MISC 1 each every 3 months Change every 3 months. 1 each 4     insulin detemir (LEVEMIR PEN) 100 UNIT/ML pen Inject 24 units subcutaneous in am and 36 units subcutaneous in pm. 40 mL 3     insulin pen needle (32G X 4 MM) 32G X 4 MM miscellaneous Use 6 pen needles daily or as directed. 200 each 3     insulin pen needle (BD MELISSA U/F) 32G X 4 MM miscellaneous Use with insulin. 250 each 3     NOVOLOG FLEXPEN 100 UNIT/ML soln Inject with meals and for correction. Pt pregnant and may use up to 80 units / day. 80 mL 3     Prenatal Vit-Fe Fumarate-FA (PRENATAL VITAMIN) 27-0.8 MG TABS      Metformin 500 mg 2 tab po  BID.      Family History  family history includes Cancer in her father, maternal grandmother, and mother; Diabetes in her father, maternal grandmother, mother, and paternal grandmother; Heart Disease in her father and mother.    Social History   reports that she has never smoked. She has never used smokeless tobacco. She reports that she does not currently use alcohol. She reports that she does not use drugs.     Past Medical History  Past Medical History:   Diagnosis Date     Abnormal Pap smear of cervix      Diabetes (H)      History of human papillomavirus infection    Obesity.    Past Surgical History:   Procedure Laterality Date     COLPOSCOPY       TONSILLECTOMY         Physical Exam    No exam today.    RESULTS  Creatinine   Date Value Ref Range Status   03/06/2022 0.66 0.52 - 1.04 mg/dL Final     GFR Estimate   Date Value Ref Range Status   03/06/2022 >90 >60 mL/min/1.73m2 Final     Comment:     Effective December 21, 2021 eGFRcr in adults is calculated using the 2021 CKD-EPI creatinine equation which includes age and gender (Morteza et al., [facility], DOI: 10.1056/DYRWks5315243)     Hemoglobin A1C   Date Value Ref Range Status   11/21/2022 6.6 (H) 0.0 - 5.6 % Final     Comment:     Normal <5.7%   Prediabetes 5.7-6.4%    Diabetes 6.5% or higher     Note: Adopted from ADA consensus guidelines.     ALT   Date Value Ref Range Status   03/06/2022 37 0 - 50 U/L Final     AST   Date Value Ref Range Status   03/06/2022 23 0 - 45 U/L Final     TSH   Date Value Ref Range Status   11/21/2022 0.46 0.30 - 4.20 uIU/mL Final   03/06/2022 1.17 0.40 - 4.00 mU/L Final       ASSESSMENT/PLAN:    1.  TYPE 2 DIABETES MELLITUS: Blood sugar control is good at this time.  No change in insulin doses today.  She is to continue to monitor her blood sugar frequently with use of her DexcomG6 sensor.  I reviewed blood sugar goals during pregnancy with patient today. She is aware that we would like to see her FBS 95 or less, but > 70 and her 1  hr postmeal blood sugar 140 or less and her 2 hr postmeal blood sugar 120 or less.  Sandy was seen by Oph in Aug 2022  without retinopathy per patient.   She denies hx of nephropathy. Her urine microalbuminuria and creat/GFR were normal in 3/2022.  No sx of neuropathy. Denies foot ulcers.  BP stable at OB appointments per patient. BP was 124/82 on 12/20/2022.  TSH normal in Nov 2022.     2.  WEIGHT: Encouraged her to continue to make healthy food choices and remain active.    3.  PREGNANCY: Pt is 16 weeks pregnant.  ARNAV is 6/14/2023.    4. FOLLOW UP:  With Kaylin JOSEPH on 1/4/2023 and Dr. Ishan Duenas on 1/11/2022.  Pt has frequent follow up scheduled.  If her blood sugars are above or below target she is to notify us.    Time spent reviewing chart,labs and DexcomG6 sensor download today = 6 minutes.  Time for video visit today = 12 minutes.  Time for documentation today = 15 minutes.    TOTAL TIME FOR VISIT TODAY = 33 minutes.    Vesta Arceo PA-C      Answers for HPI/ROS submitted by the patient on 12/22/2022  General Symptoms: No  Skin Symptoms: No  HENT Symptoms: No  EYE SYMPTOMS: No  HEART SYMPTOMS: No  LUNG SYMPTOMS: No  INTESTINAL SYMPTOMS: No  URINARY SYMPTOMS: No  GYNECOLOGIC SYMPTOMS: No  BREAST SYMPTOMS: No  SKELETAL SYMPTOMS: No  BLOOD SYMPTOMS: No  NERVOUS SYSTEM SYMPTOMS: No  MENTAL HEALTH SYMPTOMS: No

## 2023-01-04 ENCOUNTER — VIRTUAL VISIT (OUTPATIENT)
Dept: EDUCATION SERVICES | Facility: CLINIC | Age: 43
End: 2023-01-04
Payer: COMMERCIAL

## 2023-01-04 DIAGNOSIS — O24.112 PREGNANCY COMPLICATED BY PRE-EXISTING TYPE 2 DIABETES IN SECOND TRIMESTER: Primary | ICD-10-CM

## 2023-01-04 PROCEDURE — 99207 PR NO BILLABLE SERVICE THIS VISIT: CPT

## 2023-01-04 NOTE — PROGRESS NOTES
Video-Visit Details    Type of service:  Video Visit  Patient consented to video visit  Video Start Time:  11a  Video End Time:   1115  Originating Location (pt. Location): Home  Distant Location (provider location):   [employer] [place] DIABETES EDUCATION [place]   Platform used for Video Visit: [employer]      Diabetes Self-Management Education & Support    Presents for:  Pregnancy complicated by type 2 diabetes (17 Weeks)    SUBJECTIVE/OBJECTIVE:    Patient Problem List and Family Medical History reviewed for relevant medical history, current medical status, and diabetes risk factors.    Vitals:  There were no vitals taken for this visit.    Pre pregnancy weight: 235#, current weight 230 lbs related to watching diet    Estimated Date of Delivery: Jun 14, 2023    Labs:  Lab Results   Component Value Date    A1C 6.3 08/02/2022     No results found for: GLC  No results found for: LDL  No results found for: HDL]  GFR Estimate   Date Value Ref Range Status   03/06/2022 >90 >60 mL/min/1.73m2 Final     Comment:     Effective December 21, 2021 eGFRcr in adults is calculated using the 2021 CKD-EPI creatinine equation which includes age and gender (Morteza et al., NEJ, DOI: 10.1056/UEBYdv9096521)     No results found for: GFRESTBLACK  Lab Results   Component Value Date    CR 0.66 03/06/2022     No results found for: MICROALBUMIN    Last 3 BP:   BP Readings from Last 3 Encounters:   12/20/22 124/82   11/21/22 126/72   05/11/22 (!) 148/98       History   Smoking Status     Never   Smokeless Tobacco     Never       Healthy Eating  Non fat plain yogurt with granola and pb or 2 toast with pb turkey sausage, eggs  Salad for lunch pre packaged kind with chicken (usually lightly breaded) or a sandwich or soup and sandwich  Dinner veggies, quinoa or millet, and a protein    Snacks      Taking Medications  Diabetes Medication(s)     Insulin       insulin detemir (LEVEMIR PEN) 100 UNIT/ML pen    Inject 24 units subcutaneous in am and 36  units subcutaneous in pm.     NOVOLOG FLEXPEN 100 UNIT/ML soln    Inject with meals and for correction. Pt pregnant and may use up to 80 units / day.        Novolog 1 uint per 2 grams of carbs at meals and 1 unit per 3 grams at snack    Metformin has been discontinued by Endo                    ASSESSMENT:    Type 2 Diabetes. Patient is 17 weeks gestation. She saw EDGAR Stoner last week that changed pts I:C ratio to 1 unit per 2 grams at meals.  Overall blood sugars are well controlled.  Pt is trying to bolus 20-30  mins before meals and finds it helps to reduce the post meal elevation. Pt is also walking 15-30 mins daily, tries to walk after meals and works with  1x week.  Pt will continue to follow up weekly with Endo, CDE or PA.    Pt verbalized understanding of concepts discussed and recommendations provided today  Opportunities for ongoing education and support in diabetes-self management were discussed.    PLAN:  *Levemir 24 units in AM and 36u PM (no changes)  *Novolog I:C 1 unit per 2 grams at meals and 1 unit to 3 grams at snacks  *Take Novolog 20-25 minutes prior to eating  * Continue to exercise approx 30 mins daily   * Follow up weekly with endo team    Kaylin KLEIN, RN, CDCES  Certified Diabetes Care and   [facility] Endocrinology and Diabetes   [facility]  [address] [place] [address]  Phone [phone]      Time Spent: 15 minutes (no charge)  Encounter Type: Individual    Any diabetes medication dose changes were made via the CDE Protocol per the patient's referring provider. A copy of this encounter was shared with the provider.

## 2023-01-06 DIAGNOSIS — E11.9 TYPE 2 DIABETES MELLITUS WITHOUT COMPLICATION, WITHOUT LONG-TERM CURRENT USE OF INSULIN (H): ICD-10-CM

## 2023-01-06 NOTE — PROGRESS NOTES
Outcome for 01/06/23 2:47 PM :Sent patient [employer] message asking them to upload their BG data   Ignacia Galvez  Outcome for 01/09/23 9:09 AM :Dexcom data will be sent to the provider on 1/10.  Ignacia Galvez  Outcome for 01/10/23 9:08 AM :Glucose sent via Email   Ignacia Galvez

## 2023-01-09 ENCOUNTER — LAB (OUTPATIENT)
Dept: LAB | Facility: CLINIC | Age: 43
End: 2023-01-09
Payer: COMMERCIAL

## 2023-01-09 DIAGNOSIS — O24.111 PRE-EXISTING TYPE 2 DIABETES MELLITUS DURING PREGNANCY IN FIRST TRIMESTER: ICD-10-CM

## 2023-01-09 LAB — HBA1C MFR BLD: 5.5 % (ref 0–5.6)

## 2023-01-09 PROCEDURE — 36415 COLL VENOUS BLD VENIPUNCTURE: CPT

## 2023-01-09 PROCEDURE — 83036 HEMOGLOBIN GLYCOSYLATED A1C: CPT

## 2023-01-11 ENCOUNTER — OFFICE VISIT (OUTPATIENT)
Dept: ENDOCRINOLOGY | Facility: CLINIC | Age: 43
End: 2023-01-11
Payer: COMMERCIAL

## 2023-01-11 VITALS
DIASTOLIC BLOOD PRESSURE: 85 MMHG | HEART RATE: 96 BPM | WEIGHT: 246 LBS | BODY MASS INDEX: 42.23 KG/M2 | SYSTOLIC BLOOD PRESSURE: 144 MMHG

## 2023-01-11 DIAGNOSIS — O24.112 PRE-EXISTING TYPE 2 DIABETES MELLITUS DURING PREGNANCY IN SECOND TRIMESTER: Primary | ICD-10-CM

## 2023-01-11 PROCEDURE — 99214 OFFICE O/P EST MOD 30 MIN: CPT | Performed by: STUDENT IN AN ORGANIZED HEALTH CARE EDUCATION/TRAINING PROGRAM

## 2023-01-11 ASSESSMENT — PAIN SCALES - GENERAL: PAINLEVEL: NO PAIN (0)

## 2023-01-11 NOTE — PROGRESS NOTES
Endocrinology Clinic Visit         NAME:  Sandy Person  PCP:  Lisa Hutson  MRN:  [id number]  Requesting Provider:  Referred Self    Chief Complaint     Chief Complaint   Patient presents with     Diabetes       History of Present Illness     Sandy Person is a 42 year old female who is seen in video visit for DM2 management in pregnancy. She has been following with Vesta meza and sera stephenson regularly. I last saw her on 10/18/2022      The patient was diagnosed with type 2 diabetes for about 10 years.       Previous A1C in records reviewed: she reported A1c usually in good range 6-7 except during covid pandemic.She had a1c of 8.7% during covid pandemic, she did lifestyle changes with diet and exercise. March 2021, also started on rybelsus,. She lost 20lbs. Rybelsus was stopped 11/2021 by her fertility provider. a1c 5/2021 was 6.7%.   Other DM meds that she tried bydureon for 2-3 years.    She is s/p IVF , she is now 18 weeks pregnant. Reported feeling well, pregnancy going well. No more morning sickness, energy is back.     she was seen on 1/4/23 sera Stephenson, PLAN:  *Levemir 24 units in AM and 36u PM (no changes)  *Novolog I:C 1 unit per 2 grams at meals and 1 unit to 3 grams at snacks  *Take Novolog 20-25 minutes prior to eating    She said she was having high when last seen by vesta on 12/28 and she increased her novolog from 1:3 to 1:2. Since then she started having more lows post meals. Had couple lows overnight to 70s. She is now having persistent high bg after her breakfast and sometimes lows after her meals.    Diabetes Care/Complications:   Eyes: no DR last eye exam a year ago  Kidneys: last urne microalb checked 3/2022 negative. Cr 3/2022  Nerves: no symptoms  Smoking: no  Blood Pressure:  Controlled, 120/87 at home  Lipids: lpids checked on 2/2021. .   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 Lives with her . No kids. Does not smoke , no excessive alcohol  Problem List     Patient Active Problem List   Diagnosis     Supervision of high-risk pregnancy        Medications     Current Outpatient Medications   Medication     acetone urine (RELION KETONE TEST) test strip     aspirin (ASA) 81 MG chewable tablet     blood glucose (NO BRAND SPECIFIED) lancets standard     blood glucose (NO BRAND SPECIFIED) test strip     blood glucose monitoring (NO BRAND SPECIFIED) meter device kit     Continuous Blood Gluc Sensor (DEXCOM G6 SENSOR) MISC     Continuous Blood Gluc Transmit (DEXCOM G6 TRANSMITTER) MISC     insulin detemir (LEVEMIR PEN) 100 UNIT/ML pen     insulin pen needle (32G X 4 MM) 32G X 4 MM miscellaneous     insulin pen needle (BD MELISSA U/F) 32G X 4 MM miscellaneous     NOVOLOG FLEXPEN 100 UNIT/ML soln     Prenatal Vit-Fe Fumarate-FA (PRENATAL VITAMIN) 27-0.8 MG TABS     No current facility-administered medications for this visit.        Allergies     No Known Allergies    Medical / Surgical History     Past Medical History:   Diagnosis Date     Abnormal Pap smear of cervix      Diabetes (H)      History of human papillomavirus infection      Past Surgical History:   Procedure Laterality Date     COLPOSCOPY       TONSILLECTOMY         Social History     Social History     Socioeconomic History     Marital status:      Spouse name: Colin     Number of children: Not on file     Years of education: Not on file     Highest education level: Not on file   Occupational History     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[employer]   Tobacco Use     Smoking status: Never     Smokeless tobacco: Never   Vaping Use     Vaping Use: Never used   Substance and Sexual Activity     Alcohol use: Not Currently     Comment: 1-2 drinks per month     Drug use: Never     Sexual activity: Yes     Birth control/protection: None   Other Topics Concern     Not on file   Social History Narrative     Not on file      Social Determinants of Health     Financial Resource Strain: Not on file   Food Insecurity: Not on file   Transportation Needs: Not on file   Physical Activity: Not on file   Stress: Not on file   Social Connections: Not on file   Intimate Partner Violence: Not on file   Housing Stability: Not on file       Family History     Family History   Problem Relation Age of Onset     Cancer Mother      Diabetes Mother      Heart Disease Mother      Diabetes Father      Heart Disease Father      Cancer Father      Cancer Maternal Grandmother      Diabetes Maternal Grandmother      Diabetes Paternal Grandmother        ROS     Limited  ROS completed, pertinent positive and negative in HPI  Answers for HPI/ROS submitted by the patient on 1/9/2023  General Symptoms: No  Skin Symptoms: No  HENT Symptoms: No  EYE SYMPTOMS: No  HEART SYMPTOMS: No  LUNG SYMPTOMS: No  INTESTINAL SYMPTOMS: No  URINARY SYMPTOMS: No  GYNECOLOGIC SYMPTOMS: No  BREAST SYMPTOMS: No  SKELETAL SYMPTOMS: No  BLOOD SYMPTOMS: No  NERVOUS SYSTEM SYMPTOMS: No  MENTAL HEALTH SYMPTOMS: No    Physical Exam   BP (!) 144/85 (BP Location: Left arm, Patient Position: Sitting, Cuff Size: Adult Regular)   Pulse 96   Wt 111.6 kg (246 lb)   BMI 42.23 kg/m     GENERAL: Healthy, alert and no distress  EYES: Eyes grossly normal to inspection.  No discharge or erythema, or obvious scleral/conjunctival abnormalities.  RESP: No audible wheeze, cough, or visible cyanosis.  No visible retractions or increased work of breathing.    SKIN: Visible skin clear. No significant rash, abnormal pigmentation or lesions.  NEURO: Cranial nerves grossly intact.  Mentation and speech appropriate for age.  PSYCH: Mentation appears normal, affect normal/bright, judgement and insight intact, normal speech and appearance well-groomed.     Labs/Imaging     Pertinent Labs were reviewed and updated in EPIC and discussed briefly.  Radiology Results were  reviewed and updated in EPIC and discussed  briefly.    Summary of recent findings:   Lab Results   Component Value Date    A1C 6.3 08/02/2022    A1C 6.8 03/06/2022    A1C 7.2 12/16/2021       TSH   Date Value Ref Range Status   11/21/2022 0.46 0.30 - 4.20 uIU/mL Final   03/06/2022 1.17 0.40 - 4.00 mU/L Final       Creatinine   Date Value Ref Range Status   03/06/2022 0.66 0.52 - 1.04 mg/dL Final       No results for input(s): CHOL, HDL, LDL, TRIG, CHOLHDLRATIO in the last 68119 hours.    No results found for: [id number], [id number], [id number]    I personally reviewed the patient's outside records from [facility] EMR. Summary of pertinent findings in HPI.    Impression / Plan     1. Type 2 DM in second trimester pregnancy    Fasting bg in target range  Post breakfast above target >30 %  Having lows during the day post meals    Plan:  Continue same Levemir: 24 units in AM and 36u PM  Change Novolog to 1u per 1.5 grams with breakfast, 1 unit per 2.5 grams with lunch and dinner.  Try to avoid overcorrecting lows, follow rules of 15  Check with glucometer when bg low on dexcom    Last visit:  - Reviewed blood sugar goals in pregnancy. Glucose goals during pregnancy according to the American Diabetes Association:  Fasting glucose 70-95 mg/dL AND  One-hour postprandial glucose 110-140 mg/dL or  Two-hour postprandial glucose 100-120 mg/dL  - Encouraged at least 30min of activity daily.  - She is uptodate with ophthalmology evaluation. Instructed to return postpartum  - We reviewed risks of uncontrolled hyperglycemia during pregnancy, including but not limited to: gestational hypertension/preeclampsia, increased cesarian section rate, macrosomia, shoulder dystocia, still birth, and long term risk of development of diabetes in grown child    RTC- every 4 weeks until delivery. Follow weekly with diabetes educator.          Test and/or medications prescribed today:  No orders of the defined types were placed in this encounter.      30 minutes spent on the date of the  encounter doing chart review, history and exam, documentation and further activities as noted above.  This time excludes time spent reviewing CGM.        Ishan Duenas MD  Endocrinology, Diabetes and Metabolism  [facility]

## 2023-01-11 NOTE — LETTER
1/11/2023       RE: Sandy Person  [address] [facility] [address]     Dear Colleague,    Thank you for referring your patient, Sandy Person, to the [facility] ENDOCRINOLOGY [facility]INIC [place] at [facility]. Please see a copy of my visit note below.    Outcome for 01/06/23 2:47 PM :Sent patient Pig[employer] message asking them to upload their BG data   Ignacia Galvez  Outcome for 01/09/23 9:09 AM :Dexcom data will be sent to the provider on 1/10.  Ignacia Galvez  Outcome for 01/10/23 9:08 AM :Glucose sent via Email   Ignacia Galvez          Endocrinology Clinic Visit         NAME:  Sandy Person  PCP:  Lisa Hutson  MRN:  [id number]  Requesting Provider:  Referred Self    Chief Complaint     Chief Complaint   Patient presents with     Diabetes       History of Present Illness     Sandy Person is a 42 year old female who is seen in video visit for DM2 management in pregnancy. She has been following with Vesta meza and sera stephenson regularly. I last saw her on 10/18/2022      The patient was diagnosed with type 2 diabetes for about 10 years.       Previous A1C in records reviewed: she reported A1c usually in good range 6-7 except during covid pandemic.She had a1c of 8.7% during covid pandemic, she did lifestyle changes with diet and exercise. March 2021, also started on rybelsus,. She lost 20lbs. Rybelsus was stopped 11/2021 by her fertility provider. a1c 5/2021 was 6.7%.   Other DM meds that she tried bydureon for 2-3 years.    She is s/p IVF , she is now 18 weeks pregnant. Reported feeling well, pregnancy going well. No more morning sickness, energy is back.     she was seen on 1/4/23 sera Stephenson, PLAN:  *Levemir 24 units in AM and 36u PM (no changes)  *Novolog I:C 1 unit per 2 grams at meals and 1 unit to 3 grams at snacks  *Take Novolog 20-25 minutes prior to eating    She said she was having high when last seen by vesta on 12/28 and she increased her novolog  from 1:3 to 1:2. Since then she started having more lows post meals. Had couple lows overnight to 70s. She is now having persistent high bg after her breakfast and sometimes lows after her meals.    Diabetes Care/Complications:   Eyes: no DR last eye exam a year ago  Kidneys: last urne microalb checked 3/2022 negative. Cr 3/2022  Nerves: no symptoms  Smoking: no  Blood Pressure:  Controlled, 120/87 at home  Lipids: lpids checked on 2/2021. .   Macrovascular: no       Diet: 3 meals, sometimes snack  Drinks: no sugary drinks    Glucose monitoring:          Social: She is a  at the [employer]. Lives with her . No kids. Does not smoke , no excessive alcohol  Problem List     Patient Active Problem List   Diagnosis     Supervision of high-risk pregnancy        Medications     Current Outpatient Medications   Medication     acetone urine (RELION KETONE TEST) test strip     aspirin (ASA) 81 MG chewable tablet     blood glucose (NO BRAND SPECIFIED) lancets standard     blood glucose (NO BRAND SPECIFIED) test strip     blood glucose monitoring (NO BRAND SPECIFIED) meter device kit     Continuous Blood Gluc Sensor (DEXCOM G6 SENSOR) MISC     Continuous Blood Gluc Transmit (DEXCOM G6 TRANSMITTER) MISC     insulin detemir (LEVEMIR PEN) 100 UNIT/ML pen     insulin pen needle (32G X 4 MM) 32G X 4 MM miscellaneous     insulin pen needle (BD MELISSA U/F) 32G X 4 MM miscellaneous     NOVOLOG FLEXPEN 100 UNIT/ML soln     Prenatal Vit-Fe Fumarate-FA (PRENATAL VITAMIN) 27-0.8 MG TABS     No current facility-administered medications for this visit.        Allergies     No Known Allergies    Medical / Surgical History     Past Medical History:   Diagnosis Date     Abnormal Pap smear of cervix      Diabetes (H)      History of human papillomavirus infection      Past Surgical History:   Procedure Laterality Date     COLPOSCOPY       TONSILLECTOMY         Social History     Social History     Socioeconomic History      Marital status:      Spouse name: Colin     Number of children: Not on file     Years of education: Not on file     Highest education level: Not on file   Occupational History     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[employer]   Tobacco Use     Smoking status: Never     Smokeless tobacco: Never   Vaping Use     Vaping Use: Never used   Substance and Sexual Activity     Alcohol use: Not Currently     Comment: 1-2 drinks per month     Drug use: Never     Sexual activity: Yes     Birth control/protection: None   Other Topics Concern     Not on file   Social History Narrative     Not on file     Social Determinants of Health     Financial Resource Strain: Not on file   Food Insecurity: Not on file   Transportation Needs: Not on file   Physical Activity: Not on file   Stress: Not on file   Social Connections: Not on file   Intimate Partner Violence: Not on file   Housing Stability: Not on file       Family History     Family History   Problem Relation Age of Onset     Cancer Mother      Diabetes Mother      Heart Disease Mother      Diabetes Father      Heart Disease Father      Cancer Father      Cancer Maternal Grandmother      Diabetes Maternal Grandmother      Diabetes Paternal Grandmother        ROS     Limited  ROS completed, pertinent positive and negative in HPI  Answers for HPI/ROS submitted by the patient on 1/9/2023  General Symptoms: No  Skin Symptoms: No  HENT Symptoms: No  EYE SYMPTOMS: No  HEART SYMPTOMS: No  LUNG SYMPTOMS: No  INTESTINAL SYMPTOMS: No  URINARY SYMPTOMS: No  GYNECOLOGIC SYMPTOMS: No  BREAST SYMPTOMS: No  SKELETAL SYMPTOMS: No  BLOOD SYMPTOMS: No  NERVOUS SYSTEM SYMPTOMS: No  MENTAL HEALTH SYMPTOMS: No    Physical Exam   BP (!) 144/85 (BP Location: Left arm, Patient Position: Sitting, Cuff Size: Adult Regular)   Pulse 96   Wt 111.6 kg (246 lb)   BMI 42.23 kg/m     GENERAL: Healthy, alert and no distress  EYES: Eyes grossly normal to inspection.  No discharge or erythema, or obvious  scleral/conjunctival abnormalities.  RESP: No audible wheeze, cough, or visible cyanosis.  No visible retractions or increased work of breathing.    SKIN: Visible skin clear. No significant rash, abnormal pigmentation or lesions.  NEURO: Cranial nerves grossly intact.  Mentation and speech appropriate for age.  PSYCH: Mentation appears normal, affect normal/bright, judgement and insight intact, normal speech and appearance well-groomed.     Labs/Imaging     Pertinent Labs were reviewed and updated in EPIC and discussed briefly.  Radiology Results were  reviewed and updated in EPIC and discussed briefly.    Summary of recent findings:   Lab Results   Component Value Date    A1C 6.3 08/02/2022    A1C 6.8 03/06/2022    A1C 7.2 12/16/2021       TSH   Date Value Ref Range Status   11/21/2022 0.46 0.30 - 4.20 uIU/mL Final   03/06/2022 1.17 0.40 - 4.00 mU/L Final       Creatinine   Date Value Ref Range Status   03/06/2022 0.66 0.52 - 1.04 mg/dL Final       No results for input(s): CHOL, HDL, LDL, TRIG, CHOLHDLRATIO in the last 09158 hours.    No results found for: [id number], [id number], [id number]    I personally reviewed the patient's outside records from [facility] EMR. Summary of pertinent findings in HPI.    Impression / Plan     1. Type 2 DM in second trimester pregnancy    Fasting bg in target range  Post breakfast above target >30 %  Having lows during the day post meals    Plan:  Continue same Levemir: 24 units in AM and 36u PM  Change Novolog to 1u per 1.5 grams with breakfast, 1 unit per 2.5 grams with lunch and dinner.  Try to avoid overcorrecting lows, follow rules of 15  Check with glucometer when bg low on dexcom    Last visit:  - Reviewed blood sugar goals in pregnancy. Glucose goals during pregnancy according to the American Diabetes Association:  Fasting glucose 70-95 mg/dL AND  One-hour postprandial glucose 110-140 mg/dL or  Two-hour postprandial glucose 100-120 mg/dL  - Encouraged at least 30min of activity  daily.  - She is uptodate with ophthalmology evaluation. Instructed to return postpartum  - We reviewed risks of uncontrolled hyperglycemia during pregnancy, including but not limited to: gestational hypertension/preeclampsia, increased cesarian section rate, macrosomia, shoulder dystocia, still birth, and long term risk of development of diabetes in grown child    RTC- every 4 weeks until delivery. Follow weekly with diabetes educator.          Test and/or medications prescribed today:  No orders of the defined types were placed in this encounter.      30 minutes spent on the date of the encounter doing chart review, history and exam, documentation and further activities as noted above.  This time excludes time spent reviewing CGM.        Ishan Duenas MD  Endocrinology, Diabetes and Metabolism  [facility]

## 2023-01-11 NOTE — PATIENT INSTRUCTIONS
It was nice seeing you    Continue same Levemir: 24 units in AM and 36u PM  Change Novolog to 1u per 1.5 grams with breakfast, 1 unit per 2.5 grams with lunch and dinner.

## 2023-01-17 ENCOUNTER — OFFICE VISIT (OUTPATIENT)
Dept: MATERNAL FETAL MEDICINE | Facility: CLINIC | Age: 43
End: 2023-01-17
Attending: OBSTETRICS & GYNECOLOGY
Payer: COMMERCIAL

## 2023-01-17 ENCOUNTER — HOSPITAL ENCOUNTER (OUTPATIENT)
Dept: ULTRASOUND IMAGING | Facility: CLINIC | Age: 43
Discharge: HOME | End: 2023-01-17
Attending: OBSTETRICS & GYNECOLOGY
Payer: COMMERCIAL

## 2023-01-17 DIAGNOSIS — O44.03 PLACENTA PREVIA IN THIRD TRIMESTER: ICD-10-CM

## 2023-01-17 DIAGNOSIS — O24.112 PREGNANCY WITH TYPE 2 DIABETES MELLITUS IN SECOND TRIMESTER: Primary | ICD-10-CM

## 2023-01-17 DIAGNOSIS — Z36.2 ENCOUNTER FOR FOLLOW-UP ULTRASOUND OF FETAL ANATOMY: ICD-10-CM

## 2023-01-17 DIAGNOSIS — O26.90 PREGNANCY RELATED CONDITION, ANTEPARTUM: ICD-10-CM

## 2023-01-17 DIAGNOSIS — O09.812 PREGNANCY RESULTING FROM IN VITRO FERTILIZATION IN SECOND TRIMESTER: ICD-10-CM

## 2023-01-17 PROBLEM — O44.02 PLACENTA PREVIA IN SECOND TRIMESTER: Status: ACTIVE | Noted: 2023-01-17

## 2023-01-17 PROCEDURE — 76811 OB US DETAILED SNGL FETUS: CPT | Mod: 26 | Performed by: OBSTETRICS & GYNECOLOGY

## 2023-01-17 PROCEDURE — 99213 OFFICE O/P EST LOW 20 MIN: CPT | Mod: 25 | Performed by: OBSTETRICS & GYNECOLOGY

## 2023-01-17 PROCEDURE — 76811 OB US DETAILED SNGL FETUS: CPT

## 2023-01-17 NOTE — PROGRESS NOTES
The patient was seen for an ultrasound in the [facility] at the [facility] today.  For a detailed report of the ultrasound examination, please see the ultrasound report which can be found under the imaging tab.    Mary Carmen Wise MD  , OB/GYN  Maternal-Fetal Medicine  [phone] (Pager)

## 2023-01-17 NOTE — NURSING NOTE
Patient presents to [facility] for L2 due to AMA, IVF, Type 2 DM on Insulin. Denies LOF, vaginal bleeding, cramping/contractions. Patient also states that BS levels are well controlled currently.  No other concerns at this time.

## 2023-01-18 ENCOUNTER — VIRTUAL VISIT (OUTPATIENT)
Dept: EDUCATION SERVICES | Facility: CLINIC | Age: 43
End: 2023-01-18
Payer: COMMERCIAL

## 2023-01-18 DIAGNOSIS — O24.112 PREGNANCY COMPLICATED BY PRE-EXISTING TYPE 2 DIABETES IN SECOND TRIMESTER: Primary | ICD-10-CM

## 2023-01-18 PROCEDURE — 99207 PR NO BILLABLE SERVICE THIS VISIT: CPT

## 2023-01-18 NOTE — PROGRESS NOTES
Sandy Person  is being evaluated via a billable video visit.      How would you like to obtain your AVS? [employer]  For the video visit, send the invitation by: Text to cell phone: [phone]  Will anyone else be joining your video visit? No        Outcome for 01/18/23 8:14 AM: Data uploaded on Dexcom  Susan Freeman LPN   Outcome for 01/23/23 8:53 AM: Dexcom emailed to provider  Taylor Myhre, VF

## 2023-01-18 NOTE — PROGRESS NOTES
Video-Visit Details    Type of service:  Video Visit  Patient consented to video visit  Video Start Time:  11a  Video End Time:   1115  Originating Location (pt. Location): Home  Distant Location (provider location):   [facility] [place] [place]   Platform used for Video Visit: [employer]      Diabetes Self-Management Education & Support    Presents for:  Pregnancy complicated by type 2 diabetes (19 Weeks)    SUBJECTIVE/OBJECTIVE:    Patient Problem List and Family Medical History reviewed for relevant medical history, current medical status, and diabetes risk factors.    Vitals:  There were no vitals taken for this visit.    Pre pregnancy weight: 235#, current weight 230 lbs related to watching diet    Estimated Date of Delivery: Jun 14, 2023    Labs:  Lab Results   Component Value Date    A1C 6.3 08/02/2022     No results found for: GLC  No results found for: LDL  No results found for: HDL]  GFR Estimate   Date Value Ref Range Status   03/06/2022 >90 >60 mL/min/1.73m2 Final     Comment:     Effective December 21, 2021 eGFRcr in adults is calculated using the 2021 CKD-EPI creatinine equation which includes age and gender (Morteza et al., NEJ, DOI: 10.1056/TCOQnx1271401)     No results found for: GFRESTBLACK  Lab Results   Component Value Date    CR 0.66 03/06/2022     No results found for: MICROALBUMIN    Last 3 BP:   BP Readings from Last 3 Encounters:   01/11/23 (!) 144/85   12/20/22 124/82   11/21/22 126/72       History   Smoking Status     Never   Smokeless Tobacco     Never       Healthy Eating  Non fat plain yogurt with granola and pb or 2 toast with pb turkey sausage, eggs  Salad for lunch pre packaged kind with chicken (usually lightly breaded) or a sandwich or soup and sandwich  Dinner veggies, quinoa or millet, and a protein    Snacks      Taking Medications  Diabetes Medication(s)     Insulin       insulin detemir (LEVEMIR PEN) 100 UNIT/ML pen    Inject 24 units subcutaneous in am and 36  units subcutaneous in pm.     NOVOLOG FLEXPEN 100 UNIT/ML soln    Inject with meals and for correction. Pt pregnant and may use up to 80 units / day.        Novolog 1 uint per 2 grams of carbs at meals and 1 unit per 3 grams at snack    Metformin has been discontinued by Endo                            ASSESSMENT:    Type 2 Diabetes. Patient is 19 weeks gestation. She saw Dr Gagnon last week.  At the appt her Novolog I:C ratio was changed at breakfast to 1 to 1.5 grams and lunch and dinner1 to 2.5 grams.  Overall blood sugars are well controlled. Pt is getting some lows mainly post breakfast before lunch. Today we adjusted her I:C ratio at breakfast back to 1 unit per 2 gms of carb. Of she is having an AM snack she will take 1 unit her 1.5 grams of carb at breakfast.    Pt is trying to bolus 30 mins before meals, today we discussed switching it to 20 mins to prevent lows.  Pt is also walking 15-30 mins daily, tries to walk after meals and works with  1x week. Pt has been doing some fingersticks and notices some inconsistencies with blood sugars both high and lows.For the next week pt is going to fingerstick fasting and 1 hour post meals and track it.  Pt has an appt next week with EDGAR Stoner Pt will continue to follow up weekly with Endo, KASIAE or PA.    Pt verbalized understanding of concepts discussed and recommendations provided today  Opportunities for ongoing education and support in diabetes-self management were discussed.    PLAN:  * Test Fingersticks 4x a day (fasting, 1 hour post meal) to check on accuracy of Dexcom and write down  *Levemir 24 units in AM and 36u PM (no changes)  *Novolog I:C 1 unit per 2 grams at breakfast and 1 unit per 2.5 grams at lunch and dinner and 1 unit to 3 grams at snacks  *Take Novolog 20 minutes prior to eating  * Continue to exercise approx 30 mins daily   * Follow up weekly with endo team    Kaylin KLEIN, RN, CDCES  Certified Diabetes Care and Education  Specialist  [facility]  [address] [place] [address]  Phone [phone]      Time Spent: 15 minutes (no charge)  Encounter Type: Individual    Any diabetes medication dose changes were made via the CDE Protocol per the patient's referring provider. A copy of this encounter was shared with the provider.

## 2023-01-24 ENCOUNTER — PRENATAL OFFICE VISIT (OUTPATIENT)
Dept: OBGYN | Facility: CLINIC | Age: 43
End: 2023-01-24
Payer: COMMERCIAL

## 2023-01-24 ENCOUNTER — TELEPHONE (OUTPATIENT)
Dept: OBGYN | Facility: CLINIC | Age: 43
End: 2023-01-24

## 2023-01-24 ENCOUNTER — HOSPITAL ENCOUNTER (OUTPATIENT)
Facility: CLINIC | Age: 43
End: 2023-01-24
Attending: OBSTETRICS & GYNECOLOGY | Admitting: OBSTETRICS & GYNECOLOGY
Payer: COMMERCIAL

## 2023-01-24 ENCOUNTER — ANCILLARY PROCEDURE (OUTPATIENT)
Dept: ULTRASOUND IMAGING | Facility: CLINIC | Age: 43
End: 2023-01-24
Attending: OBSTETRICS & GYNECOLOGY
Payer: COMMERCIAL

## 2023-01-24 VITALS
SYSTOLIC BLOOD PRESSURE: 110 MMHG | WEIGHT: 244.4 LBS | DIASTOLIC BLOOD PRESSURE: 62 MMHG | HEIGHT: 64 IN | BODY MASS INDEX: 41.73 KG/M2

## 2023-01-24 DIAGNOSIS — O09.92 SUPERVISION OF HIGH RISK PREGNANCY IN SECOND TRIMESTER: ICD-10-CM

## 2023-01-24 DIAGNOSIS — O36.4XX0 FETAL DEATH AFFECTING MANAGEMENT OF MOTHER, SINGLE OR UNSPECIFIED FETUS: Primary | ICD-10-CM

## 2023-01-24 DIAGNOSIS — O02.1 IUFD AT LESS THAN 20 WEEKS OF GESTATION: Primary | ICD-10-CM

## 2023-01-24 DIAGNOSIS — O03.9 MISCARRIAGE: Primary | ICD-10-CM

## 2023-01-24 PROBLEM — O09.90 SUPERVISION OF HIGH-RISK PREGNANCY: Status: RESOLVED | Noted: 2022-11-03 | Resolved: 2023-01-24

## 2023-01-24 PROCEDURE — 99207 PR PRENATAL VISIT: CPT | Performed by: OBSTETRICS & GYNECOLOGY

## 2023-01-24 PROCEDURE — 76815 OB US LIMITED FETUS(S): CPT | Performed by: OBSTETRICS & GYNECOLOGY

## 2023-01-24 RX ORDER — ACETAMINOPHEN 325 MG/1
975 TABLET ORAL ONCE
Status: CANCELLED | OUTPATIENT
Start: 2023-01-24 | End: 2023-01-24

## 2023-01-24 RX ORDER — MISOPROSTOL 200 UG/1
TABLET ORAL
Qty: 2 TABLET | Refills: 0 | Status: ON HOLD | OUTPATIENT
Start: 2023-01-24 | End: 2023-01-31

## 2023-01-24 RX ORDER — METRONIDAZOLE 500 MG/1
TABLET ORAL
Qty: 1 TABLET | Refills: 0 | Status: ON HOLD | OUTPATIENT
Start: 2023-01-24 | End: 2023-01-31

## 2023-01-24 NOTE — TELEPHONE ENCOUNTER
Reviewed instructions with patient.  She would like social work to reach out.  She will discuss mementos and disposition with her spouse.    Medications sent to  pharmacy.

## 2023-01-24 NOTE — PROGRESS NOTES
Referral from [facility]. Sandy Person is a  at 19w6d with ARNAV of 23 by ETD referred to [facility] for D&E for IUFD.    Women's Right to Know completion date: N/A    History of vaginal deliveries/ deliveries: N/A    Blood type: A POS    Mifepristone:no    Misoprostol: Per provider preference    Laminaria: yes    Chromosome testing: Unsure    Remains: Unsure    Desires memento box/footprints: Unsure    Kaylin Allen MD

## 2023-01-24 NOTE — PROGRESS NOTES
Prenatal Visit: Sandy presents for a prenatal visit today she has been seen by [facility] and had an incomplete level II ultrasound. I was unable to get bedside doptones where I had before and so a bedside ultrasound was performed. Transabdominal and transvaginal approaches were done and no fetal cardiac activity was noted. We confirmed this with a formal ultrasound where no fetal cardiac activity or movement were seen. Sandy is appropriately sad but would like to plan the next step. She is not interested in a vaginal induction as she feels this would be too traumatic. She is interested in proceeding with a dilation and evacuation at this time. Will help coordinate a referral to the [facility] as soon as possible. Clinic called today.  Will cancel future visits here. [facility] care coordinator notified via staff message.  Niurka Polanco MD

## 2023-01-24 NOTE — LETTER
Day of Laminaria (dilators)    You are scheduled at [facility] [address] [address].  If parking is needed, please park in the     You should arrive in clinic at 130 PM.    Day of Surgery:    Your surgery is scheduled at [facility].  [facility].  [address]  [address].  If parking is needed please park in the [place].  If you are unsure how to get to the hospital - search google maps for [facility] [address].    Just stop at the  and security will tell you where you need to go for your surgery.    Arrive at the hospital 2 hours before your surgery at 6AM on 1/31/22.  Your surgery is scheduled at 8 AM.  Please do not eat or drink after 10 PM.  You may have sips of clear liquids (water, black coffee, Gatorade) up to 4 AM.  If you have been prescribed medication to take before surgery or if you have prescription medications that you take everyday, you can take those with a sip of water. Please shower the night before and the morning of the surgery with a special soap called Hibiclens.  If you wash your hair, wash with the special soap after you wash your hair.     Cytotec is medication that will be sent to your preferred pharmacy. This medication should be placed between your cheek and gum in your lower jaw, 2 hours before surgery.    You will need to take a home COVID test, no more than 2 days before surgery, take a picture of it and bring it to your surgery.      A responsible adult will need to drive you home after surgery.

## 2023-01-24 NOTE — TELEPHONE ENCOUNTER
[facility].  She is being referred to [facility] because she desires D&E following FDIU  Gestational age 19w6d by LMP  ARNAV 6/14/23  Medical records have been received    An email has been sent to Svetlana/financial counselor to verify insurance  Checklist given to Dr Allen for case request.

## 2023-01-25 ENCOUNTER — DOCUMENTATION ONLY (OUTPATIENT)
Dept: CARE COORDINATION | Facility: CLINIC | Age: 43
End: 2023-01-25

## 2023-01-25 ENCOUNTER — DOCUMENTATION ONLY (OUTPATIENT)
Dept: OBGYN | Facility: CLINIC | Age: 43
End: 2023-01-25

## 2023-01-25 ENCOUNTER — VIRTUAL VISIT (OUTPATIENT)
Dept: ENDOCRINOLOGY | Facility: CLINIC | Age: 43
End: 2023-01-25
Payer: COMMERCIAL

## 2023-01-25 DIAGNOSIS — E11.9 TYPE 2 DIABETES MELLITUS WITHOUT COMPLICATION, WITHOUT LONG-TERM CURRENT USE OF INSULIN (H): Primary | ICD-10-CM

## 2023-01-25 PROCEDURE — 99214 OFFICE O/P EST MOD 30 MIN: CPT | Mod: 95 | Performed by: PHYSICIAN ASSISTANT

## 2023-01-25 NOTE — LETTER
1/25/2023       RE: Sandy Person  [address] [facility] [address]     Dear Colleague,    Thank you for referring your patient, Sandy Person, to the [facility]. Please see a copy of my visit note below.    Sandy Person  is being evaluated via a billable video visit.      How would you like to obtain your AVS? MyChart  For the video visit, send the invitation by: Text to cell phone: [phone]  Will anyone else be joining your video visit? No        Outcome for 01/18/23 8:14 AM: Data uploaded on Dexcom  Susan Freeman LPN   Outcome for 01/23/23 8:53 AM: Dexcom emailed to provider  Taylor Myhre, VF            Due to the COVID 19 pandemic this visit was converted to a video visit in order to help prevent spread of infection in this patient and the general population.    Time of start: 8:30 am  Time of end: 8:42  am  Total duration of video visit: 12 minutes.  Providers location: offsite.  Patients location: home-[place].    [facility]  Sandy Person is a 42 year old female with type 2 diabetes mellitus. Video visit for diabetes follow up today.  Her diabetes control has been excellent.  Sandy was seen for her prenatal visit yesterday and ultrasound done showed no fetal cardiac activity or movement.   She is scheduled for a dilation and evacuation next Tuesday.  Sandy is sad today and has support at home.  Pt has a hx of type 2 diabetes dx 10 years ago.  She has no known retinopathy, nephropathy or neuropathy.  She has a hx of obesity and questionable hx of PCOS.  Sandy tested + for COVID in May 2022 with mild symptom.  She had been taking Rybelsus which was discontinued in late Nov 2021 by her fertility provider.   She was instructed to remain on Metformin 1000 mg BID and Glipizide 10 mg each am.  When she found out she was pregnant Levemir BID and Novolog premeals was added.  For her diabetes, she is currently taking Levemir  24 units subcutaneous each am and 36 units subcutaneous each pm and Novolog 1 unit/2 gms CHO with all meals, plus correction. Pt uses 1:6 with snacks.  Most recent A1C was 5.5 % on 1/9/2023 and previous A1C was 6.6 % in Nov 2022.  I reviewed and scanned her DexcomG6 sensor download data in her her chart below.  Blood sugar control is good at this time without frequent hypoglycemia.  On ROS today, sad today.    Diabetes Care  Retinopathy: none; pt seen by Oph in Aug 2022. No retinopathy per patient.  Nephropathy:none; urine microalbuminuria negative in 3/2022.  Neuropathy:none.  Foot Exam:no exam.  Taking aspirin: yes.  Lipids: no LDL.  CAD: no.  Mental health: no hx of depression.  Insulin: Levemir BID and meal time insulin with correction.   DM meds: none.  Testing:  DexcomG6 sensor.                                    ROS  See under HPI.    Allergies  No Known Allergies    Medications  Current Outpatient Medications   Medication Sig Dispense Refill     acetone urine (RELION KETONE TEST) test strip Test urine for ketones daily for 1 week if small or negative reduce to 2x week 50 strip 3     aspirin (ASA) 81 MG chewable tablet Take 81 mg by mouth daily       blood glucose (NO BRAND SPECIFIED) lancets standard Use to test blood sugar 4 times daily or as directed. 100 each 3     blood glucose (NO BRAND SPECIFIED) test strip Use to test blood sugar 4 times daily or as directed. 400 strip 3     blood glucose monitoring (NO BRAND SPECIFIED) meter device kit Use to test blood sugar 4 times daily or as directed. 1 kit 0     Continuous Blood Gluc Sensor (DEXCOM G6 SENSOR) MISC 1 each every 10 days Change every 10 days. 3 each 11     Continuous Blood Gluc Transmit (DEXCOM G6 TRANSMITTER) MISC 1 each every 3 months Change every 3 months. 1 each 4     insulin detemir (LEVEMIR PEN) 100 UNIT/ML pen Inject 24 units subcutaneous in am and 36 units subcutaneous in pm. 60 mL 3     insulin pen needle (32G X 4 MM) 32G X 4 MM  miscellaneous Use 6 pen needles daily or as directed. 200 each 3     insulin pen needle (BD MELISSA U/F) 32G X 4 MM miscellaneous Use with insulin. 250 each 3     metroNIDAZOLE (FLAGYL) 500 MG tablet Take one tablet by mouth with dinner on 1/30/23 1 tablet 0     misoprostol (CYTOTEC) 200 MCG tablet Take 2 hours before procedure. Place 1 tab between cheek and gum on the right, and 2nd tab on the left. Dissolve for 30 min, then swallow. 2 tablet 0     NOVOLOG FLEXPEN 100 UNIT/ML soln Inject with meals and for correction. Pt pregnant and may use up to 80 units / day. 80 mL 3     Prenatal Vit-Fe Fumarate-FA (PRENATAL VITAMIN) 27-0.8 MG TABS      Metformin 500 mg 2 tab po BID.      Family History  family history includes Cancer in her father, maternal grandmother, and mother; Diabetes in her father, maternal grandmother, mother, and paternal grandmother; Heart Disease in her father and mother.    Social History   reports that she has never smoked. She has never used smokeless tobacco. She reports that she does not currently use alcohol. She reports that she does not use drugs.     Past Medical History  Past Medical History:   Diagnosis Date     Abnormal Pap smear of cervix      Diabetes (H)      History of human papillomavirus infection    Obesity.    Past Surgical History:   Procedure Laterality Date     COLPOSCOPY       TONSILLECTOMY         Physical Exam    No exam today.    RESULTS  Creatinine   Date Value Ref Range Status   03/06/2022 0.66 0.52 - 1.04 mg/dL Final     GFR Estimate   Date Value Ref Range Status   03/06/2022 >90 >60 mL/min/1.73m2 Final     Comment:     Effective December 21, 2021 eGFRcr in adults is calculated using the 2021 CKD-EPI creatinine equation which includes age and gender (Morteza et al., NEJM, DOI: 10.1056/OUTTcx7704637)     Hemoglobin A1C   Date Value Ref Range Status   01/09/2023 5.5 0.0 - 5.6 % Final     Comment:     Normal <5.7%   Prediabetes 5.7-6.4%    Diabetes 6.5% or higher     Note:  Adopted from ADA consensus guidelines.     ALT   Date Value Ref Range Status   03/06/2022 37 0 - 50 U/L Final     AST   Date Value Ref Range Status   03/06/2022 23 0 - 45 U/L Final     TSH   Date Value Ref Range Status   11/21/2022 0.46 0.30 - 4.20 uIU/mL Final   03/06/2022 1.17 0.40 - 4.00 mU/L Final       ASSESSMENT/PLAN:    1.  TYPE 2 DIABETES MELLITUS: Blood sugar control is good at this time without frequent hypoglycemia.  Will continue current insulin doses for now and she will be admitted next Tuesday.  She is to wear her DexcomG6 sensor full time and notify us if she has any blood sugar values 70 or less.  Her insulin requirements will be less following dilation and evacuation next week.  Recommend consulting inpatient diabetes team.  Sandy was seen by Oph in Aug 2022  without retinopathy per patient.   She denies hx of nephropathy. Her urine microalbuminuria and creat/GFR were normal in 3/2022.  No sx of neuropathy. Denies foot ulcers.  BP good per patient. /62 yesterday.  TSH normal in Nov 2022.     2.  WEIGHT: Not addressed today.    3.  PREGNANCY:  Miscarriage scheduled for dilation and evacuation next week.    4. FOLLOW UP:  With CDE next week.  Her insulin requirements will be less after above procedure.    Time spent reviewing chart,labs and DexcomG6 sensor download today = 6 minutes.  Time for video visit today = 12 minutes.  Time for documentation today = 15 minutes.    TOTAL TIME FOR VISIT TODAY = 33 minutes.    Vesta Arceo PA-C

## 2023-01-25 NOTE — PROGRESS NOTES
Scheduled surgery, note placed in chart per RN checklist.    Type of surgery: REMOVAL OF CERVICAL DILATORS, DILATION AND EVACUATION OF UTERUS  Location of surgery: [facility] OR  Date and time of surgery: 1/31/23 at 8am  Surgeon: Carolin Valladares MD   Pre-Op Appt Date: will be done at clinic appointment on 1/30/23  Post-Op Appt Date: NA   Packet sent out: Not Applicable - RN to call with info  Pre-cert/Authorization completed:  Not Applicable - RN handles insurance/coverage  Date: 1/25/23    Nevaeh Chavez  Clinical Services Assistant

## 2023-01-25 NOTE — PROGRESS NOTES
ANDREW called Sandy today to talk about her upcoming D+E on 1/31. Sandy tearfully shared that this is her first pregnancy and it was an IVF Pregnancy and it was so desired for her to have this baby. She shared that she had just received the envelope containing the baby's gender and that she was excited to share with her  who is currently in [place]. SW went on to check in about whether she desires mementos and she does not at this time, but is thinking more and may change her mind. She shared that she would like hospital burial for this baby but again may change her mind, SW will check in with her on the day of her procedure about this further. SW asked if she would have a support person to be with her on Tuesday and she shared that her  is trying to make it back, but it might be her sister.     PAUL Nguyen, [facility]  Maternal and Child Health   Office: [phone]  Pager: [phone]  After Hours Pager: [phone]  [email or web address]

## 2023-01-25 NOTE — PROGRESS NOTES
Due to the COVID 19 pandemic this visit was converted to a video visit in order to help prevent spread of infection in this patient and the general population.    Time of start: 8:30 am  Time of end: 8:42  am  Total duration of video visit: 12 minutes.  Providers location: offsite.  Patients location: home-[place].    [facility]  Sandy Person is a 42 year old female with type 2 diabetes mellitus. Video visit for diabetes follow up today.  Her diabetes control has been excellent.  Sandy was seen for her prenatal visit yesterday and ultrasound done showed no fetal cardiac activity or movement.   She is scheduled for a dilation and evacuation next Tuesday.  Sandy is sad today and has support at home.  Pt has a hx of type 2 diabetes dx 10 years ago.  She has no known retinopathy, nephropathy or neuropathy.  She has a hx of obesity and questionable hx of PCOS.  Sandy tested + for COVID in May 2022 with mild symptom.  She had been taking Rybelsus which was discontinued in late Nov 2021 by her fertility provider.   She was instructed to remain on Metformin 1000 mg BID and Glipizide 10 mg each am.  When she found out she was pregnant Levemir BID and Novolog premeals was added.  For her diabetes, she is currently taking Levemir 24 units subcutaneous each am and 36 units subcutaneous each pm and Novolog 1 unit/2 gms CHO with all meals, plus correction. Pt uses 1:6 with snacks.  Most recent A1C was 5.5 % on 1/9/2023 and previous A1C was 6.6 % in Nov 2022.  I reviewed and scanned her DexcomG6 sensor download data in her her chart below.  Blood sugar control is good at this time without frequent hypoglycemia.  On ROS today, sad today.    Diabetes Care  Retinopathy: none; pt seen by Oph in Aug 2022. No retinopathy per patient.  Nephropathy:none; urine microalbuminuria negative in 3/2022.  Neuropathy:none.  Foot Exam:no exam.  Taking aspirin: yes.  Lipids: no LDL.  CAD: no.  Mental health: no hx of depression.  Insulin: Levemir BID and meal time  insulin with correction.   DM meds: none.  Testing:  DexcomG6 sensor.                                    ROS  See under HPI.    Allergies  No Known Allergies    Medications  Current Outpatient Medications   Medication Sig Dispense Refill     acetone urine (RELION KETONE TEST) test strip Test urine for ketones daily for 1 week if small or negative reduce to 2x week 50 strip 3     aspirin (ASA) 81 MG chewable tablet Take 81 mg by mouth daily       blood glucose (NO BRAND SPECIFIED) lancets standard Use to test blood sugar 4 times daily or as directed. 100 each 3     blood glucose (NO BRAND SPECIFIED) test strip Use to test blood sugar 4 times daily or as directed. 400 strip 3     blood glucose monitoring (NO BRAND SPECIFIED) meter device kit Use to test blood sugar 4 times daily or as directed. 1 kit 0     Continuous Blood Gluc Sensor (DEXCOM G6 SENSOR) MISC 1 each every 10 days Change every 10 days. 3 each 11     Continuous Blood Gluc Transmit (DEXCOM G6 TRANSMITTER) MISC 1 each every 3 months Change every 3 months. 1 each 4     insulin detemir (LEVEMIR PEN) 100 UNIT/ML pen Inject 24 units subcutaneous in am and 36 units subcutaneous in pm. 60 mL 3     insulin pen needle (32G X 4 MM) 32G X 4 MM miscellaneous Use 6 pen needles daily or as directed. 200 each 3     insulin pen needle (BD MELISSA U/F) 32G X 4 MM miscellaneous Use with insulin. 250 each 3     metroNIDAZOLE (FLAGYL) 500 MG tablet Take one tablet by mouth with dinner on 1/30/23 1 tablet 0     misoprostol (CYTOTEC) 200 MCG tablet Take 2 hours before procedure. Place 1 tab between cheek and gum on the right, and 2nd tab on the left. Dissolve for 30 min, then swallow. 2 tablet 0     NOVOLOG FLEXPEN 100 UNIT/ML soln Inject with meals and for correction. Pt pregnant and may use up to 80 units / day. 80 mL 3     Prenatal Vit-Fe Fumarate-FA (PRENATAL VITAMIN) 27-0.8 MG TABS      Metformin 500 mg 2 tab po BID.      Family History  family history includes Cancer in her  father, maternal grandmother, and mother; Diabetes in her father, maternal grandmother, mother, and paternal grandmother; Heart Disease in her father and mother.    Social History   reports that she has never smoked. She has never used smokeless tobacco. She reports that she does not currently use alcohol. She reports that she does not use drugs.     Past Medical History  Past Medical History:   Diagnosis Date     Abnormal Pap smear of cervix      Diabetes (H)      History of human papillomavirus infection    Obesity.    Past Surgical History:   Procedure Laterality Date     COLPOSCOPY       TONSILLECTOMY         Physical Exam    No exam today.    RESULTS  Creatinine   Date Value Ref Range Status   03/06/2022 0.66 0.52 - 1.04 mg/dL Final     GFR Estimate   Date Value Ref Range Status   03/06/2022 >90 >60 mL/min/1.73m2 Final     Comment:     Effective December 21, 2021 eGFRcr in adults is calculated using the 2021 CKD-EPI creatinine equation which includes age and gender (Morteza et al., NE, DOI: 10.1056/BCSDzp1573580)     Hemoglobin A1C   Date Value Ref Range Status   01/09/2023 5.5 0.0 - 5.6 % Final     Comment:     Normal <5.7%   Prediabetes 5.7-6.4%    Diabetes 6.5% or higher     Note: Adopted from ADA consensus guidelines.     ALT   Date Value Ref Range Status   03/06/2022 37 0 - 50 U/L Final     AST   Date Value Ref Range Status   03/06/2022 23 0 - 45 U/L Final     TSH   Date Value Ref Range Status   11/21/2022 0.46 0.30 - 4.20 uIU/mL Final   03/06/2022 1.17 0.40 - 4.00 mU/L Final       ASSESSMENT/PLAN:    1.  TYPE 2 DIABETES MELLITUS: Blood sugar control is good at this time without frequent hypoglycemia.  Will continue current insulin doses for now and she will be admitted next Tuesday.  She is to wear her DexcomG6 sensor full time and notify us if she has any blood sugar values 70 or less.  Her insulin requirements will be less following dilation and evacuation next week.  Recommend consulting inpatient  diabetes team.  Sandy was seen by Oph in Aug 2022  without retinopathy per patient.   She denies hx of nephropathy. Her urine microalbuminuria and creat/GFR were normal in 3/2022.  No sx of neuropathy. Denies foot ulcers.  BP good per patient. /62 yesterday.  TSH normal in Nov 2022.     2.  WEIGHT: Not addressed today.    3.  PREGNANCY:  Miscarriage scheduled for dilation and evacuation next week.    4. FOLLOW UP:  With CDE next week.  Her insulin requirements will be less after above procedure.    Time spent reviewing chart,labs and DexcomG6 sensor download today = 6 minutes.  Time for video visit today = 12 minutes.  Time for documentation today = 15 minutes.    TOTAL TIME FOR VISIT TODAY = 33 minutes.    Vesta Arceo PA-C

## 2023-01-30 ENCOUNTER — HOSPITAL ENCOUNTER (INPATIENT)
Facility: CLINIC | Age: 43
LOS: 1 days | Discharge: HOME | DRG: 770 | End: 2023-01-31
Attending: OBSTETRICS & GYNECOLOGY | Admitting: OBSTETRICS & GYNECOLOGY
Payer: COMMERCIAL

## 2023-01-30 ENCOUNTER — TELEPHONE (OUTPATIENT)
Dept: EDUCATION SERVICES | Facility: CLINIC | Age: 43
End: 2023-01-30

## 2023-01-30 ENCOUNTER — ANESTHESIA EVENT (OUTPATIENT)
Dept: SURGERY | Facility: CLINIC | Age: 43
DRG: 770 | End: 2023-01-30
Payer: COMMERCIAL

## 2023-01-30 ENCOUNTER — HOSPITAL ENCOUNTER (OUTPATIENT)
Facility: CLINIC | Age: 43
End: 2023-01-30
Attending: OBSTETRICS & GYNECOLOGY | Admitting: OBSTETRICS & GYNECOLOGY
Payer: COMMERCIAL

## 2023-01-30 ENCOUNTER — ALLIED HEALTH/NURSE VISIT (OUTPATIENT)
Dept: OBGYN | Facility: CLINIC | Age: 43
End: 2023-01-30
Attending: STUDENT IN AN ORGANIZED HEALTH CARE EDUCATION/TRAINING PROGRAM
Payer: COMMERCIAL

## 2023-01-30 VITALS
DIASTOLIC BLOOD PRESSURE: 118 MMHG | BODY MASS INDEX: 41.54 KG/M2 | SYSTOLIC BLOOD PRESSURE: 166 MMHG | WEIGHT: 242 LBS | HEART RATE: 109 BPM

## 2023-01-30 DIAGNOSIS — O02.1 IUFD AT LESS THAN 20 WEEKS OF GESTATION: ICD-10-CM

## 2023-01-30 DIAGNOSIS — O02.1 IUFD AT LESS THAN 20 WEEKS OF GESTATION: Primary | ICD-10-CM

## 2023-01-30 DIAGNOSIS — O02.1 MISSED ABORTION WITH FETAL DEMISE BEFORE 20 COMPLETED WEEKS OF GESTATION: Primary | ICD-10-CM

## 2023-01-30 LAB
ABO/RH(D): NORMAL
ALT SERPL W P-5'-P-CCNC: 18 U/L (ref 0–50)
ANTIBODY SCREEN: NEGATIVE
AST SERPL W P-5'-P-CCNC: 21 U/L (ref 0–45)
BASOPHILS # BLD AUTO: 0.1 10E3/UL (ref 0–0.2)
BASOPHILS NFR BLD AUTO: 0 %
CREAT SERPL-MCNC: 0.61 MG/DL (ref 0.52–1.04)
EOSINOPHIL # BLD AUTO: 0.1 10E3/UL (ref 0–0.7)
EOSINOPHIL NFR BLD AUTO: 1 %
ERYTHROCYTE [DISTWIDTH] IN BLOOD BY AUTOMATED COUNT: 12.4 % (ref 10–15)
FETAL RBC % LFV: 0 %
FETAL RBC (ML): 0 ML
GFR SERPL CREATININE-BSD FRML MDRD: >90 ML/MIN/1.73M2
GLUCOSE BLDC GLUCOMTR-MCNC: 101 MG/DL (ref 70–99)
HCT VFR BLD AUTO: 38.5 % (ref 35–47)
HGB BLD-MCNC: 12.7 G/DL (ref 11.7–15.7)
IF INDICATED RECOMMENDED DOSE OF RH IMMUNE GLOBULIN UG: 300 UG
IMM GRANULOCYTES # BLD: 0.1 10E3/UL
IMM GRANULOCYTES NFR BLD: 1 %
LYMPHOCYTES # BLD AUTO: 3.1 10E3/UL (ref 0.8–5.3)
LYMPHOCYTES NFR BLD AUTO: 22 %
MCH RBC QN AUTO: 29.4 PG (ref 26.5–33)
MCHC RBC AUTO-ENTMCNC: 33 G/DL (ref 31.5–36.5)
MCV RBC AUTO: 89 FL (ref 78–100)
MONOCYTES # BLD AUTO: 0.9 10E3/UL (ref 0–1.3)
MONOCYTES NFR BLD AUTO: 6 %
NEUTROPHILS # BLD AUTO: 10.3 10E3/UL (ref 1.6–8.3)
NEUTROPHILS NFR BLD AUTO: 70 %
NRBC # BLD AUTO: 0 10E3/UL
NRBC BLD AUTO-RTO: 0 /100
PLATELET # BLD AUTO: 187 10E3/UL (ref 150–450)
RBC # BLD AUTO: 4.32 10E6/UL (ref 3.8–5.2)
SARS-COV-2 RNA RESP QL NAA+PROBE: NEGATIVE
SPECIMEN EXPIRATION DATE: NORMAL
WBC # BLD AUTO: 14.5 10E3/UL (ref 4–11)

## 2023-01-30 PROCEDURE — 250N000013 HC RX MED GY IP 250 OP 250 PS 637: Performed by: STUDENT IN AN ORGANIZED HEALTH CARE EDUCATION/TRAINING PROGRAM

## 2023-01-30 PROCEDURE — 85025 COMPLETE CBC W/AUTO DIFF WBC: CPT | Performed by: STUDENT IN AN ORGANIZED HEALTH CARE EDUCATION/TRAINING PROGRAM

## 2023-01-30 PROCEDURE — 81265 STR MARKERS SPECIMEN ANAL: CPT | Performed by: OBSTETRICS & GYNECOLOGY

## 2023-01-30 PROCEDURE — 36415 COLL VENOUS BLD VENIPUNCTURE: CPT | Performed by: OBSTETRICS & GYNECOLOGY

## 2023-01-30 PROCEDURE — 59200 INSERT CERVICAL DILATOR: CPT | Performed by: STUDENT IN AN ORGANIZED HEALTH CARE EDUCATION/TRAINING PROGRAM

## 2023-01-30 PROCEDURE — 85460 HEMOGLOBIN FETAL: CPT | Performed by: STUDENT IN AN ORGANIZED HEALTH CARE EDUCATION/TRAINING PROGRAM

## 2023-01-30 PROCEDURE — 87798 DETECT AGENT NOS DNA AMP: CPT | Performed by: OBSTETRICS & GYNECOLOGY

## 2023-01-30 PROCEDURE — U0005 INFEC AGEN DETEC AMPLI PROBE: HCPCS | Performed by: STUDENT IN AN ORGANIZED HEALTH CARE EDUCATION/TRAINING PROGRAM

## 2023-01-30 PROCEDURE — 10903ZU DRAINAGE OF AMNIOTIC FLUID, DIAGNOSTIC FROM PRODUCTS OF CONCEPTION, PERCUTANEOUS APPROACH: ICD-10-PCS | Performed by: OBSTETRICS & GYNECOLOGY

## 2023-01-30 PROCEDURE — 84450 TRANSFERASE (AST) (SGOT): CPT | Performed by: STUDENT IN AN ORGANIZED HEALTH CARE EDUCATION/TRAINING PROGRAM

## 2023-01-30 PROCEDURE — 84999 UNLISTED CHEMISTRY PROCEDURE: CPT | Performed by: OBSTETRICS & GYNECOLOGY

## 2023-01-30 PROCEDURE — 84460 ALANINE AMINO (ALT) (SGPT): CPT | Performed by: STUDENT IN AN ORGANIZED HEALTH CARE EDUCATION/TRAINING PROGRAM

## 2023-01-30 PROCEDURE — 81265 STR MARKERS SPECIMEN ANAL: CPT | Performed by: STUDENT IN AN ORGANIZED HEALTH CARE EDUCATION/TRAINING PROGRAM

## 2023-01-30 PROCEDURE — C9803 HOPD COVID-19 SPEC COLLECT: HCPCS

## 2023-01-30 PROCEDURE — 36415 COLL VENOUS BLD VENIPUNCTURE: CPT | Performed by: STUDENT IN AN ORGANIZED HEALTH CARE EDUCATION/TRAINING PROGRAM

## 2023-01-30 PROCEDURE — 250N000012 HC RX MED GY IP 250 OP 636 PS 637: Performed by: STUDENT IN AN ORGANIZED HEALTH CARE EDUCATION/TRAINING PROGRAM

## 2023-01-30 PROCEDURE — 120N000002 HC R&B MED SURG/OB UMMC

## 2023-01-30 PROCEDURE — 82565 ASSAY OF CREATININE: CPT | Performed by: STUDENT IN AN ORGANIZED HEALTH CARE EDUCATION/TRAINING PROGRAM

## 2023-01-30 PROCEDURE — 87496 CYTOMEG DNA AMP PROBE: CPT | Performed by: OBSTETRICS & GYNECOLOGY

## 2023-01-30 PROCEDURE — 86901 BLOOD TYPING SEROLOGIC RH(D): CPT | Performed by: STUDENT IN AN ORGANIZED HEALTH CARE EDUCATION/TRAINING PROGRAM

## 2023-01-30 RX ORDER — ONDANSETRON 2 MG/ML
4 INJECTION INTRAMUSCULAR; INTRAVENOUS EVERY 6 HOURS PRN
Status: DISCONTINUED | OUTPATIENT
Start: 2023-01-30 | End: 2023-01-31 | Stop reason: HOSPADM

## 2023-01-30 RX ORDER — MIFEPRISTONE 200 MG/1
200 TABLET ORAL ONCE
Status: DISCONTINUED | OUTPATIENT
Start: 2023-01-30 | End: 2023-01-30

## 2023-01-30 RX ORDER — ONDANSETRON 4 MG/1
4 TABLET, ORALLY DISINTEGRATING ORAL EVERY 6 HOURS PRN
Status: DISCONTINUED | OUTPATIENT
Start: 2023-01-30 | End: 2023-01-31 | Stop reason: HOSPADM

## 2023-01-30 RX ORDER — ACETAMINOPHEN 325 MG/1
650 TABLET ORAL EVERY 4 HOURS PRN
Status: DISCONTINUED | OUTPATIENT
Start: 2023-01-30 | End: 2023-01-31 | Stop reason: HOSPADM

## 2023-01-30 RX ORDER — PROCHLORPERAZINE MALEATE 10 MG
10 TABLET ORAL EVERY 6 HOURS PRN
Status: DISCONTINUED | OUTPATIENT
Start: 2023-01-30 | End: 2023-01-31 | Stop reason: HOSPADM

## 2023-01-30 RX ORDER — METOCLOPRAMIDE 10 MG/1
10 TABLET ORAL EVERY 6 HOURS PRN
Status: DISCONTINUED | OUTPATIENT
Start: 2023-01-30 | End: 2023-01-31 | Stop reason: HOSPADM

## 2023-01-30 RX ORDER — METRONIDAZOLE 500 MG/1
500 TABLET ORAL ONCE
Status: COMPLETED | OUTPATIENT
Start: 2023-01-30 | End: 2023-01-30

## 2023-01-30 RX ORDER — METOCLOPRAMIDE HYDROCHLORIDE 5 MG/ML
10 INJECTION INTRAMUSCULAR; INTRAVENOUS EVERY 6 HOURS PRN
Status: DISCONTINUED | OUTPATIENT
Start: 2023-01-30 | End: 2023-01-31 | Stop reason: HOSPADM

## 2023-01-30 RX ORDER — PROCHLORPERAZINE 25 MG
25 SUPPOSITORY, RECTAL RECTAL EVERY 12 HOURS PRN
Status: DISCONTINUED | OUTPATIENT
Start: 2023-01-30 | End: 2023-01-31 | Stop reason: HOSPADM

## 2023-01-30 RX ORDER — NICOTINE POLACRILEX 4 MG
15-30 LOZENGE BUCCAL
Status: DISCONTINUED | OUTPATIENT
Start: 2023-01-30 | End: 2023-01-31 | Stop reason: HOSPADM

## 2023-01-30 RX ORDER — DEXTROSE MONOHYDRATE 25 G/50ML
25-50 INJECTION, SOLUTION INTRAVENOUS
Status: DISCONTINUED | OUTPATIENT
Start: 2023-01-30 | End: 2023-01-31 | Stop reason: HOSPADM

## 2023-01-30 RX ORDER — MISOPROSTOL 200 UG/1
200 TABLET ORAL
Status: COMPLETED | OUTPATIENT
Start: 2023-01-31 | End: 2023-01-31

## 2023-01-30 RX ORDER — IBUPROFEN 600 MG/1
600 TABLET, FILM COATED ORAL EVERY 6 HOURS PRN
Status: DISCONTINUED | OUTPATIENT
Start: 2023-01-30 | End: 2023-01-31 | Stop reason: HOSPADM

## 2023-01-30 RX ADMIN — METRONIDAZOLE 500 MG: 500 TABLET ORAL at 20:22

## 2023-01-30 RX ADMIN — IBUPROFEN 600 MG: 600 TABLET ORAL at 21:03

## 2023-01-30 RX ADMIN — INSULIN DETEMIR 36 UNITS: 100 INJECTION, SOLUTION SUBCUTANEOUS at 21:04

## 2023-01-30 RX ADMIN — Medication 200 MG: at 13:49

## 2023-01-30 RX ADMIN — ACETAMINOPHEN 650 MG: 325 TABLET, FILM COATED ORAL at 16:15

## 2023-01-30 ASSESSMENT — ACTIVITIES OF DAILY LIVING (ADL)
ADLS_ACUITY_SCORE: 18
ADLS_ACUITY_SCORE: 18
ADLS_ACUITY_SCORE: 35
ADLS_ACUITY_SCORE: 18
ADLS_ACUITY_SCORE: 18

## 2023-01-30 NOTE — PLAN OF CARE
"[facility] visit for lamineria placement where AROM occurred. Sandy has a placenta previa and some increased bleeding was noted after AROM. Scant bleeding observed on pad, patient denies cramping/contractions but reports a \"full\" feeling. Blood pressure mildly elevated on admission. Dr. Meeks notified of patient arrival. Plan to admit to inpatient for close monitoring before D&E procedure in the morning. PIV started, covid swab collected. Blood sugar checked, 101. Continuing with plan of care.                         "

## 2023-01-30 NOTE — PROGRESS NOTES
January 30, 2023    Genetic consultation to review options for genetic testing in the context of intrauterine fetal demise. No apparent fetal anomalies. Pregnancy conceived through in vitro fertilization s/p preimplantation genetic testing for aneuploidy.     Rupture of membranes occurred during laminaria placement, so amniocentesis will be performed if amniotic fluid is available for testing. M recommending parvovirus, cytomegalovirus, and microarray. Reviewed infection studies on the amniotic fluid and implications of chromosomal microarray:    Microarray testing involves looking for small extra (duplications) or missing (deletions) pieces of DNA within the chromosomes.  Chromosomal deletions and duplications may cause problems with an individual's health and development including learning disabilities, developmental delays, growth issues, physical differences, and psychiatric challenges. The specific symptoms would depend on the size and gene content of the specific chromosomal region involved.  Microarray testing can also identify whether there are areas within a pair of chromosomes that are too similar to each other, which could indicate that the individual's parents may be related to each other such as cousins, or could represent a phenomenon known as uniparental disomy (UPD) which is where an individual inherits more of a specific chromosome region from one parent than the other parent.  Depending on the chromosome(s) involved, this  genetic similarity  can sometimes lead to growth, developmental and/or physical problems for the individual. Testing can also reveal incidental findings, such as carrier status, that may require further evaluation.     We reviewed the benefits, limitations, and possible results from a microarray analysis which can include:    Negative: No clinically significant deletions or duplications were identified. This may not rule out a genetic cause for the fetal features.    Positive:  A deletion, duplication, or genetic similarity was identified that may be associated with a particular set of symptoms or known syndrome.     Variant of uncertain significance (VUS): A deletion or duplication was identified, but it is not known if it explains the clinical features or it is is normal variation.    If the microarray returns a VUS, parental testing may be recommended to help clarify pathogenicity.    Sandy would like to proceed with testing. Results are expected within 1-2 weeks for the infections studies and 2-3 weeks for the microarray.     Also reviewed option for maintaining back-up on products of conception (ideally fetal skin) in the event that testing on the amniotic fluid cannot be performed. Sandy would like to proceed with this as well. Communication order placed.       Caridad Lamas MS, [facility]  Licensed Genetic Counselor  [facility]  Maternal Fetal Medicine  [email or web address]  [phone]

## 2023-01-30 NOTE — H&P
[facility]  OB History and Physical      Sandy Person MRN# [id number]   Age: 42 year old YOB: 1980     CC:  IUFD    HPI:  Ms. Sandy Person is a 42 year old  at 19w5d by embryo transfer, who presents with IUFD diagnosed on , now with PPROM and vaginal bleeding with known placenta previa after placement of laminaria for scheduled D&E tomorrow. She denies contractions and now no vaginal bleeding is noted. She is feeling pressure.     She has been taking her insulin as prescribed, with no low blood glucoses. She has a Dexcom sensor. She also was noted to have a severe range blood pressure in clinic today, but denies headache, chest pain, shortness of breath, visual changes, abdominal pain, or new swelling. She denies history of hypertension.    Pregnancy Complications:  1.  Advanced maternal age  2.  IVF  3.  Type 2 DM    OB History  OB History    Para Term  AB Living   1 0 0 0 1 0   SAB IAB Ectopic Multiple Live Births   1 0 0 0 0      # Outcome Date GA Lbr [place]/2nd Weight Sex Delivery Anes PTL Lv   1 SAB 23 19w6d    AB, MISSED          PMHx:   Past Medical History:   Diagnosis Date     Abnormal Pap smear of cervix      Diabetes (H)      History of human papillomavirus infection      PSHx:   Past Surgical History:   Procedure Laterality Date     COLPOSCOPY       TONSILLECTOMY       Meds:   Facility-Administered Medications Prior to Admission   Medication Dose Route Frequency Provider Last Rate Last Admin     [COMPLETED] miFEPRIStone (MIFEPREX) tablet 200 mg  200 mg Oral Once Marilynn Marcos MD   200 mg at 23 1349     Medications Prior to Admission   Medication Sig Dispense Refill Last Dose     insulin detemir (LEVEMIR PEN) 100 UNIT/ML pen Inject 24 units subcutaneous in am and 36 units subcutaneous in pm. 60 mL 3 2023 at 0830     metroNIDAZOLE (FLAGYL) 500 MG tablet Take one tablet by mouth with dinner on 23 1 tablet 0 More  than a month     NOVOLOG FLEXPEN 100 UNIT/ML soln Inject with meals and for correction. Pt pregnant and may use up to 80 units / day. 80 mL 3 1/30/2023 at 1130     acetone urine (RELION KETONE TEST) test strip Test urine for ketones daily for 1 week if small or negative reduce to 2x week 50 strip 3      aspirin (ASA) 81 MG chewable tablet Take 81 mg by mouth daily        blood glucose (NO BRAND SPECIFIED) lancets standard Use to test blood sugar 4 times daily or as directed. 100 each 3      blood glucose (NO BRAND SPECIFIED) test strip Use to test blood sugar 4 times daily or as directed. 400 strip 3      blood glucose monitoring (NO BRAND SPECIFIED) meter device kit Use to test blood sugar 4 times daily or as directed. 1 kit 0      Continuous Blood Gluc Sensor (DEXCOM G6 SENSOR) MISC 1 each every 10 days Change every 10 days. 3 each 11      Continuous Blood Gluc Transmit (DEXCOM G6 TRANSMITTER) MISC 1 each every 3 months Change every 3 months. 1 each 4      insulin pen needle (32G X 4 MM) 32G X 4 MM miscellaneous Use 6 pen needles daily or as directed. 200 each 3      insulin pen needle (BD MELISSA U/F) 32G X 4 MM miscellaneous Use with insulin. 250 each 3      misoprostol (CYTOTEC) 200 MCG tablet Take 2 hours before procedure. Place 1 tab between cheek and gum on the right, and 2nd tab on the left. Dissolve for 30 min, then swallow. 2 tablet 0      Prenatal Vit-Fe Fumarate-FA (PRENATAL VITAMIN) 27-0.8 MG TABS Take 1 tablet by mouth daily        Allergies:  No Known Allergies   FmHx:   Family History   Problem Relation Age of Onset     Cancer Mother      Diabetes Mother      Heart Disease Mother      Diabetes Father      Heart Disease Father      Cancer Father      Cancer Maternal Grandmother      Diabetes Maternal Grandmother      Diabetes Paternal Grandmother      SocHx: She denies any tobacco, alcohol, or other drug use during this pregnancy.    PE:  Vit:   Patient Vitals for the past 4 hrs:   BP Temp Temp src Resp  "Height Weight   23 1517 (!) 148/81 98.2  F (36.8  C) Oral 16 -- --   23 1510 -- -- -- -- 1.626 m (5' 4\") 108.9 kg (240 lb)      Gen: Well-appearing, NAD, comfortable, intermittently tearful, but coping well  CV: rrr, no mrg   Pulm: Ctab, no wheezes or crackles   Abd: Soft, gravid, non-tender        Bedside US with 4x6 cm fluid pocket, no fetal cardiac activity or movement    Assessment  Ms. Sandy Person is a 42 year old  at 19w5d by embryo transfer, who presents with IUFD diagnosed on , now with PPROM and vaginal bleeding with known placenta previa after placement of laminaria for scheduled D&E tomorrow. She received mifepristone in clinic today and planned to take Flagyl tonight with misprostol 400mcg 2 hours prior to procedure tomorrow.    She plans hospital disposition. She would like mementos, but for them to be in an envelope or box. She is not ready to see them now, but would like to have the option later. [place] and she would like to be able to discuss further with him.    Plan for amniocentesis given there is still fluid present and she desires genetics. However, given that demise occurred nearly one week ago, this may not yield results. Therefore, will also plan to do genetic studies on POCs. Will send amniotic fluid for infectious disease studies toxoplasmosis and parvovirus.    Plan  - Flagyl tonight as planned  - 400mcg miso buccal tomorrow 2 hours prior to procedure  - Tylenol and Ibuprofen PRN, Toradol if needed  - Hospital disposition, mementos desired  - Genetic studies, infectious disease testing from amnio, as well as POCs  - BP monitoring, but no need to wake overnight  - Continue usual insulin dosing today (breakfast 1:2, lunch 1:2.5, dinner 1:2.5, 36 units at bedtime of Levemir), but hold AM levemir (24 units) and mealtime insulin tomorrow given NPO at midnight    The patient was discussed with Dr. Allen who is in agreement with the treatment " plan.    Paola Meeks MD  Maternal Fetal Medicine Fellow    The patient was reviewed with Dr. Meeks as well as with Dr. Marcos who saw the patient in clinic today.  I agree with the above assessment and plan of care.     Magalys Allen MD, FACOG

## 2023-01-30 NOTE — TELEPHONE ENCOUNTER
----- Message from Kaylin Valdez RN sent at 1/25/2023 12:44 PM CST -----  Regarding: Check in with patient on Monday  Vesta Arceo PA-C Zaun, Kaylin HAIRSTON RN  Hi:   Just wanted to let you know that Sandy unfortunately had a miscarriage- 20 weeks. So sad.   I will be out of the office next week, can you check to see how her blood sugars are on Monday of next week.   Thanks deandre

## 2023-01-30 NOTE — TELEPHONE ENCOUNTER
Spoke to Sandy she is doing okay since the loss of her baby. She is not sleeping well and has a lower appetite. She is hanging in there. She wants to keep her appts in Feb with endo team as insulin adjustments will be needed.  Pt is hoping to go off of insulin.   Pt has an appt on Thursday with Rosalba BARLOW.  Pt is having her procedure  tomorrow to remove the fetus.   Current insulin doses are :Levemir 24u and 36u. Advised if getting lows to reduce by 4 units and Novolog 1 unit per 2 grams at breakfast and 1 unit per 2.5 grams Lunch and dinner. No changes to insulin today.    Kaylin ROCKWELLN, RN, [facility]  Certified Diabetes Care and   [facility]   [facility]  [address] [place] [address]  Phone [phone]

## 2023-01-30 NOTE — PROGRESS NOTES
Obstetrics and Gynecology Procedure Note    Sandy Person is a 42 year old  with a fetal demise at 19w6d and a placenta previa who presents for cervical dilator placement prior to a scheduled D&E on 23. She was noted to have an initial BP of 166/118. Denies chronic HTN, headache, vision changes, SOB or chest pain.    Risks, benefits, and alternatives to cervical dilator placement discussed, including bleeding, infection, pain, inability to complete the procedure, need to repeat the procedure, rupture of membranes, premature delivery. Questions answered, consent signed. 200mg of mifepristone was given at 145pm and consent was signed.     The speculum was inserted into the vagina. The cervix was cleansed with iodine. 2 ml of 1% lidocaine was injected into the anterior cervical lip and grasped with a single-toothed tenaculum. A paracervical block with 20cc of 1% of lidocaine was performed. The cervix was dilated to 35F easily. 5 medium laminaria were inserted but in placement of one, rupture of membranes occurred. Laminaria were removed with brisk blood tinged fluid. After bleeding slowed the cervix was re-examined and 8 laminaria replaced followed by 1 radio-opaque gauze at the end of the procedure.  ml but difficult to assess due to mixture with fluid.     The patient tolerated the procedure well. A nurse was present for the entire procedure. She was transferred to L&D at the conclusion of the procedure for observation overnight given previa, bleeding and ROM.     Marilynn Marcos MD

## 2023-01-31 ENCOUNTER — HOSPITAL ENCOUNTER (INPATIENT)
Facility: CLINIC | Age: 43
End: 2023-01-31
Admitting: OBSTETRICS & GYNECOLOGY
Payer: COMMERCIAL

## 2023-01-31 ENCOUNTER — ANESTHESIA (OUTPATIENT)
Dept: SURGERY | Facility: CLINIC | Age: 43
DRG: 770 | End: 2023-01-31
Payer: COMMERCIAL

## 2023-01-31 VITALS
BODY MASS INDEX: 40.97 KG/M2 | HEART RATE: 68 BPM | RESPIRATION RATE: 16 BRPM | WEIGHT: 240 LBS | OXYGEN SATURATION: 99 % | HEIGHT: 64 IN | TEMPERATURE: 97.9 F | SYSTOLIC BLOOD PRESSURE: 112 MMHG | DIASTOLIC BLOOD PRESSURE: 77 MMHG

## 2023-01-31 LAB
BASOPHILS # BLD AUTO: 0.1 10E3/UL (ref 0–0.2)
BASOPHILS NFR BLD AUTO: 0 %
EOSINOPHIL # BLD AUTO: 0.1 10E3/UL (ref 0–0.7)
EOSINOPHIL NFR BLD AUTO: 1 %
ERYTHROCYTE [DISTWIDTH] IN BLOOD BY AUTOMATED COUNT: 12.6 % (ref 10–15)
GLUCOSE BLDC GLUCOMTR-MCNC: 100 MG/DL (ref 70–99)
GLUCOSE BLDC GLUCOMTR-MCNC: 115 MG/DL (ref 70–99)
HCT VFR BLD AUTO: 38.6 % (ref 35–47)
HGB BLD-MCNC: 12.7 G/DL (ref 11.7–15.7)
HOLD SPECIMEN: NORMAL
IMM GRANULOCYTES # BLD: 0.1 10E3/UL
IMM GRANULOCYTES NFR BLD: 0 %
LYMPHOCYTES # BLD AUTO: 3.7 10E3/UL (ref 0.8–5.3)
LYMPHOCYTES NFR BLD AUTO: 27 %
Lab: NORMAL
MCH RBC QN AUTO: 29.7 PG (ref 26.5–33)
MCHC RBC AUTO-ENTMCNC: 32.9 G/DL (ref 31.5–36.5)
MCV RBC AUTO: 90 FL (ref 78–100)
MONOCYTES # BLD AUTO: 0.8 10E3/UL (ref 0–1.3)
MONOCYTES NFR BLD AUTO: 6 %
NEUTROPHILS # BLD AUTO: 9.1 10E3/UL (ref 1.6–8.3)
NEUTROPHILS NFR BLD AUTO: 66 %
NRBC # BLD AUTO: 0 10E3/UL
NRBC BLD AUTO-RTO: 0 /100
PERFORMING LABORATORY: NORMAL
PLATELET # BLD AUTO: 196 10E3/UL (ref 150–450)
RBC # BLD AUTO: 4.27 10E6/UL (ref 3.8–5.2)
TEST NAME: NORMAL
WBC # BLD AUTO: 13.8 10E3/UL (ref 4–11)

## 2023-01-31 PROCEDURE — 250N000011 HC RX IP 250 OP 636: Performed by: OBSTETRICS & GYNECOLOGY

## 2023-01-31 PROCEDURE — 85025 COMPLETE CBC W/AUTO DIFF WBC: CPT | Performed by: OBSTETRICS & GYNECOLOGY

## 2023-01-31 PROCEDURE — 250N000009 HC RX 250: Performed by: OBSTETRICS & GYNECOLOGY

## 2023-01-31 PROCEDURE — 999N000141 HC STATISTIC PRE-PROCEDURE NURSING ASSESSMENT: Performed by: OBSTETRICS & GYNECOLOGY

## 2023-01-31 PROCEDURE — 88305 TISSUE EXAM BY PATHOLOGIST: CPT | Mod: TC | Performed by: OBSTETRICS & GYNECOLOGY

## 2023-01-31 PROCEDURE — 81229 CYTOG ALYS CHRML ABNR SNPCGH: CPT | Performed by: OBSTETRICS & GYNECOLOGY

## 2023-01-31 PROCEDURE — 250N000025 HC SEVOFLURANE, PER MIN: Performed by: OBSTETRICS & GYNECOLOGY

## 2023-01-31 PROCEDURE — 360N000075 HC SURGERY LEVEL 2, PER MIN: Performed by: OBSTETRICS & GYNECOLOGY

## 2023-01-31 PROCEDURE — 258N000003 HC RX IP 258 OP 636: Performed by: NURSE ANESTHETIST, CERTIFIED REGISTERED

## 2023-01-31 PROCEDURE — 710N000012 HC RECOVERY PHASE 2, PER MINUTE: Performed by: OBSTETRICS & GYNECOLOGY

## 2023-01-31 PROCEDURE — 250N000013 HC RX MED GY IP 250 OP 250 PS 637: Performed by: STUDENT IN AN ORGANIZED HEALTH CARE EDUCATION/TRAINING PROGRAM

## 2023-01-31 PROCEDURE — 88233 TISSUE CULTURE SKIN/BIOPSY: CPT | Performed by: OBSTETRICS & GYNECOLOGY

## 2023-01-31 PROCEDURE — 10D17ZZ EXTRACTION OF PRODUCTS OF CONCEPTION, RETAINED, VIA NATURAL OR ARTIFICIAL OPENING: ICD-10-PCS | Performed by: OBSTETRICS & GYNECOLOGY

## 2023-01-31 PROCEDURE — 370N000017 HC ANESTHESIA TECHNICAL FEE, PER MIN: Performed by: OBSTETRICS & GYNECOLOGY

## 2023-01-31 PROCEDURE — 250N000009 HC RX 250: Performed by: NURSE ANESTHETIST, CERTIFIED REGISTERED

## 2023-01-31 PROCEDURE — 250N000011 HC RX IP 250 OP 636: Performed by: NURSE ANESTHETIST, CERTIFIED REGISTERED

## 2023-01-31 PROCEDURE — 59821 TREATMENT OF MISCARRIAGE: CPT | Mod: GC | Performed by: OBSTETRICS & GYNECOLOGY

## 2023-01-31 PROCEDURE — 76998 US GUIDE INTRAOP: CPT | Mod: 26 | Performed by: OBSTETRICS & GYNECOLOGY

## 2023-01-31 PROCEDURE — G0452 MOLECULAR PATHOLOGY INTERPR: HCPCS | Mod: 26 | Performed by: MEDICAL GENETICS

## 2023-01-31 PROCEDURE — 36415 COLL VENOUS BLD VENIPUNCTURE: CPT | Performed by: OBSTETRICS & GYNECOLOGY

## 2023-01-31 PROCEDURE — 272N000001 HC OR GENERAL SUPPLY STERILE: Performed by: OBSTETRICS & GYNECOLOGY

## 2023-01-31 PROCEDURE — 250N000011 HC RX IP 250 OP 636: Performed by: ANESTHESIOLOGY

## 2023-01-31 PROCEDURE — 88305 TISSUE EXAM BY PATHOLOGIST: CPT | Mod: 26 | Performed by: PATHOLOGY

## 2023-01-31 PROCEDURE — 250N000013 HC RX MED GY IP 250 OP 250 PS 637: Performed by: OBSTETRICS & GYNECOLOGY

## 2023-01-31 PROCEDURE — 710N000010 HC RECOVERY PHASE 1, LEVEL 2, PER MIN: Performed by: OBSTETRICS & GYNECOLOGY

## 2023-01-31 RX ORDER — HYDROMORPHONE HYDROCHLORIDE 1 MG/ML
0.4 INJECTION, SOLUTION INTRAMUSCULAR; INTRAVENOUS; SUBCUTANEOUS EVERY 5 MIN PRN
Status: DISCONTINUED | OUTPATIENT
Start: 2023-01-31 | End: 2023-01-31 | Stop reason: HOSPADM

## 2023-01-31 RX ORDER — ONDANSETRON 2 MG/ML
4 INJECTION INTRAMUSCULAR; INTRAVENOUS EVERY 30 MIN PRN
Status: DISCONTINUED | OUTPATIENT
Start: 2023-01-31 | End: 2023-01-31 | Stop reason: HOSPADM

## 2023-01-31 RX ORDER — SODIUM CHLORIDE, SODIUM LACTATE, POTASSIUM CHLORIDE, CALCIUM CHLORIDE 600; 310; 30; 20 MG/100ML; MG/100ML; MG/100ML; MG/100ML
INJECTION, SOLUTION INTRAVENOUS CONTINUOUS
Status: DISCONTINUED | OUTPATIENT
Start: 2023-01-31 | End: 2023-01-31 | Stop reason: HOSPADM

## 2023-01-31 RX ORDER — ACETAMINOPHEN 325 MG/1
975 TABLET ORAL ONCE
Status: DISCONTINUED | OUTPATIENT
Start: 2023-01-31 | End: 2023-01-31 | Stop reason: HOSPADM

## 2023-01-31 RX ORDER — SODIUM CHLORIDE, SODIUM LACTATE, POTASSIUM CHLORIDE, CALCIUM CHLORIDE 600; 310; 30; 20 MG/100ML; MG/100ML; MG/100ML; MG/100ML
INJECTION, SOLUTION INTRAVENOUS CONTINUOUS PRN
Status: DISCONTINUED | OUTPATIENT
Start: 2023-01-31 | End: 2023-01-31

## 2023-01-31 RX ORDER — PROPOFOL 10 MG/ML
INJECTION, EMULSION INTRAVENOUS PRN
Status: DISCONTINUED | OUTPATIENT
Start: 2023-01-31 | End: 2023-01-31

## 2023-01-31 RX ORDER — ACETAMINOPHEN 325 MG/1
975 TABLET ORAL EVERY 6 HOURS PRN
Qty: 50 TABLET | Refills: 0 | Status: SHIPPED | OUTPATIENT
Start: 2023-01-31 | End: 2023-02-08

## 2023-01-31 RX ORDER — DOXYCYCLINE 100 MG/10ML
100 INJECTION, POWDER, LYOPHILIZED, FOR SOLUTION INTRAVENOUS
Status: COMPLETED | OUTPATIENT
Start: 2023-01-31 | End: 2023-01-31

## 2023-01-31 RX ORDER — DOXYCYCLINE 100 MG/1
200 CAPSULE ORAL ONCE
Status: DISCONTINUED | OUTPATIENT
Start: 2023-01-31 | End: 2023-01-31

## 2023-01-31 RX ORDER — LIDOCAINE HYDROCHLORIDE 20 MG/ML
INJECTION, SOLUTION INFILTRATION; PERINEURAL PRN
Status: DISCONTINUED | OUTPATIENT
Start: 2023-01-31 | End: 2023-01-31

## 2023-01-31 RX ORDER — FENTANYL CITRATE 50 UG/ML
25 INJECTION, SOLUTION INTRAMUSCULAR; INTRAVENOUS EVERY 5 MIN PRN
Status: DISCONTINUED | OUTPATIENT
Start: 2023-01-31 | End: 2023-01-31 | Stop reason: HOSPADM

## 2023-01-31 RX ORDER — HYDROMORPHONE HYDROCHLORIDE 1 MG/ML
0.2 INJECTION, SOLUTION INTRAMUSCULAR; INTRAVENOUS; SUBCUTANEOUS EVERY 5 MIN PRN
Status: DISCONTINUED | OUTPATIENT
Start: 2023-01-31 | End: 2023-01-31 | Stop reason: HOSPADM

## 2023-01-31 RX ORDER — FENTANYL CITRATE 50 UG/ML
50 INJECTION, SOLUTION INTRAMUSCULAR; INTRAVENOUS EVERY 5 MIN PRN
Status: DISCONTINUED | OUTPATIENT
Start: 2023-01-31 | End: 2023-01-31 | Stop reason: HOSPADM

## 2023-01-31 RX ORDER — LABETALOL HYDROCHLORIDE 5 MG/ML
10 INJECTION, SOLUTION INTRAVENOUS
Status: DISCONTINUED | OUTPATIENT
Start: 2023-01-31 | End: 2023-01-31 | Stop reason: HOSPADM

## 2023-01-31 RX ORDER — ONDANSETRON 2 MG/ML
INJECTION INTRAMUSCULAR; INTRAVENOUS PRN
Status: DISCONTINUED | OUTPATIENT
Start: 2023-01-31 | End: 2023-01-31

## 2023-01-31 RX ORDER — ONDANSETRON 4 MG/1
4 TABLET, ORALLY DISINTEGRATING ORAL EVERY 30 MIN PRN
Status: DISCONTINUED | OUTPATIENT
Start: 2023-01-31 | End: 2023-01-31 | Stop reason: HOSPADM

## 2023-01-31 RX ORDER — DEXAMETHASONE SODIUM PHOSPHATE 4 MG/ML
INJECTION, SOLUTION INTRA-ARTICULAR; INTRALESIONAL; INTRAMUSCULAR; INTRAVENOUS; SOFT TISSUE PRN
Status: DISCONTINUED | OUTPATIENT
Start: 2023-01-31 | End: 2023-01-31

## 2023-01-31 RX ORDER — FENTANYL CITRATE 50 UG/ML
INJECTION, SOLUTION INTRAMUSCULAR; INTRAVENOUS PRN
Status: DISCONTINUED | OUTPATIENT
Start: 2023-01-31 | End: 2023-01-31

## 2023-01-31 RX ORDER — IBUPROFEN 800 MG/1
800 TABLET, FILM COATED ORAL EVERY 6 HOURS PRN
Qty: 30 TABLET | Refills: 0 | Status: SHIPPED | OUTPATIENT
Start: 2023-01-31 | End: 2023-02-08

## 2023-01-31 RX ORDER — ACETAMINOPHEN 325 MG/1
975 TABLET ORAL ONCE
Status: COMPLETED | OUTPATIENT
Start: 2023-01-31 | End: 2023-01-31

## 2023-01-31 RX ORDER — KETOROLAC TROMETHAMINE 30 MG/ML
INJECTION, SOLUTION INTRAMUSCULAR; INTRAVENOUS PRN
Status: DISCONTINUED | OUTPATIENT
Start: 2023-01-31 | End: 2023-01-31

## 2023-01-31 RX ADMIN — FENTANYL CITRATE 100 MCG: 50 INJECTION, SOLUTION INTRAMUSCULAR; INTRAVENOUS at 08:12

## 2023-01-31 RX ADMIN — PROPOFOL 200 MG: 10 INJECTION, EMULSION INTRAVENOUS at 08:12

## 2023-01-31 RX ADMIN — KETOROLAC TROMETHAMINE 30 MG: 30 INJECTION, SOLUTION INTRAMUSCULAR at 08:51

## 2023-01-31 RX ADMIN — MISOPROSTOL 200 MCG: 200 TABLET ORAL at 06:13

## 2023-01-31 RX ADMIN — DOXYCYCLINE 100 MG: 100 INJECTION, POWDER, LYOPHILIZED, FOR SOLUTION INTRAVENOUS at 07:35

## 2023-01-31 RX ADMIN — PHENYLEPHRINE HYDROCHLORIDE 100 MCG: 10 INJECTION INTRAVENOUS at 08:12

## 2023-01-31 RX ADMIN — SODIUM CHLORIDE, POTASSIUM CHLORIDE, SODIUM LACTATE AND CALCIUM CHLORIDE: 600; 310; 30; 20 INJECTION, SOLUTION INTRAVENOUS at 08:07

## 2023-01-31 RX ADMIN — Medication 50 MG: at 08:13

## 2023-01-31 RX ADMIN — ONDANSETRON 4 MG: 2 INJECTION INTRAMUSCULAR; INTRAVENOUS at 09:21

## 2023-01-31 RX ADMIN — LIDOCAINE HYDROCHLORIDE 60 MG: 20 INJECTION, SOLUTION INFILTRATION; PERINEURAL at 08:12

## 2023-01-31 RX ADMIN — SUGAMMADEX 200 MG: 100 INJECTION, SOLUTION INTRAVENOUS at 08:51

## 2023-01-31 RX ADMIN — MIDAZOLAM 2 MG: 1 INJECTION INTRAMUSCULAR; INTRAVENOUS at 08:02

## 2023-01-31 RX ADMIN — DEXAMETHASONE SODIUM PHOSPHATE 4 MG: 4 INJECTION, SOLUTION INTRA-ARTICULAR; INTRALESIONAL; INTRAMUSCULAR; INTRAVENOUS; SOFT TISSUE at 08:51

## 2023-01-31 RX ADMIN — ONDANSETRON 4 MG: 2 INJECTION INTRAMUSCULAR; INTRAVENOUS at 08:51

## 2023-01-31 RX ADMIN — ACETAMINOPHEN 975 MG: 325 TABLET ORAL at 07:06

## 2023-01-31 ASSESSMENT — ACTIVITIES OF DAILY LIVING (ADL)
ADLS_ACUITY_SCORE: 18

## 2023-01-31 NOTE — ANESTHESIA PREPROCEDURE EVALUATION
Anesthesia Pre-Procedure Evaluation    Patient: Sandy Person   MRN: [id number] : 1980        Procedure : Procedure(s):  REMOVE DILATORS, CERVICAL  AND EVACUATION OF UTERUS          Past Medical History:   Diagnosis Date     Abnormal Pap smear of cervix      Diabetes (H)      History of human papillomavirus infection       Past Surgical History:   Procedure Laterality Date     COLPOSCOPY       TONSILLECTOMY        No Known Allergies   Social History     Tobacco Use     Smoking status: Never     Smokeless tobacco: Never   Substance Use Topics     Alcohol use: Not Currently     Comment: 1-2 drinks per month      Wt Readings from Last 1 Encounters:   23 108.9 kg (240 lb)        Anesthesia Evaluation            ROS/MED HX  ENT/Pulmonary:       Neurologic:       Cardiovascular:       METS/Exercise Tolerance:     Hematologic:       Musculoskeletal:       GI/Hepatic:       Renal/Genitourinary:       Endo:     (+) type II DM,  (-) Type I DM   Psychiatric/Substance Use:       Infectious Disease:       Malignancy:       Other:            Physical Exam    Airway        Mallampati: III   TM distance: > 3 FB   Neck ROM: full   Mouth opening: > 3 cm    Respiratory Devices and Support         Dental       (+) Modest Abnormalities - crowns, retainers, 1 or 2 missing teeth      Cardiovascular   cardiovascular exam normal       Rhythm and rate: regular     Pulmonary   pulmonary exam normal        breath sounds clear to auscultation           OUTSIDE LABS:  CBC:   Lab Results   Component Value Date    WBC 13.8 (H) 2023    WBC 14.5 (H) 2023    HGB 12.7 2023    HGB 12.7 2023    HCT 38.6 2023    HCT 38.5 2023     2023     2023     BMP:   Lab Results   Component Value Date    CR 0.61 2023    CR 0.66 2022     (H) 2023     (H) 2023     COAGS: No results found for: PTT, INR, FIBR  POC: No results found for: BGM, HCG,  HCGS  HEPATIC:   Lab Results   Component Value Date    ALT 18 01/30/2023    AST 21 01/30/2023     OTHER:   Lab Results   Component Value Date    A1C 5.5 01/09/2023    TSH 0.46 11/21/2022       Anesthesia Plan    ASA Status:  3      Anesthesia Type: MAC.              Consents    Anesthesia Plan(s) and associated risks, benefits, and realistic alternatives discussed. Questions answered and patient/representative(s) expressed understanding.    - Discussed:     - Discussed with:  Patient         Postoperative Care            Comments:                Jayjay Steele DO

## 2023-01-31 NOTE — PROGRESS NOTES
"[facility]  Gynecology Progress Note      S:  Dong well. Emotional but ready for procedure. No ctx, bleeding or loss of dilators overnight.    O:  Patient Vitals for the past 24 hrs:   BP Temp Temp src Pulse Resp Height Weight   01/31/23 0620 138/87 -- -- (!) 18 -- -- --   01/31/23 0615 (!) 139/98 -- -- -- -- -- --   01/30/23 2247 119/57 -- -- -- -- -- --   01/30/23 2020 (!) 151/72 98.8  F (37.1  C) Oral -- 18 -- --   01/30/23 1517 (!) 148/81 98.2  F (36.8  C) Oral -- 16 -- --   01/30/23 1510 -- -- -- -- -- 1.626 m (5' 4\") 108.9 kg (240 lb)     Gen: Resting comfortably, NAD  CV: Appears well perfused  Pulm: Equal chest rise  PSYCH: emotional but appropriate for procedure, tearful      A/P:  Ms. Sandy Person is a 42 year old scheduled for DE and removal of dilators today    -Proceed to OR for case    Allan GUZMAN  "

## 2023-01-31 NOTE — PROCEDURES
Amniocentesis    Discussed with GC via telephone and consent reviewed. Patient agrees to amniocentesis for microarray, toxo culture, and parvovirus culture.  Informed consent was obtained.    Bedside US performed confirming IUFD and presence of one 3x6 cm fluid pocket despite PPROM. The patient was prepped in the usual fashion.  11 cc of dark brown fluid was obtained via a single trans-amniotic insertion of a 22-gauge needle under direct continuous ultrasound guidance.  The needle was repositioned, but no additional amniotic fluid was withdrawn and the procedure was therefore terminated.       Fluid will be sent for CMV and toxoplasmosis PCR and chromosomal microarray.      Patient tolerated procedure well. Band-aid placed over the site, which was hemostatic.    Maternal cell contamination blood sample drawn and all specimens delivered to the lab.    Yolie Marcos MD

## 2023-01-31 NOTE — DISCHARGE INSTRUCTIONS

## 2023-01-31 NOTE — NURSING NOTE
Patient is here for Laminaria placement.     Pt arrived for Laminaria placement.  Pt roomed, allergies and current medications documented.  Pts vitals were taken and entered.  Discussed with pt the time to arrive at the hospital 1/31/2023 @ 0600.  Discussed with pt medications Cytotec,  at pharmacy,  and Flagyl that needs to be taken at dinner  Tonight.  MIfe was given NDC# 04418-889-07 lot# [id number] Exp date 06/30/2025.  Consent for procedure and MIFE was discussed by MD.  Consents were signed by MD, Pt and witnessed by this RN.  Post procedure and post surgery information was given to pt.  Pt was instructed on showering with Hibiclens, and when to stop eating and drinking fluids.    Patient did have an elevated blood pressure, Dr. Marcos was notified before the procedure. Blood pressure was going to be repeated after the procedure, but patient was transferred over to L&D.

## 2023-01-31 NOTE — ANESTHESIA POSTPROCEDURE EVALUATION
Patient: Sandy Person    Procedure: Procedure(s):  REMOVE DILATORS, CERVICAL  AND EVACUATION OF UTERUS       Anesthesia Type:  General    Note:     Postop Pain Control: Uneventful            Sign Out: Well controlled pain   PONV: No   Neuro/Psych: Uneventful            Sign Out: Acceptable/Baseline neuro status   Airway/Respiratory: Uneventful            Sign Out: Acceptable/Baseline resp. status   CV/Hemodynamics: Uneventful            Sign Out: Acceptable CV status; No obvious hypovolemia; No obvious fluid overload   Other NRE: NONE   DID A NON-ROUTINE EVENT OCCUR? No           Last vitals:  Vitals Value Taken Time   /69 01/31/23 0945   Temp 36.6  C (97.9  F) 01/31/23 0945   Pulse 82 01/31/23 0947   Resp 10 01/31/23 0947   SpO2 98 % 01/31/23 1008   Vitals shown include unvalidated device data.    Electronically Signed By: Jayjay Steele DO  January 31, 2023  10:16 AM

## 2023-01-31 NOTE — OP NOTE
[facility]  Operative Note    Sandy Person   [id number]  1980    Date: 2023      Pre-operative Diagnosis:   1. IUFD at 19w6d      Post-operative Diagnosis:   1. Same     Procedure: Dilation and Evacuation     Surgeon: Carolin Valladares MD    Assistants: Allan Grewal DO, MA    Anesthesia: GETA w/ local    EBL: 100cc     IVF: 500 cc crystalloid       Specimen: Products of conception    Complications: None apparent    Findings: 8 laminaria removed prior to the procedure. Uterus was approximately 18 weeks in size and midline. Complete fetal parts noted on examination after the procedure. Uterus empty with gritty texture circumferentially appreciated at the end of the procedure.     Indication: Sandy Person is a 42 year old  at 19w6d with IUFD presenting for D&E.  Patient desired surgical evacuation of uterus. 8 Laminaria were placed 23.      Procedure: The patient was taken to the operating room and following sedation, she was placed in the dorsal lithotomy position. Exam under anesthesia was preformed and 8 laminaria were removed and counted. The cervix was noted to be about 0.5cm dilated. The patient was prepped and draped in usual fashion. After a time-out was preformed, a sterile speculum was placed. Intracervical block was preformed at 3 o'clock and 9 o'clock with 20cc total of 1% lidocaine with vasopressin. A ring forceps was applied to the anterior lip of the cervix. Fetus and placental tissue was evacuated in pieces in the standard fashion with sopher forceps under ultrasound guidance. Once complete fetal parts were noted, suction curettage was performed and the endometrial canal was note to be empty. The fundus was vigorously massaged and noted to be firm. The ring forceps was removed and sight was hemostatic. Speculum was removed. The patient tolerated the procedure well and was then transferred to recovery room in stable condition. Dr. Valladares was present and  scrubbed for the entire case.     Allan Grewal DO, MA  OBGYN-PGY1    OBGYN Attending Addendum     I, Carolin Valladares, was scrubbed and present for the entire procedure. I have reviewed Dr. Grewal's operative report and edited where necessary. I agree with the documentation of findings.    Mementos were collected at the family's request. She is Rh POS and did not require rhogam. Genetic testing ordered. Autopsy declined. Hospital disposition of remains.     Carolin Valladares MD, MSCI  Date of Service: 1/31/23

## 2023-01-31 NOTE — DISCHARGE SUMMARY
Gynecology Discharge Summary    Sandy Person    YOB: 1980  MRN# [id number]   Age: 42 year old         Date of Admission:  2023  Date of Discharge:  2023  Admitting Physician:  Magalys Allen MD  Discharge Physician:  Dr Kaylin Allen  Discharging Service:  Gynecology     Primary Provider: Lisa Hutson          Admission Diagnoses:   -  at 19w5d by dating  - Intrauterine fetal demise  - PPROM  - Placenta previa  - AMA  - IVF pregnancy  - Type 2 diabetes mellitus  - Suspected chronic hypertension          Discharge Diagnosis:     Same as above             Discharge Disposition:     Discharged to home           Procedures:   Amniocentesis  Dilation and evacuation            Medications Prior to Admission:     Facility-Administered Medications Prior to Admission   Medication Dose Route Frequency Provider Last Rate Last Admin     [COMPLETED] miFEPRIStone (MIFEPREX) tablet 200 mg  200 mg Oral Once Marilynn Marcos MD   200 mg at 23 1349     Medications Prior to Admission   Medication Sig Dispense Refill Last Dose     insulin detemir (LEVEMIR PEN) 100 UNIT/ML pen Inject 24 units subcutaneous in am and 36 units subcutaneous in pm. 60 mL 3 2023 at 0830     metroNIDAZOLE (FLAGYL) 500 MG tablet Take one tablet by mouth with dinner on 23 1 tablet 0 More than a month     NOVOLOG FLEXPEN 100 UNIT/ML soln Inject with meals and for correction. Pt pregnant and may use up to 80 units / day. 80 mL 3 2023 at 1130     acetone urine (RELION KETONE TEST) test strip Test urine for ketones daily for 1 week if small or negative reduce to 2x week 50 strip 3      aspirin (ASA) 81 MG chewable tablet Take 81 mg by mouth daily        blood glucose (NO BRAND SPECIFIED) lancets standard Use to test blood sugar 4 times daily or as directed. 100 each 3      blood glucose (NO BRAND SPECIFIED) test strip Use to test blood sugar 4 times daily or as directed. 400 strip 3      blood  glucose monitoring (NO BRAND SPECIFIED) meter device kit Use to test blood sugar 4 times daily or as directed. 1 kit 0      Continuous Blood Gluc Sensor (DEXCOM G6 SENSOR) MISC 1 each every 10 days Change every 10 days. 3 each 11      Continuous Blood Gluc Transmit (DEXCOM G6 TRANSMITTER) MISC 1 each every 3 months Change every 3 months. 1 each 4      insulin pen needle (32G X 4 MM) 32G X 4 MM miscellaneous Use 6 pen needles daily or as directed. 200 each 3      insulin pen needle (BD MELISSA U/F) 32G X 4 MM miscellaneous Use with insulin. 250 each 3      misoprostol (CYTOTEC) 200 MCG tablet Take 2 hours before procedure. Place 1 tab between cheek and gum on the right, and 2nd tab on the left. Dissolve for 30 min, then swallow. 2 tablet 0      Prenatal Vit-Fe Fumarate-FA (PRENATAL VITAMIN) 27-0.8 MG TABS Take 1 tablet by mouth daily                Discharge Medications:        Review of your medicines      UNREVIEWED medicines. Ask your doctor about these medicines      Dose / Directions   aspirin 81 MG chewable tablet  Commonly known as: ASA      Dose: 81 mg  Take 81 mg by mouth daily  Refills: 0     insulin detemir 100 UNIT/ML pen  Commonly known as: LEVEMIR PEN  Used for: Type 2 diabetes mellitus without complication, without long-term current use of insulin (H)      Inject 24 units subcutaneous in am and 36 units subcutaneous in pm.  Quantity: 60 mL  Refills: 3     metroNIDAZOLE 500 MG tablet  Commonly known as: FLAGYL  Used for: Fetal death affecting management of mother, single or unspecified fetus      Take one tablet by mouth with dinner on 1/30/23  Quantity: 1 tablet  Refills: 0     misoprostol 200 MCG tablet  Commonly known as: Cytotec  Used for: Fetal death affecting management of mother, single or unspecified fetus      Take 2 hours before procedure. Place 1 tab between cheek and gum on the right, and 2nd tab on the left. Dissolve for 30 min, then swallow.  Quantity: 2 tablet  Refills: 0     NovoLOG FLEXPEN  100 UNIT/ML pen  Used for: Type 2 diabetes mellitus without complication, without long-term current use of insulin (H)  Generic drug: insulin aspart      Inject with meals and for correction. Pt pregnant and may use up to 80 units / day.  Quantity: 80 mL  Refills: 3     Prenatal Vitamin 27-0.8 MG Tabs      Dose: 1 tablet  Take 1 tablet by mouth daily  Refills: 0        CONTINUE these medicines which have NOT CHANGED      Dose / Directions   * BD MELISSA U/F 32G X 4 MM miscellaneous  Used for: Type 2 diabetes mellitus without complication, without long-term current use of insulin (H)  Generic drug: insulin pen needle      Use with insulin.  Quantity: 250 each  Refills: 3     * insulin pen needle 32G X 4 MM miscellaneous  Commonly known as: 32G X 4 MM  Used for: Gestational diabetes      Use 6 pen needles daily or as directed.  Quantity: 200 each  Refills: 3     blood glucose lancets standard  Commonly known as: NO BRAND SPECIFIED  Used for: Diabetes mellitus, type 2 (H)      Use to test blood sugar 4 times daily or as directed.  Quantity: 100 each  Refills: 3     blood glucose monitoring meter device kit  Commonly known as: NO BRAND SPECIFIED  Used for: Diabetes mellitus, type 2 (H)      Use to test blood sugar 4 times daily or as directed.  Quantity: 1 kit  Refills: 0     blood glucose test strip  Commonly known as: NO BRAND SPECIFIED  Used for: Diabetes mellitus, type 2 (H)      Use to test blood sugar 4 times daily or as directed.  Quantity: 400 strip  Refills: 3     Dexcom G6 Sensor Misc  Used for: Pregnancy complicated by pre-existing type 2 diabetes      Dose: 1 each  1 each every 10 days Change every 10 days.  Quantity: 3 each  Refills: 11     Dexcom G6 Transmitter Misc  Used for: Pregnancy complicated by pre-existing type 2 diabetes      Dose: 1 each  1 each every 3 months Change every 3 months.  Quantity: 1 each  Refills: 4     ReliOn Ketone Test test strip  Used for: Pregnancy complicated by pre-existing type  2 diabetes  Generic drug: acetone urine      Test urine for ketones daily for 1 week if small or negative reduce to 2x week  Quantity: 50 strip  Refills: 3         * This list has 2 medication(s) that are the same as other medications prescribed for you. Read the directions carefully, and ask your doctor or other care provider to review them with you.                        Consultations:     Genetic counseling  Maternal fetal medicine               Brief History of Illness:   Ms. Sandy Person is a 42 year old  at 19w5d by embryo transfer, who presents with IUFD diagnosed on , now with PPROM and vaginal bleeding with known placenta previa after placement of laminaria for scheduled D&E tomorrow.           Hospital Course:     She was admitted overnight for monitoring of her bleeding, which significantly improved. Amniocentesis performed at bedside on admission after discussion of risks and benefits for genetic evaluation. She also desires genetic evaluation on products of conception. She was made NPO at midnight, and received one dose PO flagyl in preparation for her procedure.    She had elevated BP on admission, including a severe range BP prior to admission in clinic. HELLP labs collected were wnl. Suspect she has an undiagnosed chronic hypertension given <20 weeks.        On HD#2 she underwent D&E, which was uncomplicated.  mL. Op findings include:      The patient's postoperative course was uncomplicated. At time of discharge on POD#0, patient was ambulating without lightheadedness/dizziness, pain was well controlled on PO meds, she was tolerating a regular diet without n/v, and she was voiding on her own.              Discharge Instructions and Follow-Up:     Discharge diet: Regular   Discharge activity: Activity as tolerated   Discharge follow-up: Follow up with primary care provider as needed   Other instructions: None        Allan Grewal DO, MA    Staff MD Note    Patient stable for discharge  following planned D&E procedure.    Kaylin Allen MD

## 2023-01-31 NOTE — PROGRESS NOTES
ANDREW talked to Sandy after her procedure today. She is still wishing to do hospital burial for baby, SW provided her with the handout that explains hospital burial and where baby will be buried. SW also checked in about whether Sandy has any time off after this loss from work, she shared that she does and that she is going to take an additional week off. SW explained how she can send that to her provider through [employer]. SW offered her condolences and shared that she would check in next week to see if there is anything else needed.     PAUL Nguyen, [facility]  Maternal and Child Health   Office: [phone]  Pager: [phone]  After Hours Pager: [phone]  [email or web address]

## 2023-01-31 NOTE — ANESTHESIA CARE TRANSFER NOTE
Patient: Sandy Person    Procedure: Procedure(s):  REMOVE DILATORS, CERVICAL  AND EVACUATION OF UTERUS       Diagnosis: IUFD at less than 20 weeks of gestation [O02.1]  Diagnosis Additional Information: No value filed.    Anesthesia Type:   General     Note:    Oropharynx: oropharynx clear of all foreign objects and spontaneously breathing  Level of Consciousness: awake  Oxygen Supplementation: face mask  Level of Supplemental Oxygen (L/min / FiO2): 5  Independent Airway: airway patency satisfactory and stable  Dentition: dentition unchanged  Vital Signs Stable: post-procedure vital signs reviewed and stable  Report to RN Given: handoff report given  Patient transferred to: PACU  Comments: 131/93, HR 99, sat 100%, RR 14, T 36.8  Handoff Report: Identifed the Patient, Identified the Reponsible Provider, Reviewed the pertinent medical history, Discussed the surgical course, Reviewed Intra-OP anesthesia mangement and issues during anesthesia, Set expectations for post-procedure period and Allowed opportunity for questions and acknowledgement of understanding      Vitals:  Vitals Value Taken Time   /93 01/31/23 0908   Temp     Pulse 95 01/31/23 0916   Resp 15 01/31/23 0916   SpO2 100 % 01/31/23 0916   Vitals shown include unvalidated device data.    Electronically Signed By: BARBER Barajas CRNA  January 31, 2023  9:16 AM

## 2023-01-31 NOTE — ANESTHESIA PROCEDURE NOTES
Airway       Patient location during procedure: OR       Procedure Start/Stop Times: 1/31/2023 8:15 AM  Staff -        CRNA: Shirley Martinez APRN CRNA       Performed By: CRNA  Consent for Airway        Urgency: elective  Indications and Patient Condition       Indications for airway management: jeremias-procedural       Induction type:intravenous       Mask difficulty assessment: 2 - vent by mask + OA or adjuvant +/- NMBA    Final Airway Details       Final airway type: endotracheal airway       Successful airway: ETT - single and Oral  Endotracheal Airway Details        ETT size (mm): 7.0       Cuffed: yes       Cuff volume (mL): 5       Successful intubation technique: direct laryngoscopy       DL Blade Type: Weller 2       Grade View of Cords: 1       Adjucts: stylet       Position: Right       Measured from: gums/teeth       Secured at (cm): 21       Bite block used: None    Post intubation assessment        Placement verified by: capnometry, equal breath sounds and chest rise        Number of attempts at approach: 1       Number of other approaches attempted: 0       Secured with: silk tape       Ease of procedure: easy       Dentition: Intact and Unchanged    Medication(s) Administered   Medication Administration Time: 1/31/2023 8:15 AM

## 2023-01-31 NOTE — PLAN OF CARE
Patient to OR by Magi DARBY from surgery. Very tearful, sister here for support. VSS, am fasting glucose 100.  Report given to Magi DARBY

## 2023-02-01 NOTE — PROGRESS NOTES
Outcome for 02/01/23 8:51 AM: Data uploaded on Dexcom  Mayelin Riley MA  Outcome for 02/06/23 2:04 PM: Dexcom emailed to provider  Mayelin Riley MA    Patient is showing 4/5 MNCM met. Not on statin   Mayelin Riley MA   [place]erm[place] Border Text: The closure involved the vermilion border.

## 2023-02-02 ENCOUNTER — VIRTUAL VISIT (OUTPATIENT)
Dept: EDUCATION SERVICES | Facility: CLINIC | Age: 43
End: 2023-02-02
Payer: COMMERCIAL

## 2023-02-02 DIAGNOSIS — E11.9 TYPE 2 DIABETES MELLITUS (H): Primary | ICD-10-CM

## 2023-02-02 LAB — B19V DNA SER QL NAA+PROBE: NOT DETECTED

## 2023-02-02 PROCEDURE — G0108 DIAB MANAGE TRN  PER INDIV: HCPCS | Mod: 95 | Performed by: NUTRITIONIST

## 2023-02-03 ENCOUNTER — TELEPHONE (OUTPATIENT)
Dept: MATERNAL FETAL MEDICINE | Facility: CLINIC | Age: 43
End: 2023-02-03
Payer: COMMERCIAL

## 2023-02-03 LAB — MISCELLANEOUS TEST 1 (ARUP): NORMAL

## 2023-02-03 NOTE — TELEPHONE ENCOUNTER
February 3, 2023    I called Sandy to discuss the results of the parvovirus and CMV PCR testing.     Results were: virus not identified. This indicates that there was no detectable active infection for these other these conditions that could have contributed to the loss.     We discussed that the amniotic fluid specimen was bloody and that the lab is needing to culture the sample for the chromosomal microarray. However, testing on the products of conception appears to be proceeding well. In the event that the POC testing results prior to the amniotic fluid culture, we will discuss cancelling that testing.     Sandy thanked me for the call and let me know that she is doing as well as to be expected. She is worried that we may never know exactly why this happened. We discussed that many families are able to find closure and feel reassured even with negative results, knowing that they checked every box available to them     I let Sandy know that we have behavioral health services available and encouraged her to take time to care for herself in this moment, too. She will let me know if this is something she would like coordinated.     She also asked me if she should have any kind of follow-up visit with her surgical team. I let her know that I have seen many patients have a post-op visit, but encouraged her to reach out to the OB group to discuss further.     Caridad Lamas MS, [facility]  Licensed Genetic Counselor  [facility]  Maternal Fetal Medicine  [email or web address]  [phone]

## 2023-02-06 LAB
MCCMA SPECIMEN: NORMAL
MCCMA SPECIMEN: NORMAL

## 2023-02-06 NOTE — PROGRESS NOTES
Sandy is a 42 year old who is being evaluated via a billable video visit.      How would you like to obtain your AVS? MyChart  If the video visit is dropped, the invitation should be resent by: Text to cell phone: [phone]  Will anyone else be joining your video visit? No      Video-Visit Details    Type of service:  Video Visit   Start 11:30  End 12pm  Originating Location (pt. Location): Home    Distant Location (provider location):  Off-site  Platform used for Video Visit: Intelligent Mobile Support    Diabetes Self-Management Education & Support    Sandy Person presents today for education related to Type 2 diabetes    Recently lost her baby, now post partum  Patient is being treated with:  insulin  She is accompanied by self  Referring provider:  John    PATIENT CONCERNS RELATED TO DIABETES SELF MANAGEMENT: Feels she is eating to keep glucose up. Too much insulin.       ASSESSMENT:    Taking Medication:     Current Diabetes Management per Patient:  Taking diabetes medications?   yes:     Diabetes Medication(s)     Biguanides       metFORMIN (GLUCOPHAGE) 500 MG tablet    Take 1 tablet (500 mg) by mouth daily (with breakfast) After one week increase to 1 tab with breakfast and 1 tab with dinner (1000mg daily) for one week, then 2 tabs at breakfast and 1 tab at dinner for one week, then increase to maintenance dose of 2 tabs twice daily (2000mg per day)    Insulin       insulin detemir (LEVEMIR PEN) 100 UNIT/ML pen    Inject 24 units subcutaneous in am and 36 units subcutaneous in pm.     NOVOLOG FLEXPEN 100 UNIT/ML soln    Inject with meals and for correction. Pt pregnant and may use up to 80 units / day.          Monitoring            Patient's most recent   Lab Results   Component Value Date    A1C 5.5 01/09/2023      Patient's A1C goal: <7.0    Healthy Eating:   Patient has been eating some comfort food and notices glucose is not doing much. She is feeding the insulin and does not want to keep doing that.    She would like to go back on metformin at this point.      Problem Solving:      Patient is at risk of hypoglycemia?: YES  Hospitalizations for hyper or hypoglycemia: No    Healthy Coping and Stress Management:   Sources of stress identified by patient: recent loss of baby  Spouse is in [place], but will be returning in 3 days.     PLAN:  Discussed case with Dr. Duenas via phone.   Patient will discontinue rapid acting insulin.  Continue basal insulin but at a reduced dose of 20 units daily.  Start metformin at 500mg daily and increase as recommended on script.   Continue use of dexcom and call clinic with any concerns or questions.     Verbalized and demonstrated understanding of instructions.     FOLLOW-UP:    2/7 with Vesta Arceo    Time spent with patient at today's visit was 30 minutes.      Any diabetes medication dose changes were made via the CDE Protocol and Collaborative Practice Agreement with Manda and  Gianluca.  A copy of this encounter was provided to patient's referring provider.

## 2023-02-07 ENCOUNTER — TELEPHONE (OUTPATIENT)
Dept: OBGYN | Facility: CLINIC | Age: 43
End: 2023-02-07
Payer: COMMERCIAL

## 2023-02-07 ENCOUNTER — MYC MEDICAL ADVICE (OUTPATIENT)
Dept: OBGYN | Facility: CLINIC | Age: 43
End: 2023-02-07
Payer: COMMERCIAL

## 2023-02-07 LAB
PATH REPORT.COMMENTS IMP SPEC: NORMAL
PATH REPORT.COMMENTS IMP SPEC: NORMAL
PATH REPORT.FINAL DX SPEC: NORMAL
PATH REPORT.GROSS SPEC: NORMAL
PATH REPORT.MICROSCOPIC SPEC OTHER STN: NORMAL
PATH REPORT.RELEVANT HX SPEC: NORMAL
PHOTO IMAGE: NORMAL

## 2023-02-07 NOTE — TELEPHONE ENCOUNTER
Forms received: [employer]: Certification of Health Care Provider for Employees Serious Health Condition under the FMLA.    Forms printed, labeled, and placed in Dr. Valladares's mail bin in triage office. Routed to rooming staff.

## 2023-02-08 ENCOUNTER — VIRTUAL VISIT (OUTPATIENT)
Dept: ENDOCRINOLOGY | Facility: CLINIC | Age: 43
End: 2023-02-08
Payer: COMMERCIAL

## 2023-02-08 DIAGNOSIS — E11.9 TYPE 2 DIABETES MELLITUS WITHOUT COMPLICATION, WITHOUT LONG-TERM CURRENT USE OF INSULIN (H): ICD-10-CM

## 2023-02-08 PROCEDURE — 99214 OFFICE O/P EST MOD 30 MIN: CPT | Mod: 95 | Performed by: PHYSICIAN ASSISTANT

## 2023-02-08 NOTE — LETTER
2/8/2023       RE: Sandy Person  [address] [facility] [address]     Dear Colleague,    Thank you for referring your patient, Sandy Person, to the [facility] [place] at [facility]. Please see a copy of my visit note below.    Outcome for 02/01/23 8:51 AM: Data uploaded on Dexcom  Mayelin Riley MA  Outcome for 02/06/23 2:04 PM: Dexcom emailed to provider  Mayelin Riley MA    Patient is showing 4/5 [place] met. Not on statin   Mayelin Riley MA    Due to the COVID 19 pandemic this visit was converted to a video visit in order to help prevent spread of infection in this patient and the general population.    Time of start: 8:25 am  Time of end: 8:36 am  Total duration of video visit: 11 minutes.  Providers location: offsite.  Patients location: home-[place].    [facility]  Sandy Person is a 42 year old female with type 2 diabetes mellitus. Video visit for diabetes follow up today.  Hx of IUFD at 19 [id number] s/p dilation and evacuation on 1/31/2023.  Pt's diabetes control was excellent during her pregnancy.  Pt has a hx of type 2 diabetes dx 10 years ago. She has no known retinopathy, nephropathy or neuropathy.  She has a hx of obesity and questionable hx of PCOS.  Sandy tested + for COVID in May 2022 with mild symptom.  She had been taking Rybelsus which was discontinued in late Nov 2021 by her fertility provider.   She was instructed to remain on Metformin 1000 mg BID and Glipizide 10 mg each am.  When she found out she was pregnant Levemir BID and Novolog premeals added.  For her diabetes, she is currently taking Levemir 24 units subcutaneous each am and Metformin 500 mg daily and instruction to titrate dose weekly until she is on max dose of 2000 mg daily.  Most recent A1C was 5.5 % on 1/9/2023 and previous A1C was 6.6 % in Nov 2022.  I reviewed and scanned her DexcomG6 sensor download data in her her chart below.  Blood sugar values  are higher in the evening. No hypoglycemia.  On ROS today, doing better overall.  Mild vaginal spotting at this time.  No pain.  Denies blurred vision, n/v, SOB at rest, cough or fever.  Pt denies chest pain, abd pain, dysuria or sx of neuropathy.  No foot ulcers or edema and BP good.    Diabetes Care  Retinopathy: none; pt seen by Oph in Aug 2022. No retinopathy per patient.  Nephropathy:none; urine microalbuminuria negative in 3/2022.  Neuropathy:none.  Foot Exam:no exam.  Taking aspirin: yes.  Lipids: no LDL.  CAD: no.  Mental health: no hx of depression.  Insulin: basal insulin once a day.  DM meds: Metformin.  Testing:  DexcomG6 sensor.                                    ROS  See under HPI.    Allergies  No Known Allergies    Medications  Current Outpatient Medications   Medication Sig Dispense Refill     acetaminophen (TYLENOL) 325 MG tablet Take 3 tablets (975 mg) by mouth every 6 hours as needed for mild pain 50 tablet 0     acetone urine (RELION KETONE TEST) test strip Test urine for ketones daily for 1 week if small or negative reduce to 2x week 50 strip 3     blood glucose (NO BRAND SPECIFIED) lancets standard Use to test blood sugar 4 times daily or as directed. 100 each 3     blood glucose (NO BRAND SPECIFIED) test strip Use to test blood sugar 4 times daily or as directed. 400 strip 3     blood glucose monitoring (NO BRAND SPECIFIED) meter device kit Use to test blood sugar 4 times daily or as directed. 1 kit 0     Continuous Blood Gluc Sensor (DEXCOM G6 SENSOR) MISC 1 each every 10 days Change every 10 days. 3 each 11     Continuous Blood Gluc Transmit (DEXCOM G6 TRANSMITTER) MISC 1 each every 3 months Change every 3 months. 1 each 4     ibuprofen (ADVIL/MOTRIN) 800 MG tablet Take 1 tablet (800 mg) by mouth every 6 hours as needed for other (mild and/or inflammatory pain) 30 tablet 0     insulin detemir (LEVEMIR PEN) 100 UNIT/ML pen Inject 24 units subcutaneous in am and 36 units subcutaneous in pm.  60 mL 3     insulin pen needle (32G X 4 MM) 32G X 4 MM miscellaneous Use 6 pen needles daily or as directed. 200 each 3     insulin pen needle (BD MELISSA U/F) 32G X 4 MM miscellaneous Use with insulin. 250 each 3     metFORMIN (GLUCOPHAGE) 500 MG tablet Take 1 tablet (500 mg) by mouth daily (with breakfast) After one week increase to 1 tab with breakfast and 1 tab with dinner (1000mg daily) for one week, then 2 tabs at breakfast and 1 tab at dinner for one week, then increase to maintenance dose of 2 tabs twice daily (2000mg per day) 120 tablet 3     NOVOLOG FLEXPEN 100 UNIT/ML soln Inject with meals and for correction. Pt pregnant and may use up to 80 units / day. 80 mL 3   Metformin 500 mg 2 tab po BID.      Family History  family history includes Cancer in her father, maternal grandmother, and mother; Diabetes in her father, maternal grandmother, mother, and paternal grandmother; Heart Disease in her father and mother.    Social History   reports that she has never smoked. She has never used smokeless tobacco. She reports that she does not currently use alcohol. She reports that she does not use drugs.     Past Medical History  Past Medical History:   Diagnosis Date     Abnormal Pap smear of cervix      Diabetes (H)      History of human papillomavirus infection    Obesity.    Past Surgical History:   Procedure Laterality Date     COLPOSCOPY       DILATION AND EVACUATION N/A 1/31/2023    Procedure: AND EVACUATION OF UTERUS;  Surgeon: Carolin Valladares MD;  [facility]     REMOVE DILATORS, CERVICAL N/A 1/31/2023    Procedure: REMOVE DILATORS, CERVICAL;  Surgeon: Carolin Valladares MD;  Location: [facility] OR     TONSILLECTOMY         Physical Exam    No exam today.    RESULTS  Creatinine   Date Value Ref Range Status   01/30/2023 0.61 0.52 - 1.04 mg/dL Final     GFR Estimate   Date Value Ref Range Status   01/30/2023 >90 >60 mL/min/1.73m2 Final     Comment:     eGFR calculated using 2021 CKD-EPI equation.     Hemoglobin  A1C   Date Value Ref Range Status   01/09/2023 5.5 0.0 - 5.6 % Final     Comment:     Normal <5.7%   Prediabetes 5.7-6.4%    Diabetes 6.5% or higher     Note: Adopted from ADA consensus guidelines.     ALT   Date Value Ref Range Status   01/30/2023 18 0 - 50 U/L Final     AST   Date Value Ref Range Status   01/30/2023 21 0 - 45 U/L Final     Comment:     Specimen is hemolyzed which can falsely elevate AST. Analysis of a non-hemolyzed specimen may result in a lower value.     TSH   Date Value Ref Range Status   11/21/2022 0.46 0.30 - 4.20 uIU/mL Final   03/06/2022 1.17 0.40 - 4.00 mU/L Final       ASSESSMENT/PLAN:    1.  TYPE 2 DIABETES MELLITUS: Will continue Levemir 24 units subcutaneous each am and Metformin at this time.  Pt has follow up with me in a few weeks. May consider stopping Levemir and adding another oral medication at that visit.  Sandy was seen by Oph in Aug 2022  without retinopathy per patient.   She denies hx of nephropathy. Her urine microalbuminuria and creat/GFR normal.  No sx of neuropathy. Denies foot ulcers.  BP good per patient.   TSH normal in Nov 2022.     2.  WEIGHT: Not addressed today.    3.  FOLLOW UP:  With me in a few weeks.  Pt also has follow up with Kaylin JOSEPH.    Time spent reviewing chart,labs and DexcomG6 sensor download today = 6 minutes.  Time for video visit today =  11 minutes.  Time for documentation today = 15 minutes.    TOTAL TIME FOR VISIT TODAY = 32 minutes.    Vesta Arceo PA-C    Answers for HPI/ROS submitted by the patient on 2/7/2023  General Symptoms: No  Skin Symptoms: No  HENT Symptoms: No  EYE SYMPTOMS: No  HEART SYMPTOMS: No  LUNG SYMPTOMS: No  INTESTINAL SYMPTOMS: No  URINARY SYMPTOMS: No  GYNECOLOGIC SYMPTOMS: No  BREAST SYMPTOMS: No  SKELETAL SYMPTOMS: No  BLOOD SYMPTOMS: No  NERVOUS SYSTEM SYMPTOMS: No  MENTAL HEALTH SYMPTOMS: No

## 2023-02-08 NOTE — NURSING NOTE
Is the patient currently in the state of [place]? YES    Visit mode:VIDEO    If the visit is dropped, the patient can be reconnected by: VIDEO VISIT: Text to cell phone: [phone]    Will anyone else be joining the visit? NO      How would you like to obtain your AVS? MyChart    Are changes needed to the allergy or medication list? NO    Comments or concerns related to today's visit:

## 2023-02-08 NOTE — PROGRESS NOTES
Due to the COVID 19 pandemic this visit was converted to a video visit in order to help prevent spread of infection in this patient and the general population.    Time of start: 8:25 am  Time of end: 8:36 am  Total duration of video visit: 11 minutes.  Providers location: offsite.  Patients location: home-[place].    [facility]  Sandy Person is a 42 year old female with type 2 diabetes mellitus. Video visit for diabetes follow up today.  Hx of IUFD at 19 [id number] s/p dilation and evacuation on 1/31/2023.  Pt's diabetes control was excellent during her pregnancy.  Pt has a hx of type 2 diabetes dx 10 years ago. She has no known retinopathy, nephropathy or neuropathy.  She has a hx of obesity and questionable hx of PCOS.  Sandy tested + for COVID in May 2022 with mild symptom.  She had been taking Rybelsus which was discontinued in late Nov 2021 by her fertility provider.   She was instructed to remain on Metformin 1000 mg BID and Glipizide 10 mg each am.  When she found out she was pregnant Levemir BID and Novolog premeals added.  For her diabetes, she is currently taking Levemir 24 units subcutaneous each am and Metformin 500 mg daily and instruction to titrate dose weekly until she is on max dose of 2000 mg daily.  Most recent A1C was 5.5 % on 1/9/2023 and previous A1C was 6.6 % in Nov 2022.  I reviewed and scanned her DexcomG6 sensor download data in her her chart below.  Blood sugar values are higher in the evening. No hypoglycemia.  On ROS today, doing better overall.  Mild vaginal spotting at this time.  No pain.  Denies blurred vision, n/v, SOB at rest, cough or fever.  Pt denies chest pain, abd pain, dysuria or sx of neuropathy.  No foot ulcers or edema and BP good.    Diabetes Care  Retinopathy: none; pt seen by Oph in Aug 2022. No retinopathy per patient.  Nephropathy:none; urine microalbuminuria negative in 3/2022.  Neuropathy:none.  Foot Exam:no exam.  Taking aspirin: yes.  Lipids: no LDL.  CAD: no.  Mental health: no  hx of depression.  Insulin: basal insulin once a day.  DM meds: Metformin.  Testing:  DexcomG6 sensor.                                    ROS  See under HPI.    Allergies  No Known Allergies    Medications  Current Outpatient Medications   Medication Sig Dispense Refill     acetaminophen (TYLENOL) 325 MG tablet Take 3 tablets (975 mg) by mouth every 6 hours as needed for mild pain 50 tablet 0     acetone urine (RELION KETONE TEST) test strip Test urine for ketones daily for 1 week if small or negative reduce to 2x week 50 strip 3     blood glucose (NO BRAND SPECIFIED) lancets standard Use to test blood sugar 4 times daily or as directed. 100 each 3     blood glucose (NO BRAND SPECIFIED) test strip Use to test blood sugar 4 times daily or as directed. 400 strip 3     blood glucose monitoring (NO BRAND SPECIFIED) meter device kit Use to test blood sugar 4 times daily or as directed. 1 kit 0     Continuous Blood Gluc Sensor (DEXCOM G6 SENSOR) MISC 1 each every 10 days Change every 10 days. 3 each 11     Continuous Blood Gluc Transmit (DEXCOM G6 TRANSMITTER) MISC 1 each every 3 months Change every 3 months. 1 each 4     ibuprofen (ADVIL/MOTRIN) 800 MG tablet Take 1 tablet (800 mg) by mouth every 6 hours as needed for other (mild and/or inflammatory pain) 30 tablet 0     insulin detemir (LEVEMIR PEN) 100 UNIT/ML pen Inject 24 units subcutaneous in am and 36 units subcutaneous in pm. 60 mL 3     insulin pen needle (32G X 4 MM) 32G X 4 MM miscellaneous Use 6 pen needles daily or as directed. 200 each 3     insulin pen needle (BD MELISSA U/F) 32G X 4 MM miscellaneous Use with insulin. 250 each 3     metFORMIN (GLUCOPHAGE) 500 MG tablet Take 1 tablet (500 mg) by mouth daily (with breakfast) After one week increase to 1 tab with breakfast and 1 tab with dinner (1000mg daily) for one week, then 2 tabs at breakfast and 1 tab at dinner for one week, then increase to maintenance dose of 2 tabs twice daily (2000mg per day) 120 tablet  3     NOVOLOG FLEXPEN 100 UNIT/ML soln Inject with meals and for correction. Pt pregnant and may use up to 80 units / day. 80 mL 3   Metformin 500 mg 2 tab po BID.      Family History  family history includes Cancer in her father, maternal grandmother, and mother; Diabetes in her father, maternal grandmother, mother, and paternal grandmother; Heart Disease in her father and mother.    Social History   reports that she has never smoked. She has never used smokeless tobacco. She reports that she does not currently use alcohol. She reports that she does not use drugs.     Past Medical History  Past Medical History:   Diagnosis Date     Abnormal Pap smear of cervix      Diabetes (H)      History of human papillomavirus infection    Obesity.    Past Surgical History:   Procedure Laterality Date     COLPOSCOPY       DILATION AND EVACUATION N/A 1/31/2023    Procedure: AND EVACUATION OF UTERUS;  Surgeon: Caorlin Valladares MD;  [facility]     REMOVE DILATORS, CERVICAL N/A 1/31/2023    Procedure: REMOVE DILATORS, CERVICAL;  Surgeon: Carolin Valladares MD;  [facility] OR     TONSILLECTOMY         Physical Exam    No exam today.    RESULTS  Creatinine   Date Value Ref Range Status   01/30/2023 0.61 0.52 - 1.04 mg/dL Final     GFR Estimate   Date Value Ref Range Status   01/30/2023 >90 >60 mL/min/1.73m2 Final     Comment:     eGFR calculated using 2021 CKD-EPI equation.     Hemoglobin A1C   Date Value Ref Range Status   01/09/2023 5.5 0.0 - 5.6 % Final     Comment:     Normal <5.7%   Prediabetes 5.7-6.4%    Diabetes 6.5% or higher     Note: Adopted from ADA consensus guidelines.     ALT   Date Value Ref Range Status   01/30/2023 18 0 - 50 U/L Final     AST   Date Value Ref Range Status   01/30/2023 21 0 - 45 U/L Final     Comment:     Specimen is hemolyzed which can falsely elevate AST. Analysis of a non-hemolyzed specimen may result in a lower value.     TSH   Date Value Ref Range Status   11/21/2022 0.46 0.30 - 4.20  uIU/mL Final   03/06/2022 1.17 0.40 - 4.00 mU/L Final       ASSESSMENT/PLAN:    1.  TYPE 2 DIABETES MELLITUS: Will continue Levemir 24 units subcutaneous each am and Metformin at this time.  Pt has follow up with me in a few weeks. May consider stopping Levemir and adding another oral medication at that visit.  Sandy was seen by Oph in Aug 2022  without retinopathy per patient.   She denies hx of nephropathy. Her urine microalbuminuria and creat/GFR normal.  No sx of neuropathy. Denies foot ulcers.  BP good per patient.   TSH normal in Nov 2022.     2.  WEIGHT: Not addressed today.    3.  FOLLOW UP:  With me in a few weeks.  Pt also has follow up with Kaylin JOSEPH.    Time spent reviewing chart,labs and DexcomG6 sensor download today = 6 minutes.  Time for video visit today =  11 minutes.  Time for documentation today = 15 minutes.    TOTAL TIME FOR VISIT TODAY = 32 minutes.    Vesta Arceo PA-C    Answers for HPI/ROS submitted by the patient on 2/7/2023  General Symptoms: No  Skin Symptoms: No  HENT Symptoms: No  EYE SYMPTOMS: No  HEART SYMPTOMS: No  LUNG SYMPTOMS: No  INTESTINAL SYMPTOMS: No  URINARY SYMPTOMS: No  GYNECOLOGIC SYMPTOMS: No  BREAST SYMPTOMS: No  SKELETAL SYMPTOMS: No  BLOOD SYMPTOMS: No  NERVOUS SYSTEM SYMPTOMS: No  MENTAL HEALTH SYMPTOMS: No

## 2023-02-14 NOTE — PROGRESS NOTES
Outcome for 02/14/23 11:01 AM: [employer] message sent  Taylor Myhre, RMA  Outcome for 02/15/23 8:52 AM: Replied to [employer] message  Susan Freeman LPN   Outcome for 02/20/23 2:04 PM: Data uploaded on Dexcom  Susan Freeman LPN   Outcome for 02/21/23 2:24 PM: Dexcom emailed to provider; also some glucose readings sent via [employer] as dexcom has only been on for a few days.   Susan Freeman LPN

## 2023-02-15 ENCOUNTER — VIRTUAL VISIT (OUTPATIENT)
Dept: EDUCATION SERVICES | Facility: CLINIC | Age: 43
End: 2023-02-15
Payer: COMMERCIAL

## 2023-02-15 DIAGNOSIS — E11.9 TYPE 2 DIABETES MELLITUS (H): Primary | ICD-10-CM

## 2023-02-15 PROCEDURE — G0108 DIAB MANAGE TRN  PER INDIV: HCPCS | Mod: VID

## 2023-02-15 NOTE — PROGRESS NOTES
Video-Visit Details    Type of service:  Video Visit   Start 11:00  End 11:30  Originating Location (pt. Location): Home    Distant Location (provider location):  Off-site  Platform used for Video Visit: Deuce    Diabetes Self-Management Education & Support    Sandy Person presents today for education related to Type 2 diabetes    Recently lost her baby, now post partum  Patient is being treated with:  insulin  She is accompanied by self  Referring provider:  John    PATIENT CONCERNS RELATED TO DIABETES SELF MANAGEMENT:   Blood Glucose follow up after loss of pregnancy    ASSESSMENT:    Taking Medication:     Current Diabetes Management per Patient:  Taking diabetes medications?   yes:     Diabetes Medication(s)     Biguanides       metFORMIN (GLUCOPHAGE) 500 MG tablet    Take 1 tablet (500 mg) by mouth daily (with breakfast) After one week increase to 1 tab with breakfast and 1 tab with dinner (1000mg daily) for one week, then 2 tabs at breakfast and 1 tab at dinner for one week, then increase to maintenance dose of 2 tabs twice daily (2000mg per day)    Insulin       insulin detemir (LEVEMIR PEN) 100 UNIT/ML pen    Inject 24 units subcutaneous in am.          Monitoring            Patient's most recent   Lab Results   Component Value Date    A1C 5.5 01/09/2023      Patient's A1C goal: <7.0    Healthy Eating:     Pt is working on getting back to healthy eating and carb counting, was eating comfort food after loss of baby    Problem Solving:      Patient is at risk of hypoglycemia?: YES  Hospitalizations for hyper or hypoglycemia: No    Healthy Coping and Stress Management:   Sources of stress identified by patient: recent loss of baby  Stress eating starting to increase activity meeting with  next week    Assessment:    Pt here for 1x1 follow up for Type 1 DM.  Pt recently lost her baby at 19 wks 6days  And she is s/p dilation and evacuation on 1/31/2023.  Pt's diabetes control was  excellent during her pregnancy.  She has reduced her insulin and is off of Novolog. Currently on Levemir 24u in AM and Metformin 500mg BID, increasing to 2000mg. Pt is using the Dexcom G6, she is waiting to get a new transmitter she had transmitter issues.  Her time in range is 79.6%. She is getting a rise in BG's she feels like its due to eating higher carbs in evening. No changes to meds today.  Over the next few months they will decide if they will try IVF again.  Pt has a follow up with EDGAR Stoner next week.    PLAN:     Continue current meds. Levemir 24u in AM , reduce by 2 unit if you are getting low blood sugars   Metformin 500mg BID (increasing weekly)    Continue to start adding in exercise walking most days     Verbalized and demonstrated understanding of instructions.     FOLLOW-UP:      EDGAR Stoner next week    Time spent with patient at today's visit was 30 minutes.      Any diabetes medication dose changes were made via the CDE Protocol and Collaborative Practice Agreement with [place] and  [facility].  A copy of this encounter was provided to patient's referring provider.

## 2023-02-17 LAB — Lab: NORMAL

## 2023-02-20 ENCOUNTER — TELEPHONE (OUTPATIENT)
Dept: MATERNAL FETAL MEDICINE | Facility: CLINIC | Age: 43
End: 2023-02-20
Payer: COMMERCIAL

## 2023-02-20 LAB — INTERPRETATION: NORMAL

## 2023-02-20 NOTE — TELEPHONE ENCOUNTER
February 20, 2023    I called Sandy to discuss the results of the chromosomal microarray performed on products of conception.     Results were consistent with a normal array pattern. Sandy has chosen not to learn the fetal sex and is aware that this information is in the test report. There were no clinically significant copy number changes or regions of homozygosity detected. Maternal cell contamination studies were negative; a single fetal genotype was detected.     Sandy understands that a normal microarray cannot rule out all genetic conditions.     Sandy shared that she is glad the result is normal so that there isn't a high risk of recurrence due to genetic disease, but also that she had hoped we would find an explanation. She was tearful on the phone. I normalized how difficult it can be to remain without an explanation.     I let Sandy know that the testing on the amniotic fluid has not been initiated, but that with this result, it does not need to be performed as it would be a duplicate test. She stated that it would be okay to cancel the pending amniotic fluid microarray and discard the remaining sample.     I encouraged her to reach out to me with any questions or concerns.     Caridad Lamsa, MS, [facility]  Licensed Genetic Counselor  [facility]  Maternal Fetal Medicine  [email or web address]  [phone]

## 2023-02-22 ENCOUNTER — MYC MEDICAL ADVICE (OUTPATIENT)
Dept: OBGYN | Facility: CLINIC | Age: 43
End: 2023-02-22

## 2023-02-22 ENCOUNTER — VIRTUAL VISIT (OUTPATIENT)
Dept: ENDOCRINOLOGY | Facility: CLINIC | Age: 43
End: 2023-02-22
Payer: COMMERCIAL

## 2023-02-22 ENCOUNTER — TELEPHONE (OUTPATIENT)
Dept: ENDOCRINOLOGY | Facility: CLINIC | Age: 43
End: 2023-02-22

## 2023-02-22 DIAGNOSIS — E11.9 TYPE 2 DIABETES MELLITUS WITHOUT COMPLICATION, WITHOUT LONG-TERM CURRENT USE OF INSULIN (H): Primary | ICD-10-CM

## 2023-02-22 PROCEDURE — 99214 OFFICE O/P EST MOD 30 MIN: CPT | Mod: VID | Performed by: PHYSICIAN ASSISTANT

## 2023-02-22 RX ORDER — ORAL SEMAGLUTIDE 7 MG/1
7 TABLET ORAL DAILY
Qty: 90 TABLET | Refills: 1 | Status: SHIPPED | OUTPATIENT
Start: 2023-02-22 | End: 2023-09-21

## 2023-02-22 RX ORDER — ORAL SEMAGLUTIDE 3 MG/1
3 TABLET ORAL DAILY
Qty: 30 TABLET | Refills: 0 | Status: SHIPPED | OUTPATIENT
Start: 2023-02-22 | End: 2023-03-30

## 2023-02-22 NOTE — PROGRESS NOTES
Due to the COVID 19 pandemic this visit was converted to a video visit in order to help prevent spread of infection in this patient and the general population.    Time of start: 8:30 am  Time of end: 8:45 am  Total duration of video visit: 15 minutes.  Providers location: offsite.  Patients location: [place].    hospitals  Sandy Person is a 42 year old female with type 2 diabetes mellitus. Video visit for diabetes follow up today.  Hx of IUFD at 19 [id number] s/p dilation and evacuation on 1/31/2023.  Pt's diabetes control was excellent during her pregnancy.  Pt has a hx of type 2 diabetes dx 10 years ago. She has no known retinopathy, nephropathy or neuropathy.  She has a hx of obesity and questionable hx of PCOS.  Sandy tested + for COVID in May 2022 with mild symptom.  She had been taking Rybelsus which was discontinued in late Nov 2021 by her fertility provider.   She was instructed to remain on Metformin 1000 mg BID and Glipizide 10 mg each am.  When she found out she was pregnant Levemir BID and Novolog premeals added.  For her diabetes, she is currently taking Levemir 24 units subcutaneous each am and Metformin 1500 mg daily and instruction to titrate dose weekly until she is on max dose of 2000 mg daily.  Most recent A1C was 5.5 % on 1/9/2023 and previous A1C was 6.6 % in Nov 2022.  I reviewed and scanned her DexcomG6 sensor download data in her her chart below.  On ROS today, doing better overall.  May try IVF in the near future 3 + months.  Denies blurred vision, n/v, SOB at rest, cough or fever.  Pt denies chest pain, abd pain, dysuria or sx of neuropathy.  No foot ulcers or edema and BP good.    Diabetes Care  Retinopathy: none; pt seen by Oph in Aug 2022. No retinopathy per patient.  Nephropathy:none; urine microalbuminuria negative in 3/2022.  Neuropathy:none.  Foot Exam:no exam.  Taking aspirin: yes.  Lipids: no LDL.  CAD: no.  Mental health: no hx of depression.  Insulin: basal insulin once a day. DISCONTINUING  Levemir today.   DM meds: Metformin.  ADDING Rybelsus today.  Testing:  DexcomG6 sensor.                        ROS  See under HPI.    Allergies  No Known Allergies    Medications  Current Outpatient Medications   Medication Sig Dispense Refill     acetone urine (RELION KETONE TEST) test strip Test urine for ketones daily for 1 week if small or negative reduce to 2x week 50 strip 3     blood glucose (NO BRAND SPECIFIED) lancets standard Use to test blood sugar 4 times daily or as directed. 100 each 3     blood glucose (NO BRAND SPECIFIED) test strip Use to test blood sugar 4 times daily or as directed. 400 strip 3     blood glucose monitoring (NO BRAND SPECIFIED) meter device kit Use to test blood sugar 4 times daily or as directed. 1 kit 0     Continuous Blood Gluc Sensor (DEXCOM G6 SENSOR) MISC 1 each every 10 days Change every 10 days. 3 each 11     Continuous Blood Gluc Transmit (DEXCOM G6 TRANSMITTER) MISC 1 each every 3 months Change every 3 months. 1 each 4     insulin detemir (LEVEMIR PEN) 100 UNIT/ML pen Inject 24 units subcutaneous in am. 60 mL 3     insulin pen needle (32G X 4 MM) 32G X 4 MM miscellaneous Use 6 pen needles daily or as directed. 200 each 3     insulin pen needle (BD MELISSA U/F) 32G X 4 MM miscellaneous Use with insulin. 250 each 3     metFORMIN (GLUCOPHAGE) 500 MG tablet Take 1 tablet (500 mg) by mouth daily (with breakfast) After one week increase to 1 tab with breakfast and 1 tab with dinner (1000mg daily) for one week, then 2 tabs at breakfast and 1 tab at dinner for one week, then increase to maintenance dose of 2 tabs twice daily (2000mg per day) 120 tablet 3   Metformin 500 mg 2 tab po BID.      Family History  family history includes Cancer in her father, maternal grandmother, and mother; Diabetes in her father, maternal grandmother, mother, and paternal grandmother; Heart Disease in her father and mother.    Social History   reports that she has never smoked. She has never used  smokeless tobacco. She reports that she does not currently use alcohol. She reports that she does not use drugs.     Past Medical History  Past Medical History:   Diagnosis Date     Abnormal Pap smear of cervix      Diabetes (H)      History of human papillomavirus infection    Obesity.    Past Surgical History:   Procedure Laterality Date     COLPOSCOPY       DILATION AND EVACUATION N/A 1/31/2023    Procedure: AND EVACUATION OF UTERUS;  Surgeon: Carolin Valladares MD;  [facility]     REMOVE DILATORS, CERVICAL N/A 1/31/2023    Procedure: REMOVE DILATORS, CERVICAL;  Surgeon: Carolin Valladares MD;  [facility]     TONSILLECTOMY         Physical Exam    No exam today.    RESULTS  Creatinine   Date Value Ref Range Status   01/30/2023 0.61 0.52 - 1.04 mg/dL Final     GFR Estimate   Date Value Ref Range Status   01/30/2023 >90 >60 mL/min/1.73m2 Final     Comment:     eGFR calculated using 2021 CKD-EPI equation.     Hemoglobin A1C   Date Value Ref Range Status   01/09/2023 5.5 0.0 - 5.6 % Final     Comment:     Normal <5.7%   Prediabetes 5.7-6.4%    Diabetes 6.5% or higher     Note: Adopted from ADA consensus guidelines.     ALT   Date Value Ref Range Status   01/30/2023 18 0 - 50 U/L Final     AST   Date Value Ref Range Status   01/30/2023 21 0 - 45 U/L Final     Comment:     Specimen is hemolyzed which can falsely elevate AST. Analysis of a non-hemolyzed specimen may result in a lower value.     TSH   Date Value Ref Range Status   11/21/2022 0.46 0.30 - 4.20 uIU/mL Final   03/06/2022 1.17 0.40 - 4.00 mU/L Final       ASSESSMENT/PLAN:    1.  TYPE 2 DIABETES MELLITUS: Sandy may try IVF in the near future - 3 + months.  For now, will discontinue Levemir and start Rybelsus 3 mg po daily x 30 days and then increase dose 7 mg po daily.  She will continue to titrate her Metformin dose to 2000 mg daily.  Pt will continue to monitor her blood sugar values.  Sandy was seen by Oph in Aug 2022  without retinopathy per patient.    She denies hx of nephropathy. Her urine microalbuminuria and creat/GFR normal.  No sx of neuropathy. Denies foot ulcers.  BP good per patient.   TSH normal in Nov 2022.     2.  WEIGHT: Not addressed today.    3.  FOLLOW UP:  With me in May 2023.   Pt also has follow up with Kaylin JOSEPH on 3/1/2023.  Rybelsus ordered today.    Time spent reviewing chart,labs and DexcomG6 sensor download today = 6 minutes.  Time for video visit today = 15 minutes.  Time for documentation today = 15 minutes.    TOTAL TIME FOR VISIT TODAY = 36 minutes.    Vesta Arceo PA-C

## 2023-02-22 NOTE — NURSING NOTE
Is the patient currently in the state of [place]? YES    Visit mode:VIDEO    If the visit is dropped, the patient can be reconnected by: VIDEO VISIT: Text to cell phone: [phone]    Will anyone else be joining the visit? NO      How would you like to obtain your AVS? MyChart    Are changes needed to the allergy or medication list? NO    Reason for visit: Follow up

## 2023-02-22 NOTE — LETTER
2/22/2023       RE: Sandy Person  [address] [facility] [address]     Dear Colleague,    Thank you for referring your patient, Sandy Person, to the [facility] [place] [facility]. Please see a copy of my visit note below.    Outcome for 02/14/23 11:01 AM: [facility] message sent  Taylor Myhre, RMA  Outcome for 02/15/23 8:52 AM: Replied to [facility] message  Susan Freeman LPN   Outcome for 02/20/23 2:04 PM: Data uploaded on Dexcom  Susan Freeman LPN   Outcome for 02/21/23 2:24 PM: Dexcom emailed to provider; also some glucose readings sent via [employer] as dexcom has only been on for a few days.   Susan Freeman LPN                     Due to the COVID 19 pandemic this visit was converted to a video visit in order to help prevent spread of infection in this patient and the general population.    Time of start: 8:30 am  Time of end: 8:45 am  Total duration of video visit: 15 minutes.  Providers location: offsite.  Patients location: [place].    [facility]  Sandy Person is a 42 year old female with type 2 diabetes mellitus. Video visit for diabetes follow up today.  Hx of IUFD at 19 [id number] s/p dilation and evacuation on 1/31/2023.  Pt's diabetes control was excellent during her pregnancy.  Pt has a hx of type 2 diabetes dx 10 years ago. She has no known retinopathy, nephropathy or neuropathy.  She has a hx of obesity and questionable hx of PCOS.  Sandy tested + for COVID in May 2022 with mild symptom.  She had been taking Rybelsus which was discontinued in late Nov 2021 by her fertility provider.   She was instructed to remain on Metformin 1000 mg BID and Glipizide 10 mg each am.  When she found out she was pregnant Levemir BID and Novolog premeals added.  For her diabetes, she is currently taking Levemir 24 units subcutaneous each am and Metformin 1500 mg daily and instruction to titrate dose weekly until she is on max dose of 2000 mg daily.  Most  recent A1C was 5.5 % on 1/9/2023 and previous A1C was 6.6 % in Nov 2022.  I reviewed and scanned her DexcomG6 sensor download data in her her chart below.  On ROS today, doing better overall.  May try IVF in the near future 3 + months.  Denies blurred vision, n/v, SOB at rest, cough or fever.  Pt denies chest pain, abd pain, dysuria or sx of neuropathy.  No foot ulcers or edema and BP good.    Diabetes Care  Retinopathy: none; pt seen by Oph in Aug 2022. No retinopathy per patient.  Nephropathy:none; urine microalbuminuria negative in 3/2022.  Neuropathy:none.  Foot Exam:no exam.  Taking aspirin: yes.  Lipids: no LDL.  CAD: no.  Mental health: no hx of depression.  Insulin: basal insulin once a day. DISCONTINUING Levemir today.   DM meds: Metformin.  ADDING Rybelsus today.  Testing:  DexcomG6 sensor.                        ROS  See under HPI.    Allergies  No Known Allergies    Medications  Current Outpatient Medications   Medication Sig Dispense Refill     acetone urine (RELION KETONE TEST) test strip Test urine for ketones daily for 1 week if small or negative reduce to 2x week 50 strip 3     blood glucose (NO BRAND SPECIFIED) lancets standard Use to test blood sugar 4 times daily or as directed. 100 each 3     blood glucose (NO BRAND SPECIFIED) test strip Use to test blood sugar 4 times daily or as directed. 400 strip 3     blood glucose monitoring (NO BRAND SPECIFIED) meter device kit Use to test blood sugar 4 times daily or as directed. 1 kit 0     Continuous Blood Gluc Sensor (DEXCOM G6 SENSOR) MISC 1 each every 10 days Change every 10 days. 3 each 11     Continuous Blood Gluc Transmit (DEXCOM G6 TRANSMITTER) MISC 1 each every 3 months Change every 3 months. 1 each 4     insulin detemir (LEVEMIR PEN) 100 UNIT/ML pen Inject 24 units subcutaneous in am. 60 mL 3     insulin pen needle (32G X 4 MM) 32G X 4 MM miscellaneous Use 6 pen needles daily or as directed. 200 each 3     insulin pen needle (BD MELISSA U/F) 32G  X 4 MM miscellaneous Use with insulin. 250 each 3     metFORMIN (GLUCOPHAGE) 500 MG tablet Take 1 tablet (500 mg) by mouth daily (with breakfast) After one week increase to 1 tab with breakfast and 1 tab with dinner (1000mg daily) for one week, then 2 tabs at breakfast and 1 tab at dinner for one week, then increase to maintenance dose of 2 tabs twice daily (2000mg per day) 120 tablet 3   Metformin 500 mg 2 tab po BID.      Family History  family history includes Cancer in her father, maternal grandmother, and mother; Diabetes in her father, maternal grandmother, mother, and paternal grandmother; Heart Disease in her father and mother.    Social History   reports that she has never smoked. She has never used smokeless tobacco. She reports that she does not currently use alcohol. She reports that she does not use drugs.     Past Medical History  Past Medical History:   Diagnosis Date     Abnormal Pap smear of cervix      Diabetes (H)      History of human papillomavirus infection    Obesity.    Past Surgical History:   Procedure Laterality Date     COLPOSCOPY       DILATION AND EVACUATION N/A 1/31/2023    Procedure: AND EVACUATION OF UTERUS;  Surgeon: Carolin Valladares MD;  [facility]     REMOVE DILATORS, CERVICAL N/A 1/31/2023    Procedure: REMOVE DILATORS, CERVICAL;  Surgeon: Carolin Valladares MD;  [facility]     TONSILLECTOMY         Physical Exam    No exam today.    RESULTS  Creatinine   Date Value Ref Range Status   01/30/2023 0.61 0.52 - 1.04 mg/dL Final     GFR Estimate   Date Value Ref Range Status   01/30/2023 >90 >60 mL/min/1.73m2 Final     Comment:     eGFR calculated using 2021 CKD-EPI equation.     Hemoglobin A1C   Date Value Ref Range Status   01/09/2023 5.5 0.0 - 5.6 % Final     Comment:     Normal <5.7%   Prediabetes 5.7-6.4%    Diabetes 6.5% or higher     Note: Adopted from ADA consensus guidelines.     ALT   Date Value Ref Range Status   01/30/2023 18 0 - 50 U/L Final     AST   Date Value  Ref Range Status   01/30/2023 21 0 - 45 U/L Final     Comment:     Specimen is hemolyzed which can falsely elevate AST. Analysis of a non-hemolyzed specimen may result in a lower value.     TSH   Date Value Ref Range Status   11/21/2022 0.46 0.30 - 4.20 uIU/mL Final   03/06/2022 1.17 0.40 - 4.00 mU/L Final       ASSESSMENT/PLAN:    1.  TYPE 2 DIABETES MELLITUS: Sandy may try IVF in the near future - 3 + months.  For now, will discontinue Levemir and start Rybelsus 3 mg po daily x 30 days and then increase dose 7 mg po daily.  She will continue to titrate her Metformin dose to 2000 mg daily.  Pt will continue to monitor her blood sugar values.  Sandy was seen by Oph in Aug 2022  without retinopathy per patient.   She denies hx of nephropathy. Her urine microalbuminuria and creat/GFR normal.  No sx of neuropathy. Denies foot ulcers.  BP good per patient.   TSH normal in Nov 2022.     2.  WEIGHT: Not addressed today.    3.  FOLLOW UP:  With me in May 2023.   Pt also has follow up with Kaylin JOSEPH on 3/1/2023.  Rybelsus ordered today.    Time spent reviewing chart,labs and DexcomG6 sensor download today = 6 minutes.  Time for video visit today = 15 minutes.  Time for documentation today = 15 minutes.    TOTAL TIME FOR VISIT TODAY = 36 minutes.    Vesta Arceo PA-C

## 2023-02-22 NOTE — TELEPHONE ENCOUNTER
PA Initiation    Medication: Rybelsus  [employer]: [employer] - Phone [phone] Fax [phone]  Pharmacy Filling the Rx:    Filling Pharmacy Phone:    Filling Pharmacy Fax:    Start Date: 2/22/2023    [id number]

## 2023-02-23 NOTE — TELEPHONE ENCOUNTER
Prior Authorization Approval    Authorization Effective Date: 2/22/2023  Authorization Expiration Date: 2/23/2024  Medication: Rybelsus  Approved Dose/Quantity: 30 per 30 days  Reference #: [id number]   Insurance Company: [employer] [phone] Fax [phone]  Expected CoPay:       CoPay Card Available:      Foundation Assistance Needed:    Which Pharmacy is filling the prescription (Not needed for infusion/clinic administered):    Pharmacy Notified:    Patient Notified:

## 2023-03-01 ENCOUNTER — VIRTUAL VISIT (OUTPATIENT)
Dept: EDUCATION SERVICES | Facility: CLINIC | Age: 43
End: 2023-03-01
Payer: COMMERCIAL

## 2023-03-01 DIAGNOSIS — E11.9 TYPE 2 DIABETES MELLITUS (H): Primary | ICD-10-CM

## 2023-03-01 PROCEDURE — 99207 PR NO BILLABLE SERVICE THIS VISIT: CPT | Mod: VID

## 2023-03-01 NOTE — PROGRESS NOTES
Video-Visit Details    Type of service:  Video Visit   Start 11:00  End 11:15  Originating Location (pt. Location): Home    Distant Location (provider location):  Off-site  Platform used for Video Visit: Deuce    Diabetes Self-Management Education & Support    Sandy Person presents today for education related to Type 2 diabetes    Recently lost her baby, now post partum  Patient is being treated with:  insulin  She is accompanied by self  Referring provider:  John    PATIENT CONCERNS RELATED TO DIABETES SELF MANAGEMENT:   Blood Glucose follow up after loss of pregnancy    ASSESSMENT:    Taking Medication:     Current Diabetes Management per Patient:  Taking diabetes medications?   yes:     Diabetes Medication(s)     Biguanides       metFORMIN (GLUCOPHAGE) 500 MG tablet    Take 1 tablet (500 mg) by mouth daily (with breakfast) After one week increase to 1 tab with breakfast and 1 tab with dinner (1000mg daily) for one week, then 2 tabs at breakfast and 1 tab at dinner for one week, then increase to maintenance dose of 2 tabs twice daily (2000mg per day)    Incretin Mimetic Agents       Semaglutide (RYBELSUS) 3 MG TABS    Take 3 mg by mouth daily Then take 7 mg po daily.     Semaglutide (RYBELSUS) 7 MG TABS    Take 1 tablet (7 mg) by mouth daily Take 7 mg daily after taking Rybelsus 3 mg daily x 30 days.          Monitoring            Patient's most recent   Lab Results   Component Value Date    A1C 5.5 01/09/2023      Patient's A1C goal: <7.0    Healthy Eating:     Pt is working on getting back to healthy eating and carb counting, was eating comfort food after loss of baby    Problem Solving:      Patient is at risk of hypoglycemia?: YES  Hospitalizations for hyper or hypoglycemia: No    Healthy Coping and Stress Management:   Sources of stress identified by patient: recent loss of baby  Stress eating starting to increase activity meeting with  next week    Assessment:    Pt here for 1x1  follow up for Type 2 DM.  Pt recently lost her baby at 19 wks 6days  And she is s/p dilation and evacuation on 1/31/2023.  Pt's diabetes control was excellent during her pregnancy.  She has reduced her insulin and is off of Novolog. Currently on Levemir 24u in AM and Metformin  2000mg. Pt is using the Dexcom G6, and will continue to use it for the next 1-2 months. Pt saw Cait Arceo last week and she ordered Rybelsus. Pt will be starting new med today at 3mg and increasing in 1 month to 7mg.  Pt is stopping Levemir today.  Her time in range is 71.%.  Over the next few months they will decide if they will try IVF again.   Pt will check in with CDE in 2-3 weeks.    PLAN:     Continue Metformin 2000mg every day    Start Rybelsus 3mg daily for a month then increase to 7mg.     Stop Levemir today    Continue to start adding in exercise walking most days     Verbalized and demonstrated understanding of instructions.     FOLLOW-UP:      With CDE in 2-3 weeks via my chart     Time spent with patient at today's visit was 15 minutes.  No charge    Any diabetes medication dose changes were made via the CDE Protocol and Collaborative Practice Agreement [facility] [place] [facility].  A copy of this encounter was provided to patient's referring provider.

## 2023-03-09 NOTE — TELEPHONE ENCOUNTER
RN and rooming staff unable to locate FMLA forms that had been printed, labeled, and placed in Dr. Valladares's box on 2/7.      Second copy of FMLA forms printed, labeled, and given to rooming staff.

## 2023-03-10 ENCOUNTER — TELEPHONE (OUTPATIENT)
Dept: OBGYN | Facility: CLINIC | Age: 43
End: 2023-03-10
Payer: COMMERCIAL

## 2023-03-10 NOTE — TELEPHONE ENCOUNTER
Paperwork received and placed on  desk. Waiting on signature.     Jazzy CHAPMAN CMA 3/10/23    Paperwork faxed and placed in file cabinet    Jazzy CHAPMAN CMA 3/13/23

## 2023-03-29 ENCOUNTER — TELEPHONE (OUTPATIENT)
Dept: ENDOCRINOLOGY | Facility: CLINIC | Age: 43
End: 2023-03-29

## 2023-03-29 DIAGNOSIS — E11.9 TYPE 2 DIABETES MELLITUS WITHOUT COMPLICATION, WITHOUT LONG-TERM CURRENT USE OF INSULIN (H): ICD-10-CM

## 2023-03-29 NOTE — TELEPHONE ENCOUNTER
PA Initiation    Medication: Rybelsus 7mg  Insurance Company: [employer] - Phone [phone] Fax [phone]  Pharmacy Filling the Rx:    Filling Pharmacy Phone:    Filling Pharmacy Fax:    Start Date: 3/29/2023    Key: [id number]    [employer]

## 2023-03-30 RX ORDER — ORAL SEMAGLUTIDE 3 MG/1
3 TABLET ORAL DAILY
Qty: 15 TABLET | Refills: 0 | Status: SHIPPED | OUTPATIENT
Start: 2023-03-30 | End: 2023-05-16

## 2023-03-30 NOTE — TELEPHONE ENCOUNTER
Prior Authorization Approval    Authorization Effective Date: 3/30/2023  Authorization Expiration Date: 3/30/2024  Medication: Rybelsus 7mg  Approved Dose/Quantity: 30 per 30 days  Reference #: Key: [id number]   Insurance Company: [employer] - Phone [phone] Fax [phone]  Expected CoPay:       CoPay Card Available:      Foundation Assistance Needed:    Which Pharmacy is filling the prescription (Not needed for infusion/clinic administered):    Pharmacy Notified:    Patient Notified:

## 2023-04-04 ENCOUNTER — TELEPHONE (OUTPATIENT)
Dept: OBGYN | Facility: CLINIC | Age: 43
End: 2023-04-04
Payer: COMMERCIAL

## 2023-04-04 NOTE — TELEPHONE ENCOUNTER
----- Message from Anabell Elliott MD sent at 4/3/2023  6:12 PM CDT -----  Regarding: [facility] patient  This patient is on my schedule for Thursday 4/6/23 for follow up after D&E.  This operation was performed by [facility] providers on 1/31/23.  (Dr. Carolin Valladares).  She has been seen in the 3rd floor clinic twice prior to the procedure.  Please address and add to the log of patients that are inappropriately scheduled.  Thanks,  Anabell

## 2023-04-04 NOTE — TELEPHONE ENCOUNTER
LVMTCB to explain that patient got scheduled with wrong group and can call us back so we can transfer patient to correct clinic ([facility]) for scheduling.     Delia Braxton/her/hers  [facility] Scheduler

## 2023-04-16 DIAGNOSIS — E11.9 TYPE 2 DIABETES MELLITUS WITHOUT COMPLICATION, WITHOUT LONG-TERM CURRENT USE OF INSULIN (H): ICD-10-CM

## 2023-04-19 RX ORDER — PEN NEEDLE, DIABETIC 32GX 5/32"
NEEDLE, DISPOSABLE MISCELLANEOUS
Qty: 250 EACH | Refills: 3 | Status: SHIPPED | OUTPATIENT
Start: 2023-04-19 | End: 2024-04-25

## 2023-04-19 NOTE — TELEPHONE ENCOUNTER
B-D MELISSA 2ND GEN PEN NDL [id number]      Last Written Prescription Date:  12/22/22  Last Fill Quantity: 250,   # refills: 3  Last Office Visit : 2/22/23  Future Office visit:  0    Routing refill request to provider for review/approval because:  Drug not active on patient's medication list

## 2023-04-29 ENCOUNTER — HEALTH MAINTENANCE LETTER (OUTPATIENT)
Age: 43
End: 2023-04-29

## 2023-05-04 ENCOUNTER — TELEPHONE (OUTPATIENT)
Dept: ENDOCRINOLOGY | Facility: CLINIC | Age: 43
End: 2023-05-04

## 2023-05-04 NOTE — TELEPHONE ENCOUNTER
Please route determinations to the Pharm Diabetes pool ([id number]).      Thank you!    Diabetes Care Services Team   [facility]  [address] [place] [address]     signed

## 2023-05-04 NOTE — TELEPHONE ENCOUNTER
Please route determinations to the Pharm Diabetes pool ([id number]).      Thank you!    Diabetes Care Services Team   [facility]  [address] [place] [address]

## 2023-05-05 NOTE — TELEPHONE ENCOUNTER
Prior Authorization Approval    Authorization Effective Date: 5/5/2023  Authorization Expiration Date: 5/5/2024  Medication: Continuous Blood Gluc Transmit (DEXCOM G6 TRANSMITTER) MISC--APPROVED  Approved Dose/Quantity:   Reference #:     Insurance Company: [employer] - Phone [phone] Fax [phone]  Expected CoPay:       CoPay Card Available:      Foundation Assistance Needed:    Which Pharmacy is filling the prescription (Not needed for infusion/clinic administered): [facility] - [facility] [place] [facility] [address]  Pharmacy Notified: Yes  Patient Notified: Yes **Instructed pharmacy to notify patient when script is ready to /ship.**

## 2023-05-05 NOTE — TELEPHONE ENCOUNTER
PA Initiation    Medication: Continuous Blood Gluc Sensor (DEXCOM G6 SENSOR) MISC   Insurance Company: [employer] - Phone [phone] Fax [phone]  Pharmacy Filling the Rx: [facility] - [place], [place] - [facility] [address]  Filling Pharmacy Phone: [phone]  Filling Pharmacy Fax: [phone]  Start Date: 5/4/2023

## 2023-05-05 NOTE — TELEPHONE ENCOUNTER
PA Initiation    Medication: Continuous Blood Gluc Transmit (DEXCOM G6 TRANSMITTER) MISC   Insurance Company: [employer] - Phone [phone] Fax [phone]  Pharmacy Filling the Rx: [facility] - [place], [place] - [facility] [address]  Filling Pharmacy Phone: [phone]  Filling Pharmacy Fax: [phone]  Start Date: 5/4/2023

## 2023-05-05 NOTE — TELEPHONE ENCOUNTER
Prior Authorization Approval    Authorization Effective Date: 5/5/2023  Authorization Expiration Date: 5/5/2024  Medication: Continuous Blood Gluc Sensor (DEXCOM G6 SENSOR) MISC--APPROVED  Approved Dose/Quantity:   Reference #:     Insurance Company: [employer] - Phone [phone] Fax [phone]  Expected CoPay:       CoPay Card Available:      Foundation Assistance Needed:    Which Pharmacy is filling the prescription (Not needed for infusion/clinic administered): [place] [facility] - [facility] [address]  Pharmacy Notified: Yes  Patient Notified: Yes **Instructed pharmacy to notify patient when script is ready to /ship.**

## 2023-05-16 PROBLEM — M75.42 IMPINGEMENT SYNDROME OF LEFT SHOULDER: Status: ACTIVE | Noted: 2017-06-08

## 2023-05-16 PROBLEM — O44.02 PLACENTA PREVIA IN SECOND TRIMESTER: Status: RESOLVED | Noted: 2023-01-17 | Resolved: 2023-05-16

## 2023-05-16 RX ORDER — DOXYCYCLINE HYCLATE 100 MG
TABLET ORAL
COMMUNITY
Start: 2022-06-06 | End: 2023-05-17

## 2023-05-16 RX ORDER — INSULIN GLARGINE-YFGN 100 [IU]/ML
INJECTION, SOLUTION SUBCUTANEOUS
COMMUNITY
Start: 2022-06-07 | End: 2023-05-17

## 2023-05-16 RX ORDER — NORETHINDRONE AND ETHINYL ESTRADIOL 1 MG-35MCG
KIT ORAL
COMMUNITY
Start: 2022-05-18 | End: 2023-05-17

## 2023-05-17 ENCOUNTER — OFFICE VISIT (OUTPATIENT)
Dept: OBGYN | Facility: CLINIC | Age: 43
End: 2023-05-17
Payer: COMMERCIAL

## 2023-05-17 VITALS
SYSTOLIC BLOOD PRESSURE: 144 MMHG | WEIGHT: 246 LBS | BODY MASS INDEX: 42.23 KG/M2 | DIASTOLIC BLOOD PRESSURE: 88 MMHG | OXYGEN SATURATION: 99 % | HEART RATE: 84 BPM

## 2023-05-17 DIAGNOSIS — R10.2 PELVIC PAIN IN FEMALE: ICD-10-CM

## 2023-05-17 DIAGNOSIS — E11.9 TYPE 2 DIABETES MELLITUS WITHOUT COMPLICATION, WITH LONG-TERM CURRENT USE OF INSULIN (H): ICD-10-CM

## 2023-05-17 DIAGNOSIS — Z79.4 TYPE 2 DIABETES MELLITUS WITHOUT COMPLICATION, WITH LONG-TERM CURRENT USE OF INSULIN (H): ICD-10-CM

## 2023-05-17 DIAGNOSIS — Z01.419 WELL WOMAN EXAM WITH ROUTINE GYNECOLOGICAL EXAM: Primary | ICD-10-CM

## 2023-05-17 DIAGNOSIS — Z12.31 ENCOUNTER FOR SCREENING MAMMOGRAM FOR BREAST CANCER: ICD-10-CM

## 2023-05-17 LAB — HBA1C MFR BLD: 6 % (ref 0–5.6)

## 2023-05-17 PROCEDURE — 99396 PREV VISIT EST AGE 40-64: CPT | Mod: 25 | Performed by: OBSTETRICS & GYNECOLOGY

## 2023-05-17 PROCEDURE — 90471 IMMUNIZATION ADMIN: CPT | Performed by: OBSTETRICS & GYNECOLOGY

## 2023-05-17 PROCEDURE — 83036 HEMOGLOBIN GLYCOSYLATED A1C: CPT | Performed by: OBSTETRICS & GYNECOLOGY

## 2023-05-17 PROCEDURE — 90715 TDAP VACCINE 7 YRS/> IM: CPT | Performed by: OBSTETRICS & GYNECOLOGY

## 2023-05-17 PROCEDURE — 99213 OFFICE O/P EST LOW 20 MIN: CPT | Mod: 25 | Performed by: OBSTETRICS & GYNECOLOGY

## 2023-05-17 PROCEDURE — 36415 COLL VENOUS BLD VENIPUNCTURE: CPT | Performed by: OBSTETRICS & GYNECOLOGY

## 2023-05-17 NOTE — PROGRESS NOTES
Sandy is a 42 year old  female who presents for annual exam.     Menses are regular q 28-30 days and regular lasting 7 days.  Menses flow: normal and medium.  Patient's last menstrual period was 2023.. Using none for contraception.  She is not currently considering pregnancy.  Besides routine health maintenance.  Patient feels she is contnuing to work through the grief from her pregnancy loss.  She had previously done grief counseling with the death of her mother and has found the tools she used with that are helpful to her now.  Does not feel she needs additional supports at this time.  She is not planning pregnancy at this time.  If they were going to take a next step to have a child the option would be pregnancy with a donor egg.    She states that after her pregnancy she has experienced increased discharge at time, which is irritating.  This is not currently happening and she does not note any persistent odor.  She does have LLQ cramping with each period since her pregnancy.  This is not currently present, but has been with each period.   GYNECOLOGIC HISTORY:  Menarche: 12  Age at first intercourse: 18  Sandy is sexually active with male partner(s) and is currently in monogamous relationship with Spouse.    History sexually transmitted infections: HPV in hear early 's  STI testing offered?  Declined  NICANOR exposure: No  History of abnormal Pap smear: YES - updated in Problem List and Health Maintenance accordingly  Family history of breast CA: Yes (Please explain): Maternal grandmother  Family history of uterine/ovarian CA: No    Family history of colon CA: Yes (Please explain): Father    HEALTH MAINTENANCE:  Cholesterol: (No results found for: CHOL History of abnormal lipids: Yes  Mammo:  . History of abnormal Mammo: No.  Regular Self Breast Exams: No  Calcium/Vitamin D intake: source:  dairy Adequate? No  TSH: (  TSH   Date Value Ref Range Status   2022 0.46 0.30 - 4.20 uIU/mL Final    2022 1.17 0.40 - 4.00 mU/L Final    )  Pap 21 NIL HPV negative    HISTORY:  OB History    Para Term  AB Living   1 0 0 0 1 0   SAB IAB Ectopic Multiple Live Births   1 0 0 0 0      # Outcome Date GA Lbr [place]/2nd Weight Sex Delivery Anes PTL Lv   1 SAB 23 19w6d    AB, MISSED        Past Medical History:   Diagnosis Date     Abnormal Pap smear of cervix      Diabetes (H)      History of human papillomavirus infection      Past Surgical History:   Procedure Laterality Date     COLPOSCOPY       DILATION AND EVACUATION N/A 2023    Procedure: AND EVACUATION OF UTERUS;  Surgeon: Carolin Valladares MD;  [facility]     REMOVE DILATORS, CERVICAL N/A 2023    Procedure: REMOVE DILATORS, CERVICAL;  Surgeon: Carolin Valladares MD;  [facility]     TONSILLECTOMY       Family History   Problem Relation Age of Onset     Cancer Mother         bile duct     Diabetes Mother      Heart Disease Mother      Diabetes Father      Heart Disease Father      Cancer Father      Cancer Maternal Grandmother      Diabetes Maternal Grandmother      Diabetes Paternal Grandmother      Social History   Denies any tobacco or drug use.  Consumes 0-1 drinks per week.  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    Current Outpatient Medications:      blood glucose (NO BRAND SPECIFIED) lancets standard, Use to test blood sugar 4 times daily or as directed., Disp: 100 each, Rfl: 3     blood glucose (NO BRAND SPECIFIED) test strip, Use to test blood sugar 4 times daily or as directed., Disp: 400 strip, Rfl: 3     blood glucose monitoring (NO BRAND SPECIFIED) meter device kit, Use to test blood sugar 4 times daily or as directed., Disp: 1 kit, Rfl: 0     Continuous Blood Gluc Sensor (DEXCOM G6 SENSOR) MISC, 1 each every 10 days Change every 10 days., Disp: 3 each, Rfl: 11     Continuous Blood Gluc Transmit (DEXCOM G6 TRANSMITTER) MISC, 1 each every 3 months Change every 3 months., Disp: 1 each, Rfl:  4     glipiZIDE (GLUCOTROL XL) 10 MG 24 hr tablet, Take 1 tablet (10 mg) by mouth daily, Disp: 30 tablet, Rfl: 3     insulin detemir (LEVEMIR PEN) 100 UNIT/ML pen, Inject 20-30 units @ HS, Disp: 15 mL, Rfl: 1     insulin pen needle (BD PEN NEEDLE MELISSA 2ND GEN) 32G X 4 MM miscellaneous, Use as directed with insulin, Disp: 250 each, Rfl: 3     metFORMIN (GLUCOPHAGE) 500 MG tablet, Take 1 tablet (500 mg) by mouth daily (with breakfast) Take 2 tabs twice daily (2000mg per day), Disp: 360 tablet, Rfl: 3     Semaglutide (RYBELSUS) 7 MG TABS, Take 1 tablet (7 mg) by mouth daily Take 7 mg daily after taking Rybelsus 3 mg daily x 30 days., Disp: 90 tablet, Rfl: 1   No Known Allergies    Past medical, surgical, social and family history were reviewed and updated in EPIC.    EXAM:  BP (!) 144/88   Pulse 84   Wt 111.6 kg (246 lb)   LMP 05/02/2023   SpO2 99%   BMI 42.23 kg/m     BMI: Body mass index is 42.23 kg/m .  Constitutional: healthy, alert and no distress  Head: Normocephalic. No masses, lesions, tenderness or abnormalities  Neck: Neck supple. Trachea midline. No adenopathy. Thyroid symmetric, normal size.   Cardiovascular: regular rate and rhythm   Respiratory: clear to auscultation bilaterally   Breasts: normal without suspicious masses, skin changes or axillary nodes.  Gastrointestinal: Abdomen soft, non-tender, non-distended  : Normal appearing external genitalia, normal appearing vaginal mucosa, normal appearing cervix, no vaginal discharge.  On bimanual exam- no cervical motion tenderness, no tenderness of uterus or bilateral adnexa.  Musculoskeletal: extremities normal  Skin: no suspicious lesions or rashes  Psychiatric: Affect appropriate, cooperative,mentation appears normal.         ASSESSMENT:  42 year old female with satisfactory annual exam  (Z01.419) Well woman exam with routine gynecological exam  (primary encounter diagnosis)  COUNSELING:   Reviewed preventive health counseling, as reflected in  patient instructions       Regular exercise       Healthy diet/nutrition       Alcohol Use       Family planning   reports that she has never smoked. She has never used smokeless tobacco.    Body mass index is 42.23 kg/m .  Weight management plan: Discussed healthy diet and exercise guidelines    (E11.9,  Z79.4) Type 2 diabetes mellitus without complication, with long-term current use of insulin (H)  Comment: Patient working with endocrine.  Discussed HgbA1c today to have for next visit.  Endo will do her diabetic foot exam  Plan: Hemoglobin A1c    (Z12.31) Encounter for screening mammogram for breast cancer  Comment: due for mammogram  Plan: *MA Screening Digital Bilateral    (R10.2) Pelvic pain in female  Comment: Given patient's persistent LLQ with cycles since her pregnancy loss, we will get pelvic ultrasound for further assessment.  Eval for structural causes of pain such as fibroid, polyp, or ovarian cysts/masses.   Normal pelvic exam.  Plan: US Transvaginal Pelvic Non-OB    Anabell Elliott MD

## 2023-05-26 NOTE — PROGRESS NOTES
Outcome for 05/26/23 10:52 AM :Patient is sharing data via clinic device website and patient has been instructed to update data on website. Find login information by using .[employer]

## 2023-05-30 ENCOUNTER — OFFICE VISIT (OUTPATIENT)
Dept: ENDOCRINOLOGY | Facility: CLINIC | Age: 43
End: 2023-05-30
Payer: COMMERCIAL

## 2023-05-30 VITALS
WEIGHT: 242.6 LBS | OXYGEN SATURATION: 97 % | SYSTOLIC BLOOD PRESSURE: 126 MMHG | HEART RATE: 82 BPM | DIASTOLIC BLOOD PRESSURE: 84 MMHG | BODY MASS INDEX: 41.64 KG/M2

## 2023-05-30 DIAGNOSIS — E11.9 TYPE 2 DIABETES MELLITUS WITHOUT COMPLICATION, WITHOUT LONG-TERM CURRENT USE OF INSULIN (H): Primary | ICD-10-CM

## 2023-05-30 PROCEDURE — 99215 OFFICE O/P EST HI 40 MIN: CPT | Performed by: PHYSICIAN ASSISTANT

## 2023-05-30 RX ORDER — VALACYCLOVIR HYDROCHLORIDE 500 MG/1
500 TABLET, FILM COATED ORAL 2 TIMES DAILY
Qty: 6 TABLET | Refills: 1 | Status: SHIPPED | OUTPATIENT
Start: 2023-05-30 | End: 2023-07-12

## 2023-05-30 ASSESSMENT — PAIN SCALES - GENERAL: PAINLEVEL: NO PAIN (0)

## 2023-05-30 NOTE — PROGRESS NOTES
HPI  Sandy Person is a 42 year old female with type 2 diabetes mellitus. Clinic visit for diabetes follow up today.  Hx of IUFD at 19 [id number] s/p dilation and evacuation on 1/31/2023.  Pt's diabetes control was excellent during her pregnancy.  Pt has a hx of type 2 diabetes dx 10 years ago. She has no known retinopathy, nephropathy or neuropathy.  She has a hx of obesity and questionable hx of PCOS.  Sandy tested + for COVID in May 2022 with mild symptom.  She had been taking Rybelsus which was discontinued in late Nov 2021 by her fertility provider.   She was instructed to remain on Metformin 1000 mg BID and Glipizide 10 mg each am.  When she found out she was pregnant Levemir BID and Novolog premeals added.  For her diabetes, she is currently taking Levemir 20 units subcutaneous at hs, Rybelsus 7 mg po daily, Metformin 2000 mg daily and Glipizide 10 mg each am.  Most recent A1C was 6.0 % on 5/17/2023.    I reviewed and scanned her DexcomG6 sensor download data in her her chart below.  Her blood sugars are good at this time.  Pt's blood sugar is in target 96 % of the time.  No documented hypoglycemia.  On ROS today, doing better overall.  She has her menses today.  Has cold sore in mouth.  Denies blurred vision, n/v, SOB at rest, cough or fever.  Pt denies chest pain, abd pain, dysuria or sx of neuropathy.  No foot ulcers.    Diabetes Care  Retinopathy: none; pt seen by Oph in Aug 2022. No retinopathy per patient.  Nephropathy:none; urine microalbuminuria negative in 3/2022.  Neuropathy:none.  Foot Exam:no exam.  Taking aspirin: yes.  Lipids: no LDL.  CAD: no.  Mental health: no hx of depression.  Insulin: basal insulin once a day.   DM meds: Metformin, Rybelsus and Glipizide.  Testing:  DexcomG6 sensor.          ROS  See under HPI.    Allergies  No Known Allergies    Medications  Current Outpatient Medications   Medication Sig Dispense Refill     blood glucose (NO BRAND SPECIFIED) lancets standard Use to test  blood sugar 4 times daily or as directed. 100 each 3     blood glucose (NO BRAND SPECIFIED) test strip Use to test blood sugar 4 times daily or as directed. 400 strip 3     blood glucose monitoring (NO BRAND SPECIFIED) meter device kit Use to test blood sugar 4 times daily or as directed. 1 kit 0     Continuous Blood Gluc Sensor (DEXCOM G6 SENSOR) MISC 1 each every 10 days Change every 10 days. 3 each 11     Continuous Blood Gluc Transmit (DEXCOM G6 TRANSMITTER) MISC 1 each every 3 months Change every 3 months. 1 each 4     glipiZIDE (GLUCOTROL XL) 10 MG 24 hr tablet Take 1 tablet (10 mg) by mouth daily 30 tablet 3     insulin detemir (LEVEMIR PEN) 100 UNIT/ML pen Inject 20-30 units @ HS 15 mL 1     insulin pen needle (BD PEN NEEDLE MELISSA 2ND GEN) 32G X 4 MM miscellaneous Use as directed with insulin 250 each 3     metFORMIN (GLUCOPHAGE) 500 MG tablet Take 1 tablet (500 mg) by mouth daily (with breakfast) Take 2 tabs twice daily (2000mg per day) 360 tablet 3     Semaglutide (RYBELSUS) 7 MG TABS Take 1 tablet (7 mg) by mouth daily Take 7 mg daily after taking Rybelsus 3 mg daily x 30 days. 90 tablet 1   Metformin 500 mg 2 tab po BID.      Family History  family history includes Cancer in her father, maternal grandmother, and mother; Diabetes in her father, maternal grandmother, mother, and paternal grandmother; Heart Disease in her father and mother.    Social History   reports that she has never smoked. She has never used smokeless tobacco. She reports that she does not currently use alcohol. She reports that she does not use drugs.     Past Medical History  Past Medical History:   Diagnosis Date     Abnormal Pap smear of cervix      Diabetes (H)      History of human papillomavirus infection    Obesity.    Past Surgical History:   Procedure Laterality Date     COLPOSCOPY       DILATION AND EVACUATION N/A 1/31/2023    Procedure: AND EVACUATION OF UTERUS;  Surgeon: Carolin Valladares MD;  [facility]     REMOVE  DILATORS, CERVICAL N/A 1/31/2023    Procedure: REMOVE DILATORS, CERVICAL;  Surgeon: Carolin Valladares MD;  [facility]     TONSILLECTOMY         Physical Exam    /84   Pulse 82   Wt 110 kg (242 lb 9.6 oz)   LMP 05/02/2023   SpO2 97%   BMI 41.64 kg/m       FEET: No ulcers.    RESULTS  Creatinine   Date Value Ref Range Status   01/30/2023 0.61 0.52 - 1.04 mg/dL Final     GFR Estimate   Date Value Ref Range Status   01/30/2023 >90 >60 mL/min/1.73m2 Final     Comment:     eGFR calculated using 2021 CKD-EPI equation.     Hemoglobin A1C   Date Value Ref Range Status   05/17/2023 6.0 (H) 0.0 - 5.6 % Final     Comment:     Normal <5.7%   Prediabetes 5.7-6.4%    Diabetes 6.5% or higher     Note: Adopted from ADA consensus guidelines.     ALT   Date Value Ref Range Status   01/30/2023 18 0 - 50 U/L Final     AST   Date Value Ref Range Status   01/30/2023 21 0 - 45 U/L Final     Comment:     Specimen is hemolyzed which can falsely elevate AST. Analysis of a non-hemolyzed specimen may result in a lower value.     TSH   Date Value Ref Range Status   11/21/2022 0.46 0.30 - 4.20 uIU/mL Final   03/06/2022 1.17 0.40 - 4.00 mU/L Final       ASSESSMENT/PLAN:    1.  TYPE 2 DIABETES MELLITUS: Sandy may try IVF in the near future. She has her menses today.  Her glycemic control is good at this time.  Continue current Rybelsus, Metformin, Glipizide and Levemir doses.  Pt will continue to monitor her blood sugar values.  Sandy was seen by Oph in Aug 2022  without retinopathy per patient.   She denies hx of nephropathy. Her urine microalbuminuria and creat/GFR normal.  No sx of neuropathy. No foot ulcers.  /84 today.  TSH normal in Nov 2022.     2.  WEIGHT: Some weight loss.  Encourage pt to continue to eat healthy and exercise.    3.  FOLLOW UP:  With me in clinic in 4 months.  Valtrex ordered today.    Time spent reviewing chart,labs and DexcomG6 sensor download today = 6 minutes.  Time for clinic visit today = 25   minutes.  Time for documentation today = 15 minutes.    TOTAL TIME FOR VISIT TODAY = 46 minutes.    Vesta Arceo PA-C    Answers for HPI/ROS submitted by the patient on 5/26/2023  General Symptoms: No  Skin Symptoms: No  HENT Symptoms: No  EYE SYMPTOMS: No  HEART SYMPTOMS: No  LUNG SYMPTOMS: No  INTESTINAL SYMPTOMS: No  URINARY SYMPTOMS: No  GYNECOLOGIC SYMPTOMS: No  BREAST SYMPTOMS: No  SKELETAL SYMPTOMS: No  BLOOD SYMPTOMS: No  NERVOUS SYSTEM SYMPTOMS: No  MENTAL HEALTH SYMPTOMS: No

## 2023-05-30 NOTE — LETTER
5/30/2023       RE: Sandy Person  [address] [facility] [address]     Dear Colleague,    Thank you for referring your patient, Sandy Person, to the [facility] [place] [facility]. Please see a copy of my visit note below.    Outcome for 05/26/23 10:52 AM :Patient is sharing data via clinic device website and patient has been instructed to update data on website. Find login information by using .[employer]                HPI  Sandy Person is a 42 year old female with type 2 diabetes mellitus. Clinic visit for diabetes follow up today.  Hx of IUFD at 19 [id number] s/p dilation and evacuation on 1/31/2023.  Pt's diabetes control was excellent during her pregnancy.  Pt has a hx of type 2 diabetes dx 10 years ago. She has no known retinopathy, nephropathy or neuropathy.  She has a hx of obesity and questionable hx of PCOS.  Sandy tested + for COVID in May 2022 with mild symptom.  She had been taking Rybelsus which was discontinued in late Nov 2021 by her fertility provider.   She was instructed to remain on Metformin 1000 mg BID and Glipizide 10 mg each am.  When she found out she was pregnant Levemir BID and Novolog premeals added.  For her diabetes, she is currently taking Levemir 20 units subcutaneous at hs, Rybelsus 7 mg po daily, Metformin 2000 mg daily and Glipizide 10 mg each am.  Most recent A1C was 6.0 % on 5/17/2023.    I reviewed and scanned her DexcomG6 sensor download data in her her chart below.  Her blood sugars are good at this time.  Pt's blood sugar is in target 96 % of the time.  No documented hypoglycemia.  On ROS today, doing better overall.  She has her menses today.  Has cold sore in mouth.  Denies blurred vision, n/v, SOB at rest, cough or fever.  Pt denies chest pain, abd pain, dysuria or sx of neuropathy.  No foot ulcers.    Diabetes Care  Retinopathy: none; pt seen by Oph in Aug 2022. No retinopathy per  patient.  Nephropathy:none; urine microalbuminuria negative in 3/2022.  Neuropathy:none.  Foot Exam:no exam.  Taking aspirin: yes.  Lipids: no LDL.  CAD: no.  Mental health: no hx of depression.  Insulin: basal insulin once a day.   DM meds: Metformin, Rybelsus and Glipizide.  Testing:  DexcomG6 sensor.          ROS  See under HPI.    Allergies  No Known Allergies    Medications  Current Outpatient Medications   Medication Sig Dispense Refill    blood glucose (NO BRAND SPECIFIED) lancets standard Use to test blood sugar 4 times daily or as directed. 100 each 3    blood glucose (NO BRAND SPECIFIED) test strip Use to test blood sugar 4 times daily or as directed. 400 strip 3    blood glucose monitoring (NO BRAND SPECIFIED) meter device kit Use to test blood sugar 4 times daily or as directed. 1 kit 0    Continuous Blood Gluc Sensor (DEXCOM G6 SENSOR) MISC 1 each every 10 days Change every 10 days. 3 each 11    Continuous Blood Gluc Transmit (DEXCOM G6 TRANSMITTER) MISC 1 each every 3 months Change every 3 months. 1 each 4    glipiZIDE (GLUCOTROL XL) 10 MG 24 hr tablet Take 1 tablet (10 mg) by mouth daily 30 tablet 3    insulin detemir (LEVEMIR PEN) 100 UNIT/ML pen Inject 20-30 units @ HS 15 mL 1    insulin pen needle (BD PEN NEEDLE MELISSA 2ND GEN) 32G X 4 MM miscellaneous Use as directed with insulin 250 each 3    metFORMIN (GLUCOPHAGE) 500 MG tablet Take 1 tablet (500 mg) by mouth daily (with breakfast) Take 2 tabs twice daily (2000mg per day) 360 tablet 3    Semaglutide (RYBELSUS) 7 MG TABS Take 1 tablet (7 mg) by mouth daily Take 7 mg daily after taking Rybelsus 3 mg daily x 30 days. 90 tablet 1   Metformin 500 mg 2 tab po BID.      Family History  family history includes Cancer in her father, maternal grandmother, and mother; Diabetes in her father, maternal grandmother, mother, and paternal grandmother; Heart Disease in her father and mother.    Social History   reports that she has never smoked. She has never used  smokeless tobacco. She reports that she does not currently use alcohol. She reports that she does not use drugs.     Past Medical History  Past Medical History:   Diagnosis Date    Abnormal Pap smear of cervix     Diabetes (H)     History of human papillomavirus infection    Obesity.    Past Surgical History:   Procedure Laterality Date    COLPOSCOPY      DILATION AND EVACUATION N/A 1/31/2023    Procedure: AND EVACUATION OF UTERUS;  Surgeon: Carolin Valladares MD;  [facility]    REMOVE DILATORS, CERVICAL N/A 1/31/2023    Procedure: REMOVE DILATORS, CERVICAL;  Surgeon: Carolin Valladares MD;  [facility]    TONSILLECTOMY         Physical Exam    /84   Pulse 82   Wt 110 kg (242 lb 9.6 oz)   LMP 05/02/2023   SpO2 97%   BMI 41.64 kg/m       FEET: No ulcers.    RESULTS  Creatinine   Date Value Ref Range Status   01/30/2023 0.61 0.52 - 1.04 mg/dL Final     GFR Estimate   Date Value Ref Range Status   01/30/2023 >90 >60 mL/min/1.73m2 Final     Comment:     eGFR calculated using 2021 CKD-EPI equation.     Hemoglobin A1C   Date Value Ref Range Status   05/17/2023 6.0 (H) 0.0 - 5.6 % Final     Comment:     Normal <5.7%   Prediabetes 5.7-6.4%    Diabetes 6.5% or higher     Note: Adopted from ADA consensus guidelines.     ALT   Date Value Ref Range Status   01/30/2023 18 0 - 50 U/L Final     AST   Date Value Ref Range Status   01/30/2023 21 0 - 45 U/L Final     Comment:     Specimen is hemolyzed which can falsely elevate AST. Analysis of a non-hemolyzed specimen may result in a lower value.     TSH   Date Value Ref Range Status   11/21/2022 0.46 0.30 - 4.20 uIU/mL Final   03/06/2022 1.17 0.40 - 4.00 mU/L Final       ASSESSMENT/PLAN:    1.  TYPE 2 DIABETES MELLITUS: Sandy may try IVF in the near future. She has her menses today.  Her glycemic control is good at this time.  Continue current Rybelsus, Metformin, Glipizide and Levemir doses.  Pt will continue to monitor her blood sugar values.  Sandy was seen by Oph  in Aug 2022  without retinopathy per patient.   She denies hx of nephropathy. Her urine microalbuminuria and creat/GFR normal.  No sx of neuropathy. No foot ulcers.  /84 today.  TSH normal in Nov 2022.     2.  WEIGHT: Some weight loss.  Encourage pt to continue to eat healthy and exercise.    3.  FOLLOW UP:  With me in clinic in 4 months.  Valtrex ordered today.    Time spent reviewing chart,labs and DexcomG6 sensor download today = 6 minutes.  Time for clinic visit today = 25  minutes.  Time for documentation today = 15 minutes.    TOTAL TIME FOR VISIT TODAY = 46 minutes.    Vesta Arceo PA-C    Answers for HPI/ROS submitted by the patient on 5/26/2023  General Symptoms: No  Skin Symptoms: No  HENT Symptoms: No  EYE SYMPTOMS: No  HEART SYMPTOMS: No  LUNG SYMPTOMS: No  INTESTINAL SYMPTOMS: No  URINARY SYMPTOMS: No  GYNECOLOGIC SYMPTOMS: No  BREAST SYMPTOMS: No  SKELETAL SYMPTOMS: No  BLOOD SYMPTOMS: No  NERVOUS SYSTEM SYMPTOMS: No  MENTAL HEALTH SYMPTOMS: No

## 2023-07-12 ENCOUNTER — OFFICE VISIT (OUTPATIENT)
Dept: OBGYN | Facility: CLINIC | Age: 43
End: 2023-07-12
Attending: OBSTETRICS & GYNECOLOGY
Payer: COMMERCIAL

## 2023-07-12 ENCOUNTER — MYC MEDICAL ADVICE (OUTPATIENT)
Dept: ENDOCRINOLOGY | Facility: CLINIC | Age: 43
End: 2023-07-12

## 2023-07-12 ENCOUNTER — ANCILLARY PROCEDURE (OUTPATIENT)
Dept: ULTRASOUND IMAGING | Facility: CLINIC | Age: 43
End: 2023-07-12
Attending: OBSTETRICS & GYNECOLOGY
Payer: COMMERCIAL

## 2023-07-12 VITALS
OXYGEN SATURATION: 97 % | BODY MASS INDEX: 41.66 KG/M2 | HEART RATE: 85 BPM | DIASTOLIC BLOOD PRESSURE: 85 MMHG | SYSTOLIC BLOOD PRESSURE: 138 MMHG | WEIGHT: 242.7 LBS

## 2023-07-12 DIAGNOSIS — R10.2 PELVIC PAIN IN FEMALE: ICD-10-CM

## 2023-07-12 DIAGNOSIS — R10.2 PELVIC PAIN IN FEMALE: Primary | ICD-10-CM

## 2023-07-12 DIAGNOSIS — Z79.4 TYPE 2 DIABETES MELLITUS WITHOUT COMPLICATION, WITH LONG-TERM CURRENT USE OF INSULIN (H): ICD-10-CM

## 2023-07-12 DIAGNOSIS — E11.9 TYPE 2 DIABETES MELLITUS WITHOUT COMPLICATION, WITH LONG-TERM CURRENT USE OF INSULIN (H): ICD-10-CM

## 2023-07-12 PROCEDURE — 99213 OFFICE O/P EST LOW 20 MIN: CPT | Performed by: OBSTETRICS & GYNECOLOGY

## 2023-07-12 PROCEDURE — 76830 TRANSVAGINAL US NON-OB: CPT | Performed by: OBSTETRICS & GYNECOLOGY

## 2023-07-12 RX ORDER — GLIPIZIDE 10 MG/1
10 TABLET, FILM COATED, EXTENDED RELEASE ORAL DAILY
Qty: 90 TABLET | Refills: 3 | Status: SHIPPED | OUTPATIENT
Start: 2023-07-12 | End: 2024-03-08

## 2023-07-12 NOTE — TELEPHONE ENCOUNTER
glipiZIDE (GLUCOTROL XL) 10 MG 24 hr tablet  Last Written Prescription Date:  3/20/23  Last Fill Quantity: 30,   # refills: 3  Last Office Visit :5/30/23  Future Office visit:  9/26/23      Routing refill request to provider for review/approval because:  My chart request.

## 2023-07-12 NOTE — PROGRESS NOTES
[facility]- OBGYN    CC: ultrasound review    S:Sandy Person is a 43 year old  who presents today for ultrasound review of LLQ pain with periods.  Patient reports her last period on 23 was particularly painful with cramping.  She does still have some LLQ pain today.  She denies any history of VTE, HTN, of migraine with aura.  Reiterates she has a regular, monthly period.        O: Patient Vitals for the past 24 hrs:   BP Pulse SpO2 Weight   23 1301 138/85 85 97 % 110.1 kg (242 lb 11.2 oz)   ]  Exam:  General- awake, alert, answering questions appropriately, appears comfortable.  CV- regular rate  Lung- breathing comfortably on room air    Imaging and Labs:  Gynecological Ultrasound Report  Pelvic U/S - Transvaginal   [facility]  Referring Provider: Anabell Elliott MD   Sonographer:  Juan Jose Galdamez RDMS  Indication: Pelvic pain in female - LLQ  LMP: 2023  History:   Gynecological Ultrasonography:   Uterus: anteflexed. Contour is smooth/regular.  Size: 6.9 x 4.0 x 2.9 cm  Endometrium: Thickness Total 8.3 mm  Findings: Hypoechoic area on uterus 0.5 x 0.2 x 0.4 cm     Right Ovary: 2.2 x 2.2 x 1.7 cm. Complex cysts 1.9 x 1.5 x 2.1 cm & 1.7 x 1.4 x 1.3 cm, free fluid in right adnexal region  Left Ovary: 3.0 x 2.5 x 2.1 cm. Wnl  Cul de Sac Free Fluid: Trace     Impression:   Uterus is normal in size and contour.  Small fluid collection in myometrium adjacent to the cavity.  Bilateral ovaries appear polycystic.  Right ovary contains a small complex cyst vs fibroma.     Kaylin Fernandez MD       A&P: Sandy Person is a 43 year old  who presents today for ultrasound review of LLQ pain with periods.    (R10.2) Pelvic pain in female  (primary encounter diagnosis)  Comment: Reviewed ultrasound findings with patient.  No ultrasound findings that specifically explain LLQ pain.  Discussed possibly ovarian cyst rupture and fluid in adnexal  region as irritating to lining of abdomen and could contribute to some pain, but it is on the right not the left side.  Discussed option to suppress ovulation with birth control such as POPs, OCPs, depo provera.  Patient declines at this time and opts to just monitor.  Also discussed polycystic appearance of ovaries.  Patient denies any hirsutism.  She did have acne in her 20's but this is now resolved.  She has regular monthly period.  Does not at this time meet strict criteria for PCOS.  She is being treated for diabetes. Will not proceed with testosterone lab testing as no clinical signs of hyperandrogenism and no change in management would be indicated if abnormal.   Plan: will treat with ibuprofen and tylenol prn.  She will consider ovulatory suppress and let us know if she would like to proceed with this management in the future.     Anabell Elliott MD

## 2023-08-06 NOTE — LETTER
2/4/2022       RE: Sandy Person  [address] [facility] [address]     Dear Colleague,    Thank you for referring your patient, Sandy Person, to the [facility] ENDOCRINOLOGY CLINIC [place] at [facility]. Please see a copy of my visit note below.    Sandy is a 41 year old who is being evaluated via a billable video visit.      How would you like to obtain your AVS? [employer]  If the video visit is dropped, the invitation should be resent by: Text to cell phone: [phone]  Will anyone else be joining your video visit? No      Outcome for 01/31/22 4:22 PM: [employer] message sent  SIMON Humphreys  Outcome for 02/03/22 9:11 AM: Left Voicemail     Outcome for 02/03/22 9:59 AM: Glucose Readings sent via [employer]  SIMON Humphreys      Due to the COVID 19 pandemic this visit was converted to a video visit in order to help prevent spread of infection in this patient and the general population.    Time of start: 10:30 am  Time of end: 10:50 am  Total duration of video visit:  20 minutes.    HPI  Sandy Person is a 41 year old female with type 2 diabetes mellitus. Video visit for diabetes follow up today.  Pt was seen by Dr. Ishan Duenas in Dec 2021.  Pt gives a hx of type 2 diabetes dx 10 years ago.  She has no known retinopathy, nephropathy or neuropathy.  She has a hx of obesity and is undergoing IVF.  She had been taking Rybelsus which was discontinued in late Nov 2021 by her fertility provider.   She was instructed to remain on Metformin 1000 mg BID and Glipizide 10 mg each am.  Her most recent A1C was 7.2 % on 12/16/2021.  For her diabetes, she is currently taking Metformin 500 mg 2 tab po BID and Glipizide 10 mg in am.  I do not have her glucose meter today, but she provided me with blood sugar readings today which I scanned in her note below.  Her FBS values are in the 180 range.  No hypoglycemia.  On ROS today, she states she has been working on  decreasing carbs in her diet and eating healthy and remaining active. Tearful during our visit today.  She is undergoing IVF and states she will be having another stim test and  harvest done . She hopes to have an embryo implanted in May 2022.  LMP was 1/25/2022.  Pt denies blurred vision, n/v, SOB at rest, cough, fever or chills.  She denies chest pain, abd pain, diarrhea, dysuria or hematuria.  No sx of neuropathy and she denies foot ulcers.  Sandy received the COVID vaccines and COVID booster.        Diabetes Care  Retinopathy: none; pt seen by Oph in Spring 2021 without retinopathy per patient.  Nephropathy:none; will check urine microalbuminuria.  Neuropathy:none.  Foot Exam:no exam.  Taking aspirin: no.  Lipids: no LDL.  CAD: no.  Mental health: no hx of depression.  Insulin: starting basal insulin at hs today.  DM meds: Metformin and Glipizide.  Testing: glucose meter. Will order Freestyle Libre2 sensor today since she will be starting on insulin    ROS  See under HPI.    Allergies  No Known Allergies    Medications  Current Outpatient Medications   Medication Sig Dispense Refill     glipiZIDE (GLIPIZIDE XL) 10 MG 24 hr tablet Take 1 tablet (10 mg) by mouth daily 90 tablet 1   Metformin 500 mg 2 tab po BID.      Family History  family history includes Cancer in her father, maternal grandmother, and mother; Diabetes in her father, maternal grandmother, mother, and paternal grandmother; Heart Disease in her father and mother.    Social History   reports that she has never smoked. She has never used smokeless tobacco. She reports current alcohol use. She reports that she does not use drugs.     Past Medical History  Past Medical History:   Diagnosis Date     Abnormal Pap smear of cervix      Diabetes (H)      History of human papillomavirus infection    Obesity.    Past Surgical History:   Procedure Laterality Date     COLPOSCOPY       TONSILLECTOMY         Physical Exam    See under HPI.    RESULTS  Hemoglobin  A1C   Date Value Ref Range Status   12/16/2021 7.2 (H) 0.0 - 5.6 % Final     Comment:     Normal <5.7%   Prediabetes 5.7-6.4%    Diabetes 6.5% or higher     Note: Adopted from ADA consensus guidelines.           ASSESSMENT/PLAN:    1.  TYPE 2 DIABETES MELLITUS: Sandy's fasting blood sugars are high. She states she has a long hx of high FBS values.  I add Lantus 4 units subcutaneous at hs today. Suspect this dose will need to be increased.  Pt instructed to send me her blood sugar readings next Tuesday 2/8/2022 for review and Lantus will be increased if needed.  She is to remain on Metformin 500 mg 2 tab po BID and Glipizide 10 mg in am.  Since she is now using insulin, I placed an order for a Freestyle Libre2 sensor today and asked her to check her fasting blood sugar each am and bs premeals daily.  She has been working hard on reducing carbs in her diet and eating healthy and remaining active. I reassured her that she is doing a good job and that by adding Lantus at bedtime, her blood sugar values should improved.  I also asked Chaparritamiranda JOSEPH to reach out to patient since I started her on insulin today.  Sandy was seen by Oph in Spring 2021 without retinopathy per patient.  She denies hx of nephropathy. Will check urine microalbuminuria and creat/GFR.  No sx of neuropathy.  I have no vitals today.  Sandy received the COVID vaccines and COVID booster.  She also received the flu vaccine this season.    2.  WEIGHT: Encouraged her to continue to make healthy food choices and remain active.    3.  INFERTILITY: Undergoing IVF treatments as above.  LMP was 1/25/2022.    4. FOLLOW UP: with me on 3/18/2022 at 9:30 am.  She is to send me her blood sugar readings via [employer] next Tuesday 2/8/2022 and Lantus dose will be increased if needed.  A1C,creat/GFR, urine microalbuminuria, TSH and AST/ALT ordered today.    Time spent reviewing chart,labs and glucose data today = 10 minutes.  Time for video visit today = 20  minutes.  Time for documentation today = 15 minutes.    TOTAL TIME FOR VISIT TODAY = 45 minutes.    Vesta Arceo PA-C      Answers for HPI/ROS submitted by the patient on 1/31/2022  General Symptoms: No  Skin Symptoms: No  HENT Symptoms: No  EYE SYMPTOMS: No  HEART SYMPTOMS: No  LUNG SYMPTOMS: No  INTESTINAL SYMPTOMS: No  URINARY SYMPTOMS: No  GYNECOLOGIC SYMPTOMS: No  BREAST SYMPTOMS: No  SKELETAL SYMPTOMS: No  BLOOD SYMPTOMS: No  NERVOUS SYSTEM SYMPTOMS: No  MENTAL HEALTH SYMPTOMS: No       0

## 2023-08-07 DIAGNOSIS — O24.119 PREGNANCY COMPLICATED BY PRE-EXISTING TYPE 2 DIABETES: ICD-10-CM

## 2023-08-10 RX ORDER — PROCHLORPERAZINE 25 MG/1
SUPPOSITORY RECTAL
Qty: 3 EACH | Refills: 11 | Status: SHIPPED | OUTPATIENT
Start: 2023-08-10 | End: 2023-12-05

## 2023-08-10 NOTE — TELEPHONE ENCOUNTER
5/30/2023  [facility]in[facility] [place]     Vesta Arceo PA-C  Endocrinology, Diabetes, and Metabolism

## 2023-09-21 DIAGNOSIS — E11.9 TYPE 2 DIABETES MELLITUS WITHOUT COMPLICATION, WITHOUT LONG-TERM CURRENT USE OF INSULIN (H): ICD-10-CM

## 2023-09-21 RX ORDER — ORAL SEMAGLUTIDE 7 MG/1
7 TABLET ORAL DAILY
Qty: 90 TABLET | Refills: 1 | Status: SHIPPED | OUTPATIENT
Start: 2023-09-21 | End: 2024-03-08

## 2023-09-21 NOTE — TELEPHONE ENCOUNTER
Semaglutide (RYBELSUS) 7 MG tablet       Take 1 tablet (7 mg) by mouth daily Take 7 mg daily after taking Rybelsus 3 mg daily x 30 days.    Last Written Prescription Date:  2-22-23  Last Fill Quantity: 90,   # refills: 1  Last Office Visit : 5-30-23  Future Office visit:  9-6-23    Routing refill request to provider for review/approval because:  Directions w/ adjusting dose, update Rx

## 2023-09-26 ENCOUNTER — OFFICE VISIT (OUTPATIENT)
Dept: ENDOCRINOLOGY | Facility: CLINIC | Age: 43
End: 2023-09-26
Payer: COMMERCIAL

## 2023-09-26 VITALS
SYSTOLIC BLOOD PRESSURE: 152 MMHG | BODY MASS INDEX: 40.68 KG/M2 | HEART RATE: 76 BPM | OXYGEN SATURATION: 95 % | WEIGHT: 237 LBS | DIASTOLIC BLOOD PRESSURE: 91 MMHG

## 2023-09-26 DIAGNOSIS — E11.9 TYPE 2 DIABETES MELLITUS WITHOUT COMPLICATION, WITHOUT LONG-TERM CURRENT USE OF INSULIN (H): Primary | ICD-10-CM

## 2023-09-26 LAB — HBA1C MFR BLD: 5.9 % (ref 4.3–?)

## 2023-09-26 PROCEDURE — 83036 HEMOGLOBIN GLYCOSYLATED A1C: CPT | Performed by: PHYSICIAN ASSISTANT

## 2023-09-26 PROCEDURE — 99215 OFFICE O/P EST HI 40 MIN: CPT | Performed by: PHYSICIAN ASSISTANT

## 2023-09-26 ASSESSMENT — PAIN SCALES - GENERAL: PAINLEVEL: NO PAIN (0)

## 2023-09-26 NOTE — LETTER
9/26/2023       RE: Sandy Person  [address] [facility] [address]     Dear Colleague,    Thank you for referring your patient, Sandy Person, to the [facility] ENDOCRINOLOGY [facility]IN[facility] [place] at [facility]. Please see a copy of my visit note below.        BG data obtained via [employer] portal :            HPI  Sandy Person is a 43 year old female with type 2 diabetes mellitus. Clinic visit for diabetes follow up today.  Hx of IUFD at 19 [id number] s/p dilation and evacuation on 1/31/2023.  Pt's diabetes control was excellent during her pregnancy.  Pt has a hx of type 2 diabetes dx 10 years ago. She has no known retinopathy, nephropathy or neuropathy.  She has a hx of obesity and questionable hx of PCOS.  Sandy tested + for COVID in May 2022 with mild symptom.  She had been taking Rybelsus which was discontinued in late Nov 2021 by her fertility provider.   She was instructed to remain on Metformin 1000 mg BID and Glipizide 10 mg each am.  When she found out she was pregnant Levemir BID and Novolog premeals added.  For her diabetes, she is currently taking Levemir 20 units subcutaneous at hs, Rybelsus 7 mg po daily, Metformin 2000 mg daily and Glipizide 10 mg each am.  Pt's A1C is 5.9 % today.  I reviewed and scanned her DexcomG6 sensor download data in her her chart below.  Her blood sugars are good at this time.  Pt's blood sugar is in target 90 % of the time.   On ROS today, has URI which is resolving.  Mild cough. No SOB at rest or fever.  LMP was last week.  Pt denies blurred vision, n/v, chest pain, abd pain, dysuria or sx of neuropathy.  No foot ulcers.    Diabetes Care  Retinopathy: none; pt seen by Oph in Aug 2022. No retinopathy per patient. Reminded her to schedule annual diabetic eye exam.  Nephropathy:none; urine microalbuminuria negative in 3/2022.  Neuropathy:none.  Foot Exam:no ulcers and normal monofilamentous exam today.  Taking aspirin:  no.  Lipids: no LDL.   CAD: no.  Mental health: no hx of depression.  Insulin: basal insulin once a day.   DM meds: Metformin, Rybelsus and Glipizide.  Testing:  DexcomG6 sensor.            ROS  See under HPI.    Allergies  No Known Allergies    Medications  Current Outpatient Medications   Medication Sig Dispense Refill    blood glucose (NO BRAND SPECIFIED) lancets standard Use to test blood sugar 4 times daily or as directed. 100 each 3    blood glucose (NO BRAND SPECIFIED) test strip Use to test blood sugar 4 times daily or as directed. 400 strip 3    blood glucose monitoring (NO BRAND SPECIFIED) meter device kit Use to test blood sugar 4 times daily or as directed. 1 kit 0    Continuous Blood Gluc Sensor (DEXCOM G6 SENSOR) MISC CHANGE EVERY 10 DAYS. 3 each 11    Continuous Blood Gluc Transmit (DEXCOM G6 TRANSMITTER) MISC 1 each every 3 months Change every 3 months. 1 each 4    glipiZIDE (GLUCOTROL XL) 10 MG 24 hr tablet Take 1 tablet (10 mg) by mouth daily 90 tablet 3    insulin detemir (LEVEMIR PEN) 100 UNIT/ML pen Inject 20-30 units @ HS 15 mL 1    insulin pen needle (BD PEN NEEDLE MELISSA 2ND GEN) 32G X 4 MM miscellaneous Use as directed with insulin 250 each 3    metFORMIN (GLUCOPHAGE) 500 MG tablet Take 1 tablet (500 mg) by mouth daily (with breakfast) Take 2 tabs twice daily (2000mg per day) 360 tablet 3    Semaglutide (RYBELSUS) 7 MG tablet Take 1 tablet (7 mg) by mouth daily 90 tablet 1   Metformin 500 mg 2 tab po BID.      Family History  family history includes Cancer in her father, maternal grandmother, and mother; Diabetes in her father, maternal grandmother, mother, and paternal grandmother; Heart Disease in her father and mother.    Social History   reports that she has never smoked. She has never used smokeless tobacco. She reports that she does not currently use alcohol. She reports that she does not use drugs.     Past Medical History  Past Medical History:   Diagnosis Date    Abnormal Pap smear of  cervix     Diabetes (H)     History of human papillomavirus infection    Obesity.    Past Surgical History:   Procedure Laterality Date    COLPOSCOPY      DILATION AND EVACUATION N/A 1/31/2023    Procedure: AND EVACUATION OF UTERUS;  Surgeon: Carolin Valladares MD;  Location: [facility] OR    REMOVE DILATORS, CERVICAL N/A 1/31/2023    Procedure: REMOVE DILATORS, CERVICAL;  Surgeon: Carolin Valladares MD;  Location: [facility] OR    TONSILLECTOMY         Physical Exam    BP (!) 152/91 (BP Location: Right arm, Patient Position: Sitting, Cuff Size: Adult Regular)   Pulse 76   Wt 107.5 kg (237 lb)   SpO2 95%   BMI 40.68 kg/m       FEET: No ulcers and normal monofilamentous exam.      RESULTS  Creatinine   Date Value Ref Range Status   01/30/2023 0.61 0.52 - 1.04 mg/dL Final     GFR Estimate   Date Value Ref Range Status   01/30/2023 >90 >60 mL/min/1.73m2 Final     Comment:     eGFR calculated using 2021 CKD-EPI equation.     Hemoglobin A1C   Date Value Ref Range Status   05/17/2023 6.0 (H) 0.0 - 5.6 % Final     Comment:     Normal <5.7%   Prediabetes 5.7-6.4%    Diabetes 6.5% or higher     Note: Adopted from ADA consensus guidelines.     ALT   Date Value Ref Range Status   01/30/2023 18 0 - 50 U/L Final     AST   Date Value Ref Range Status   01/30/2023 21 0 - 45 U/L Final     Comment:     Specimen is hemolyzed which can falsely elevate AST. Analysis of a non-hemolyzed specimen may result in a lower value.     TSH   Date Value Ref Range Status   11/21/2022 0.46 0.30 - 4.20 uIU/mL Final   03/06/2022 1.17 0.40 - 4.00 mU/L Final       ASSESSMENT/PLAN:    1.  TYPE 2 DIABETES MELLITUS: Sandy may try IVF in the near future. LMP was last week.  Her glycemic control is good at this time.  Continue current Rybelsus, Metformin, Glipizide and Levemir doses.  Pt will continue to monitor her blood sugar with her DexcomG6 sensor.  Sandy was seen by Oph in Aug 2022  without retinopathy per patient.  Reminded her to schedule an diabetic eye exam.  She  denies hx of nephropathy. Her urine microalbuminuria and creat/GFR normal.  No sx of neuropathy. No foot ulcers today.  BP higher today. I asked her to check her BP at home and notify me or her PCP if her BP is consistently > 130/80.  TSH normal in Nov 2022.     2.  WEIGHT:  Encourage pt to continue to eat healthy and exercise.    3.  FOLLOW UP:  With me in clinic in 4 months.  Fasting lipid panel, AST. Creat/GFR, urine microalbuminuria and TSH ordered.    Time spent reviewing chart,labs and DexcomG6 sensor download today = 6 minutes.  Time for clinic visit today = 25  minutes.  Time for documentation today = 15 minutes.    TOTAL TIME FOR VISIT TODAY = 46 minutes.    Vesta Arceo PA-C

## 2023-09-26 NOTE — NURSING NOTE
"Chief Complaint   Patient presents with    Diabetes     Vital signs:      BP: (!) 152/91 Pulse: 76     SpO2: 95 %       Weight: 107.5 kg (237 lb)  Estimated body mass index is 40.68 kg/m  as calculated from the following:    Height as of 1/30/23: 1.626 m (5' 4\").    Weight as of this encounter: 107.5 kg (237 lb).        "

## 2023-09-26 NOTE — PROGRESS NOTES
HPI  Sandy Person is a 43 year old female with type 2 diabetes mellitus. Clinic visit for diabetes follow up today.  Hx of IUFD at 19 [id number] s/p dilation and evacuation on 1/31/2023.  Pt's diabetes control was excellent during her pregnancy.  Pt has a hx of type 2 diabetes dx 10 years ago. She has no known retinopathy, nephropathy or neuropathy.  She has a hx of obesity and questionable hx of PCOS.  Sandy tested + for COVID in May 2022 with mild symptom.  She had been taking Rybelsus which was discontinued in late Nov 2021 by her fertility provider.   She was instructed to remain on Metformin 1000 mg BID and Glipizide 10 mg each am.  When she found out she was pregnant Levemir BID and Novolog premeals added.  For her diabetes, she is currently taking Levemir 20 units subcutaneous at hs, Rybelsus 7 mg po daily, Metformin 2000 mg daily and Glipizide 10 mg each am.  Pt's A1C is 5.9 % today.  I reviewed and scanned her DexcomG6 sensor download data in her her chart below.  Her blood sugars are good at this time.  Pt's blood sugar is in target 90 % of the time.   On ROS today, has URI which is resolving.  Mild cough. No SOB at rest or fever.  LMP was last week.  Pt denies blurred vision, n/v, chest pain, abd pain, dysuria or sx of neuropathy.  No foot ulcers.    Diabetes Care  Retinopathy: none; pt seen by Oph in Aug 2022. No retinopathy per patient. Reminded her to schedule annual diabetic eye exam.  Nephropathy:none; urine microalbuminuria negative in 3/2022.  Neuropathy:none.  Foot Exam:no ulcers and normal monofilamentous exam today.  Taking aspirin: no.  Lipids: no LDL.   CAD: no.  Mental health: no hx of depression.  Insulin: basal insulin once a day.   DM meds: Metformin, Rybelsus and Glipizide.  Testing:  DexcomG6 sensor.            ROS  See under HPI.    Allergies  No Known Allergies    Medications  Current Outpatient Medications   Medication Sig Dispense Refill    blood glucose (NO BRAND SPECIFIED) lancets  standard Use to test blood sugar 4 times daily or as directed. 100 each 3    blood glucose (NO BRAND SPECIFIED) test strip Use to test blood sugar 4 times daily or as directed. 400 strip 3    blood glucose monitoring (NO BRAND SPECIFIED) meter device kit Use to test blood sugar 4 times daily or as directed. 1 kit 0    Continuous Blood Gluc Sensor (DEXCOM G6 SENSOR) MISC CHANGE EVERY 10 DAYS. 3 each 11    Continuous Blood Gluc Transmit (DEXCOM G6 TRANSMITTER) MISC 1 each every 3 months Change every 3 months. 1 each 4    glipiZIDE (GLUCOTROL XL) 10 MG 24 hr tablet Take 1 tablet (10 mg) by mouth daily 90 tablet 3    insulin detemir (LEVEMIR PEN) 100 UNIT/ML pen Inject 20-30 units @ HS 15 mL 1    insulin pen needle (BD PEN NEEDLE MELISSA 2ND GEN) 32G X 4 MM miscellaneous Use as directed with insulin 250 each 3    metFORMIN (GLUCOPHAGE) 500 MG tablet Take 1 tablet (500 mg) by mouth daily (with breakfast) Take 2 tabs twice daily (2000mg per day) 360 tablet 3    Semaglutide (RYBELSUS) 7 MG tablet Take 1 tablet (7 mg) by mouth daily 90 tablet 1   Metformin 500 mg 2 tab po BID.      Family History  family history includes Cancer in her father, maternal grandmother, and mother; Diabetes in her father, maternal grandmother, mother, and paternal grandmother; Heart Disease in her father and mother.    Social History   reports that she has never smoked. She has never used smokeless tobacco. She reports that she does not currently use alcohol. She reports that she does not use drugs.     Past Medical History  Past Medical History:   Diagnosis Date    Abnormal Pap smear of cervix     Diabetes (H)     History of human papillomavirus infection    Obesity.    Past Surgical History:   Procedure Laterality Date    COLPOSCOPY      DILATION AND EVACUATION N/A 1/31/2023    Procedure: AND EVACUATION OF UTERUS;  Surgeon: Carolin Valladares MD;  [facility]    REMOVE DILATORS, CERVICAL N/A 1/31/2023    Procedure: REMOVE DILATORS, CERVICAL;   Surgeon: Carolin Valladares MD;  [facility]    TONSILLECTOMY         Physical Exam    BP (!) 152/91 (BP Location: Right arm, Patient Position: Sitting, Cuff Size: Adult Regular)   Pulse 76   Wt 107.5 kg (237 lb)   SpO2 95%   BMI 40.68 kg/m       FEET: No ulcers and normal monofilamentous exam.      RESULTS  Creatinine   Date Value Ref Range Status   01/30/2023 0.61 0.52 - 1.04 mg/dL Final     GFR Estimate   Date Value Ref Range Status   01/30/2023 >90 >60 mL/min/1.73m2 Final     Comment:     eGFR calculated using 2021 CKD-EPI equation.     Hemoglobin A1C   Date Value Ref Range Status   05/17/2023 6.0 (H) 0.0 - 5.6 % Final     Comment:     Normal <5.7%   Prediabetes 5.7-6.4%    Diabetes 6.5% or higher     Note: Adopted from ADA consensus guidelines.     ALT   Date Value Ref Range Status   01/30/2023 18 0 - 50 U/L Final     AST   Date Value Ref Range Status   01/30/2023 21 0 - 45 U/L Final     Comment:     Specimen is hemolyzed which can falsely elevate AST. Analysis of a non-hemolyzed specimen may result in a lower value.     TSH   Date Value Ref Range Status   11/21/2022 0.46 0.30 - 4.20 uIU/mL Final   03/06/2022 1.17 0.40 - 4.00 mU/L Final       ASSESSMENT/PLAN:    1.  TYPE 2 DIABETES MELLITUS: Sandy may try IVF in the near future. LMP was last week.  Her glycemic control is good at this time.  Continue current Rybelsus, Metformin, Glipizide and Levemir doses.  Pt will continue to monitor her blood sugar with her DexcomG6 sensor.  Sandy was seen by Oph in Aug 2022  without retinopathy per patient.  Reminded her to schedule an diabetic eye exam.  She denies hx of nephropathy. Her urine microalbuminuria and creat/GFR normal.  No sx of neuropathy. No foot ulcers today.  BP higher today. I asked her to check her BP at home and notify me or her PCP if her BP is consistently > 130/80.  TSH normal in Nov 2022.     2.  WEIGHT:  Encourage pt to continue to eat healthy and exercise.    3.  FOLLOW UP:  With me in clinic  in 4 months.  Fasting lipid panel, AST. Creat/GFR, urine microalbuminuria and TSH ordered.    Time spent reviewing chart,labs and DexcomG6 sensor download today = 6 minutes.  Time for clinic visit today = 25  minutes.  Time for documentation today = 15 minutes.    TOTAL TIME FOR VISIT TODAY = 46 minutes.    Vesta Arceo PA-C

## 2023-10-17 ENCOUNTER — LAB (OUTPATIENT)
Dept: LAB | Facility: CLINIC | Age: 43
End: 2023-10-17
Payer: COMMERCIAL

## 2023-10-17 ENCOUNTER — IMMUNIZATION (OUTPATIENT)
Dept: FAMILY MEDICINE | Facility: CLINIC | Age: 43
End: 2023-10-17
Payer: COMMERCIAL

## 2023-10-17 ENCOUNTER — TELEPHONE (OUTPATIENT)
Dept: ENDOCRINOLOGY | Facility: CLINIC | Age: 43
End: 2023-10-17

## 2023-10-17 DIAGNOSIS — Z23 NEED FOR PROPHYLACTIC VACCINATION AND INOCULATION AGAINST INFLUENZA: Primary | ICD-10-CM

## 2023-10-17 DIAGNOSIS — E11.9 TYPE 2 DIABETES MELLITUS WITHOUT COMPLICATION, WITHOUT LONG-TERM CURRENT USE OF INSULIN (H): ICD-10-CM

## 2023-10-17 LAB
AST SERPL W P-5'-P-CCNC: 27 U/L (ref 0–45)
CHOLEST SERPL-MCNC: 169 MG/DL
CREAT SERPL-MCNC: 0.73 MG/DL (ref 0.51–0.95)
CREAT UR-MCNC: 135 MG/DL
EGFRCR SERPLBLD CKD-EPI 2021: >90 ML/MIN/1.73M2
HDLC SERPL-MCNC: 46 MG/DL
LDLC SERPL CALC-MCNC: 96 MG/DL
MICROALBUMIN UR-MCNC: <12 MG/L
MICROALBUMIN/CREAT UR: NORMAL MG/G{CREAT}
NONHDLC SERPL-MCNC: 123 MG/DL
TRIGL SERPL-MCNC: 137 MG/DL
TSH SERPL DL<=0.005 MIU/L-ACNC: 1.18 UIU/ML (ref 0.3–4.2)

## 2023-10-17 PROCEDURE — 82570 ASSAY OF URINE CREATININE: CPT

## 2023-10-17 PROCEDURE — 82565 ASSAY OF CREATININE: CPT

## 2023-10-17 PROCEDURE — 84443 ASSAY THYROID STIM HORMONE: CPT

## 2023-10-17 PROCEDURE — 90686 IIV4 VACC NO PRSV 0.5 ML IM: CPT

## 2023-10-17 PROCEDURE — 84450 TRANSFERASE (AST) (SGOT): CPT

## 2023-10-17 PROCEDURE — 80061 LIPID PANEL: CPT

## 2023-10-17 PROCEDURE — 36415 COLL VENOUS BLD VENIPUNCTURE: CPT

## 2023-10-17 PROCEDURE — 90471 IMMUNIZATION ADMIN: CPT

## 2023-10-17 PROCEDURE — 82043 UR ALBUMIN QUANTITATIVE: CPT

## 2023-11-28 ASSESSMENT — ENCOUNTER SYMPTOMS
EYE PAIN: 0
HEMATURIA: 0
PARESTHESIAS: 0
COUGH: 0
NAUSEA: 0
SORE THROAT: 0
HEMATOCHEZIA: 0
DYSURIA: 0
SHORTNESS OF BREATH: 0
DIZZINESS: 0
BREAST MASS: 0
JOINT SWELLING: 0
HEARTBURN: 0
FREQUENCY: 0
PALPITATIONS: 0
FEVER: 0
CONSTIPATION: 0
ARTHRALGIAS: 0
WEAKNESS: 0
NERVOUS/ANXIOUS: 0
MYALGIAS: 0
HEADACHES: 0
DIARRHEA: 0
CHILLS: 0
ABDOMINAL PAIN: 1

## 2023-12-05 ENCOUNTER — OFFICE VISIT (OUTPATIENT)
Dept: FAMILY MEDICINE | Facility: CLINIC | Age: 43
End: 2023-12-05
Payer: COMMERCIAL

## 2023-12-05 VITALS
HEIGHT: 64 IN | SYSTOLIC BLOOD PRESSURE: 148 MMHG | TEMPERATURE: 97.7 F | RESPIRATION RATE: 20 BRPM | HEART RATE: 73 BPM | DIASTOLIC BLOOD PRESSURE: 87 MMHG | BODY MASS INDEX: 41.15 KG/M2 | WEIGHT: 241 LBS | OXYGEN SATURATION: 97 %

## 2023-12-05 DIAGNOSIS — J34.2 DNS (DEVIATED NASAL SEPTUM): ICD-10-CM

## 2023-12-05 DIAGNOSIS — E11.9 TYPE 2 DIABETES MELLITUS WITHOUT COMPLICATION, WITHOUT LONG-TERM CURRENT USE OF INSULIN (H): Primary | ICD-10-CM

## 2023-12-05 DIAGNOSIS — Z76.89 ENCOUNTER TO ESTABLISH CARE WITH NEW DOCTOR: ICD-10-CM

## 2023-12-05 DIAGNOSIS — Z82.49 FAMILY HISTORY OF ISCHEMIC HEART DISEASE: ICD-10-CM

## 2023-12-05 DIAGNOSIS — I10 ESSENTIAL HYPERTENSION: ICD-10-CM

## 2023-12-05 DIAGNOSIS — E66.01 CLASS 3 SEVERE OBESITY DUE TO EXCESS CALORIES WITH SERIOUS COMORBIDITY AND BODY MASS INDEX (BMI) OF 40.0 TO 44.9 IN ADULT (H): ICD-10-CM

## 2023-12-05 DIAGNOSIS — R06.83 SNORING: ICD-10-CM

## 2023-12-05 DIAGNOSIS — R03.0 ELEVATED BLOOD PRESSURE READING WITHOUT DIAGNOSIS OF HYPERTENSION: ICD-10-CM

## 2023-12-05 DIAGNOSIS — Z80.3 FAMILY HISTORY OF MALIGNANT NEOPLASM OF BREAST: ICD-10-CM

## 2023-12-05 DIAGNOSIS — J30.2 SEASONAL ALLERGIC RHINITIS, UNSPECIFIED TRIGGER: ICD-10-CM

## 2023-12-05 DIAGNOSIS — E66.813 CLASS 3 SEVERE OBESITY DUE TO EXCESS CALORIES WITH SERIOUS COMORBIDITY AND BODY MASS INDEX (BMI) OF 40.0 TO 44.9 IN ADULT (H): ICD-10-CM

## 2023-12-05 DIAGNOSIS — Z12.31 ENCOUNTER FOR SCREENING MAMMOGRAM FOR BREAST CANCER: ICD-10-CM

## 2023-12-05 LAB
ALBUMIN SERPL BCG-MCNC: 3.8 G/DL (ref 3.5–5.2)
ALP SERPL-CCNC: 55 U/L (ref 40–150)
ALT SERPL W P-5'-P-CCNC: 33 U/L (ref 0–50)
AST SERPL W P-5'-P-CCNC: 31 U/L (ref 0–45)
BILIRUB DIRECT SERPL-MCNC: <0.2 MG/DL (ref 0–0.3)
BILIRUB SERPL-MCNC: 0.3 MG/DL
ERYTHROCYTE [DISTWIDTH] IN BLOOD BY AUTOMATED COUNT: 12.7 % (ref 10–15)
FERRITIN SERPL-MCNC: 33 NG/ML (ref 6–175)
HBA1C MFR BLD: 5.9 % (ref 0–5.6)
HCT VFR BLD AUTO: 39.2 % (ref 35–47)
HGB BLD-MCNC: 12.9 G/DL (ref 11.7–15.7)
IRON BINDING CAPACITY (ROCHE): 339 UG/DL (ref 240–430)
IRON SATN MFR SERPL: 11 % (ref 15–46)
IRON SERPL-MCNC: 36 UG/DL (ref 37–145)
MCH RBC QN AUTO: 29.2 PG (ref 26.5–33)
MCHC RBC AUTO-ENTMCNC: 32.9 G/DL (ref 31.5–36.5)
MCV RBC AUTO: 89 FL (ref 78–100)
PLATELET # BLD AUTO: 193 10E3/UL (ref 150–450)
PROT SERPL-MCNC: 6.6 G/DL (ref 6.4–8.3)
RBC # BLD AUTO: 4.42 10E6/UL (ref 3.8–5.2)
VIT D+METAB SERPL-MCNC: 37 NG/ML (ref 20–50)
WBC # BLD AUTO: 7.9 10E3/UL (ref 4–11)

## 2023-12-05 PROCEDURE — 85027 COMPLETE CBC AUTOMATED: CPT | Performed by: NURSE PRACTITIONER

## 2023-12-05 PROCEDURE — 83036 HEMOGLOBIN GLYCOSYLATED A1C: CPT | Performed by: NURSE PRACTITIONER

## 2023-12-05 PROCEDURE — 91320 SARSCV2 VAC 30MCG TRS-SUC IM: CPT | Performed by: NURSE PRACTITIONER

## 2023-12-05 PROCEDURE — 99215 OFFICE O/P EST HI 40 MIN: CPT | Mod: 25 | Performed by: NURSE PRACTITIONER

## 2023-12-05 PROCEDURE — 36415 COLL VENOUS BLD VENIPUNCTURE: CPT | Performed by: NURSE PRACTITIONER

## 2023-12-05 PROCEDURE — 83550 IRON BINDING TEST: CPT | Performed by: NURSE PRACTITIONER

## 2023-12-05 PROCEDURE — 90480 ADMN SARSCOV2 VAC 1/ONLY CMP: CPT | Performed by: NURSE PRACTITIONER

## 2023-12-05 PROCEDURE — 99396 PREV VISIT EST AGE 40-64: CPT | Performed by: NURSE PRACTITIONER

## 2023-12-05 PROCEDURE — 83540 ASSAY OF IRON: CPT | Performed by: NURSE PRACTITIONER

## 2023-12-05 PROCEDURE — 82306 VITAMIN D 25 HYDROXY: CPT | Performed by: NURSE PRACTITIONER

## 2023-12-05 PROCEDURE — 82728 ASSAY OF FERRITIN: CPT | Performed by: NURSE PRACTITIONER

## 2023-12-05 PROCEDURE — 80076 HEPATIC FUNCTION PANEL: CPT | Performed by: NURSE PRACTITIONER

## 2023-12-05 RX ORDER — VITAMIN B COMPLEX
25 TABLET ORAL DAILY
COMMUNITY
End: 2024-06-14

## 2023-12-05 RX ORDER — ACYCLOVIR 400 MG/1
1 TABLET ORAL
Qty: 3 EACH | Refills: 5 | Status: SHIPPED | OUTPATIENT
Start: 2023-12-05 | End: 2024-06-18

## 2023-12-05 RX ORDER — CALCIUM CARBONATE/VITAMIN D3 500-10/5ML
400 LIQUID (ML) ORAL DAILY
COMMUNITY

## 2023-12-05 RX ORDER — ACYCLOVIR 400 MG/1
1 TABLET ORAL ONCE
Qty: 1 EACH | Refills: 0 | Status: SHIPPED | OUTPATIENT
Start: 2023-12-05 | End: 2023-12-05

## 2023-12-05 ASSESSMENT — ENCOUNTER SYMPTOMS
PARESTHESIAS: 0
ABDOMINAL PAIN: 1
ARTHRALGIAS: 0
HEMATURIA: 0
DIARRHEA: 0
HEADACHES: 0
DYSURIA: 0
FEVER: 0
HEMATOCHEZIA: 0
FREQUENCY: 0
JOINT SWELLING: 0
SHORTNESS OF BREATH: 0
BREAST MASS: 0
CONSTIPATION: 0
NERVOUS/ANXIOUS: 0
DIZZINESS: 0
WEAKNESS: 0
COUGH: 0
MYALGIAS: 0
PALPITATIONS: 0
HEARTBURN: 0
SORE THROAT: 0
NAUSEA: 0
EYE PAIN: 0
CHILLS: 0

## 2023-12-05 NOTE — PROGRESS NOTES
"   SUBJECTIVE:   Sandy is a 43 year old, presenting for the following:  Physical        2023     7:42 AM   Additional Questions   Roomed by nano   Accompanied by self     43 year old year old female  with PMH   Patient Active Problem List   Diagnosis Code    Missed  with fetal demise before 20 completed weeks of gestation O02.1    Impingement syndrome of left shoulder M75.42    in clinic for preventive health care exam.     Mother age 60's MI   - stopped phentermine, glipizide   - started wegovy  - elvated blood pressure    - constant sniffing; post-nasal; taking allegra 180 mg ; hx of sinus infection as a kid; with working out notices it harder to break through nose; feeling left maxillary sinus pressure;+ snoring \"gentle\"    - paternal 2 uncles and 1 aunt; and paternal cousin (aunt  Paternal colon ca age 80' ; mother bile duct age 82;   Maternal GM Breast cancer and bone        In addition to the preventive visit, 44  minutes of the appointment were spent evaluating and developing a treatment plan for her additional concern(s).        Healthy Habits:     Getting at least 3 servings of Calcium per day:  Yes    Bi-annual eye exam:  Yes    Dental care twice a year:  Yes    Sleep apnea or symptoms of sleep apnea:  None    Diet:  Diabetic    Frequency of exercise:  2-3 days/week    Duration of exercise:  30-45 minutes    Taking medications regularly:  Yes    Medication side effects:  None    Additional concerns today:  Yes                PROBLEMS TO ADD ON...  Have you ever done Advance Care Planning? (For example, a Health Directive, POLST, or a discussion with a medical provider or your loved ones about your wishes): No, advance care planning information given to patient to review.  Patient plans to discuss their wishes with loved ones or provider.      Social History     Tobacco Use    Smoking status: Never    Smokeless tobacco: Never   Substance Use Topics    Alcohol use: Not Currently     Comment: 1-2 " drinks per month             11/28/2023    12:38 PM   Alcohol Use   Prescreen: >3 drinks/day or >7 drinks/week? No          No data to display              Reviewed orders with patient.  Reviewed health maintenance and updated orders accordingly - Yes  Lab work is in process  Labs reviewed in EPIC  BP Readings from Last 3 Encounters:   12/05/23 (!) 148/87   09/26/23 (!) 152/91   07/12/23 138/85    Wt Readings from Last 3 Encounters:   12/05/23 109.3 kg (241 lb)   09/26/23 107.5 kg (237 lb)   07/12/23 110.1 kg (242 lb 11.2 oz)                    Breast Cancer Screening:    FHS-7:       11/28/2023    12:39 PM   Breast CA Risk Assessment (FHS-7)   Did any of your first-degree relatives have breast or ovarian cancer? No   Did any of your relatives have bilateral breast cancer? Yes   Did any man in your family have breast cancer? No   Did any woman in your family have breast and ovarian cancer? Yes   Did any woman in your family have breast cancer before age 50 y? Yes   Do you have 2 or more relatives with breast and/or ovarian cancer? No   Do you have 2 or more relatives with breast and/or bowel cancer? Yes       Mammogram Screening - Offered annual screening and updated Health Maintenance for mutual plan based on risk factor consideration  Pertinent mammograms are reviewed under the imaging tab.    History of abnormal Pap smear: NO - age 30-65 PAP every 5 years with negative HPV co-testing recommended      Latest Ref Rng & Units 7/22/2021     4:47 PM   PAP / HPV   PAP  Negative for Intraepithelial Lesion or Malignancy (NILM)    HPV 16 DNA Negative Negative    HPV 18 DNA Negative Negative    Other HR HPV Negative Negative      Reviewed and updated as needed this visit by clinical staff   Tobacco  Allergies  Meds  Problems  Med Hx  Surg Hx  Fam Hx          Reviewed and updated as needed this visit by Provider   Tobacco  Allergies  Meds  Problems  Med Hx  Surg Hx  Fam Hx         Past Medical History:  "  Diagnosis Date    Abnormal Pap smear of cervix     Diabetes (H)     History of human papillomavirus infection         Review of Systems   Constitutional:  Negative for chills and fever.   HENT:  Negative for congestion, ear pain, hearing loss and sore throat.    Eyes:  Negative for pain and visual disturbance.   Respiratory:  Negative for cough and shortness of breath.    Cardiovascular:  Negative for chest pain, palpitations and peripheral edema.   Gastrointestinal:  Positive for abdominal pain. Negative for constipation, diarrhea, heartburn, hematochezia and nausea.   Breasts:  Negative for tenderness, breast mass and discharge.   Genitourinary:  Negative for dysuria, frequency, genital sores, hematuria, pelvic pain, urgency, vaginal bleeding and vaginal discharge.   Musculoskeletal:  Negative for arthralgias, joint swelling and myalgias.   Skin:  Negative for rash.   Neurological:  Negative for dizziness, weakness, headaches and paresthesias.   Psychiatric/Behavioral:  Negative for mood changes. The patient is not nervous/anxious.           OBJECTIVE:   BP (!) 148/87 (BP Location: Right arm, Patient Position: Sitting, Cuff Size: Adult Large)   Pulse 73   Temp 97.7  F (36.5  C) (Temporal)   Resp 20   Ht 1.62 m (5' 3.78\")   Wt 109.3 kg (241 lb)   SpO2 97%   BMI 41.65 kg/m    Physical Exam  GENERAL: healthy, alert and no distress  EYES: Eyes grossly normal to inspection, PERRL and conjunctivae and sclerae normal  HENT: ear canals and TM's normal, nose and mouth without ulcers or lesions  NECK: no adenopathy, no asymmetry, masses, or scars and thyroid normal to palpation  RESP: lungs clear to auscultation - no rales, rhonchi or wheezes  BREAST: normal without masses, tenderness or nipple discharge and no palpable axillary masses or adenopathy  CV: regular rate and rhythm, normal S1 S2, no S3 or S4, no murmur, click or rub, no peripheral edema and peripheral pulses strong  ABDOMEN: soft, nontender, no " hepatosplenomegaly, no masses and bowel sounds normal  MS: no gross musculoskeletal defects noted, no edema  SKIN: no suspicious lesions or rashes  NEURO: Normal strength and tone, mentation intact and speech normal  PSYCH: mentation appears normal, affect normal/bright    Diagnostic Test Results:  Labs reviewed in Epic    ASSESSMENT/PLAN:   Sandy was seen today for physical.    Diagnoses and all orders for this visit:    Routine general medical examination at a health care facility  Preventative exam w/no abnormalities and/or concerns listed in diagnoses; discussed health maintenance screenings including prostate, breast, cervical and colorectal ca screenings related to gender;  reviewed and reconciled medication, medical history and patient related health concerns  Plan: obtain metabolic labs  -     Continuous Blood Gluc  (DEXCOM G7 ) POOL; 1 each once for 1 dose Use to read blood sugars as per manufacturers instructions  -     Continuous Blood Gluc Sensor (DEXCOM G7 SENSOR) MISC; 1 each every 10 days Change sensor every 10 days  -     Hemoglobin A1c; Future  -     Vitamin D Deficiency; Future  -     Ferritin; Future  -     CBC with platelets; Future  -     Iron & Iron Binding Capacity; Future  -     Hepatic function panel; Future  -     Hemoglobin A1c  -     Vitamin D Deficiency  -     Ferritin  -     CBC with platelets  -     Iron & Iron Binding Capacity  -     Hepatic function panel    Type 2 diabetes mellitus without complication, without long-term current use of insulin (H)  -     Continuous Blood Gluc  (DEXCOM G7 ) POOL; 1 each once for 1 dose Use to read blood sugars as per manufacturers instructions  -     Continuous Blood Gluc Sensor (DEXCOM G7 SENSOR) MISC; 1 each every 10 days Change sensor every 10 days  -     Hemoglobin A1c; Future    Encounter for screening mammogram for breast cancer  -     *MA Screening Digital Bilateral    Class 3 severe obesity due to excess  "calories with serious comorbidity and body mass index (BMI) of 40.0 to 44.9 in adult (H)    Estimated body mass index is 41.65 kg/m  as calculated from the following:    Height as of this encounter: 1.62 m (5' 3.78\").    Weight as of this encounter: 109.3 kg (241 lb).   discussed treatment options w/phentermine, topiramate, natrexone, wellbutrin and glp-1 therapy; reviewed side effects of all medications; discussed and lifestyle changes including starting an exercise/activity program 30 minutes daily, daily caloric/cho/protein; meal tracking;    - reduce caloric intake 1800-2K per day; cho 100-150 gms per meal; protein 25-30 gm per meal  - check nutritional status; A1c, Lipid, Tsh, Vit D, B12      -     Hemoglobin A1c; Future  -     Vitamin D Deficiency; Future  -     Hepatic function panel; Future  -     phentermine (ADIPEX-P) 15 MG capsule; Take 1 capsule (15 mg) by mouth every morning    Elevated blood pressure reading without diagnosis of hypertension  For your blood pressure:  Check your blood pressure once a day  It can be at different times of day, but you should be sitting restfully for ~10 minutes before you take it.  Note your blood pressure on a paper log or on your phone  In ~2 weeks, send me a message on [employer] with your results.  Your goal is <140/90, even better is <130/80.  Low sodium, high potassium (DASH) diet.    Encounter to establish care with new doctor  Reviewed chronic health conditions; medications, labs and pertinent health concerns today    Family history of ischemic heart disease  The 10-year ASCVD risk score (Saray DAWN, et al., 2019) is: 1.8%    Values used to calculate the score:      Age: 43 years      Sex: Female      Is Non- : No      Diabetic: Yes      Tobacco smoker: No      Systolic Blood Pressure: 148 mmHg      Is BP treated: No      HDL Cholesterol: 46 mg/dL      Total Cholesterol: 169 mg/dL    Other orders  -     REVIEW OF HEALTH MAINTENANCE PROTOCOL " "ORDERS  -     COVID-19 12+ (2023-24) (PFIZER)  -     PRIMARY CARE FOLLOW-UP SCHEDULING; Future        Patient has been advised of split billing requirements and indicates understanding: Yes      COUNSELING:  Reviewed preventive health counseling, as reflected in patient instructions       Regular exercise       Healthy diet/nutrition       Family planning      BMI:   Estimated body mass index is 41.65 kg/m  as calculated from the following:    Height as of this encounter: 1.62 m (5' 3.78\").    Weight as of this encounter: 109.3 kg (241 lb).   Weight management plan: Discussed healthy diet and exercise guidelines      She reports that she has never smoked. She has never used smokeless tobacco.          BARBER Lyman CNP  [facility]  "

## 2023-12-06 RX ORDER — PHENTERMINE HYDROCHLORIDE 15 MG/1
15 CAPSULE ORAL EVERY MORNING
Qty: 30 CAPSULE | Refills: 2 | Status: SHIPPED | OUTPATIENT
Start: 2023-12-06 | End: 2024-03-08

## 2023-12-07 ENCOUNTER — TELEPHONE (OUTPATIENT)
Dept: FAMILY MEDICINE | Facility: CLINIC | Age: 43
End: 2023-12-07
Payer: COMMERCIAL

## 2023-12-07 NOTE — TELEPHONE ENCOUNTER
Please route determinations to the Pharm Diabetes pool ([id number]).      Thank you!    Diabetes Care Services Team   [facility]  [address] [place] [address]

## 2023-12-07 NOTE — TELEPHONE ENCOUNTER
Please route determinations to the Pharm Diabetes pool ([id number]).      Thank you!    Diabetes Care Services Team   [facility]  [address] [place] [address]

## 2023-12-11 NOTE — TELEPHONE ENCOUNTER
Central Prior Authorization Team   Phone: [phone]    PA Initiation    Medication: Dexcom G7 Sensor  Insurance Company: [employer] - Phone [phone] Fax [phone]  Pharmacy Filling the Rx: [facility] - [place], [place] - [address]  Filling Pharmacy Phone: [phone]  Filling Pharmacy Fax:    Start Date: 12/11/2023

## 2023-12-11 NOTE — TELEPHONE ENCOUNTER
Central Prior Authorization Team   Phone: [phone]    PA Initiation    Med[facility]  Insurance Company: [employer] - Phone [phone] Fax [phone]  Pharmacy Filling the Rx: [facility] - [place], [place] - [address]  Filling Pharmacy Phone: [phone]  Filling Pharmacy Fax:    Start Date: 12/11/2023

## 2023-12-13 NOTE — TELEPHONE ENCOUNTER
Prior Authorization Approval    Authorization Effective Date: 12/12/2023  Authorization Expiration Date: 12/12/2024  Medication: Dexcom G7 Sensor  Reference #:     Insurance Company: [employer] - Phone [phone] Fax [phone]  Which Pharmacy is filling the prescription (Not needed for infusion/clinic administered): [facility] - [facility] [address]  Pharmacy Notified: Yes  Patient Notified: Instructed pharmacy to notify patient when script is ready to /ship.

## 2023-12-13 NOTE — TELEPHONE ENCOUNTER
Prior Authorization Approval    Authorization Effective Date: 12/12/2023  Authorization Expiration Date: 12/12/2024  Medication: Dexcom G7 Reciever  Reference #:     Insurance Company: [employer] - Phone [phone] Fax [phone]  Which Pharmacy is filling the prescription (Not needed for infusion/clinic administered): [facility] - [place] [address]  Pharmacy Notified: Yes  Patient Notified: Instructed pharmacy to notify patient when script is ready to /ship.

## 2024-01-17 NOTE — PROGRESS NOTES
Outcome for 01/17/24 2:51 PM: Data uploaded on Dexcom  Susan Freeman LPN   Outcome for 01/23/24 9:08 AM: Data obtained via Dexcom website  Susan Freeman LPN       Patient is showing 3/5 [place]CM met. BP out range and Not on statin   Susan Freeman LPN

## 2024-01-25 ENCOUNTER — VIRTUAL VISIT (OUTPATIENT)
Dept: ENDOCRINOLOGY | Facility: CLINIC | Age: 44
End: 2024-01-25
Payer: COMMERCIAL

## 2024-01-25 DIAGNOSIS — E11.9 TYPE 2 DIABETES MELLITUS WITHOUT COMPLICATION, WITHOUT LONG-TERM CURRENT USE OF INSULIN (H): Primary | ICD-10-CM

## 2024-01-25 PROCEDURE — 99214 OFFICE O/P EST MOD 30 MIN: CPT | Mod: 95 | Performed by: PHYSICIAN ASSISTANT

## 2024-01-25 NOTE — NURSING NOTE
Is the patient currently in the state of [place]? YES    Visit mode:VIDEO    If the visit is dropped, the patient can be reconnected by: VIDEO VISIT: Text to cell phone:   Telephone Information:   Mobile [phone]       Will anyone else be joining the visit? NO  (If patient encounters technical issues they should call [phone]    How would you like to obtain your AVS? MyChart    Are changes needed to the allergy or medication list? Patient reported no changes to allergies or medications.   Patient reports that she didn't make any changes in her e-check in information for visit and that information was accurate.     Reason for visit: RECHECK (Follow up)    Mague AZUL

## 2024-01-25 NOTE — LETTER
1/25/2024       RE: Sandy Person  [address] [facility] [address]     Dear Colleague,    Thank you for referring your patient, Sandy Person, to the [facility] [place] [facility]. Please see a copy of my visit note below.    Outcome for 01/17/24 2:51 PM: Data uploaded on Dexcom  Susan Freeman LPN   Outcome for 01/23/24 9:08 AM: Data obtained via Dexcom website  Susan Freeman LPN       Patient is showing 3/5 [place]CM met. BP out range and Not on statin   Susan Freeman LPN              Time of start: 8:30 am   Time of end: 8:46 am  Total duration of video visit: 16 minutes.  Providers location: offsite  Patients location: [place].    HPI  Sandy Person is a 43 year old female with type 2 diabetes mellitus. Video visit for diabetes follow up today.  Hx of IUFD at 19 [id number] s/p dilation and evacuation on 1/31/2023.  Pt's diabetes control was excellent during her pregnancy.  Pt has a hx of type 2 diabetes dx 10 years ago. She has no known retinopathy, nephropathy or neuropathy.  She has a hx of obesity and questionable hx of PCOS.  Sandy tested + for COVID in May 2022 with mild symptom.  She had been taking Rybelsus which was discontinued in late Nov 2021 by her fertility provider.   She was instructed to remain on Metformin 1000 mg BID and Glipizide 10 mg each am.  When she found out she was pregnant Levemir BID and Novolog premeals added.  She was seen by her PCP in early Dec 2023 and her Levemir was discontinued and phentermine 15 mg daily was added to help with weight loss.  For her diabetes, she is currently taking  Rybelsus 7 mg po daily, Metformin 2000 mg daily and Glipizide 10 mg each am.    Most recent A1 C was 5.9 % on 12/5/2023.  I reviewed and scanned her DexcomG7 sensor download data in her her chart below.  Her blood sugars are good at this time.  Pt's blood sugar is in target 96 % of the time with an estimated A1C 6.1 %  On ROS  today, she reports 3 lbs weight loss since taking phentermine.  Sandy works out 4-5 days per week.  LMP was 1/17/2024.  Pt not trying to get pregnant at this time.  Pt denies blurred vision, n/v, chest pain, abd pain, dysuria or sx of neuropathy.  No foot ulcers.    Diabetes Care  Retinopathy: none; pt seen by Oph in Aug 2028.  Nephropathy:none; urine microalbuminuria negative in 10/2023.  Neuropathy:none.  Foot Exam:no exam today.  Taking aspirin: no.  Lipids: LDL 96 in 10/2023.  CAD: no.  Mental health: no hx of depression.  Insulin: none.  DM meds: Metformin, Rybelsus and Glipizide.  Pt also started on phentermine by PCP.  MTM referral placed today to start/titrate Mounjaro and discontinue Rybelsus, Glipizide and phentermine.  Testing:  DexcomG7 sensor.            ROS  See under HPI.    Allergies  No Known Allergies    Medications  Current Outpatient Medications   Medication Sig Dispense Refill    blood glucose (NO BRAND SPECIFIED) lancets standard Use to test blood sugar 4 times daily or as directed. 100 each 3    blood glucose (NO BRAND SPECIFIED) test strip Use to test blood sugar 4 times daily or as directed. 400 strip 3    blood glucose monitoring (NO BRAND SPECIFIED) meter device kit Use to test blood sugar 4 times daily or as directed. 1 kit 0    Continuous Blood Gluc Sensor (DEXCOM G7 SENSOR) MISC 1 each every 10 days Change sensor every 10 days 3 each 5    glipiZIDE (GLUCOTROL XL) 10 MG 24 hr tablet Take 1 tablet (10 mg) by mouth daily 90 tablet 3    insulin detemir (LEVEMIR PEN) 100 UNIT/ML pen Inject 20-30 units @ HS 15 mL 1    insulin pen needle (BD PEN NEEDLE MELISSA 2ND GEN) 32G X 4 MM miscellaneous Use as directed with insulin 250 each 3    magnesium oxide 400 MG CAPS Take 400 mg by mouth daily      metFORMIN (GLUCOPHAGE) 500 MG tablet Take 1 tablet (500 mg) by mouth daily (with breakfast) Take 2 tabs twice daily (2000mg per day) 360 tablet 3    phentermine (ADIPEX-P) 15 MG capsule Take 1 capsule (15  mg) by mouth every morning 30 capsule 2    Semaglutide (RYBELSUS) 7 MG tablet Take 1 tablet (7 mg) by mouth daily 90 tablet 1    Vitamin D3 (VITAMIN D, CHOLECALCIFEROL,) 25 mcg (1000 units) tablet Take 25 mcg by mouth daily     Metformin 500 mg 2 tab po BID.      Family History  family history includes Cancer in her father, maternal grandmother, and mother; Diabetes in her father, maternal grandmother, mother, and paternal grandmother; Heart Disease in her father and mother.    Social History   reports that she has never smoked. She has never used smokeless tobacco. She reports that she does not currently use alcohol. She reports that she does not use drugs.     Past Medical History  Past Medical History:   Diagnosis Date    Abnormal Pap smear of cervix     Diabetes (H)     History of human papillomavirus infection    Obesity.    Past Surgical History:   Procedure Laterality Date    COLPOSCOPY      DILATION AND EVACUATION N/A 1/31/2023    Procedure: AND EVACUATION OF UTERUS;  Surgeon: Carolin Valladares MD;  Location: [facility] OR    REMOVE DILATORS, CERVICAL N/A 1/31/2023    Procedure: REMOVE DILATORS, CERVICAL;  Surgeon: Carolin Valladares MD;  Location: [facility] OR    TONSILLECTOMY         Physical Exam    No exam today.      RESULTS  Creatinine   Date Value Ref Range Status   10/17/2023 0.73 0.51 - 0.95 mg/dL Final     GFR Estimate   Date Value Ref Range Status   10/17/2023 >90 >60 mL/min/1.73m2 Final     Hemoglobin A1C   Date Value Ref Range Status   12/05/2023 5.9 (H) 0.0 - 5.6 % Final     Comment:     Normal <5.7%   Prediabetes 5.7-6.4%    Diabetes 6.5% or higher     Note: Adopted from ADA consensus guidelines.     ALT   Date Value Ref Range Status   12/05/2023 33 0 - 50 U/L Final     Comment:     Reference intervals for this test were updated on 6/12/2023 to more accurately reflect our healthy population. There may be differences in the flagging of prior results with similar values performed with this method.  Interpretation of those prior results can be made in the context of the updated reference intervals.       AST   Date Value Ref Range Status   12/05/2023 31 0 - 45 U/L Final     Comment:     Reference intervals for this test were updated on 6/12/2023 to more accurately reflect our healthy population. There may be differences in the flagging of prior results with similar values performed with this method. Interpretation of those prior results can be made in the context of the updated reference intervals.     TSH   Date Value Ref Range Status   10/17/2023 1.18 0.30 - 4.20 uIU/mL Final   03/06/2022 1.17 0.40 - 4.00 mU/L Final       ASSESSMENT/PLAN:    1.  TYPE 2 DIABETES MELLITUS: Sandy may try IVF in the near future. LMP was 1/17/2024. She is not trying to get pregnant at this time and is aware that she will need to stop Mounjaro 3 months prior to trying to conceive.  We discussed Mounjaro today which she would like to try. Mounjaro would replace Rybelsus and would discontinue Glipizide and phentermine. Our goal is to treat her with just Mounjaro and Metformin for her diabetes.  I reivewed how Mounjaro works and possible side effects of the drug including n/v, GI distress, constipation, hypoglycemia and rare risk of pancreatitis.   Pt denies hx of pancreatitis or gastroparesis.  MTM referral placed to to start/titrate Mounjaro and discontinue Rybelsus, Glipizide and phentermine as above.  Sandy was seen by Oph in Aug 2023 without retinopathy per patient.    She denies hx of nephropathy. Her urine microalbuminuria was negative in Oct 2023 with creat 0.73 and GFR > 90 mL/min.  No sx of neuropathy. Denies foot ulcers/sores.  No vitals today.   TSH normal in Oct 2023.     2.  WEIGHT:  Encourage pt to continue to eat healthy and exercise.  Plan to start Mounjaro which will replace Rybelsus and phentermine.    3.  FOLLOW UP:  With me in clinic in 3 months.  MTM referral placed today as above.      Time spent reviewing  chart,labs and DexcomG7 sensor download today = 5 minutes.  Time for video visit today = 14 minutes.  Time for documentation today = 15  minutes.    TOTAL TIME FOR VISIT TODAY =  34  minutes.    Vesta Arceo PA-C

## 2024-01-25 NOTE — PROGRESS NOTES
Time of start: 8:30 am   Time of end: 8:46 am  Total duration of video visit: 16 minutes.  Providers location: offsite  Patients location: [place].    \[facility]\""  Sandy Person is a 43 year old female with type 2 diabetes mellitus. Video visit for diabetes follow up today.  Hx of IUFD at 19 uff7uoqm s/p dilation and evacuation on 1/31/2023.  Pt's diabetes control was excellent during her pregnancy.  Pt has a hx of type 2 diabetes dx 10 years ago. She has no known retinopathy, nephropathy or neuropathy.  She has a hx of obesity and questionable hx of PCOS.  Sandy tested + for COVID in May 2022 with mild symptom.  She had been taking Rybelsus which was discontinued in late Nov 2021 by her fertility provider.   She was instructed to remain on Metformin 1000 mg BID and Glipizide 10 mg each am.  When she found out she was pregnant Levemir BID and Novolog premeals added.  She was seen by her PCP in early Dec 2023 and her Levemir was discontinued and phentermine 15 mg daily was added to help with weight loss.  For her diabetes, she is currently taking  Rybelsus 7 mg po daily, Metformin 2000 mg daily and Glipizide 10 mg each am.    Most recent A1 C was 5.9 % on 12/5/2023.  I reviewed and scanned her DexcomG7 sensor download data in her her chart below.  Her blood sugars are good at this time.  Pt's blood sugar is in target 96 % of the time with an estimated A1C 6.1 %  On ROS today, she reports 3 lbs weight loss since taking phentermine.  Sandy works out 4-5 days per week.  LMP was 1/17/2024.  Pt not trying to get pregnant at this time.  Pt denies blurred vision, n/v, chest pain, abd pain, dysuria or sx of neuropathy.  No foot ulcers.    Diabetes Care  Retinopathy: none; pt seen by Oph in Aug 2028.  Nephropathy:none; urine microalbuminuria negative in 10/2023.  Neuropathy:none.  Foot Exam:no exam today.  Taking aspirin: no.  Lipids: LDL 96 in 10/2023.  CAD: no.  Mental health: no hx of depression.  Insulin: none.  DM meds: Metformin,  Rybelsus and Glipizide.  Pt also started on phentermine by PCP.  MTM referral placed today to start/titrate Mounjaro and discontinue Rybelsus, Glipizide and phentermine.  Testing:  DexcomG7 sensor.            ROS  See under HPI.    Allergies  No Known Allergies    Medications  Current Outpatient Medications   Medication Sig Dispense Refill    blood glucose (NO BRAND SPECIFIED) lancets standard Use to test blood sugar 4 times daily or as directed. 100 each 3    blood glucose (NO BRAND SPECIFIED) test strip Use to test blood sugar 4 times daily or as directed. 400 strip 3    blood glucose monitoring (NO BRAND SPECIFIED) meter device kit Use to test blood sugar 4 times daily or as directed. 1 kit 0    Continuous Blood Gluc Sensor (DEXCOM G7 SENSOR) MISC 1 each every 10 days Change sensor every 10 days 3 each 5    glipiZIDE (GLUCOTROL XL) 10 MG 24 hr tablet Take 1 tablet (10 mg) by mouth daily 90 tablet 3    insulin detemir (LEVEMIR PEN) 100 UNIT/ML pen Inject 20-30 units @ HS 15 mL 1    insulin pen needle (BD PEN NEEDLE MELISSA 2ND GEN) 32G X 4 MM miscellaneous Use as directed with insulin 250 each 3    magnesium oxide 400 MG CAPS Take 400 mg by mouth daily      metFORMIN (GLUCOPHAGE) 500 MG tablet Take 1 tablet (500 mg) by mouth daily (with breakfast) Take 2 tabs twice daily (2000mg per day) 360 tablet 3    phentermine (ADIPEX-P) 15 MG capsule Take 1 capsule (15 mg) by mouth every morning 30 capsule 2    Semaglutide (RYBELSUS) 7 MG tablet Take 1 tablet (7 mg) by mouth daily 90 tablet 1    Vitamin D3 (VITAMIN D, CHOLECALCIFEROL,) 25 mcg (1000 units) tablet Take 25 mcg by mouth daily     Metformin 500 mg 2 tab po BID.      Family History  family history includes Cancer in her father, maternal grandmother, and mother; Diabetes in her father, maternal grandmother, mother, and paternal grandmother; Heart Disease in her father and mother.    Social History   reports that she has never smoked. She has never used smokeless  tobacco. She reports that she does not currently use alcohol. She reports that she does not use drugs.     Past Medical History  Past Medical History:   Diagnosis Date    Abnormal Pap smear of cervix     Diabetes (H)     History of human papillomavirus infection    Obesity.    Past Surgical History:   Procedure Laterality Date    COLPOSCOPY      DILATION AND EVACUATION N/A 1/31/2023    Procedure: AND EVACUATION OF UTERUS;  Surgeon: Carolin Valladares MD;  Location: [facility] OR    REMOVE DILATORS, CERVICAL N/A 1/31/2023    Procedure: REMOVE DILATORS, CERVICAL;  Surgeon: Carolin Valladares MD;  Location: [facility] OR    TONSILLECTOMY         Physical Exam    No exam today.      RESULTS  Creatinine   Date Value Ref Range Status   10/17/2023 0.73 0.51 - 0.95 mg/dL Final     GFR Estimate   Date Value Ref Range Status   10/17/2023 >90 >60 mL/min/1.73m2 Final     Hemoglobin A1C   Date Value Ref Range Status   12/05/2023 5.9 (H) 0.0 - 5.6 % Final     Comment:     Normal <5.7%   Prediabetes 5.7-6.4%    Diabetes 6.5% or higher     Note: Adopted from ADA consensus guidelines.     ALT   Date Value Ref Range Status   12/05/2023 33 0 - 50 U/L Final     Comment:     Reference intervals for this test were updated on 6/12/2023 to more accurately reflect our healthy population. There may be differences in the flagging of prior results with similar values performed with this method. Interpretation of those prior results can be made in the context of the updated reference intervals.       AST   Date Value Ref Range Status   12/05/2023 31 0 - 45 U/L Final     Comment:     Reference intervals for this test were updated on 6/12/2023 to more accurately reflect our healthy population. There may be differences in the flagging of prior results with similar values performed with this method. Interpretation of those prior results can be made in the context of the updated reference intervals.     TSH   Date Value Ref Range Status   10/17/2023 1.18 0.30 - 4.20  uIU/mL Final   03/06/2022 1.17 0.40 - 4.00 mU/L Final       ASSESSMENT/PLAN:    1.  TYPE 2 DIABETES MELLITUS: Sandy may try IVF in the near future. LMP was 1/17/2024. She is not trying to get pregnant at this time and is aware that she will need to stop Mounjaro 3 months prior to trying to conceive.  We discussed Mounjaro today which she would like to try. Mounjaro would replace Rybelsus and would discontinue Glipizide and phentermine. Our goal is to treat her with just Mounjaro and Metformin for her diabetes.  I reivewed how Mounjaro works and possible side effects of the drug including n/v, GI distress, constipation, hypoglycemia and rare risk of pancreatitis.   Pt denies hx of pancreatitis or gastroparesis.  MTM referral placed to to start/titrate Mounjaro and discontinue Rybelsus, Glipizide and phentermine as above.  Sandy was seen by Oph in Aug 2023 without retinopathy per patient.    She denies hx of nephropathy. Her urine microalbuminuria was negative in Oct 2023 with creat 0.73 and GFR > 90 mL/min.  No sx of neuropathy. Denies foot ulcers/sores.  No vitals today.   TSH normal in Oct 2023.     2.  WEIGHT:  Encourage pt to continue to eat healthy and exercise.  Plan to start Mounjaro which will replace Rybelsus and phentermine.    3.  FOLLOW UP:  With me in clinic in 3 months.  MTM referral placed today as above.      Time spent reviewing chart,labs and DexcomG7 sensor download today = 5 minutes.  Time for video visit today = 14 minutes.  Time for documentation today = 15  minutes.    TOTAL TIME FOR VISIT TODAY =  34  minutes.    Vesta Arceo PA-C

## 2024-01-26 ENCOUNTER — TELEPHONE (OUTPATIENT)
Dept: ENDOCRINOLOGY | Facility: CLINIC | Age: 44
End: 2024-01-26

## 2024-01-26 NOTE — TELEPHONE ENCOUNTER
1st attempt- LVM and sent mychart    Schedule 1/25 checkout order:  - 3 mo (around April 2024) and 8 mo (around Sept 2024) follow-up appointments with Vesta Arceo. Return Diabetes

## 2024-01-28 DIAGNOSIS — E11.9 TYPE 2 DIABETES MELLITUS (H): ICD-10-CM

## 2024-02-17 ENCOUNTER — HEALTH MAINTENANCE LETTER (OUTPATIENT)
Age: 44
End: 2024-02-17

## 2024-02-20 ENCOUNTER — TELEPHONE (OUTPATIENT)
Dept: ENDOCRINOLOGY | Facility: CLINIC | Age: 44
End: 2024-02-20
Payer: COMMERCIAL

## 2024-02-20 DIAGNOSIS — E11.9 TYPE 2 DIABETES MELLITUS WITHOUT COMPLICATION, WITHOUT LONG-TERM CURRENT USE OF INSULIN (H): Primary | ICD-10-CM

## 2024-02-20 RX ORDER — TIRZEPATIDE 2.5 MG/.5ML
2.5 INJECTION, SOLUTION SUBCUTANEOUS
Qty: 2 ML | Refills: 3 | Status: SHIPPED | OUTPATIENT
Start: 2024-02-20 | End: 2024-04-25

## 2024-02-20 NOTE — TELEPHONE ENCOUNTER
Please run test claim for Mounjaro and put in PA if needed. Has tried Rybelsus, Bydureon. LMK if denied.    Sreedhar Joyner, PharmD, [facility]  Endocrine & Diabetes [place] Pharmacist  [address] [place] [id number]

## 2024-02-20 NOTE — TELEPHONE ENCOUNTER
Hello,    I did a test claim on the Mounjaro and it went through patient's insurance([employer]). Would you like me to go ahead and release the prescription?    Thank You!    Bishop Esteban [facility] Pharmacy Liaison  [facility] [email or web address]  Phone: [phone]  Fax: [phone]

## 2024-02-22 ENCOUNTER — ANCILLARY PROCEDURE (OUTPATIENT)
Dept: MAMMOGRAPHY | Facility: CLINIC | Age: 44
End: 2024-02-22
Attending: NURSE PRACTITIONER
Payer: COMMERCIAL

## 2024-02-22 DIAGNOSIS — Z12.31 VISIT FOR SCREENING MAMMOGRAM: ICD-10-CM

## 2024-02-22 PROCEDURE — 77067 SCR MAMMO BI INCL CAD: CPT | Mod: TC | Performed by: STUDENT IN AN ORGANIZED HEALTH CARE EDUCATION/TRAINING PROGRAM

## 2024-02-22 PROCEDURE — 77063 BREAST TOMOSYNTHESIS BI: CPT | Mod: TC | Performed by: STUDENT IN AN ORGANIZED HEALTH CARE EDUCATION/TRAINING PROGRAM

## 2024-03-06 ENCOUNTER — HOSPITAL ENCOUNTER (OUTPATIENT)
Dept: ULTRASOUND IMAGING | Facility: CLINIC | Age: 44
Discharge: HOME | End: 2024-03-06
Attending: NURSE PRACTITIONER | Admitting: NURSE PRACTITIONER
Payer: COMMERCIAL

## 2024-03-06 DIAGNOSIS — R92.8 ABNORMAL MAMMOGRAM: ICD-10-CM

## 2024-03-06 PROCEDURE — 76642 ULTRASOUND BREAST LIMITED: CPT | Mod: LT

## 2024-03-08 ENCOUNTER — PATIENT OUTREACH (OUTPATIENT)
Dept: ONCOLOGY | Facility: CLINIC | Age: 44
End: 2024-03-08

## 2024-03-08 ENCOUNTER — OFFICE VISIT (OUTPATIENT)
Dept: FAMILY MEDICINE | Facility: CLINIC | Age: 44
End: 2024-03-08
Payer: COMMERCIAL

## 2024-03-08 VITALS
TEMPERATURE: 98.6 F | OXYGEN SATURATION: 100 % | WEIGHT: 226.6 LBS | SYSTOLIC BLOOD PRESSURE: 142 MMHG | RESPIRATION RATE: 20 BRPM | DIASTOLIC BLOOD PRESSURE: 82 MMHG | HEIGHT: 63 IN | HEART RATE: 85 BPM | BODY MASS INDEX: 40.15 KG/M2

## 2024-03-08 DIAGNOSIS — J30.2 SEASONAL ALLERGIC RHINITIS, UNSPECIFIED TRIGGER: ICD-10-CM

## 2024-03-08 DIAGNOSIS — E11.9 TYPE 2 DIABETES MELLITUS WITHOUT COMPLICATION, WITHOUT LONG-TERM CURRENT USE OF INSULIN (H): ICD-10-CM

## 2024-03-08 DIAGNOSIS — E66.813 CLASS 3 SEVERE OBESITY DUE TO EXCESS CALORIES WITH SERIOUS COMORBIDITY AND BODY MASS INDEX (BMI) OF 40.0 TO 44.9 IN ADULT (H): ICD-10-CM

## 2024-03-08 DIAGNOSIS — E66.01 CLASS 3 SEVERE OBESITY DUE TO EXCESS CALORIES WITH SERIOUS COMORBIDITY AND BODY MASS INDEX (BMI) OF 40.0 TO 44.9 IN ADULT (H): ICD-10-CM

## 2024-03-08 DIAGNOSIS — R03.0 ELEVATED BLOOD PRESSURE READING WITHOUT DIAGNOSIS OF HYPERTENSION: Primary | ICD-10-CM

## 2024-03-08 LAB — HBA1C MFR BLD: 6.2 % (ref 0–5.6)

## 2024-03-08 PROCEDURE — 82728 ASSAY OF FERRITIN: CPT | Performed by: NURSE PRACTITIONER

## 2024-03-08 PROCEDURE — 82607 VITAMIN B-12: CPT | Performed by: NURSE PRACTITIONER

## 2024-03-08 PROCEDURE — 82785 ASSAY OF IGE: CPT | Performed by: NURSE PRACTITIONER

## 2024-03-08 PROCEDURE — 83036 HEMOGLOBIN GLYCOSYLATED A1C: CPT | Performed by: NURSE PRACTITIONER

## 2024-03-08 PROCEDURE — 82306 VITAMIN D 25 HYDROXY: CPT | Performed by: NURSE PRACTITIONER

## 2024-03-08 PROCEDURE — 99214 OFFICE O/P EST MOD 30 MIN: CPT | Performed by: NURSE PRACTITIONER

## 2024-03-08 PROCEDURE — 36415 COLL VENOUS BLD VENIPUNCTURE: CPT | Performed by: NURSE PRACTITIONER

## 2024-03-08 PROCEDURE — 86003 ALLG SPEC IGE CRUDE XTRC EA: CPT | Performed by: NURSE PRACTITIONER

## 2024-03-08 RX ORDER — OLMESARTAN MEDOXOMIL 5 MG/1
5 TABLET ORAL DAILY
Qty: 90 TABLET | Refills: 3 | Status: SHIPPED | OUTPATIENT
Start: 2024-03-08 | End: 2024-09-26

## 2024-03-08 ASSESSMENT — PAIN SCALES - GENERAL: PAINLEVEL: NO PAIN (0)

## 2024-03-08 NOTE — PROGRESS NOTES
Writer received referral to Cancer Risk Management/Genetic Counseling.    Referred for: family hx breast, colon, bone and bile duct ca; parkinson      Per review of chart:  paternal 2 uncles and 1 aunt; and paternal cousin (aunt Paternal colon ca age 80' ; mother bile duct age 82;   Maternal GM Breast cancer and bone    Referral reviewed for appropriate plan, and sent to New Patient Scheduling ([phone]) for completion.    Francesca Sawyer, RN, BSN  Oncology New Patient Nurse Navigator   [facility]  [phone]

## 2024-03-08 NOTE — PROGRESS NOTES
"  Assessment & Plan     Type 2 diabetes mellitus without complication, without long-term current use of insulin (H)  Hemoglobin A1C   Date Value Ref Range Status   03/08/2024 6.2 (H) 0.0 - 5.6 % Final     Comment:     Normal <5.7%   Prediabetes 5.7-6.4%    Diabetes 6.5% or higher     Note: Adopted from ADA consensus guidelines.   Managed by Endocrinology; pending GLP-1 therapy  - Hemoglobin A1c    Class 3 severe obesity due to excess calories with serious comorbidity and body mass index (BMI) of 40.0 to 44.9 in adult (H)    Estimated body mass index is 39.55 kg/m  as calculated from the following:    Height as of this encounter: 1.612 m (5' 3.47\").    Weight as of this encounter: 102.8 kg (226 lb 9.6 oz).  - lifestyle changes including starting an exercise/activity program 30 minutes daily, daily caloric/cho/protein; meal tracking;    - reduce caloric intake 1800-2K per day; cho 100-150 gms per day; protein 25-30 gm per meal  - Vitamin D Deficiency  - Vitamin B12    Seasonal allergic rhinitis, unspecified trigger  - [place] Resp Allergen Panel                    Indira Dlegado is a 43 year old, presenting for the following health issues:  Recheck Medication        3/8/2024     8:33 AM   Additional Questions   Roomed by Nena   Accompanied by Self         3/8/2024     8:33 AM   Patient Reported Additional Medications   Patient reports taking the following new medications None     History of Present Illness       Reason for visit:  Follow up from check up    She eats 2-3 servings of fruits and vegetables daily.She consumes 0 sweetened beverage(s) daily.She exercises with enough effort to increase her heart rate 60 or more minutes per day.  She exercises with enough effort to increase her heart rate 5 days per week.   She is taking medications regularly.  Follow up visit from 12/5/23: Plan   Type 2 diabetes mellitus without complication, without long-term current use of insulin (H)  -     Continuous Blood Gluc " " (DEXCOM G7 ) POOL; 1 each once for 1 dose Use to read blood sugars as per manufacturers instructions  -     Continuous Blood Gluc Sensor (DEXCOM G7 SENSOR) MISC; 1 each every 10 days Change sensor every 10 days  -     Hemoglobin A1c; Future      Elevated blood pressure reading without diagnosis of hypertension   For your blood pressure:  Check your blood pressure once a day  It can be at different times of day, but you should be sitting restfully for ~10 minutes before you take it.  Note your blood pressure on a paper log or on your phone  In ~2 weeks, send me a message on [facility] with your results.  Your goal is <140/90, even better is <130/80.  Low sodium, high potassium (DASH) diet.    Class 3 severe obesity due to excess calories with serious comorbidity and body mass index (BMI) of 40.0 to 44.9 in adult (H)     Estimated body mass index is 41.65 kg/m  as calculated from the following:    Height as of this encounter: 1.62 m (5' 3.78\").    Weight as of this encounter: 109.3 kg (241 lb).   discussed treatment options w/phentermine, topiramate, natrexone, wellbutrin and glp-1 therapy; reviewed side effects of all medications; discussed and lifestyle changes including starting an exercise/activity program 30 minutes daily, daily caloric/cho/protein; meal tracking;    - reduce caloric intake 1800-2K per day; cho 100-150 gms per meal; protein 25-30 gm per meal  - check nutritional status; A1c, Lipid, Tsh, Vit D, B12       -     Hemoglobin A1c; Future  -     Vitamin D Deficiency; Future  -     Hepatic function panel; Future  -     phentermine (ADIPEX-P) 15 MG capsule; Take 1 capsule (15 mg) by mouth every morning                    Objective    BP (!) 142/82 (BP Location: Right arm, Patient Position: Sitting, Cuff Size: Adult Large)   Pulse 85   Temp 98.6  F (37  C) (Temporal)   Resp 20   Ht 1.612 m (5' 3.47\")   Wt 102.8 kg (226 lb 9.6 oz)   LMP 02/11/2024 (Exact Date)   SpO2 100%   " Breastfeeding No   BMI 39.55 kg/m    Body mass index is 39.55 kg/m .  Physical Exam               Signed Electronically by: BARBER Lyman CNP

## 2024-03-09 LAB
FERRITIN SERPL-MCNC: 38 NG/ML (ref 6–175)
VIT B12 SERPL-MCNC: 720 PG/ML (ref 232–1245)
VIT D+METAB SERPL-MCNC: 44 NG/ML (ref 20–50)

## 2024-03-11 LAB

## 2024-03-25 ENCOUNTER — LAB (OUTPATIENT)
Dept: LAB | Facility: CLINIC | Age: 44
End: 2024-03-25
Payer: COMMERCIAL

## 2024-03-25 DIAGNOSIS — I10 ESSENTIAL HYPERTENSION: Primary | ICD-10-CM

## 2024-03-25 LAB
ANION GAP SERPL CALCULATED.3IONS-SCNC: 10 MMOL/L (ref 7–15)
BUN SERPL-MCNC: 10.2 MG/DL (ref 6–20)
CALCIUM SERPL-MCNC: 9.2 MG/DL (ref 8.6–10)
CHLORIDE SERPL-SCNC: 104 MMOL/L (ref 98–107)
CREAT SERPL-MCNC: 0.71 MG/DL (ref 0.51–0.95)
DEPRECATED HCO3 PLAS-SCNC: 23 MMOL/L (ref 22–29)
EGFRCR SERPLBLD CKD-EPI 2021: >90 ML/MIN/1.73M2
GLUCOSE SERPL-MCNC: 172 MG/DL (ref 70–99)
POTASSIUM SERPL-SCNC: 4.7 MMOL/L (ref 3.4–5.3)
SODIUM SERPL-SCNC: 137 MMOL/L (ref 135–145)

## 2024-03-25 PROCEDURE — 80048 BASIC METABOLIC PNL TOTAL CA: CPT

## 2024-03-25 PROCEDURE — 36415 COLL VENOUS BLD VENIPUNCTURE: CPT

## 2024-03-26 NOTE — PROGRESS NOTES
[facility] Dermatology Note  Encounter Date: Mar 27, 2024  Office Visit     Dermatology Problem List:  #. Pink patch right shin, previously had been on left as well. Ddx psoriasis v eczema   # Scalp psoriasis  -- clobetasol foam or solution pending insurance, ketoconazole shampoo  ____________________________________________    Assessment & Plan:    # Psoriasis of scalp   Examination consistent for psoriasis. Discuss multiple treatment options. Will prescribe Clobetasol solution and foam pending insurance approval. Return to clinic in 6 months. Sooner if symptoms worsen or does not improve.   - start either clobetasol solution or foam BID prn pending insurance  - Start ketoconazole shampoo 3 times per week. Counseled to massage into wet scalp, let sit 5 min, then rinse.    Procedures Performed:   None    Follow-up: 6 month(s) in-person, or earlier for new or changing lesions    Staff and Scribe:     Davide Horton MD      The patient was seen and staffed with Dr. Lexie MD        Scribe Disclosure:   I, MAGUE PROCTOR, am serving as a scribe; to document services personally performed by Ivanna Owen MD-based on data collection and the provider's statements to me.    Staff Physician Comments:   I saw and evaluated the patient with the resident and I agree with the assessment and plan.  I was present for the examination.    Ivanna Owen MD    Department of Dermatology  [facility]: Phone: [phone], Fax: [phone]  4/2/2024     ____________________________________________    CC: No chief complaint on file.    HPI:  Ms. Sandy Person is a(n) 43 year old female who presents today as a return patient for Psoriasis of scalp.    - Onset: a couple years ago  - Location: scalp  - Duration: comes and goes, somewhat consant, maybe a little worse this past week  - Symptoms: burning pain and  itchiness   - Triggers: none   - Medication tried: OTC cream, unsure what exactly    - Worse in the winter time, summer time better   - Some dryness this week, some flakes   - History of psoriasis of the feet, stress in induced    Patient is otherwise feeling well, without additional skin concerns.    Labs Reviewed:  N/A    Physical Exam:  Vitals: LMP 2024 (Exact Date)   SKIN: Scalp exam was performed.  - There is an erythematous well demarcated plaque with silvery micaceous scale on the posterior scalp.  - No other lesions of concern on areas examined.     Medications:  Current Outpatient Medications   Medication    blood glucose (NO BRAND SPECIFIED) lancets standard    blood glucose (NO BRAND SPECIFIED) test strip    blood glucose monitoring (NO BRAND SPECIFIED) meter device kit    Continuous Blood Gluc Sensor (DEXCOM G7 SENSOR) MISC    insulin pen needle (BD PEN NEEDLE MELISSA 2ND GEN) 32G X 4 MM miscellaneous    magnesium oxide 400 MG CAPS    metFORMIN (GLUCOPHAGE) 500 MG tablet    olmesartan (BENICAR) 5 MG tablet    tirzepatide (MOUNJARO) 2.5 MG/0.5ML pen    Vitamin D3 (VITAMIN D, CHOLECALCIFEROL,) 25 mcg (1000 units) tablet     No current facility-administered medications for this visit.      Past Medical History:   Patient Active Problem List   Diagnosis    Missed  with fetal demise before 20 completed weeks of gestation    Impingement syndrome of left shoulder     Past Medical History:   Diagnosis Date    Abnormal Pap smear of cervix     Diabetes (H)     History of human papillomavirus infection         CC No referring provider defined for this encounter. on close of this encounter.

## 2024-03-27 ENCOUNTER — OFFICE VISIT (OUTPATIENT)
Dept: DERMATOLOGY | Facility: CLINIC | Age: 44
End: 2024-03-27
Payer: COMMERCIAL

## 2024-03-27 DIAGNOSIS — L40.9 SCALP PSORIASIS: Primary | ICD-10-CM

## 2024-03-27 PROCEDURE — 99214 OFFICE O/P EST MOD 30 MIN: CPT | Mod: GC | Performed by: DERMATOLOGY

## 2024-03-27 RX ORDER — CLOBETASOL PROPIONATE 0.5 MG/ML
SOLUTION TOPICAL 2 TIMES DAILY
Qty: 50 ML | Refills: 3 | Status: SHIPPED | OUTPATIENT
Start: 2024-03-27 | End: 2024-09-26

## 2024-03-27 RX ORDER — CLOBETASOL PROPIONATE 0.5 MG/G
AEROSOL, FOAM TOPICAL
Qty: 50 G | Refills: 3 | Status: SHIPPED | OUTPATIENT
Start: 2024-03-27 | End: 2024-09-26

## 2024-03-27 RX ORDER — KETOCONAZOLE 20 MG/ML
SHAMPOO TOPICAL DAILY PRN
Qty: 120 ML | Refills: 11 | Status: SHIPPED | OUTPATIENT
Start: 2024-03-27

## 2024-03-27 ASSESSMENT — PAIN SCALES - GENERAL: PAINLEVEL: NO PAIN (0)

## 2024-03-27 NOTE — LETTER
3/27/2024       RE: Sandy Person  [address] [facility] [address]     Dear Colleague,    Thank you for referring your patient, Sandy Person, to the [facility] [place] at [facility]. Please see a copy of my visit note below.    [facility] Dermatology Note  Encounter Date: Mar 27, 2024  Office Visit     Dermatology Problem List:  #. Pink patch right shin, previously had been on left as well. Ddx psoriasis v eczema   # Scalp psoriasis  -- clobetasol foam or solution pending insurance, ketoconazole shampoo  ____________________________________________    Assessment & Plan:    # Psoriasis of scalp   Examination consistent for psoriasis. Discuss multiple treatment options. Will prescribe Clobetasol solution and foam pending insurance approval. Return to clinic in 6 months. Sooner if symptoms worsen or does not improve.   - start either clobetasol solution or foam BID prn pending insurance  - Start ketoconazole shampoo 3 times per week. Counseled to massage into wet scalp, let sit 5 min, then rinse.    Procedures Performed:   None    Follow-up: 6 month(s) in-person, or earlier for new or changing lesions    Staff and Scribe:     Davide Horton MD      The patient was seen and staffed with Dr. Lexie MD        Scribe Disclosure:   I, MAGUE PROCTOR, am serving as a scribe; to document services personally performed by Ivanna Owen MD-based on data collection and the provider's statements to me.    Staff Physician Comments:   I saw and evaluated the patient with the resident and I agree with the assessment and plan.  I was present for the examination.    Ivanna Owen MD    Department of Dermatology  [facility]: Phone: [phone], Fax: [phone]  4/2/2024     ____________________________________________    CC: No  chief complaint on file.    HPI:  Ms. Sandy Person is a(n) 43 year old female who presents today as a return patient for Psoriasis of scalp.    - Onset: a couple years ago  - Location: scalp  - Duration: comes and goes, somewhat consant, maybe a little worse this past week  - Symptoms: burning pain and itchiness   - Triggers: none   - Medication tried: OTC cream, unsure what exactly    - Worse in the winter time, summer time better   - Some dryness this week, some flakes   - History of psoriasis of the feet, stress in induced    Patient is otherwise feeling well, without additional skin concerns.    Labs Reviewed:  N/A    Physical Exam:  Vitals: LMP 2024 (Exact Date)   SKIN: Scalp exam was performed.  - There is an erythematous well demarcated plaque with silvery micaceous scale on the posterior scalp.  - No other lesions of concern on areas examined.     Medications:  Current Outpatient Medications   Medication    blood glucose (NO BRAND SPECIFIED) lancets standard    blood glucose (NO BRAND SPECIFIED) test strip    blood glucose monitoring (NO BRAND SPECIFIED) meter device kit    Continuous Blood Gluc Sensor (DEXCOM G7 SENSOR) MISC    insulin pen needle (BD PEN NEEDLE MELISSA 2ND GEN) 32G X 4 MM miscellaneous    magnesium oxide 400 MG CAPS    metFORMIN (GLUCOPHAGE) 500 MG tablet    olmesartan (BENICAR) 5 MG tablet    tirzepatide (MOUNJARO) 2.5 MG/0.5ML pen    Vitamin D3 (VITAMIN D, CHOLECALCIFEROL,) 25 mcg (1000 units) tablet     No current facility-administered medications for this visit.      Past Medical History:   Patient Active Problem List   Diagnosis    Missed  with fetal demise before 20 completed weeks of gestation    Impingement syndrome of left shoulder     Past Medical History:   Diagnosis Date    Abnormal Pap smear of cervix     Diabetes (H)     History of human papillomavirus infection         CC No referring provider defined for this encounter. on close of this encounter.

## 2024-03-27 NOTE — NURSING NOTE
Chief Complaint   Patient presents with    Derm Problem     Patient reports psoriasis on their scalp starting 6-7 months ago. The patient reports use of otc medications with minimal improvement. The patient reports that the scalp is itching and burning.      Sharon Ybarra LPN

## 2024-03-29 ENCOUNTER — MYC MEDICAL ADVICE (OUTPATIENT)
Dept: DERMATOLOGY | Facility: CLINIC | Age: 44
End: 2024-03-29
Payer: COMMERCIAL

## 2024-03-29 ENCOUNTER — VIRTUAL VISIT (OUTPATIENT)
Dept: PHARMACY | Facility: CLINIC | Age: 44
End: 2024-03-29
Attending: PHYSICIAN ASSISTANT
Payer: COMMERCIAL

## 2024-03-29 DIAGNOSIS — Z78.9 TAKES DIETARY SUPPLEMENTS: ICD-10-CM

## 2024-03-29 DIAGNOSIS — E11.65 TYPE 2 DIABETES MELLITUS WITH HYPERGLYCEMIA, UNSPECIFIED WHETHER LONG TERM INSULIN USE (H): Primary | ICD-10-CM

## 2024-03-29 DIAGNOSIS — I10 ESSENTIAL HYPERTENSION: ICD-10-CM

## 2024-03-29 PROCEDURE — 99605 MTMS BY PHARM NP 15 MIN: CPT | Mod: 93

## 2024-03-29 RX ORDER — TIRZEPATIDE 5 MG/.5ML
5 INJECTION, SOLUTION SUBCUTANEOUS
Qty: 2 ML | Refills: 3 | Status: SHIPPED | OUTPATIENT
Start: 2024-03-29 | End: 2024-04-02

## 2024-04-04 ENCOUNTER — HOSPITAL ENCOUNTER (OUTPATIENT)
Dept: CT IMAGING | Facility: CLINIC | Age: 44
Discharge: HOME | End: 2024-04-04
Attending: NURSE PRACTITIONER | Admitting: NURSE PRACTITIONER
Payer: COMMERCIAL

## 2024-04-04 DIAGNOSIS — J34.2 DNS (DEVIATED NASAL SEPTUM): ICD-10-CM

## 2024-04-04 PROCEDURE — 70486 CT MAXILLOFACIAL W/O DYE: CPT

## 2024-04-09 NOTE — PROGRESS NOTES
"History of Present Illness - Sandy Person is a very pleasant 43 year old female here to see me for the first time for evaluation of the upper airway.      She tells me that she has had issues her entire adult life, she has a sniffle and a sense of congestion.  But nothing comes out when she blows her nose. Perhaps there is some post nasal drainage as well.  She does not endorse classic sinusitis symptoms.    The LEFT face also feels a general fullness as well.  Her \"sniffle\" gets worse when she is not drinking enough water.  She also clear her throat a lot as well.  This does not seem seasonal.    She has not been to an allergist before.  But she did have a serology test for allergies.  She has tried some OTC allergy medications, but they do not seem to help.  She has not tried any sprays or nasaotopicals.    The primary issue has been progressively worse snoring and nasal obstruction.    A CT sinus was done on 24 and the images personally reviewed.  They showed complex septal deviation, as well as some mucosal disease of the maxillary sinuses bilaterally.    Her only previous ENT issues were a tonsillectomy as a small child.  She did get sinusitis as a child but that seemed to outgrow them    Past Medical History -   Patient Active Problem List   Diagnosis    Missed  with fetal demise before 20 completed weeks of gestation    Impingement syndrome of left shoulder       Current Medications -   Current Outpatient Medications:     blood glucose (NO BRAND SPECIFIED) lancets standard, Use to test blood sugar 4 times daily or as directed., Disp: 100 each, Rfl: 3    blood glucose (NO BRAND SPECIFIED) test strip, Use to test blood sugar 4 times daily or as directed., Disp: 400 strip, Rfl: 3    blood glucose monitoring (NO BRAND SPECIFIED) meter device kit, Use to test blood sugar 4 times daily or as directed., Disp: 1 kit, Rfl: 0    clobetasol (TEMOVATE) 0.05 % external solution, Apply topically 2 times " daily, Disp: 50 mL, Rfl: 3    clobetasol propionate (OLUX) 0.05 % external foam, Apply 2 times daily to scalp, Disp: 50 g, Rfl: 3    Continuous Blood Gluc Sensor (DEXCOM G7 SENSOR) MISC, 1 each every 10 days Change sensor every 10 days, Disp: 3 each, Rfl: 5    insulin pen needle (BD PEN NEEDLE MELISSA 2ND GEN) 32G X 4 MM miscellaneous, Use as directed with insulin, Disp: 250 each, Rfl: 3    ketoconazole (NIZORAL) 2 % external shampoo, Apply topically daily as needed for itching or irritation, Disp: 120 mL, Rfl: 11    magnesium oxide 400 MG CAPS, Take 400 mg by mouth daily, Disp: , Rfl:     metFORMIN (GLUCOPHAGE) 500 MG tablet, TAKE 2 TABLETS BY MOUTH TWICE DAILY, Disp: 360 tablet, Rfl: 3    olmesartan (BENICAR) 5 MG tablet, Take 1 tablet (5 mg) by mouth daily, Disp: 90 tablet, Rfl: 3    tirzepatide (MOUNJARO) 2.5 MG/0.5ML pen, Inject 2.5 mg Subcutaneous every 7 days, Disp: 2 mL, Rfl: 3    tirzepatide (MOUNJARO) 5 MG/0.5ML pen, Inject 5 mg Subcutaneous every 7 days, Disp: 2 mL, Rfl: 3    Vitamin D3 (VITAMIN D, CHOLECALCIFEROL,) 25 mcg (1000 units) tablet, Take 25 mcg by mouth daily, Disp: , Rfl:     Allergies - No Known Allergies    Social History -   Social History     Socioeconomic History    Marital status:      Spouse name: Colin   Occupational History    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[employer]   Tobacco Use    Smoking status: Never     Passive exposure: Past    Smokeless tobacco: Never   Vaping Use    Vaping Use: Never used   Substance and Sexual Activity    Alcohol use: Not Currently     Comment: 1-2 drinks per month    Drug use: Never    Sexual activity: Yes     Birth control/protection: None     Social Determinants of Health     Financial Resource Strain: Low Risk  (11/28/2023)    Financial Resource Strain     Within the past 12 months, have you or your family members you live with been unable to get utilities (heat, electricity) when it was really needed?: No   Food Insecurity: Low Risk  (11/28/2023)    Food  "Insecurity     Within the past 12 months, did you worry that your food would run out before you got money to buy more?: No     Within the past 12 months, did the food you bought just not last and you didn t have money to get more?: No   Transportation Needs: Low Risk  (11/28/2023)    Transportation Needs     Within the past 12 months, has lack of transportation kept you from medical appointments, getting your medicines, non-medical meetings or appointments, work, or from getting things that you need?: No   Interpersonal Safety: Low Risk  (12/5/2023)    Interpersonal Safety     Do you feel physically and emotionally safe where you currently live?: Yes     Within the past 12 months, have you been hit, slapped, kicked or otherwise physically hurt by someone?: No     Within the past 12 months, have you been humiliated or emotionally abused in other ways by your partner or ex-partner?: No   Housing Stability: Low Risk  (11/28/2023)    Housing Stability     Do you have housing? : Yes     Are you worried about losing your housing?: No       Family History -   Family History   Problem Relation Age of Onset    Cancer Mother         bile duct    Diabetes Mother     Heart Disease Mother     Diabetes Father     Heart Disease Father     Cancer Father     Cancer Maternal Grandmother     Diabetes Maternal Grandmother     Diabetes Paternal Grandmother        Review of Systems - As per HPI and PMHx, otherwise 10+ system review of the head and neck, and general constitution is negative.    Physical Exam  /86   Pulse 98   Resp 16   Ht 1.612 m (5' 3.47\")   Wt 102.5 kg (226 lb)   LMP 02/11/2024 (Exact Date)   SpO2 99%   BMI 39.44 kg/m        General - The patient is well nourished and well developed, and appears to have good nutritional status.  Alert and oriented to person and place, answers questions and cooperates with examination appropriately.   Head and Face - Normocephalic and atraumatic, with no gross asymmetry " noted of the contour of the facial features.  The facial nerve is intact, with strong symmetric movements.  Voice and Breathing - The patient was breathing comfortably without the use of accessory muscles. There was no wheezing, stridor, or stertor.  The patients voice was clear and strong, and had appropriate pitch and quality.  Ears - The tympanic membranes are normal in appearance, bony landmarks are intact.  No retraction, perforation, or masses.  No fluid or purulence was seen in the external canal or the middle ear. No evidence of infection of the middle ear or external canal, cerumen was normal in appearance.  Eyes - Extraocular movements intact, and the pupils were reactive to light.  Sclera were not icteric or injected, conjunctiva were pink and moist.  Mouth - Examination of the oral cavity showed pink, healthy oral mucosa. No lesions or ulcerations noted.  The tongue was mobile and midline, and the dentition were in good condition.    Throat - The walls of the oropharynx were smooth, pink, moist, symmetric, and had no lesions or ulcerations.  The tonsillar pillars and soft palate were symmetric.  The uvula was midline on elevation.    Neck - Normal midline excursion of the laryngotracheal complex during swallowing.  Full range of motion on passive movement.  Palpation of the occipital, submental, submandibular, internal jugular chain, and supraclavicular nodes did not demonstrate any abnormal lymph nodes or masses.  The carotid pulse was palpable bilaterally.  Palpation of the thyroid was soft and smooth, with no nodules or goiter appreciated.  The trachea was mobile and midline.  Nose - External contour is symmetric, no gross deflection or scars.  Nasal mucosa is pink and moist with no abnormal mucus.  As shown on the CT scan, there is a complex deviated nasal septum that is obstructive bilaterally.      A/P - Sandy Person is a 43 year old female  (J31.0) Chronic rhinitis  (primary encounter  diagnosis)  (J34.2) DNS (deviated nasal septum)    Although the deviated septum is certainly a factor here, I think that allergic rhinitis or untreated rhinitis should be addressed first. I will try her on a month of Budesonide in [facility], and follow up with [facility].    If that fails, we can try other nasal irrigations, Oral antihistamines, and also atarax potentially.    And if all medical management fails, we can consier endoscopic septoplasty with Clarifix

## 2024-04-15 ENCOUNTER — OFFICE VISIT (OUTPATIENT)
Dept: OTOLARYNGOLOGY | Facility: CLINIC | Age: 44
End: 2024-04-15
Payer: COMMERCIAL

## 2024-04-15 VITALS
WEIGHT: 226 LBS | HEIGHT: 63 IN | BODY MASS INDEX: 40.04 KG/M2 | RESPIRATION RATE: 16 BRPM | HEART RATE: 98 BPM | DIASTOLIC BLOOD PRESSURE: 86 MMHG | OXYGEN SATURATION: 99 % | SYSTOLIC BLOOD PRESSURE: 134 MMHG

## 2024-04-15 DIAGNOSIS — J34.2 DNS (DEVIATED NASAL SEPTUM): ICD-10-CM

## 2024-04-15 DIAGNOSIS — J31.0 CHRONIC RHINITIS: ICD-10-CM

## 2024-04-15 DIAGNOSIS — J31.0 CHRONIC RHINITIS: Primary | ICD-10-CM

## 2024-04-15 PROCEDURE — 99203 OFFICE O/P NEW LOW 30 MIN: CPT | Performed by: OTOLARYNGOLOGY

## 2024-04-15 RX ORDER — BUDESONIDE 0.5 MG/2ML
INHALANT ORAL
Qty: 180 ML | Refills: 3 | Status: SHIPPED | OUTPATIENT
Start: 2024-04-15 | End: 2024-09-26

## 2024-04-15 RX ORDER — BUDESONIDE 0.5 MG/2ML
INHALANT ORAL
Qty: 60 ML | Refills: 12 | Status: SHIPPED | OUTPATIENT
Start: 2024-04-15 | End: 2024-04-15

## 2024-04-15 ASSESSMENT — PAIN SCALES - GENERAL: PAINLEVEL: NO PAIN (0)

## 2024-04-15 NOTE — PROGRESS NOTES
Outcome for 04/15/24 3:27 PM: Data uploaded on Dexcom  Mayelin Riley MA  Outcome for 04/23/24 9:07 AM: Data obtained via Dexcom website  Mayelin Riley MA    Patient is showing 4/5 MNCM met. Not on statin   Mayelin Riley MA

## 2024-04-15 NOTE — TELEPHONE ENCOUNTER
Signed Prescriptions:                        Disp   Refills    budesonide (PULMICORT) 0.5 MG/2ML neb solu*180 mL 3        Sig: Add one ampule into [facility] sinus rinse bottle with           saline mixture and irrigate nose daily  Authorizing Provider: RONALD VILLANUEVA  Ordering User: ROSA KING RN Care Coordinator, [facility] 04/15/24 2:15 PM

## 2024-04-15 NOTE — LETTER
"    4/15/2024         RE: Sandy Person  [address] [facility] [address]        Dear Colleague,    Thank you for referring your patient, Sandy Person, to the [facility]. Please see a copy of my visit note below.    History of Present Illness - Sandy Person is a very pleasant 43 year old female here to see me for the first time for evaluation of the upper airway.      She tells me that she has had issues her entire adult life, she has a sniffle and a sense of congestion.  But nothing comes out when she blows her nose. Perhaps there is some post nasal drainage as well.  She does not endorse classic sinusitis symptoms.    The LEFT face also feels a general fullness as well.  Her \"sniffle\" gets worse when she is not drinking enough water.  She also clear her throat a lot as well.  This does not seem seasonal.    She has not been to an allergist before.  But she did have a serology test for allergies.  She has tried some OTC allergy medications, but they do not seem to help.  She has not tried any sprays or nasaotopicals.    The primary issue has been progressively worse snoring and nasal obstruction.    A CT sinus was done on 24 and the images personally reviewed.  They showed complex septal deviation, as well as some mucosal disease of the maxillary sinuses bilaterally.    Her only previous ENT issues were a tonsillectomy as a small child.  She did get sinusitis as a child but that seemed to outgrow them    Past Medical History -   Patient Active Problem List   Diagnosis     Missed  with fetal demise before 20 completed weeks of gestation     Impingement syndrome of left shoulder       Current Medications -   Current Outpatient Medications:      blood glucose (NO BRAND SPECIFIED) lancets standard, Use to test blood sugar 4 times daily or as directed., Disp: 100 each, Rfl: 3     blood glucose (NO BRAND SPECIFIED) test strip, Use to test blood sugar 4 times daily or as " directed., Disp: 400 strip, Rfl: 3     blood glucose monitoring (NO BRAND SPECIFIED) meter device kit, Use to test blood sugar 4 times daily or as directed., Disp: 1 kit, Rfl: 0     clobetasol (TEMOVATE) 0.05 % external solution, Apply topically 2 times daily, Disp: 50 mL, Rfl: 3     clobetasol propionate (OLUX) 0.05 % external foam, Apply 2 times daily to scalp, Disp: 50 g, Rfl: 3     Continuous Blood Gluc Sensor (DEXCOM G7 SENSOR) MISC, 1 each every 10 days Change sensor every 10 days, Disp: 3 each, Rfl: 5     insulin pen needle (BD PEN NEEDLE MELISSA 2ND GEN) 32G X 4 MM miscellaneous, Use as directed with insulin, Disp: 250 each, Rfl: 3     ketoconazole (NIZORAL) 2 % external shampoo, Apply topically daily as needed for itching or irritation, Disp: 120 mL, Rfl: 11     magnesium oxide 400 MG CAPS, Take 400 mg by mouth daily, Disp: , Rfl:      metFORMIN (GLUCOPHAGE) 500 MG tablet, TAKE 2 TABLETS BY MOUTH TWICE DAILY, Disp: 360 tablet, Rfl: 3     olmesartan (BENICAR) 5 MG tablet, Take 1 tablet (5 mg) by mouth daily, Disp: 90 tablet, Rfl: 3     tirzepatide (MOUNJARO) 2.5 MG/0.5ML pen, Inject 2.5 mg Subcutaneous every 7 days, Disp: 2 mL, Rfl: 3     tirzepatide (MOUNJARO) 5 MG/0.5ML pen, Inject 5 mg Subcutaneous every 7 days, Disp: 2 mL, Rfl: 3     Vitamin D3 (VITAMIN D, CHOLECALCIFEROL,) 25 mcg (1000 units) tablet, Take 25 mcg by mouth daily, Disp: , Rfl:     Allergies - No Known Allergies    Social History -   Social History     Socioeconomic History     Marital status:      Spouse name: Colin   Occupational History     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[employer]   Tobacco Use     Smoking status: Never     Passive exposure: Past     Smokeless tobacco: Never   Vaping Use     Vaping Use: Never used   Substance and Sexual Activity     Alcohol use: Not Currently     Comment: 1-2 drinks per month     Drug use: Never     Sexual activity: Yes     Birth control/protection: None     Social Determinants of Health     Financial Resource  "Strain: Low Risk  (11/28/2023)    Financial Resource Strain      Within the past 12 months, have you or your family members you live with been unable to get utilities (heat, electricity) when it was really needed?: No   Food Insecurity: Low Risk  (11/28/2023)    Food Insecurity      Within the past 12 months, did you worry that your food would run out before you got money to buy more?: No      Within the past 12 months, did the food you bought just not last and you didn t have money to get more?: No   Transportation Needs: Low Risk  (11/28/2023)    Transportation Needs      Within the past 12 months, has lack of transportation kept you from medical appointments, getting your medicines, non-medical meetings or appointments, work, or from getting things that you need?: No   Interpersonal Safety: Low Risk  (12/5/2023)    Interpersonal Safety      Do you feel physically and emotionally safe where you currently live?: Yes      Within the past 12 months, have you been hit, slapped, kicked or otherwise physically hurt by someone?: No      Within the past 12 months, have you been humiliated or emotionally abused in other ways by your partner or ex-partner?: No   Housing Stability: Low Risk  (11/28/2023)    Housing Stability      Do you have housing? : Yes      Are you worried about losing your housing?: No       Family History -   Family History   Problem Relation Age of Onset     Cancer Mother         bile duct     Diabetes Mother      Heart Disease Mother      Diabetes Father      Heart Disease Father      Cancer Father      Cancer Maternal Grandmother      Diabetes Maternal Grandmother      Diabetes Paternal Grandmother        Review of Systems - As per HPI and PMHx, otherwise 10+ system review of the head and neck, and general constitution is negative.    Physical Exam  /86   Pulse 98   Resp 16   Ht 1.612 m (5' 3.47\")   Wt 102.5 kg (226 lb)   LMP 02/11/2024 (Exact Date)   SpO2 99%   BMI 39.44 kg/m  "       General - The patient is well nourished and well developed, and appears to have good nutritional status.  Alert and oriented to person and place, answers questions and cooperates with examination appropriately.   Head and Face - Normocephalic and atraumatic, with no gross asymmetry noted of the contour of the facial features.  The facial nerve is intact, with strong symmetric movements.  Voice and Breathing - The patient was breathing comfortably without the use of accessory muscles. There was no wheezing, stridor, or stertor.  The patients voice was clear and strong, and had appropriate pitch and quality.  Ears - The tympanic membranes are normal in appearance, bony landmarks are intact.  No retraction, perforation, or masses.  No fluid or purulence was seen in the external canal or the middle ear. No evidence of infection of the middle ear or external canal, cerumen was normal in appearance.  Eyes - Extraocular movements intact, and the pupils were reactive to light.  Sclera were not icteric or injected, conjunctiva were pink and moist.  Mouth - Examination of the oral cavity showed pink, healthy oral mucosa. No lesions or ulcerations noted.  The tongue was mobile and midline, and the dentition were in good condition.    Throat - The walls of the oropharynx were smooth, pink, moist, symmetric, and had no lesions or ulcerations.  The tonsillar pillars and soft palate were symmetric.  The uvula was midline on elevation.    Neck - Normal midline excursion of the laryngotracheal complex during swallowing.  Full range of motion on passive movement.  Palpation of the occipital, submental, submandibular, internal jugular chain, and supraclavicular nodes did not demonstrate any abnormal lymph nodes or masses.  The carotid pulse was palpable bilaterally.  Palpation of the thyroid was soft and smooth, with no nodules or goiter appreciated.  The trachea was mobile and midline.  Nose - External contour is symmetric, no  gross deflection or scars.  Nasal mucosa is pink and moist with no abnormal mucus.  As shown on the CT scan, there is a complex deviated nasal septum that is obstructive bilaterally.      A/P - Sandy Person is a 43 year old female  (J31.0) Chronic rhinitis  (primary encounter diagnosis)  (J34.2) DNS (deviated nasal septum)    Although the deviated septum is certainly a factor here, I think that allergic rhinitis or untreated rhinitis should be addressed first. I will try her on a month of Budesonide in [facility], and [facility]    If that fails, we can try other nasal irrigations, Oral antihistamines, and also atarax potentially.    And if all medical management fails, we can consier endoscopic septoplasty with Clarifix      Again, thank you for allowing me to participate in the care of your patient.        Sincerely,        Miles Hansen MD

## 2024-04-25 ENCOUNTER — VIRTUAL VISIT (OUTPATIENT)
Dept: ENDOCRINOLOGY | Facility: CLINIC | Age: 44
End: 2024-04-25
Payer: COMMERCIAL

## 2024-04-25 DIAGNOSIS — E11.9 TYPE 2 DIABETES MELLITUS WITHOUT COMPLICATION, WITH LONG-TERM CURRENT USE OF INSULIN (H): Primary | ICD-10-CM

## 2024-04-25 DIAGNOSIS — Z79.4 TYPE 2 DIABETES MELLITUS WITHOUT COMPLICATION, WITH LONG-TERM CURRENT USE OF INSULIN (H): Primary | ICD-10-CM

## 2024-04-25 PROCEDURE — 99214 OFFICE O/P EST MOD 30 MIN: CPT | Mod: 95 | Performed by: PHYSICIAN ASSISTANT

## 2024-04-25 RX ORDER — TIRZEPATIDE 7.5 MG/.5ML
7.5 INJECTION, SOLUTION SUBCUTANEOUS
Qty: 5 ML | Refills: 0 | Status: SHIPPED | OUTPATIENT
Start: 2024-04-25 | End: 2024-06-17

## 2024-04-25 NOTE — LETTER
4/25/2024       RE: Sandy Person  [address] [facility] [address]     Dear Colleague,    Thank you for referring your patient, Sadny Person, to the [facility] ENDOCRINOLOGY [facility] [place] at [facility]. Please see a copy of my visit note below.    Outcome for 04/15/24 3:27 PM: Data uploaded on Dexcom  Mayelin Riley MA  Outcome for 04/23/24 9:07 AM: Data obtained via Dexcom website  Mayelin Riley MA    Patient is showing 4/5 [place] met. Not on statin   Mayelin Riley MA                Time of start: 11:41 am   Time of end: 12:00 pm   Total duration of video visit: 19 minutes.  Providers location: Off-site.  Patient's location: [place].    HPI  Sandy Person is a 43 year old female with type 2 diabetes mellitus. Video visit for diabetes follow up today.  Last visit with me was in January 2024 and I suggested that patient start Mounjaro at that time.  Patient has been taking Mounjaro and tolerating the drug well.  Sandy has a history of IUFD at 19 [id number] s/p dilation and evacuation on 1/31/2023.    Pt has a hx of type 2 diabetes dx 10 years ago.   She has no known retinopathy, nephropathy or neuropathy.  Sandy has a hx of obesity and questionable hx of PCOS.  For her diabetes, she is currently taking Mounjaro 5 mg subcutaneous once a week and Metformin 2000 mg daily.  Rybelsus and  Glipzide were discontinued when she started Mounjaro.    Most recent A1C was 6.2 % on 3/8/2024.   I reviewed and scanned her DexcomG7 sensor download data in her her chart below.  Her blood sugars are good at this time.  Pt's blood sugar is in target 86 % of the time with an average glucose of 154.  On ROS today, she reports 15 pound weight loss lbs weight loss .  LMP was 4/7/2024.  Pt not trying to get pregnant at this time.  Pt denies blurred vision, n/v, chest pain, abd pain, dysuria or sx of neuropathy.  No foot ulcers.    Diabetes Care  Retinopathy: none; pt  seen by Oph in Aug 2023.  Nephropathy:none; urine microalbuminuria negative in 10/2023.  Pt is taking Benicar.  Neuropathy:none.  Foot Exam:no exam today.  Taking aspirin: no.  Lipids: LDL 96 in 10/2023.  CAD: no.  Mental health: no hx of depression.  Insulin: none.  DM meds: Metformin and Mounjaro.    Testing:  DexcomG7 sensor.            ROS  See under HPI.    Allergies  No Known Allergies    Medications  Current Outpatient Medications   Medication Sig Dispense Refill    tirzepatide (MOUNJARO) 7.5 MG/0.5ML pen Inject 7.5 mg Subcutaneous every 7 days 5 mL 0    blood glucose (NO BRAND SPECIFIED) lancets standard Use to test blood sugar 4 times daily or as directed. 100 each 3    blood glucose (NO BRAND SPECIFIED) test strip Use to test blood sugar 4 times daily or as directed. 400 strip 3    blood glucose monitoring (NO BRAND SPECIFIED) meter device kit Use to test blood sugar 4 times daily or as directed. 1 kit 0    budesonide (PULMICORT) 0.5 MG/2ML neb solution Add one ampule into NeilMed sinus rinse bottle with saline mixture and irrigate nose daily 180 mL 3    clobetasol (TEMOVATE) 0.05 % external solution Apply topically 2 times daily 50 mL 3    clobetasol propionate (OLUX) 0.05 % external foam Apply 2 times daily to scalp 50 g 3    Continuous Blood Gluc Sensor (DEXCOM G7 SENSOR) MISC 1 each every 10 days Change sensor every 10 days 3 each 5    ketoconazole (NIZORAL) 2 % external shampoo Apply topically daily as needed for itching or irritation 120 mL 11    magnesium oxide 400 MG CAPS Take 400 mg by mouth daily      metFORMIN (GLUCOPHAGE) 500 MG tablet TAKE 2 TABLETS BY MOUTH TWICE DAILY 360 tablet 3    olmesartan (BENICAR) 5 MG tablet Take 1 tablet (5 mg) by mouth daily 90 tablet 3    tirzepatide (MOUNJARO) 5 MG/0.5ML pen Inject 5 mg Subcutaneous every 7 days 2 mL 3    Vitamin D3 (VITAMIN D, CHOLECALCIFEROL,) 25 mcg (1000 units) tablet Take 25 mcg by mouth daily     Metformin 500 mg 2 tab po BID.      Family  History  family history includes Cancer in her father, maternal grandmother, and mother; Diabetes in her father, maternal grandmother, mother, and paternal grandmother; Heart Disease in her father and mother.    Social History   reports that she has never smoked. She has been exposed to tobacco smoke. She has never used smokeless tobacco. She reports that she does not currently use alcohol. She reports that she does not use drugs.     Past Medical History  Past Medical History:   Diagnosis Date    Abnormal Pap smear of cervix     Diabetes (H)     History of human papillomavirus infection    Obesity.    Past Surgical History:   Procedure Laterality Date    COLPOSCOPY      DILATION AND EVACUATION N/A 1/31/2023    Procedure: AND EVACUATION OF UTERUS;  Surgeon: Carolin Valladares MD;  [facility]    REMOVE DILATORS, CERVICAL N/A 1/31/2023    Procedure: REMOVE DILATORS, CERVICAL;  Surgeon: Carolin Valladares MD;  [facility] OR    TONSILLECTOMY         Physical Exam    No exam today.      RESULTS  Creatinine   Date Value Ref Range Status   03/25/2024 0.71 0.51 - 0.95 mg/dL Final     GFR Estimate   Date Value Ref Range Status   03/25/2024 >90 >60 mL/min/1.73m2 Final     Hemoglobin A1C   Date Value Ref Range Status   03/08/2024 6.2 (H) 0.0 - 5.6 % Final     Comment:     Normal <5.7%   Prediabetes 5.7-6.4%    Diabetes 6.5% or higher     Note: Adopted from ADA consensus guidelines.     Potassium   Date Value Ref Range Status   03/25/2024 4.7 3.4 - 5.3 mmol/L Final     ALT   Date Value Ref Range Status   12/05/2023 33 0 - 50 U/L Final     Comment:     Reference intervals for this test were updated on 6/12/2023 to more accurately reflect our healthy population. There may be differences in the flagging of prior results with similar values performed with this method. Interpretation of those prior results can be made in the context of the updated reference intervals.       AST   Date Value Ref Range Status   12/05/2023 31 0 -  45 U/L Final     Comment:     Reference intervals for this test were updated on 6/12/2023 to more accurately reflect our healthy population. There may be differences in the flagging of prior results with similar values performed with this method. Interpretation of those prior results can be made in the context of the updated reference intervals.     TSH   Date Value Ref Range Status   10/17/2023 1.18 0.30 - 4.20 uIU/mL Final   03/06/2022 1.17 0.40 - 4.00 mU/L Final       ASSESSMENT/PLAN:    1.  TYPE 2 DIABETES MELLITUS: Sandy may try IVF in the near future. LMP was 4/7/2024.  She is not trying to get pregnant at this time and is aware that she will need to stop Mounjaro 3 months prior to trying to conceive.  Sandy is tolerating Mounjaro well.  Patient denies nausea vomiting or abdominal pain.  Mild constipation.  Our goal is to treat Sandy with just Mounjaro and Metformin for her diabetes.  Again, I reviewed how Mounjaro works and possible side effects of the drug including n/v, GI distress, constipation, hypoglycemia and rare risk of pancreatitis.   Pt denies hx of pancreatitis or gastroparesis.  Increase Mounjaro 7.5 mg subcutaneous once a week.  Sandy was seen by Oph in Aug 2023 without retinopathy per patient.    She denies hx of nephropathy. Her urine microalbuminuria was negative in Oct 2023 with creat 0.71 with GFR > 90 mL/min in March 2024.  No sx of neuropathy. Denies foot ulcers/sores.  BP at home is in the 115-123 / 83-90 range.  Pt taking Benicar.  TSH normal in Oct 2023.     2.  WEIGHT:  Encourage pt to continue to eat healthy and exercise.  Continue to titrate Mounjaro.    3.  FOLLOW UP:  With me in clinic in September 2024.  Mounjaro 7.5 mg pen ordered today.      Time spent reviewing chart,labs and DexcomG7 sensor download today = 5 minutes.  Time for video visit today = 19 minutes.  Time for documentation today = 15  minutes.    TOTAL TIME FOR VISIT TODAY =  39  minutes.    Vesta Arceo PA-C

## 2024-04-25 NOTE — PROGRESS NOTES
Time of start: 11:41 am   Time of end: 12:00 pm   Total duration of video visit: 19 minutes.  Providers location: Off-site.  Patient's location: [place].    HPI  Sandy Person is a 43 year old female with type 2 diabetes mellitus. Video visit for diabetes follow up today.  Last visit with me was in January 2024 and I suggested that patient start Mounjaro at that time.  Patient has been taking Mounjaro and tolerating the drug well.  Sandy has a history of IUFD at 19 [id number] s/p dilation and evacuation on 1/31/2023.    Pt has a hx of type 2 diabetes dx 10 years ago.   She has no known retinopathy, nephropathy or neuropathy.  Sandy has a hx of obesity and questionable hx of PCOS.  For her diabetes, she is currently taking Mounjaro 5 mg subcutaneous once a week and Metformin 2000 mg daily.  Rybelsus and  Glipzide were discontinued when she started Mounjaro.    Most recent A1C was 6.2 % on 3/8/2024.   I reviewed and scanned her DexcomG7 sensor download data in her her chart below.  Her blood sugars are good at this time.  Pt's blood sugar is in target 86 % of the time with an average glucose of 154.  On ROS today, she reports 15 pound weight loss lbs weight loss .  LMP was 4/7/2024.  Pt not trying to get pregnant at this time.  Pt denies blurred vision, n/v, chest pain, abd pain, dysuria or sx of neuropathy.  No foot ulcers.    Diabetes Care  Retinopathy: none; pt seen by Oph in Aug 2023.  Nephropathy:none; urine microalbuminuria negative in 10/2023.  Pt is taking Benicar.  Neuropathy:none.  Foot Exam:no exam today.  Taking aspirin: no.  Lipids: LDL 96 in 10/2023.  CAD: no.  Mental health: no hx of depression.  Insulin: none.  DM meds: Metformin and Mounjaro.    Testing:  DexcomG7 sensor.            ROS  See under HPI.    Allergies  No Known Allergies    Medications  Current Outpatient Medications   Medication Sig Dispense Refill    tirzepatide (MOUNJARO) 7.5 MG/0.5ML pen Inject 7.5 mg Subcutaneous every 7 days 5 mL 0     blood glucose (NO BRAND SPECIFIED) lancets standard Use to test blood sugar 4 times daily or as directed. 100 each 3    blood glucose (NO BRAND SPECIFIED) test strip Use to test blood sugar 4 times daily or as directed. 400 strip 3    blood glucose monitoring (NO BRAND SPECIFIED) meter device kit Use to test blood sugar 4 times daily or as directed. 1 kit 0    budesonide (PULMICORT) 0.5 MG/2ML neb solution Add one ampule into NeilMed sinus rinse bottle with saline mixture and irrigate nose daily 180 mL 3    clobetasol (TEMOVATE) 0.05 % external solution Apply topically 2 times daily 50 mL 3    clobetasol propionate (OLUX) 0.05 % external foam Apply 2 times daily to scalp 50 g 3    Continuous Blood Gluc Sensor (DEXCOM G7 SENSOR) MISC 1 each every 10 days Change sensor every 10 days 3 each 5    ketoconazole (NIZORAL) 2 % external shampoo Apply topically daily as needed for itching or irritation 120 mL 11    magnesium oxide 400 MG CAPS Take 400 mg by mouth daily      metFORMIN (GLUCOPHAGE) 500 MG tablet TAKE 2 TABLETS BY MOUTH TWICE DAILY 360 tablet 3    olmesartan (BENICAR) 5 MG tablet Take 1 tablet (5 mg) by mouth daily 90 tablet 3    tirzepatide (MOUNJARO) 5 MG/0.5ML pen Inject 5 mg Subcutaneous every 7 days 2 mL 3    Vitamin D3 (VITAMIN D, CHOLECALCIFEROL,) 25 mcg (1000 units) tablet Take 25 mcg by mouth daily     Metformin 500 mg 2 tab po BID.      Family History  family history includes Cancer in her father, maternal grandmother, and mother; Diabetes in her father, maternal grandmother, mother, and paternal grandmother; Heart Disease in her father and mother.    Social History   reports that she has never smoked. She has been exposed to tobacco smoke. She has never used smokeless tobacco. She reports that she does not currently use alcohol. She reports that she does not use drugs.     Past Medical History  Past Medical History:   Diagnosis Date    Abnormal Pap smear of cervix     Diabetes (H)     History of human  papillomavirus infection    Obesity.    Past Surgical History:   Procedure Laterality Date    COLPOSCOPY      DILATION AND EVACUATION N/A 1/31/2023    Procedure: AND EVACUATION OF UTERUS;  Surgeon: Carolin Valladares MD;  [facility]    REMOVE DILATORS, CERVICAL N/A 1/31/2023    Procedure: REMOVE DILATORS, CERVICAL;  Surgeon: Carolin Valladares MD;  [facility]    TONSILLECTOMY         Physical Exam    No exam today.      RESULTS  Creatinine   Date Value Ref Range Status   03/25/2024 0.71 0.51 - 0.95 mg/dL Final     GFR Estimate   Date Value Ref Range Status   03/25/2024 >90 >60 mL/min/1.73m2 Final     Hemoglobin A1C   Date Value Ref Range Status   03/08/2024 6.2 (H) 0.0 - 5.6 % Final     Comment:     Normal <5.7%   Prediabetes 5.7-6.4%    Diabetes 6.5% or higher     Note: Adopted from ADA consensus guidelines.     Potassium   Date Value Ref Range Status   03/25/2024 4.7 3.4 - 5.3 mmol/L Final     ALT   Date Value Ref Range Status   12/05/2023 33 0 - 50 U/L Final     Comment:     Reference intervals for this test were updated on 6/12/2023 to more accurately reflect our healthy population. There may be differences in the flagging of prior results with similar values performed with this method. Interpretation of those prior results can be made in the context of the updated reference intervals.       AST   Date Value Ref Range Status   12/05/2023 31 0 - 45 U/L Final     Comment:     Reference intervals for this test were updated on 6/12/2023 to more accurately reflect our healthy population. There may be differences in the flagging of prior results with similar values performed with this method. Interpretation of those prior results can be made in the context of the updated reference intervals.     TSH   Date Value Ref Range Status   10/17/2023 1.18 0.30 - 4.20 uIU/mL Final   03/06/2022 1.17 0.40 - 4.00 mU/L Final       ASSESSMENT/PLAN:    1.  TYPE 2 DIABETES MELLITUS: Sandy may try IVF in the near future. LMP was  4/7/2024.  She is not trying to get pregnant at this time and is aware that she will need to stop Mounjaro 3 months prior to trying to conceive.  Sandy is tolerating Mounjaro well.  Patient denies nausea vomiting or abdominal pain.  Mild constipation.  Our goal is to treat Sandy with just Mounjaro and Metformin for her diabetes.  Again, I reviewed how Mounjaro works and possible side effects of the drug including n/v, GI distress, constipation, hypoglycemia and rare risk of pancreatitis.   Pt denies hx of pancreatitis or gastroparesis.  Increase Mounjaro 7.5 mg subcutaneous once a week.  Sandy was seen by Oph in Aug 2023 without retinopathy per patient.    She denies hx of nephropathy. Her urine microalbuminuria was negative in Oct 2023 with creat 0.71 with GFR > 90 mL/min in March 2024.  No sx of neuropathy. Denies foot ulcers/sores.  BP at home is in the 115-123 / 83-90 range.  Pt taking Benicar.  TSH normal in Oct 2023.     2.  WEIGHT:  Encourage pt to continue to eat healthy and exercise.  Continue to titrate Mounjaro.    3.  FOLLOW UP:  With me in clinic in September 2024.  Mounjaro 7.5 mg pen ordered today.      Time spent reviewing chart,labs and DexcomG7 sensor download today = 5 minutes.  Time for video visit today = 19 minutes.  Time for documentation today = 15  minutes.    TOTAL TIME FOR VISIT TODAY =  39  minutes.    Vesta Arceo PA-C

## 2024-04-25 NOTE — NURSING NOTE
Is the patient currently in the state of [place]? YES    Visit mode:VIDEO    If the visit is dropped, the patient can be reconnected by: VIDEO VISIT: Text to cell phone:   Telephone Information:   Mobile [phone]       Will anyone else be joining the visit? NO  (If patient encounters technical issues they should call [phone]    How would you like to obtain your AVS? MyChart    Are changes needed to the allergy or medication list? No patient reported making no changes to e-check in information for visit. VF did not review e-check in information again with patient due to this.    Are refills needed on medications prescribed by this physician? NO    Reason for visit: RECHECK (Follow up)    Mague AZUL

## 2024-05-10 ENCOUNTER — MYC MEDICAL ADVICE (OUTPATIENT)
Dept: OTOLARYNGOLOGY | Facility: CLINIC | Age: 44
End: 2024-05-10
Payer: COMMERCIAL

## 2024-05-10 DIAGNOSIS — J31.0 CHRONIC RHINITIS: Primary | ICD-10-CM

## 2024-05-13 RX ORDER — IPRATROPIUM BROMIDE 42 UG/1
2 SPRAY, METERED NASAL 2 TIMES DAILY
Qty: 15 ML | Refills: 6 | Status: SHIPPED | OUTPATIENT
Start: 2024-05-13 | End: 2024-09-26

## 2024-05-13 RX ORDER — FLUTICASONE PROPIONATE 50 MCG
2 SPRAY, SUSPENSION (ML) NASAL DAILY
Qty: 15.8 ML | Refills: 6 | Status: SHIPPED | OUTPATIENT
Start: 2024-05-13 | End: 2024-09-26

## 2024-05-20 ENCOUNTER — VIRTUAL VISIT (OUTPATIENT)
Dept: PHARMACY | Facility: CLINIC | Age: 44
End: 2024-05-20
Payer: COMMERCIAL

## 2024-05-20 DIAGNOSIS — E11.65 TYPE 2 DIABETES MELLITUS WITH HYPERGLYCEMIA, UNSPECIFIED WHETHER LONG TERM INSULIN USE (H): Primary | ICD-10-CM

## 2024-05-20 PROCEDURE — 99606 MTMS BY PHARM EST 15 MIN: CPT | Mod: 93

## 2024-05-20 RX ORDER — TIRZEPATIDE 10 MG/.5ML
10 INJECTION, SOLUTION SUBCUTANEOUS
Qty: 6 ML | Refills: 3 | Status: SHIPPED | OUTPATIENT
Start: 2024-05-20 | End: 2024-08-02 | Stop reason: DRUGHIGH

## 2024-05-20 NOTE — PROGRESS NOTES
Medication Therapy Management (MTM) Encounter    ASSESSMENT:                            Medication Adherence/Access: See below for considerations    Type 2 Diabetes: A1C below goal of < 7%, but started Mounjaro in January and replacement of Rybelsus to help remove need for glipizide.  Patient was able to stop glipizide and is tolerating Mounjaro well. Her time in range is well above goal of greater than 70%.  Given tolerating Mounjaro well, would benefit from dose increase today to help improve time in range and achieve weight loss goals.    PLAN:                            INCREASE Mounjaro to 10 mg weekly. Not cancelling refills for 7.5 mg in case 10 mg is not available. Please Mychart me if you have 1 shot left and cannot find either dose.     Endocrine Team & Next Follow-Up:  9/26/2024 with Cait Arceo PA-C   6/17/2024 with Sreedhar    SUBJECTIVE/OBJECTIVE:                          Sandy Person is a 43 year old female called for a follow-up visit. She was referred to me from Cait Arceo PA-C.      Reason for visit: Medication Therapy Management (MTM).      Allergies/ADRs: Reviewed in chart  Past Medical History: Reviewed in chart  Social History     Tobacco Use    Smoking status: Never     Passive exposure: Past    Smokeless tobacco: Never   Vaping Use    Vaping status: Never Used   Substance Use Topics    Alcohol use: Not Currently     Comment: 1-2 drinks per month    Drug use: Never        Medication Adherence/Access: Adherence is reflected well in the dispense report.     Type 2 Diabetes:   Diabetes Medication(s)       Biguanides       metFORMIN (GLUCOPHAGE) 500 MG tablet TAKE 2 TABLETS BY MOUTH TWICE DAILY       Incretin Mimetic Agents       tirzepatide (MOUNJARO) 7.5 MG/0.5ML pen Inject 7.5 mg Subcutaneous every 7 days     Historically on Rybelsus 7 mg + glipizide, but these were stopped when Mounjaro was started            Blood sugar monitoring:       Lifestyle: Very regimented diet (high protein), now  "struggling with carbs   ACEi/ARB: Yes: olmesartan.   Urine Albumin:   Lab Results   Component Value Date    UMALCR  10/17/2023      Comment:      Unable to calculate, urine albumin and/or urine creatinine is outside detectable limits.  Microalbuminuria is defined as an albumin:creatinine ratio of 17 to 299 for males and 25 to 299 for females. A ratio of albumin:creatinine of 300 or higher is indicative of overt proteinuria.  Due to biologic variability, positive results should be confirmed by a second, first-morning random or 24-hour timed urine specimen. If there is discrepancy, a third specimen is recommended. When 2 out of 3 results are in the microalbuminuria range, this is evidence for incipient nephropathy and warrants increased efforts at glucose control, blood pressure control, and institution of therapy with an angiotensin-converting-enzyme (ACE) inhibitor (if the patient can tolerate it).      UMALCR 12.07 03/06/2022      Lab Results   Component Value Date    A1C 6.2 03/08/2024    A1C 5.9 12/05/2023    A1C 6.0 05/17/2023    A1C 5.5 01/09/2023    A1C 6.6 11/21/2022     Lab Results   Component Value Date    GFRESTIMATED >90 03/25/2024    GFRESTIMATED >90 10/17/2023    GFRESTIMATED >90 01/30/2023     Most Recent Immunizations   Administered Date(s) Administered    COVID-19 12+ (2023-24) (Pfizer) 12/05/2023    COVID-19 Bivalent 12+ (Pfizer) 12/26/2022    COVID-19 MONOVALENT 12+ (Pfizer) 11/13/2021    Influenza Vaccine >6 months,quad, PF 10/17/2023    Influenza, seasonal, injectable, PF 01/25/2016    Influenza,INJ,MDCK,PF,Quad >6mo(Flucelvax) 11/27/2022    Pneumococcal 23 valent 06/05/2015    TDAP (Adacel,Boostrix) 05/17/2023    Twinrix A/B 02/28/2018    Typhoid IM 02/28/2018      Estimated body mass index is 39.44 kg/m  as calculated from the following:    Height as of 4/15/24: 5' 3.47\" (1.612 m).    Weight as of 4/15/24: 226 lb (102.5 kg).     Wt Readings from Last 3 Encounters:   04/15/24 226 lb (102.5 kg) "   03/08/24 226 lb 9.6 oz (102.8 kg)   12/05/23 241 lb (109.3 kg)     BP Readings from Last 1 Encounters:   04/15/24 134/86     Pulse Readings from Last 1 Encounters:   04/15/24 98     Temp Readings from Last 1 Encounters:   03/08/24 98.6  F (37  C) (Temporal)       ----------------    I spent 15 minutes with this patient today. Cait Arceo PA-C was provided the recommendations above via routed note and is the authorizing prescriber for this visit through the pharmacist collaborative practice agreement. A copy of the visit note was provided to the patient's provider(s).    The patient was given to the patient a summary of these recommendations.     Sreedhar Joyner, PharmD, Formerly named [facility]  Endocrine & Diabetes [facility] Pharmacist  [address] [place] [id number]  Direct Voicemail: [phone]    Telemedicine Visit Details  Type of service:  Telephone visit  Start Time: 8AM  End Time: 8:15AM  Originating Location (pt. Location): Home  Provider has received verbal consent for a visit from the patient? Yes     Medication Therapy Recommendations  Type 2 diabetes mellitus with hyperglycemia, unspecified whether long term insulin use (H)    Current Medication: tirzepatide (MOUNJARO) 7.5 MG/0.5ML pen   Rationale: Medication product not available - Adherence - Adherence   Recommendation: Provide Adherence Intervention   Status: Resolved Med Access Issue

## 2024-05-22 ENCOUNTER — VIRTUAL VISIT (OUTPATIENT)
Dept: ONCOLOGY | Facility: CLINIC | Age: 44
End: 2024-05-22
Attending: NURSE PRACTITIONER
Payer: COMMERCIAL

## 2024-05-22 DIAGNOSIS — Z80.0 FAMILY HISTORY OF COLON CANCER: ICD-10-CM

## 2024-05-22 DIAGNOSIS — Z80.0 FAMILY HISTORY OF CANCER OF EXTRAHEPATIC BILE DUCTS: ICD-10-CM

## 2024-05-22 DIAGNOSIS — Z80.8 FAMILY HISTORY OF SKIN CANCER: ICD-10-CM

## 2024-05-22 DIAGNOSIS — Z80.42 FAMILY HISTORY OF PROSTATE CANCER: ICD-10-CM

## 2024-05-22 DIAGNOSIS — Z80.3 FAMILY HISTORY OF MALIGNANT NEOPLASM OF BREAST: Primary | ICD-10-CM

## 2024-05-22 PROCEDURE — 96040 HC GENETIC COUNSELING, EACH 30 MINUTES: CPT | Mod: GT,95

## 2024-05-22 NOTE — PATIENT INSTRUCTIONS
Assessing Cancer Risk  Cancer is a common diagnosis which impacts many families. Individuals may develop cancer due to environmental factors (such as exposures and lifestyle), aging, genetic predisposition, or a combination of these factors. The vast majority of cancer diagnoses are considered sporadic, and not primarily due to an inherited risk factor. Approximately 5-10% of cancer diagnoses are thought to be caused by inherited risk factors.       There are several features that are likely to be present in a family that has an inherited alteration in a cancer susceptibility gene. However, this may not be the case for all families that carry a cancer risk gene, such as those with small family size or limited history information.  Cancers diagnosed at a young age (often before age 50)  Individuals with more than one primary cancer  Certain rare tumors/cancers  Multiple generations of the family affected with cancer    Categories of Cancer        Cancers may be:  Sporadic: somatic (acquired) genetic errors, environmental exposures, and lifestyle contribute to cancer as we age.  Familial: clustering of cancer in a family due to a combination of multiple genetic and shared environmental factors.  Hereditary: inherited risk for cancer, typically in a single gene.      Cancer Screening and Management  Cancer risk, as well as screening and management recommendations, are based on a combination of factors. This includes both personal and family history factors. Population cancer screening options, such as those recommended by the American Cancer Society and the National Comprehensive Cancer Network (NCCN), are appropriate for many families at average risk for cancer. However, earlier and/or more frequent screening may be recommended based on personal factors (lifestyle, exposures, medications, screening results), family history of cancer, and sometimes genetic factors. These cancer risk management options should be  discussed in more detail with an individual's medical providers.    Resources  National Society of Genetic Counselors nsgc.org   American Cancer Society cancer.org     Please call us if you have any questions or concerns.   Cancer Risk Management Program (Appointments: [phone])  Nilo Kimbrough, MS, [facility] [phone]  Michaela Polanco, MS, [facility] [phone]  Maddie Osman, MS, [facility]  [phone]  Soheila Brown, MS, [facility]  [phone]  Dee Owens, MS, [facility]  [phone]  Penelope Pang, MS, [facility] [phone]  Kerry Hood, MS, [facility] [phone]

## 2024-05-22 NOTE — Clinical Note
5/22/2024         RE: Sandy Person  [address] [facility] [address]        Dear Colleague,    Thank you for referring your patient, Sandy Person, to the [facility]. Please see a copy of my visit note below.    5/22/2024    Virtual Visit Details  Type of service:  Video Visit   Originating Location (pt. Location): Home  Distant Location (provider location):  Off-site  Platform used for Video Visit: [facility]    Referring Provider: BARBER Lyman CNP     Presenting Information:   I met with Sandy Person today for her video genetic counseling visit through the Cancer Risk Management Program to discuss her family history of colon, prostate, breast, and bile duct cancer cancer. She is here today to review this history, cancer screening recommendations, and available genetic testing options.    Personal History:  Sandy is a 43 year old female. She does not have any personal history of cancer. In her hormonal-based medical history, she had her first menstrual period at age 15, does not have biological children, and is premenopausal. Sandy has her ovaries, fallopian tubes and uterus in place, and reports no ovarian cancer screening to date. She reports no history of hormone replacement therapy. Sandy reports oral contraceptive use for approximately 10 years.       Sandy has regular clinical breast exams. On 2/22/2024, a screening mammogram found a possible mass in the left breast. An ultrasound on 3/6/2024 showed a span of clustered microcysts. Sandy has not had a colonoscopy. She reports a history of dermatological exams. Sandy does not regularly do any other cancer screening at this time. She reported no history of nicotine or tobacco use and occasional alcohol use.    Family History: (Please see scanned pedigree for detailed family history information)  Sandy's mother was diagnosed with skin cancer at age 65 on her back and bile duct cancer at age 72. It was reported that she was  diabetic. She passed away at age 72.   Maternal aunt was diagnosed with mouth cancer. It was reported that she was a smoker. She passed away in her 60's.  Maternal first cousin was diagnosed with cancer (unknown type) at age 56 and passed away shortly after.   Maternal grandmother was diagnosed with breast cancer over the age of 50 and bone cancer in her 80's. She passed away at age 82.   Sandy's father was diagnosed with non-metastatic prostate cancer in his 60's and colon cancer at age 80. He passed away at age 86.   Paternal aunt was diagnosed with cancer (unknown type) at an unknown age. It was reported that she had two recurrences. She passed away at age 90.   Paternal grandmother was diagnosed with cancer (unknown type) at an unknown age. She passed away at age 83.   Paternal grandfather was diagnosed with colon cancer over the age of 50. He passed away at age 81.   Her maternal ethnicity is [place] and Citizen of [place]. Her paternal ethnicity is [place].  There is no known Ashkenazi Voodoo ancestry on either side of her family. There is no reported consanguinity.    Discussion:  Based upon Sandy's current personal and family history, Sandy is likely at a low risk for a hereditary cancer syndrome.   We reviewed the features of sporadic, familial, and hereditary cancers and discussed the features commonly associated with hereditary cancer syndromes. The vast majority of cancers are considered sporadic and not primarily due to an inherited factor. Individuals can develop cancer due to aging, chance events, environmental exposures or lifestyles. A handout regarding this information will be sent to Sandy and can be found in the after visit summary.  As discussed in our session, her family history is absent for the following features typically seen in high risk families:   Cancers diagnosed at a young age (often before age 50)  Individuals with more than one primary cancer  Certain rare cancers/tumors  (i.e., male breast  cancer)  Multiple relatives with similar cancers on the same side of the family  Cancers in multiple generations  Because of the absence of these features, there is a lower chance that there is a currently identifiable hereditary cancer syndrome present in Sandy's family.    After our discussion, Sandy was not interested in pursuing genetic testing at this time.  We discussed the importance of Sandy contacting me if her personal or family history changes. This may modify our assessment regarding risk for a hereditary cancer syndrome and/or genetic testing options.   Screening recommendations based upon personal/family history:  Per National Comprehensive Cancer Network (NCCN) guidelines, individuals with a first degree relative with colorectal cancer diagnosed at any age should begin colonoscopy at age 40, or 10 years before the earliest diagnosis of colorectal cancer, whichever is first. In this family, colonoscopy should start at age 40. Colonoscopy should be repeated every 5 years, or based on colonoscopy findings. This would apply to Sandy and her sister. These recommendations may change based on personal and family history as well as personal preference, and should be discussed with an individuals physician.      Based on her personal and family history, Sandy has a 9.5% lifetime risk of developing breast cancer based on the NINA model. Therefore, Sandy does not meet current National Comprehensive Cancer Network (NCCN) guidelines for high risk breast screening, which is offered to women with a 20% lifetime risk or higher. However, it is still important for Sandy to continue with routine breast screening under the care of her physicians. Breast cancer screening is generally recommended to begin approximately 10 years younger than the earliest age of breast cancer diagnosis in the family, or at age 40, whichever comes first. In this family, screening may begin at age 40. Sandy is encouraged to discuss breast screening  with her physicians.   Other population cancer screening options, such as those recommended by the American Cancer Society and the National Comprehensive Cancer Network (NCCN), are also appropriate for Sandy and her family. These screening recommendations may change if there are changes to Sandy's personal and/or family history of cancer.   Final screening recommendations should be made by each individual's managing physician.  Of note, these screening recommendations may change should Sandy elect to pursue genetic testing in the future.      Plan:  1) Sandy elected not to have genetic testing at this time. Therefore, no genetic testing was ordered today.   2) Information regarding recommended screening, based upon the family history, was reviewed for Sandy.  3) Sandy will contact me regularly and/or if the family or personal history changes. My contact information was provided.   Sandy is 43 year old and is being evaluated via a billable video visit.    Time spent on video: 33 minutes    Michaela Polanco MS, [facility]  Licensed, Certified Genetic Counselor  Phone: [phone]      Again, thank you for allowing me to participate in the care of your patient.        Sincerely,        Michaela Polanco GC

## 2024-05-22 NOTE — NURSING NOTE
Is the patient currently in the state of [place]? YES    Visit mode:VIDEO    If the visit is dropped, the patient can be reconnected by: VIDEO VISIT:  Send e-mail to at [email or web address]    Will anyone else be joining the visit? No  (If patient encounters technical issues they should call [phone])    How would you like to obtain your AVS? MyChart    Are changes needed to the allergy or medication list? N/A    Are refills needed on medications prescribed by this physician? NO    Rooming Documentation: Assigned questionnaire(s) completed .    Reason for visit: Consult     JORGE ALBERTO Trent

## 2024-05-22 NOTE — LETTER
Cancer Risk Management  Program Locations    [facility]  [facility]  [facility]  [facility]  [facility]  Mailing Address  Cancer Risk Management Program  [facility]  [address] [facility] [address]  [place] [address]    New patient appointments  [phone]    May 22, 2024    Sandy Person  [address]  [facility] [address]    Dear Sandy,    It was a pleasure talking with you via your virtual genetic counseling visit on 5/22/2024.  Below is a copy of the progress note from that recent visit through the Cancer Risk Management Program.  If you have any additional questions, please feel free to contact me.    Referring Provider: BARBER Lyman CNP     Presenting Information:   I met with Sandy Person today for her video genetic counseling visit through the Cancer Risk Management Program to discuss her family history of colon, prostate, breast, and bile duct cancer. She is here today to review this history, cancer screening recommendations, and available genetic testing options.    Personal History:  Sandy is a 43 year old female. She does not have any personal history of cancer. In her hormonal-based medical history, she had her first menstrual period at age 15, does not have biological children, and is premenopausal. Sandy has her ovaries, fallopian tubes and uterus in place, and reports no ovarian cancer screening to date. She reports no history of hormone replacement therapy. Sandy reports oral contraceptive use for approximately 10 years.       Sandy has regular clinical breast exams. On 2/22/2024, a screening mammogram found a possible mass in the left breast. An ultrasound on 3/6/2024 showed a span of clustered microcysts. Sandy has not had a colonoscopy. She reports a history of dermatological exams. Sandy does not regularly do any other cancer screening at this time. She  reported no history of nicotine or tobacco use and occasional alcohol use.    Family History: (Please see scanned pedigree for detailed family history information)  Sandy's mother was diagnosed with skin cancer at age 65 on her back and bile duct cancer at age 72. It was reported that she was diabetic. She passed away at age 72.   Maternal aunt was diagnosed with mouth cancer. It was reported that she was a smoker. She passed away in her 60's.  Maternal first cousin was diagnosed with cancer (unknown type) in her 50's. She passed away at age 56.   Maternal grandmother was diagnosed with breast cancer over the age of 50 and bone cancer in her 80's. She passed away at age 82.   Sandy's father was diagnosed with non-metastatic prostate cancer in his 60's and colon cancer at age 80. He passed away at age 86.   Paternal aunt was diagnosed with cancer (unknown type) at an unknown age. It was reported that she had two recurrences. She passed away at age 90.   Paternal grandmother was diagnosed with cancer (unknown type) at an unknown age. She passed away at age 83.   Paternal grandfather was diagnosed with colon cancer over the age of 50. He passed away at age 81.   Her maternal ethnicity is Northern [place] and [place]. Her paternal ethnicity is [place].  There is no known Ashkenazi Alevism ancestry on either side of her family. There is no reported consanguinity.    Discussion:  Based upon Sandy's current personal and family history, Sandy is likely at a lower risk for a hereditary cancer syndrome.   We reviewed the features of sporadic, familial, and hereditary cancers and discussed the features commonly associated with hereditary cancer syndromes. The vast majority of cancers are considered sporadic and not primarily due to an inherited factor. Individuals can develop cancer due to aging, chance events, environmental exposures or lifestyles. A handout regarding this information will be sent to Sandy and can be found in the after visit  summary.  As discussed in our session, her family history is absent for the following features typically seen in high risk families:   Cancers diagnosed at a young age (often before age 50)  Individuals with more than one primary cancer  Certain rare cancers/tumors  (i.e., male breast cancer)  Multiple relatives with similar cancers on the same side of the family  Cancers in multiple generations  Because of the absence of these features, there is a lower chance that there is a currently identifiable hereditary cancer syndrome present in Sandy's family. Of note, this assessment may change if Sandy is able to gather additional details about the types of cancer some of her relatives were diagnosed with. Sandy expressed understanding.    After our discussion, Sandy was not interested in pursuing genetic testing at this time.  We discussed the importance of Sandy contacting me if her personal or family history changes in the future. This may modify our assessment regarding risk for a hereditary cancer syndrome and/or genetic testing options.   Screening recommendations based upon personal/family history:  Per National Comprehensive Cancer Network (NCCN) guidelines, individuals with a first degree relative with colorectal cancer diagnosed at any age should begin colonoscopy at age 40, or 10 years before the earliest diagnosis of colorectal cancer, whichever is first. In this family, colonoscopy should start at age 40. Colonoscopy should be repeated every 5 years, or based on colonoscopy findings. This would apply to Sandy and her sister. These recommendations may change based on personal and family history as well as personal preference, and should be discussed with an individuals physician.      Based on her personal and family history, Sandy has a 9.5% lifetime risk of developing breast cancer based on the NINA model. Therefore, Sandy does not meet current National Comprehensive Cancer Network (NCCN) guidelines for high risk  breast screening, which is offered to women with a 20% lifetime risk or higher. However, it is still important for Sandy to continue with routine breast screening under the care of her physicians. Breast cancer screening is generally recommended to begin approximately 10 years younger than the earliest age of breast cancer diagnosis in the family, or at age 40, whichever comes first. In this family, screening may begin at age 40. Sandy is encouraged to discuss breast screening with her physicians.   Other population cancer screening options, such as those recommended by the American Cancer Society and the National Comprehensive Cancer Network (NCCN), are also appropriate for Sandy and her family. These screening recommendations may change if there are changes to Sandy's personal and/or family history of cancer.   Final screening recommendations should be made by each individual's managing physician.  Of note, these screening recommendations may change should Sandy elect to pursue genetic testing in the future.      Plan:  1) Sandy elected not to have genetic testing at this time. Therefore, no genetic testing was ordered today.   2) Information regarding recommended screening, based upon the family history, was reviewed for Sandy.  3) Sandy will contact me regularly and/or if the family or personal history changes. My contact information was provided.   Sandy is 43 year old and is being evaluated via a billable video visit.      Time spent on video: 33 minutes    Michaela Polanco MS, [facility]  Licensed, Certified Genetic Counselor  Phone: [phone]

## 2024-06-14 ENCOUNTER — OFFICE VISIT (OUTPATIENT)
Dept: FAMILY MEDICINE | Facility: CLINIC | Age: 44
End: 2024-06-14
Payer: COMMERCIAL

## 2024-06-14 VITALS
BODY MASS INDEX: 36.66 KG/M2 | OXYGEN SATURATION: 98 % | WEIGHT: 214.7 LBS | SYSTOLIC BLOOD PRESSURE: 133 MMHG | HEART RATE: 77 BPM | RESPIRATION RATE: 18 BRPM | TEMPERATURE: 97.5 F | HEIGHT: 64 IN | DIASTOLIC BLOOD PRESSURE: 84 MMHG

## 2024-06-14 DIAGNOSIS — E11.65 TYPE 2 DIABETES MELLITUS WITH HYPERGLYCEMIA, UNSPECIFIED WHETHER LONG TERM INSULIN USE (H): ICD-10-CM

## 2024-06-14 DIAGNOSIS — E66.01 CLASS 3 SEVERE OBESITY DUE TO EXCESS CALORIES WITH SERIOUS COMORBIDITY AND BODY MASS INDEX (BMI) OF 40.0 TO 44.9 IN ADULT (H): ICD-10-CM

## 2024-06-14 DIAGNOSIS — Z23 ENCOUNTER FOR IMMUNIZATION: Primary | ICD-10-CM

## 2024-06-14 DIAGNOSIS — Z80.0 FAMILY HISTORY OF COLON CANCER: ICD-10-CM

## 2024-06-14 DIAGNOSIS — E66.813 CLASS 3 SEVERE OBESITY DUE TO EXCESS CALORIES WITH SERIOUS COMORBIDITY AND BODY MASS INDEX (BMI) OF 40.0 TO 44.9 IN ADULT (H): ICD-10-CM

## 2024-06-14 DIAGNOSIS — Z12.11 SPECIAL SCREENING FOR MALIGNANT NEOPLASMS, COLON: ICD-10-CM

## 2024-06-14 PROCEDURE — 99213 OFFICE O/P EST LOW 20 MIN: CPT | Mod: 25 | Performed by: NURSE PRACTITIONER

## 2024-06-14 PROCEDURE — 90677 PCV20 VACCINE IM: CPT | Performed by: NURSE PRACTITIONER

## 2024-06-14 PROCEDURE — 90471 IMMUNIZATION ADMIN: CPT | Performed by: NURSE PRACTITIONER

## 2024-06-14 ASSESSMENT — PAIN SCALES - GENERAL: PAINLEVEL: NO PAIN (0)

## 2024-06-14 NOTE — PROGRESS NOTES
"  Assessment & Plan     Type 2 diabetes mellitus with hyperglycemia, unspecified whether long term insulin use (H)  Hemoglobin A1C   Date Value Ref Range Status   03/08/2024 6.2 (H) 0.0 - 5.6 % Final     Comment:     Normal <5.7%   Prediabetes 5.7-6.4%    Diabetes 6.5% or higher     Note: Adopted from ADA consensus guidelines.   Controlled; w/Metformin and Mounjaro; tolerating well; followed by Endocrinology  - HEMOGLOBIN A1C     Special screening for malignant neoplasms, colon  Family history of colon cancer  Increase family history of colon cancer; early screening recommended  - Colonoscopy Screening  Referral    Class 3 severe obesity due to excess calories with serious comorbidity and body mass index (BMI) of 40.0 to 44.9 in adult (H)  Wt Readings from Last 5 Encounters:   06/14/24 97.4 kg (214 lb 11.2 oz)   04/15/24 102.5 kg (226 lb)   03/08/24 102.8 kg (226 lb 9.6 oz)   12/05/23 109.3 kg (241 lb)   09/26/23 107.5 kg (237 lb)     Estimated body mass index is 36.97 kg/m  as calculated from the following:    Height as of this encounter: 1.623 m (5' 3.9\").    Weight as of this encounter: 97.4 kg (214 lb 11.2 oz).  - continue lifestyle changes: exercise/activity program 30 minutes daily,                 BMI  Estimated body mass index is 36.97 kg/m  as calculated from the following:    Height as of this encounter: 1.623 m (5' 3.9\").    Weight as of this encounter: 97.4 kg (214 lb 11.2 oz).             Indira Delgado is a 44 year old, presenting for the following health issues:  Recheck Medication        6/14/2024     7:33 AM   Additional Questions   Roomed by Courtney CHAPMAN     History of Present Illness       Reason for visit:  Follow up    She eats 2-3 servings of fruits and vegetables daily.She consumes 0 sweetened beverage(s) daily.She exercises with enough effort to increase her heart rate 30 to 60 minutes per day.  She exercises with enough effort to increase her heart rate 4 days per week.   She is " "taking medications regularly.   Class 3 severe obesity due to excess calories with serious comorbidity and body mass index (BMI) of 40.0 to 44.9 in adult (H)   - doing well on Mounjaro 10 mg   - down 14 lbs     Type 2 diabetes mellitus without complication, without long-term current use of insulin (H)   - controlled; started mounjaro; stopped glipizide and rybleus                   Objective    /84   Pulse 77   Temp 97.5  F (36.4  C) (Temporal)   Resp 18   Ht 1.623 m (5' 3.9\")   Wt 97.4 kg (214 lb 11.2 oz)   LMP 06/03/2024 (Exact Date)   SpO2 98%   BMI 36.97 kg/m    Body mass index is 36.97 kg/m .  Physical Exam               Signed Electronically by: BARBER Lyman CNP    "

## 2024-06-14 NOTE — NURSING NOTE
Prior to immunization administration, verified patients identity using patient s name and date of birth. Please see Immunization Activity for additional information.     Screening Questionnaire for Adult Immunization    Are you sick today?   No   Do you have allergies to medications, food, a vaccine component or latex?   No   Have you ever had a serious reaction after receiving a vaccination?   No   Do you have a long-term health problem with heart, lung, kidney, or metabolic disease (e.g., diabetes), asthma, a blood disorder, no spleen, complement component deficiency, a cochlear implant, or a spinal fluid leak?  Are you on long-term aspirin therapy?   No   Do you have cancer, leukemia, HIV/AIDS, or any other immune system problem?   No   Do you have a parent, brother, or sister with an immune system problem?   No   In the past 3 months, have you taken medications that affect  your immune system, such as prednisone, other steroids, or anticancer drugs; drugs for the treatment of rheumatoid arthritis, Crohn s disease, or psoriasis; or have you had radiation treatments?   No   Have you had a seizure, or a brain or other nervous system problem?   No   During the past year, have you received a transfusion of blood or blood    products, or been given immune (gamma) globulin or antiviral drug?   No   For women: Are you pregnant or is there a chance you could become       pregnant during the next month?   No   Have you received any vaccinations in the past 4 weeks?   No     Immunization questionnaire answers were all negative.      Patient instructed to remain in clinic for 15 minutes afterwards, and to report any adverse reactions.     Screening performed by Abebe Craig MA on 6/14/2024 at 8:26 AM.

## 2024-06-14 NOTE — PROGRESS NOTES
Medication Therapy Management (MTM) Encounter    ASSESSMENT:                            Medication Adherence/Access: See below for considerations    Type 2 Diabetes: A1C below goal of < 7%, but started Mounjaro in January and replacement of Rybelsus to help remove need for glipizide.  Patient was able to stop glipizide and is tolerating Mounjaro well. Her time in range is well above goal of greater than 70%.  Just received 3 month supply of Mounjaro 10 mg, so will continue this dose for another 3 months and will message patient to inquire about dose increase for weight loss about 2 months from now.     PLAN:                            Discussed over Mychart: Continue Mounjaro 10 mg weekly for 3 months. I will message you in two months asking about a possible dose increase to 12.5 mg weekly  Keep up the great work!    Endocrine Team & Next Follow-Up:  9/26/2024 with Cait Arceo PA-C   [facility]    SUBJECTIVE/OBJECTIVE:                          Sandy Person is a 44 year old female called for a follow-up visit. She was referred to me from Cait Arceo PA-C.      Reason for visit: Medication Therapy Management (MTM).      Allergies/ADRs: Reviewed in chart  Past Medical History: Reviewed in chart  Social History     Tobacco Use    Smoking status: Never     Passive exposure: Past    Smokeless tobacco: Never   Vaping Use    Vaping status: Never Used   Substance Use Topics    Alcohol use: Not Currently     Comment: 1-2 drinks per month    Drug use: Never        Medication Adherence/Access: Adherence is reflected well in the dispense report.     Type 2 Diabetes:   Diabetes Medication(s)       Biguanides       metFORMIN (GLUCOPHAGE) 500 MG tablet TAKE 2 TABLETS BY MOUTH TWICE DAILY       Incretin Mimetic Agents       tirzepatide (MOUNJARO) 10 MG/0.5ML pen Inject 10 mg Subcutaneous every 7 days. 3 mo supply shipped 5/22/24. More appetite suppression effects per patient.     Historically on Rybelsus 7 mg +  "glipizide, but these were stopped when Mounjaro was started     Blood sugar monitoring:       Lifestyle: Very regimented diet (high protein), now struggling with carbs   ACEi/ARB: Yes: olmesartan.   Urine Albumin:   Lab Results   Component Value Date    UMALCR  10/17/2023      Comment:      Unable to calculate, urine albumin and/or urine creatinine is outside detectable limits.  Microalbuminuria is defined as an albumin:creatinine ratio of 17 to 299 for males and 25 to 299 for females. A ratio of albumin:creatinine of 300 or higher is indicative of overt proteinuria.  Due to biologic variability, positive results should be confirmed by a second, first-morning random or 24-hour timed urine specimen. If there is discrepancy, a third specimen is recommended. When 2 out of 3 results are in the microalbuminuria range, this is evidence for incipient nephropathy and warrants increased efforts at glucose control, blood pressure control, and institution of therapy with an angiotensin-converting-enzyme (ACE) inhibitor (if the patient can tolerate it).      UMALCR 12.07 03/06/2022      Lab Results   Component Value Date    A1C 6.2 03/08/2024    A1C 5.9 12/05/2023    A1C 6.0 05/17/2023    A1C 5.5 01/09/2023    A1C 6.6 11/21/2022     Lab Results   Component Value Date    GFRESTIMATED >90 03/25/2024    GFRESTIMATED >90 10/17/2023    GFRESTIMATED >90 01/30/2023     Estimated body mass index is 36.97 kg/m  as calculated from the following:    Height as of 6/14/24: 1.623 m (5' 3.9\").    Weight as of 6/14/24: 97.4 kg (214 lb 11.2 oz).     Wt Readings from Last 3 Encounters:   06/14/24 97.4 kg (214 lb 11.2 oz)   04/15/24 102.5 kg (226 lb)   03/08/24 102.8 kg (226 lb 9.6 oz)     BP Readings from Last 1 Encounters:   06/14/24 133/84     Pulse Readings from Last 1 Encounters:   06/14/24 77     Temp Readings from Last 1 Encounters:   06/14/24 97.5  F (36.4  C) (Temporal)       ----------------    I spent 15 minutes with this patient " today. Cait Arceo PA-C was provided the recommendations above via routed note and is the authorizing prescriber for this visit through the pharmacist collaborative practice agreement. A copy of the visit note was provided to the patient's provider(s).    The patient was given to the patient a summary of these recommendations.     Sreedhar Joyner, PharmD, [facility]  Endocrine & Diabetes [facility] Pharmacist  [address] [place] [id number]    Telemedicine Visit Details  Type of service:  Telephone visit  Start Time: 8AM  End Time: 8:15AM  Originating Location (pt. Location): Home  Provider has received verbal consent for a visit from the patient? Yes     Medication Therapy Recommendations  No medication therapy recommendations to display

## 2024-06-17 ENCOUNTER — TELEPHONE (OUTPATIENT)
Dept: ENDOCRINOLOGY | Facility: CLINIC | Age: 44
End: 2024-06-17
Payer: COMMERCIAL

## 2024-06-17 ENCOUNTER — VIRTUAL VISIT (OUTPATIENT)
Dept: CARDIOLOGY | Facility: CLINIC | Age: 44
End: 2024-06-17
Attending: PHYSICIAN ASSISTANT
Payer: COMMERCIAL

## 2024-06-17 DIAGNOSIS — E11.9 TYPE 2 DIABETES MELLITUS WITHOUT COMPLICATION, WITH LONG-TERM CURRENT USE OF INSULIN (H): Primary | ICD-10-CM

## 2024-06-17 DIAGNOSIS — E11.65 TYPE 2 DIABETES MELLITUS WITH HYPERGLYCEMIA, UNSPECIFIED WHETHER LONG TERM INSULIN USE (H): Primary | ICD-10-CM

## 2024-06-17 DIAGNOSIS — Z79.4 TYPE 2 DIABETES MELLITUS WITHOUT COMPLICATION, WITH LONG-TERM CURRENT USE OF INSULIN (H): Primary | ICD-10-CM

## 2024-06-17 NOTE — Clinical Note
6/17/2024      RE: Sandy Person  [address] [facility] [address]       Dear Colleague,    Thank you for the opportunity to participate in the care of your patient, Sandy Person, at the [facility] [place] [facility]. Please see a copy of my visit note below.    Medication Therapy Management (MTM) Encounter    ASSESSMENT:                            Medication Adherence/Access: See below for considerations    Type 2 Diabetes: A1C below goal of < 7%, but started Mounjaro in January and replacement of Rybelsus to help remove need for glipizide.  Patient was able to stop glipizide and is tolerating Mounjaro well. Her time in range is well above goal of greater than 70%.  Just received 3 month supply of Mounjaro 10 mg, so will continue this dose for another 3 months and will message patient to inquire about dose increase for weight loss about 2 months from now.     PLAN:                            Discussed over Mychart: Continue Mounjaro 10 mg weekly for 3 months. I will message you in two months asking about a possible dose increase to 12.5 mg weekly  Keep up the great work!    Endocrine Team & Next Follow-Up:  9/26/2024 with Cait Arceo PA-C   8 weeks over Mychart with Sreedhar    SUBJECTIVE/OBJECTIVE:                          Sandy Person is a 44 year old female called for a follow-up visit. She was referred to me from Cait Arceo PA-C.      Reason for visit: Medication Therapy Management (MTM).      Allergies/ADRs: Reviewed in chart  Past Medical History: Reviewed in chart  Social History     Tobacco Use    Smoking status: Never     Passive exposure: Past    Smokeless tobacco: Never   Vaping Use    Vaping status: Never Used   Substance Use Topics    Alcohol use: Not Currently     Comment: 1-2 drinks per month    Drug use: Never        Medication Adherence/Access: Adherence is reflected well in the dispense report.     Type 2 Diabetes:   Diabetes  "Medication(s)       Biguanides       metFORMIN (GLUCOPHAGE) 500 MG tablet TAKE 2 TABLETS BY MOUTH TWICE DAILY       Incretin Mimetic Agents       tirzepatide (MOUNJARO) 10 MG/0.5ML pen Inject 10 mg Subcutaneous every 7 days. 3 mo supply shipped 5/22/24. More appetite suppression effects per patient.     Historically on Rybelsus 7 mg + glipizide, but these were stopped when Mounjaro was started     Blood sugar monitoring:       Lifestyle: Very regimented diet (high protein), now struggling with carbs   ACEi/ARB: Yes: olmesartan.   Urine Albumin:   Lab Results   Component Value Date    UMALCR  10/17/2023      Comment:      Unable to calculate, urine albumin and/or urine creatinine is outside detectable limits.  Microalbuminuria is defined as an albumin:creatinine ratio of 17 to 299 for males and 25 to 299 for females. A ratio of albumin:creatinine of 300 or higher is indicative of overt proteinuria.  Due to biologic variability, positive results should be confirmed by a second, first-morning random or 24-hour timed urine specimen. If there is discrepancy, a third specimen is recommended. When 2 out of 3 results are in the microalbuminuria range, this is evidence for incipient nephropathy and warrants increased efforts at glucose control, blood pressure control, and institution of therapy with an angiotensin-converting-enzyme (ACE) inhibitor (if the patient can tolerate it).      UMALCR 12.07 03/06/2022      Lab Results   Component Value Date    A1C 6.2 03/08/2024    A1C 5.9 12/05/2023    A1C 6.0 05/17/2023    A1C 5.5 01/09/2023    A1C 6.6 11/21/2022     Lab Results   Component Value Date    GFRESTIMATED >90 03/25/2024    GFRESTIMATED >90 10/17/2023    GFRESTIMATED >90 01/30/2023     Estimated body mass index is 36.97 kg/m  as calculated from the following:    Height as of 6/14/24: 1.623 m (5' 3.9\").    Weight as of 6/14/24: 97.4 kg (214 lb 11.2 oz).     Wt Readings from Last 3 Encounters:   06/14/24 97.4 kg (214 lb " 11.2 oz)   04/15/24 102.5 kg (226 lb)   03/08/24 102.8 kg (226 lb 9.6 oz)     BP Readings from Last 1 Encounters:   06/14/24 133/84     Pulse Readings from Last 1 Encounters:   06/14/24 77     Temp Readings from Last 1 Encounters:   06/14/24 97.5  F (36.4  C) (Temporal)       ----------------    I spent 15 minutes with this patient today. Cait Arceo PA-C was provided the recommendations above via routed note and is the authorizing prescriber for this visit through the pharmacist collaborative practice agreement. A copy of the visit note was provided to the patient's provider(s).    The patient was given to the patient a summary of these recommendations.     Sreedhar Joyner, PharmD, [facility]  Endocrine & Diabetes [facility] Pharmacist  [address] [place] [id number]    Telemedicine Visit Details  Type of service:  Telephone visit  Start Time: 8AM  End Time: 8:15AM  Originating Location (pt. Location): Home  Provider has received verbal consent for a visit from the patient? Yes     Medication Therapy Recommendations  No medication therapy recommendations to display                  Please do not hesitate to contact me if you have any questions/concerns.     Sincerely,     Sreedhar Joyner RPH

## 2024-06-18 DIAGNOSIS — E11.9 TYPE 2 DIABETES MELLITUS WITHOUT COMPLICATION, WITHOUT LONG-TERM CURRENT USE OF INSULIN (H): ICD-10-CM

## 2024-06-18 RX ORDER — ACYCLOVIR 400 MG/1
1 TABLET ORAL
Qty: 3 EACH | Refills: 5 | Status: SHIPPED | OUTPATIENT
Start: 2024-06-18

## 2024-07-06 ENCOUNTER — HEALTH MAINTENANCE LETTER (OUTPATIENT)
Age: 44
End: 2024-07-06

## 2024-07-18 ENCOUNTER — TELEPHONE (OUTPATIENT)
Dept: GASTROENTEROLOGY | Facility: CLINIC | Age: 44
End: 2024-07-18
Payer: COMMERCIAL

## 2024-07-18 DIAGNOSIS — Z12.11 SPECIAL SCREENING FOR MALIGNANT NEOPLASMS, COLON: Primary | ICD-10-CM

## 2024-07-18 NOTE — TELEPHONE ENCOUNTER
"Endoscopy Scheduling Screen    Have you had a positive Covid test in the last 14 days?  No    What is your communication preference for Instructions and/or Bowel Prep?   MyChart    What insurance is in the chart?  Other:  [employer]    Ordering/Referring Provider:     BLAIR LEGGETT      (If ordering provider performs procedure, schedule with ordering provider unless otherwise instructed. )    BMI: Estimated body mass index is 36.97 kg/m  as calculated from the following:    Height as of 6/14/24: 1.623 m (5' 3.9\").    Weight as of 6/14/24: 97.4 kg (214 lb 11.2 oz).     Sedation Ordered  moderate sedation.   If patient BMI > 50 do not schedule in [facility].    If patient BMI > 45 do not schedule at [facility].    Are you taking methadone or Suboxone?  No    Have you had difficulties, pain, or discomfort during past endoscopy procedures?  No    Are you taking any prescription medications for pain 3 or more times per week?   NO, No RN review required.    Do you have a history of malignant hyperthermia?  No    (Females) Are you currently pregnant?   No     Have you been diagnosed or told you have pulmonary hypertension?   No    Do you have an LVAD?  No    Have you been told you have moderate to severe sleep apnea?  No    Have you been told you have COPD, asthma, or any other lung disease?  No    Do you have any heart conditions?  No     Have you ever had or are you waiting for an organ transplant?  No. Continue scheduling, no site restrictions.    Have you had a stroke or transient ischemic attack (TIA aka \"mini stroke\" in the last 6 months?   No    Have you been diagnosed with or been told you have cirrhosis of the liver?   No    Are you currently on dialysis?   No    Do you need assistance transferring?   No    BMI: Estimated body mass index is 36.97 kg/m  as calculated from the following:    Height as of 6/14/24: 1.623 m (5' 3.9\").    Weight as of 6/14/24: 97.4 kg (214 lb 11.2 oz).     Is patients BMI > 40 and scheduling location " [facility]?  No    Do you take an injectable medication for weight loss or diabetes (excluding insulin)?  Yes, hold time can be up to 7 days. Please consult with you prescribing provider to discuss endoscopy recommendations.  Mounjaro    Do you take the medication Naltrexone?  No    Do you take blood thinners?  No       Prep   Are you currently on dialysis or do you have chronic kidney disease?  No    Do you have a diagnosis of diabetes?  Yes (Golytely Prep)    Do you have a diagnosis of cystic fibrosis (CF)?  No    On a regular basis do you go 3 -5 days between bowel movements?  No    BMI > 40?  No    Preferred Pharmacy:    [id number] - [place], [place] - [address] AT [address]  [address]  [facility] [address]  Phone: [phone] Fax: [phone]      Final Scheduling Details     Procedure scheduled  Colonoscopy    Surgeon:  Annette     Date of procedure:  9/17     Pre-OP / PAC:   No - Not required for this site.    Location  [facility] - Patient preference.    Sedation   Moderate Sedation - Per order.      Patient Reminders:   You will receive a call from a Nurse to review instructions and health history.  This assessment must be completed prior to your procedure.  Failure to complete the Nurse assessment may result in the procedure being cancelled.      On the day of your procedure, please designate an adult(s) who can drive you home stay with you for the next 24 hours. The medicines used in the exam will make you sleepy. You will not be able to drive.      You cannot take public transportation, ride share services, or non-medical taxi service without a responsible caregiver.  Medical transport services are allowed with the requirement that a responsible caregiver will receive you at your destination.  We require that drivers and caregivers are confirmed prior to your procedure.

## 2024-07-23 ENCOUNTER — OFFICE VISIT (OUTPATIENT)
Dept: URGENT CARE | Facility: URGENT CARE | Age: 44
End: 2024-07-23
Payer: COMMERCIAL

## 2024-07-23 VITALS
HEART RATE: 87 BPM | BODY MASS INDEX: 35.61 KG/M2 | OXYGEN SATURATION: 99 % | RESPIRATION RATE: 16 BRPM | SYSTOLIC BLOOD PRESSURE: 127 MMHG | TEMPERATURE: 98.3 F | DIASTOLIC BLOOD PRESSURE: 88 MMHG | WEIGHT: 206.8 LBS

## 2024-07-23 DIAGNOSIS — S61.213A LACERATION OF LEFT MIDDLE FINGER WITHOUT DAMAGE TO NAIL, FOREIGN BODY PRESENCE UNSPECIFIED, INITIAL ENCOUNTER: Primary | ICD-10-CM

## 2024-07-23 PROCEDURE — 99213 OFFICE O/P EST LOW 20 MIN: CPT | Performed by: PHYSICIAN ASSISTANT

## 2024-07-23 ASSESSMENT — PAIN SCALES - GENERAL: PAINLEVEL: MODERATE PAIN (4)

## 2024-07-23 NOTE — PROGRESS NOTES
Chief Complaint   Patient presents with    Urgent Care    Laceration     Left middle finger laceration by a  this morning around 10 am. Pt reports feeling shaky       ASSESSMENT/PLAN:  Sandy was seen today for urgent care and laceration.    Diagnoses and all orders for this visit:    Laceration of left middle finger without damage to nail, foreign body presence unspecified, initial encounter    Wound already appropriately clean.  Glued today.  Wound care discussed.  Return precautions discussed    George Vivar PA-C      SUBJECTIVE:  Sandy is a 44 year old female who presents to urgent care with a cut on the middle finger.  It happened at a garden this year.  They flushed it and bandaged it immediately afterwards.  Tetanus done last year.    ROS: Pertinent ROS neg other than the symptoms noted above in the HPI.     OBJECTIVE:  /88 (BP Location: Left arm, Patient Position: Sitting, Cuff Size: Adult Large)   Pulse 87   Temp 98.3  F (36.8  C) (Tympanic)   Resp 16   Wt 93.8 kg (206 lb 12.8 oz)   LMP 06/03/2024 (Exact Date)   SpO2 99%   BMI 35.61 kg/m     GENERAL: alert and no distress  SKIN: Finger laceration of the middle finger that is not currently bleeding and wound edges approximated well    DIAGNOSTICS    No results found for any visits on 07/23/24.     Current Outpatient Medications   Medication Sig Dispense Refill    clobetasol propionate (OLUX) 0.05 % external foam Apply 2 times daily to scalp 50 g 3    ketoconazole (NIZORAL) 2 % external shampoo Apply topically daily as needed for itching or irritation 120 mL 11    magnesium oxide 400 MG CAPS Take 400 mg by mouth daily      metFORMIN (GLUCOPHAGE) 500 MG tablet TAKE 2 TABLETS BY MOUTH TWICE DAILY 360 tablet 3    olmesartan (BENICAR) 5 MG tablet Take 1 tablet (5 mg) by mouth daily 90 tablet 3    tirzepatide (MOUNJARO) 10 MG/0.5ML pen Inject 10 mg Subcutaneous every 7 days 6 mL 3    blood glucose (NO BRAND SPECIFIED) lancets standard Use to  test blood sugar 4 times daily or as directed. 100 each 3    blood glucose (NO BRAND SPECIFIED) test strip Use to test blood sugar 4 times daily or as directed. 400 strip 3    blood glucose monitoring (NO BRAND SPECIFIED) meter device kit Use to test blood sugar 4 times daily or as directed. 1 kit 0    budesonide (PULMICORT) 0.5 MG/2ML neb solution Add one ampule into [employer] sinus rinse bottle with saline mixture and irrigate nose daily (Patient not taking: Reported on 2024) 180 mL 3    clobetasol (TEMOVATE) 0.05 % external solution Apply topically 2 times daily (Patient not taking: Reported on 2024) 50 mL 3    Continuous Glucose Sensor (DEXCOM G7 SENSOR) MISC 1 each every 10 days Change sensor every 10 days 3 each 5    fluticasone (FLONASE) 50 MCG/ACT nasal spray Spray 2 sprays into both nostrils daily (Patient not taking: Reported on 2024) 15.8 mL 6    ipratropium (ATROVENT) 0.06 % nasal spray Spray 2 sprays into both nostrils 2 times daily (Patient not taking: Reported on 2024) 15 mL 6     No current facility-administered medications for this visit.      Patient Active Problem List   Diagnosis    Missed  with fetal demise before 20 completed weeks of gestation    Impingement syndrome of left shoulder      Past Medical History:   Diagnosis Date    Abnormal Pap smear of cervix     Diabetes (H)     History of human papillomavirus infection      Past Surgical History:   Procedure Laterality Date    COLPOSCOPY      DILATION AND EVACUATION N/A 2023    Procedure: AND EVACUATION OF UTERUS;  Surgeon: Carolin Valladares MD;  Location: [facility]    REMOVE DILATORS, CERVICAL N/A 2023    Procedure: REMOVE DILATORS, CERVICAL;  Surgeon: Carolin Valladares MD;  Location: [facility]    TONSILLECTOMY       Family History   Problem Relation Age of Onset    Cancer Mother         bile duct    Diabetes Mother     Heart Disease Mother     Diabetes Father     Heart Disease Father     Cancer Father     Cancer  Maternal Grandmother     Diabetes Maternal Grandmother     Diabetes Paternal Grandmother      Social History     Tobacco Use    Smoking status: Never     Passive exposure: Past    Smokeless tobacco: Never   Substance Use Topics    Alcohol use: Not Currently     Comment: 1-2 drinks per month              The plan of care was discussed with the patient. They understand and agree with the course of treatment prescribed. A printed summary was given including instructions and medications.  The use of Dragon/i[employer] dictation services may have been used to construct the content in this note; any grammatical or spelling errors are non-intentional. Please contact the author of this note directly if you are in need of any clarification.

## 2024-08-07 ENCOUNTER — MYC MEDICAL ADVICE (OUTPATIENT)
Dept: OTOLARYNGOLOGY | Facility: CLINIC | Age: 44
End: 2024-08-07
Payer: COMMERCIAL

## 2024-08-07 DIAGNOSIS — J31.0 CHRONIC RHINITIS: Primary | ICD-10-CM

## 2024-08-08 RX ORDER — HYDROXYZINE HYDROCHLORIDE 10 MG/1
10 TABLET, FILM COATED ORAL 3 TIMES DAILY PRN
Qty: 90 TABLET | Refills: 11 | Status: SHIPPED | OUTPATIENT
Start: 2024-08-08 | End: 2024-09-26

## 2024-08-19 ENCOUNTER — OFFICE VISIT (OUTPATIENT)
Dept: URGENT CARE | Facility: URGENT CARE | Age: 44
End: 2024-08-19
Payer: COMMERCIAL

## 2024-08-19 VITALS
BODY MASS INDEX: 34.96 KG/M2 | TEMPERATURE: 97.8 F | HEART RATE: 83 BPM | SYSTOLIC BLOOD PRESSURE: 107 MMHG | DIASTOLIC BLOOD PRESSURE: 73 MMHG | OXYGEN SATURATION: 100 % | WEIGHT: 203 LBS | RESPIRATION RATE: 24 BRPM

## 2024-08-19 DIAGNOSIS — J32.9 BACTERIAL SINUSITIS: Primary | ICD-10-CM

## 2024-08-19 DIAGNOSIS — E11.69 TYPE 2 DIABETES MELLITUS WITH OTHER SPECIFIED COMPLICATION, WITHOUT LONG-TERM CURRENT USE OF INSULIN (H): ICD-10-CM

## 2024-08-19 DIAGNOSIS — R09.81 NASAL CONGESTION: ICD-10-CM

## 2024-08-19 DIAGNOSIS — B96.89 BACTERIAL SINUSITIS: Primary | ICD-10-CM

## 2024-08-19 DIAGNOSIS — J01.90 ACUTE SINUSITIS, RECURRENCE NOT SPECIFIED, UNSPECIFIED LOCATION: ICD-10-CM

## 2024-08-19 PROCEDURE — 99214 OFFICE O/P EST MOD 30 MIN: CPT | Performed by: PHYSICIAN ASSISTANT

## 2024-08-19 RX ORDER — PREDNISONE 20 MG/1
20 TABLET ORAL 2 TIMES DAILY
Qty: 10 TABLET | Refills: 0 | Status: SHIPPED | OUTPATIENT
Start: 2024-08-19 | End: 2024-09-26

## 2024-08-19 RX ORDER — OXYMETAZOLINE HYDROCHLORIDE 0.05 G/100ML
2 SPRAY NASAL 2 TIMES DAILY
Qty: 30 ML | Refills: 0 | Status: SHIPPED | OUTPATIENT
Start: 2024-08-19 | End: 2024-08-22

## 2024-08-19 NOTE — PROGRESS NOTES
Assessment & Plan     Bacterial sinusitis    You have been diagnosed with a bacterial sinus infection. Sinusitis can occur during a cold. It can also happen due to allergies to pollens and other particles in the air.  A sinus infection can sometimes cause fever, headache, and facial pain. There is often green or yellow fluid draining from the nose or into the back of the throat (post-nasal drip). This infection is treated with antibiotics.  Home care  Take the full course of antibiotics as instructed. Don't stop taking them, even when you feel better.  Drink plenty of water, hot tea, and other liquids as directed by the healthcare provider. This may help thin nasal mucus. It also may help your sinuses drain fluids.  Heat may help soothe painful areas of your face. Use a towel soaked in hot water. Or,  the shower and direct the warm spray onto your face. Using a vaporizer along with a menthol rub at night may also help soothe symptoms.     - amoxicillin-clavulanate (AUGMENTIN) 875-125 MG tablet  Dispense: 14 tablet; Refill: 0    Acute sinusitis, recurrence not specified, unspecified location    Prednisone for sinus inflammation and sinus congestion  - predniSONE (DELTASONE) 20 MG tablet  Dispense: 10 tablet; Refill: 0    Nasal congestion    Afrin x 3 days for nasal congestion  - oxymetazoline (AFRIN) 0.05 % nasal spray  Dispense: 30 mL; Refill: 0    Type 2 diabetes mellitus with other specified complication, without long-term current use of insulin (H)    Monitor blood sugars as prednisone can increase blood sugars       Follow up with ENT    No follow-ups on file.    Lucian Vanegas, [facility], PA-C  [facility] KIARA Delgado is a 44 year old female who presents to clinic today for the following health issues:  Chief Complaint   Patient presents with    Breathing Problem     Dry sniffling, cough x 1 week, getting progressively worse.         HPI  Review of Systems  Constitutional,  HEENT, cardiovascular, pulmonary, GI, , musculoskeletal, neuro, skin, endocrine and psych systems are negative, except as otherwise noted.      Objective    /73 (BP Location: Left arm, Patient Position: Sitting, Cuff Size: Adult Large)   Pulse 83   Temp 97.8  F (36.6  C) (Tympanic)   Resp 24   Wt 92.1 kg (203 lb)   SpO2 100%   BMI 34.96 kg/m    Physical Exam   GENERAL: alert and no distress  EYES: Eyes grossly normal to inspection, PERRL and conjunctivae and sclerae normal  HENT: normal cephalic/atraumatic, ear canals and TM's normal, nasal mucosa edematous , rhinorrhea thick, oropharynx clear, and oral mucous membranes moist  NECK: no adenopathy, no asymmetry, masses, or scars  RESP: lungs clear to auscultation - no rales, rhonchi or wheezes  CV: regular rate and rhythm, normal S1 S2, no S3 or S4, no murmur, click or rub, no peripheral edema  MS: no gross musculoskeletal defects noted, no edema  SKIN: no suspicious lesions or rashes  NEURO: Normal strength and tone, mentation intact and speech normal  PSYCH: mentation appears normal, affect normal/bright

## 2024-08-24 ENCOUNTER — MYC MEDICAL ADVICE (OUTPATIENT)
Dept: OTOLARYNGOLOGY | Facility: CLINIC | Age: 44
End: 2024-08-24
Payer: COMMERCIAL

## 2024-08-24 DIAGNOSIS — J30.0 VASOMOTOR RHINITIS: ICD-10-CM

## 2024-08-24 DIAGNOSIS — J34.89 NASAL OBSTRUCTION: ICD-10-CM

## 2024-08-24 DIAGNOSIS — J34.2 DEVIATED NASAL SEPTUM: Primary | ICD-10-CM

## 2024-08-26 ENCOUNTER — TELEPHONE (OUTPATIENT)
Dept: OTOLARYNGOLOGY | Facility: CLINIC | Age: 44
End: 2024-08-26
Payer: COMMERCIAL

## 2024-08-26 NOTE — TELEPHONE ENCOUNTER
Type of surgery: ENDOSCOPIC SEPTOPLASTY, NOSE, WITH TURBINOPLASTY, CLARIFIX DEVICE   Location of surgery: [facility]  Date and time of surgery: 10/24  Surgeon: alfonso  Pre-Op Appt Date: 10/14  Post-Op Appt Date: 1031   Packet sent out: Yes  Pre-cert/Authorization completed:  Not Applicable  Date:

## 2024-08-27 RX ORDER — BISACODYL 5 MG/1
TABLET, DELAYED RELEASE ORAL
Qty: 4 TABLET | Refills: 0 | Status: ON HOLD | OUTPATIENT
Start: 2024-08-27 | End: 2024-09-17

## 2024-08-27 NOTE — TELEPHONE ENCOUNTER
Extended Golytely Bowel Prep  recommended due to diabetes.  and GLP-1 agonist medication noted in chart.  Instructions were sent via Between. Bowel prep was sent 8/27/2024 to    [id number] - [place] - [address]

## 2024-08-30 PROBLEM — J34.89 NASAL OBSTRUCTION: Status: ACTIVE | Noted: 2024-08-24

## 2024-08-30 PROBLEM — J34.2 DEVIATED NASAL SEPTUM: Status: ACTIVE | Noted: 2024-08-24

## 2024-08-30 PROBLEM — J30.0 VASOMOTOR RHINITIS: Status: ACTIVE | Noted: 2024-08-24

## 2024-09-09 ENCOUNTER — TELEPHONE (OUTPATIENT)
Dept: GASTROENTEROLOGY | Facility: CLINIC | Age: 44
End: 2024-09-09
Payer: COMMERCIAL

## 2024-09-09 NOTE — TELEPHONE ENCOUNTER
Attempted to contact patient in order to complete pre assessment questions.     No answer. Left message to return call to [phone] option 4      Radha Mead RN  Endoscopy Procedure Pre Assessment

## 2024-09-09 NOTE — TELEPHONE ENCOUNTER
Pre visit planning completed.      Procedure details:    Patient scheduled for Colonoscopy on 9/17/24.     Arrival time: 0945. Procedure time 1030    Facility location: [facility]; [address] [place] [address]. Check in location: Main entrance, door #1 on the North side of the building under roundabout awning. DO NOT GO TO SURGERY/ED ENTRANCE.     Sedation type: Conscious sedation     Pre op exam needed? No.    Indication for procedure: screening, family history colon cancer       Chart review:     Electronic implanted devices? No    Recent diagnosis of diverticulitis within the last 6 weeks? No      Medication review:    Diabetic? Yes. Diabetic medication HOLDING recommendations: Oral diabetic medications: Metformin (glucophage): HOLD day of procedure.   Diabetic injectables: Mounjaro (Tirzepatide).  Weekly dosing of medication.  Hold 7 days before procedure.  Follow up with managing provider.     Anticoagulants? No    Weight loss medication/injectable? No GLP-1 medication per patient's medication list.  RN will verify with pre-assessment call.    NSAIDS? No    Other medication HOLDING recommendations:  N/A      Prep for procedure:     Bowel prep recommendation: Extended Golytely. Bowel prep prescription sent to [id number] - [place], [place] - [address] AT [address] [place] [address]    Due to: GLP-1 agonist medication noted in chart.     Prep instructions sent via [employer] by CRC team.         Zena Alvarez RN  Endoscopy Procedure Pre Assessment RN  [phone] option 4

## 2024-09-11 NOTE — TELEPHONE ENCOUNTER
Pre assessment completed for upcoming procedure.   (Please see previous telephone encounter notes for complete details)    Patient  returned call.       Procedure details:    Arrival time and facility location reviewed.    Pre op exam needed? No.    Designated  policy reviewed. Instructed to have someone stay 6  hours post procedure.       Medication review:    Oral diabetic medication(s): Metformin (glucophage): HOLD day of procedure.  Injectable diabetic medication(s): Mounjaro (Tirzepatide).  Weekly dosing of medication.  Hold 7 days before procedure.  Follow up with managing provider.       Prep for procedure:     Procedure prep instructions reviewed.        Any additional information needed:  N/A      Patient  verbalized understanding and had no questions or concerns at this time.      Andree Anderson RN  Endoscopy Procedure Pre Assessment   [phone] option 2

## 2024-09-17 ENCOUNTER — PATIENT OUTREACH (OUTPATIENT)
Dept: GASTROENTEROLOGY | Facility: CLINIC | Age: 44
End: 2024-09-17

## 2024-09-17 ENCOUNTER — HOSPITAL ENCOUNTER (OUTPATIENT)
Facility: CLINIC | Age: 44
Discharge: HOME | End: 2024-09-17
Attending: COLON & RECTAL SURGERY | Admitting: COLON & RECTAL SURGERY
Payer: COMMERCIAL

## 2024-09-17 VITALS
BODY MASS INDEX: 32.95 KG/M2 | WEIGHT: 193 LBS | HEIGHT: 64 IN | TEMPERATURE: 96.8 F | RESPIRATION RATE: 16 BRPM | SYSTOLIC BLOOD PRESSURE: 123 MMHG | HEART RATE: 75 BPM | OXYGEN SATURATION: 100 % | DIASTOLIC BLOOD PRESSURE: 84 MMHG

## 2024-09-17 LAB
COLONOSCOPY: NORMAL
GLUCOSE BLDC GLUCOMTR-MCNC: 112 MG/DL (ref 70–99)

## 2024-09-17 PROCEDURE — 45378 DIAGNOSTIC COLONOSCOPY: CPT | Performed by: COLON & RECTAL SURGERY

## 2024-09-17 PROCEDURE — G0121 COLON CA SCRN NOT HI RSK IND: HCPCS | Performed by: COLON & RECTAL SURGERY

## 2024-09-17 PROCEDURE — G0500 MOD SEDAT ENDO SERVICE >5YRS: HCPCS | Performed by: COLON & RECTAL SURGERY

## 2024-09-17 PROCEDURE — 82962 GLUCOSE BLOOD TEST: CPT

## 2024-09-17 PROCEDURE — 250N000011 HC RX IP 250 OP 636: Performed by: COLON & RECTAL SURGERY

## 2024-09-17 PROCEDURE — 250N000013 HC RX MED GY IP 250 OP 250 PS 637: Performed by: COLON & RECTAL SURGERY

## 2024-09-17 RX ORDER — EPINEPHRINE 1 MG/ML
0.1 INJECTION, SOLUTION INTRAMUSCULAR; SUBCUTANEOUS
Status: DISCONTINUED | OUTPATIENT
Start: 2024-09-17 | End: 2024-09-17 | Stop reason: HOSPADM

## 2024-09-17 RX ORDER — NALOXONE HYDROCHLORIDE 0.4 MG/ML
0.2 INJECTION, SOLUTION INTRAMUSCULAR; INTRAVENOUS; SUBCUTANEOUS
Status: DISCONTINUED | OUTPATIENT
Start: 2024-09-17 | End: 2024-09-17 | Stop reason: HOSPADM

## 2024-09-17 RX ORDER — PROCHLORPERAZINE MALEATE 10 MG
10 TABLET ORAL EVERY 6 HOURS PRN
Status: DISCONTINUED | OUTPATIENT
Start: 2024-09-17 | End: 2024-09-17 | Stop reason: HOSPADM

## 2024-09-17 RX ORDER — ONDANSETRON 2 MG/ML
4 INJECTION INTRAMUSCULAR; INTRAVENOUS
Status: DISCONTINUED | OUTPATIENT
Start: 2024-09-17 | End: 2024-09-17 | Stop reason: HOSPADM

## 2024-09-17 RX ORDER — LIDOCAINE 40 MG/G
CREAM TOPICAL
Status: DISCONTINUED | OUTPATIENT
Start: 2024-09-17 | End: 2024-09-17 | Stop reason: HOSPADM

## 2024-09-17 RX ORDER — FLUMAZENIL 0.1 MG/ML
0.2 INJECTION, SOLUTION INTRAVENOUS
Status: DISCONTINUED | OUTPATIENT
Start: 2024-09-17 | End: 2024-09-17 | Stop reason: HOSPADM

## 2024-09-17 RX ORDER — FENTANYL CITRATE 50 UG/ML
50-100 INJECTION, SOLUTION INTRAMUSCULAR; INTRAVENOUS EVERY 5 MIN PRN
Status: DISCONTINUED | OUTPATIENT
Start: 2024-09-17 | End: 2024-09-17 | Stop reason: HOSPADM

## 2024-09-17 RX ORDER — NALOXONE HYDROCHLORIDE 0.4 MG/ML
0.4 INJECTION, SOLUTION INTRAMUSCULAR; INTRAVENOUS; SUBCUTANEOUS
Status: DISCONTINUED | OUTPATIENT
Start: 2024-09-17 | End: 2024-09-17 | Stop reason: HOSPADM

## 2024-09-17 RX ORDER — SIMETHICONE 40MG/0.6ML
133 SUSPENSION, DROPS(FINAL DOSAGE FORM)(ML) ORAL
Status: COMPLETED | OUTPATIENT
Start: 2024-09-17 | End: 2024-09-17

## 2024-09-17 RX ORDER — ONDANSETRON 2 MG/ML
4 INJECTION INTRAMUSCULAR; INTRAVENOUS EVERY 6 HOURS PRN
Status: DISCONTINUED | OUTPATIENT
Start: 2024-09-17 | End: 2024-09-17 | Stop reason: HOSPADM

## 2024-09-17 RX ORDER — ATROPINE SULFATE 0.1 MG/ML
1 INJECTION INTRAVENOUS
Status: DISCONTINUED | OUTPATIENT
Start: 2024-09-17 | End: 2024-09-17 | Stop reason: HOSPADM

## 2024-09-17 RX ORDER — DIPHENHYDRAMINE HYDROCHLORIDE 50 MG/ML
25-50 INJECTION INTRAMUSCULAR; INTRAVENOUS
Status: DISCONTINUED | OUTPATIENT
Start: 2024-09-17 | End: 2024-09-17 | Stop reason: HOSPADM

## 2024-09-17 RX ORDER — ONDANSETRON 4 MG/1
4 TABLET, ORALLY DISINTEGRATING ORAL EVERY 6 HOURS PRN
Status: DISCONTINUED | OUTPATIENT
Start: 2024-09-17 | End: 2024-09-17 | Stop reason: HOSPADM

## 2024-09-17 RX ADMIN — FENTANYL CITRATE 100 MCG: 50 INJECTION, SOLUTION INTRAMUSCULAR; INTRAVENOUS at 10:37

## 2024-09-17 RX ADMIN — SIMETHICONE 133 MG: 20 SUSPENSION/ DROPS ORAL at 10:46

## 2024-09-17 RX ADMIN — MIDAZOLAM 2 MG: 1 INJECTION INTRAMUSCULAR; INTRAVENOUS at 10:37

## 2024-09-17 ASSESSMENT — ACTIVITIES OF DAILY LIVING (ADL)
ADLS_ACUITY_SCORE: 35
ADLS_ACUITY_SCORE: 35

## 2024-09-17 NOTE — PROGRESS NOTES
Outcome for 09/17/24 2:20 PM: Data uploaded on Dexcom  Susan Freeman LPN   Outcome for 09/24/24 7:25 AM: Data obtained via Dexcom website  Bobbilynn Grossaint, VF     Patient is showing 4/5 [place] met. Not on statin   Bobbi Grossaint, VF

## 2024-09-17 NOTE — H&P
Pre-Endoscopy History and Physical     Sandy Person MRN# [id number]   YOB: 1980 Age: 44 year old     Date of Procedure: 2024  Primary care provider: No Ref-Primary, Physician  Type of Endoscopy: colonoscopy  Reason for Procedure: screening, family history  Type of Anesthesia Anticipated: Moderate Sedation    HPI:    Sandy is a 44 year old female who will be undergoing the above procedure.      A history and physical has been performed. The patient's medications and allergies have been reviewed. The risks and benefits of the procedure and the sedation options and risks were discussed with the patient.  All questions were answered and informed consent was obtained.      She denies a personal or family history of anesthesia complications or bleeding disorders.     No Known Allergies     Prior to Admission Medications   Prescriptions Last Dose Informant Patient Reported? Taking?   Continuous Glucose Sensor (DEXCOM G7 SENSOR) MISC   No No   Si each every 10 days Change sensor every 10 days   blood glucose (NO BRAND SPECIFIED) lancets standard   No No   Sig: Use to test blood sugar 4 times daily or as directed.   blood glucose (NO BRAND SPECIFIED) test strip   No No   Sig: Use to test blood sugar 4 times daily or as directed.   blood glucose monitoring (NO BRAND SPECIFIED) meter device kit   No No   Sig: Use to test blood sugar 4 times daily or as directed.   budesonide (PULMICORT) 0.5 MG/2ML neb solution   No No   Sig: Add one ampule into [employer] sinus rinse bottle with saline mixture and irrigate nose daily   Patient not taking: Reported on 2024   clobetasol (TEMOVATE) 0.05 % external solution   No No   Sig: Apply topically 2 times daily   Patient not taking: Reported on 2024   clobetasol propionate (OLUX) 0.05 % external foam   No No   Sig: Apply 2 times daily to scalp   fluticasone (FLONASE) 50 MCG/ACT nasal spray   No No   Sig: Spray 2 sprays into both nostrils daily   Patient not  taking: Reported on 2024   hydrOXYzine HCl (ATARAX) 10 MG tablet   No No   Sig: Take 1 tablet (10 mg) by mouth 3 times daily as needed for itching   ipratropium (ATROVENT) 0.06 % nasal spray   No No   Sig: Spray 2 sprays into both nostrils 2 times daily   Patient not taking: Reported on 2024   ketoconazole (NIZORAL) 2 % external shampoo   No No   Sig: Apply topically daily as needed for itching or irritation   magnesium oxide 400 MG CAPS   Yes No   Sig: Take 400 mg by mouth daily   metFORMIN (GLUCOPHAGE) 500 MG tablet 2024  No Yes   Sig: TAKE 2 TABLETS BY MOUTH TWICE DAILY   olmesartan (BENICAR) 5 MG tablet 2024  No Yes   Sig: Take 1 tablet (5 mg) by mouth daily   predniSONE (DELTASONE) 20 MG tablet   No No   Sig: Take 1 tablet (20 mg) by mouth 2 times daily   tirzepatide (MOUNJARO) 12.5 MG/0.5ML pen Past Week  No Yes   Sig: Inject 12.5 mg subcutaneously every 7 days      Facility-Administered Medications: None       Patient Active Problem List   Diagnosis    Missed  with fetal demise before 20 completed weeks of gestation    Impingement syndrome of left shoulder    Deviated nasal septum    Nasal obstruction    Vasomotor rhinitis        Past Medical History:   Diagnosis Date    Abnormal Pap smear of cervix     Diabetes (H)     History of human papillomavirus infection         Past Surgical History:   Procedure Laterality Date    COLPOSCOPY      DILATION AND EVACUATION N/A 2023    Procedure: AND EVACUATION OF UTERUS;  Surgeon: Carolin Valladares MD;  Location: [facility]    REMOVE DILATORS, CERVICAL N/A 2023    Procedure: REMOVE DILATORS, CERVICAL;  Surgeon: Carolin Valladares MD;  Location: [facility] OR    TONSILLECTOMY         Social History     Tobacco Use    Smoking status: Never     Passive exposure: Past    Smokeless tobacco: Never   Substance Use Topics    Alcohol use: Not Currently       Family History   Problem Relation Age of Onset    Cancer Mother         bile duct    Diabetes  "Mother     Heart Disease Mother     Colon Cancer Father     Diabetes Father     Heart Disease Father     Cancer Father     Cancer Maternal Grandmother     Diabetes Maternal Grandmother     Diabetes Paternal Grandmother     Colon Cancer Paternal Grandfather        REVIEW OF SYSTEMS:     5 point ROS negative except as noted above in HPI, including Gen., Resp., CV, GI &  system review.      PHYSICAL EXAM:   BP (!) 130/98   Pulse 77   Temp 96.8  F (36  C) (Temporal)   Resp 16   Ht 1.626 m (5' 4\")   Wt 87.5 kg (193 lb)   SpO2 99%   BMI 33.13 kg/m   Estimated body mass index is 33.13 kg/m  as calculated from the following:    Height as of this encounter: 1.626 m (5' 4\").    Weight as of this encounter: 87.5 kg (193 lb).   GENERAL APPEARANCE: healthy and alert  MENTAL STATUS: alert  AIRWAY EXAM: Mallampatti Class II (visualization of the soft palate, fauces, and uvula)  RESP: lungs clear to auscultation - no rales, rhonchi or wheezes  CV: regular rates and rhythm      DIAGNOSTICS:    Not indicated      IMPRESSION   ASA Class 2 - Mild systemic disease        PLAN:       Plan for colonoscopy. We discussed the risks, benefits and alternatives and the patient wished to proceed.    The above has been forwarded to the consulting provider.      Signed Electronically by: Kaylin Bryant MD  September 17, 2024    "

## 2024-09-26 ENCOUNTER — VIRTUAL VISIT (OUTPATIENT)
Dept: ENDOCRINOLOGY | Facility: CLINIC | Age: 44
End: 2024-09-26
Payer: COMMERCIAL

## 2024-09-26 DIAGNOSIS — E11.9 TYPE 2 DIABETES MELLITUS WITHOUT COMPLICATION, WITH LONG-TERM CURRENT USE OF INSULIN (H): Primary | ICD-10-CM

## 2024-09-26 DIAGNOSIS — Z79.4 TYPE 2 DIABETES MELLITUS WITHOUT COMPLICATION, WITH LONG-TERM CURRENT USE OF INSULIN (H): Primary | ICD-10-CM

## 2024-09-26 PROCEDURE — 99214 OFFICE O/P EST MOD 30 MIN: CPT | Mod: 95 | Performed by: PHYSICIAN ASSISTANT

## 2024-09-26 NOTE — PROGRESS NOTES
Time of start: 8:00 am   Time of end: 8:16 am   Total duration of video visit: 16 minutes.  Providers location: Off-site.  Patient's location: [place].    HPI  Sandy Person is a 44 year old female with type 2 diabetes mellitus. Video visit for diabetes follow up today.  Last visit with me was in April 2024 and with Sreedhar Joyner [facility] in September 2024.    Patient was started on Mounjaro earlier this year.  Sandy has lost weight and her glycemic control has improved.   Patient has a history of IUFD at 19 [id number] s/p dilation and evacuation on 1/31/2023.    Pt has a hx of type 2 diabetes dx 10 years ago.   She has no known retinopathy, nephropathy or neuropathy.  Sandy has a hx of obesity and questionable hx of PCOS.  For her diabetes, she is currently taking Mounjaro 12.5 mg subcutaneous once a week and Metformin 2000 mg daily.  Rybelsus and Glipizide were discontinued when she started Mounjaro.    Most recent A1C was 6.2 % on 3/8/2024.   I reviewed and scanned her DexcomG7 sensor download data in her her chart below.  Her blood sugars are excellent at this time.  Glucose in target range 98 % of the time with no hypoglycemia.  Average glucose 118 with estimated A1C 6.1 %.  On ROS today, she reports 40 lb weight loss since taking Mounjaro.  Some lightheadedness especially with posture change.    LMP -now.  Pt not trying to get pregnant at this time.  Pt denies blurred vision, n/v, shortness of breath at rest, fever, chest pain, abd pain, constipation, diarrhea, blood in stool or melena.    No dysuria.  Patient has no symptoms of diabetic neuropathy and denies foot ulcers.    Diabetes Care  Retinopathy: none; pt seen by Oph in Aug 2023.  She plans to schedule her annual diabetic eye exam.  Nephropathy:none; urine microalbuminuria negative in 10/2023.  Pt is taking Benicar for HTN.  I asked patient to HOLD Benicar for now and check her BP every other day at home and if BP is consistently > 130/80 to notify  me.  Neuropathy:none.  Foot Exam:no exam today.  Taking aspirin: no.  Lipids: LDL 96 in 10/2023.  CAD: no.  Insulin: none.  DM meds: Metformin and Mounjaro.    Testing:  DexcomG7 sensor.            ROS  See under HPI.    Allergies  No Known Allergies    Medications  Current Outpatient Medications   Medication Sig Dispense Refill    blood glucose (NO BRAND SPECIFIED) lancets standard Use to test blood sugar 4 times daily or as directed. 100 each 3    blood glucose (NO BRAND SPECIFIED) test strip Use to test blood sugar 4 times daily or as directed. 400 strip 3    blood glucose monitoring (NO BRAND SPECIFIED) meter device kit Use to test blood sugar 4 times daily or as directed. 1 kit 0    budesonide (PULMICORT) 0.5 MG/2ML neb solution Add one ampule into NeilMed sinus rinse bottle with saline mixture and irrigate nose daily (Patient not taking: Reported on 7/23/2024) 180 mL 3    clobetasol (TEMOVATE) 0.05 % external solution Apply topically 2 times daily (Patient not taking: Reported on 7/23/2024) 50 mL 3    clobetasol propionate (OLUX) 0.05 % external foam Apply 2 times daily to scalp 50 g 3    Continuous Glucose Sensor (DEXCOM G7 SENSOR) MISC 1 each every 10 days Change sensor every 10 days 3 each 5    fluticasone (FLONASE) 50 MCG/ACT nasal spray Spray 2 sprays into both nostrils daily (Patient not taking: Reported on 7/23/2024) 15.8 mL 6    hydrOXYzine HCl (ATARAX) 10 MG tablet Take 1 tablet (10 mg) by mouth 3 times daily as needed for itching 90 tablet 11    ipratropium (ATROVENT) 0.06 % nasal spray Spray 2 sprays into both nostrils 2 times daily (Patient not taking: Reported on 7/23/2024) 15 mL 6    ketoconazole (NIZORAL) 2 % external shampoo Apply topically daily as needed for itching or irritation 120 mL 11    magnesium oxide 400 MG CAPS Take 400 mg by mouth daily      metFORMIN (GLUCOPHAGE) 500 MG tablet TAKE 2 TABLETS BY MOUTH TWICE DAILY 360 tablet 3    olmesartan (BENICAR) 5 MG tablet Take 1 tablet (5 mg)  by mouth daily 90 tablet 3    predniSONE (DELTASONE) 20 MG tablet Take 1 tablet (20 mg) by mouth 2 times daily 10 tablet 0    tirzepatide (MOUNJARO) 12.5 MG/0.5ML pen Inject 12.5 mg subcutaneously every 7 days 2 mL 1   Metformin 500 mg 2 tab po BID.      Family History  family history includes Cancer in her father, maternal grandmother, and mother; Colon Cancer in her father and paternal grandfather; Diabetes in her father, maternal grandmother, mother, and paternal grandmother; Heart Disease in her father and mother.    Social History   reports that she has never smoked. She has been exposed to tobacco smoke. She has never used smokeless tobacco. She reports that she does not currently use alcohol. She reports that she does not use drugs.     Past Medical History  Past Medical History:   Diagnosis Date    Abnormal Pap smear of cervix     Diabetes (H)     History of human papillomavirus infection    Obesity.    Past Surgical History:   Procedure Laterality Date    COLONOSCOPY N/A 9/17/2024    Procedure: Colonoscopy;  Surgeon: Kaylin Bryant MD;  Location:  GI    COLPOSCOPY      DILATION AND EVACUATION N/A 01/31/2023    Procedure: AND EVACUATION OF UTERUS;  Surgeon: Carolin Valladares MD;  [facility]    REMOVE DILATORS, CERVICAL N/A 01/31/2023    Procedure: REMOVE DILATORS, CERVICAL;  Surgeon: Carolin Valladares MD;  [facility]    TONSILLECTOMY         Physical Exam    No exam today.      RESULTS  Creatinine   Date Value Ref Range Status   03/25/2024 0.71 0.51 - 0.95 mg/dL Final     GFR Estimate   Date Value Ref Range Status   03/25/2024 >90 >60 mL/min/1.73m2 Final     Hemoglobin A1C   Date Value Ref Range Status   03/08/2024 6.2 (H) 0.0 - 5.6 % Final     Comment:     Normal <5.7%   Prediabetes 5.7-6.4%    Diabetes 6.5% or higher     Note: Adopted from ADA consensus guidelines.     Potassium   Date Value Ref Range Status   03/25/2024 4.7 3.4 - 5.3 mmol/L Final     ALT   Date Value Ref Range Status    12/05/2023 33 0 - 50 U/L Final     Comment:     Reference intervals for this test were updated on 6/12/2023 to more accurately reflect our healthy population. There may be differences in the flagging of prior results with similar values performed with this method. Interpretation of those prior results can be made in the context of the updated reference intervals.       AST   Date Value Ref Range Status   12/05/2023 31 0 - 45 U/L Final     Comment:     Reference intervals for this test were updated on 6/12/2023 to more accurately reflect our healthy population. There may be differences in the flagging of prior results with similar values performed with this method. Interpretation of those prior results can be made in the context of the updated reference intervals.     TSH   Date Value Ref Range Status   10/17/2023 1.18 0.30 - 4.20 uIU/mL Final   03/06/2022 1.17 0.40 - 4.00 mU/L Final       ASSESSMENT/PLAN:    1.  TYPE 2 DIABETES MELLITUS: Sandy has lost 40 lbs since taking Mounjaro and her glycemic control is excellent at this time.    She may consider increasing Mounjaro 15 mg subcutaneous once a week next month.  Sandy does have follow-up with Sreedhar Joyner [facility] in a few weeks.  For now, continue Mounjaro 12.5 mg subcutaneous once a week and current Metformin dose.  She is not trying to get pregnant at this time and is aware that she will need to stop Mounjaro 3 months prior to trying to conceive.  Pt has menses today.  Sandy is tolerating Mounjaro well.  Patient denies nausea, vomiting, abdominal pain, constipation or diarrhea at this time.    Again, I reviewed how Mounjaro works and possible side effects of the drug including n/v, GI distress, constipation, hypoglycemia and rare risk of pancreatitis.   Pt denies hx of pancreatitis or gastroparesis.  Sandy was seen by Oph in Aug 2023 without retinopathy per patient.  She plans to schedule her annual diabetic eye exam.  She denies hx of nephropathy. Her urine  microalbuminuria was negative in Oct 2023 with creat 0.71 with GFR > 90 mL/min in March 2024.  No sx of neuropathy. Denies foot ulcers/sores.  TSH normal in Oct 2023.     2.  WEIGHT:  Encourage pt to continue to eat healthy and exercise.  Continue to titrate Mounjaro.    3. HTN: Pt taking Benicar.  In view of lightheadedness with posture change, I asked Sandy to HOLD Benicar and to check her blood pressure at home every other day and if her blood pressure is consistently > 130/80 to notify me.  She has lost 40 pounds and may no longer need antihypertensive medication.    4.  HEALTH MAINTENANCE: She tells me she needs to find a new primary care provider.  Encouraged her to see a primary care provider annually for health maintenance.    5.  FOLLOW UP: With me in 6 months.  If patient has primary care provider by next visit, I mentioned to Sandy that we may graduate her from the endocrine/diabetes clinic.  Patient has follow-up with Sreedhar Joyner [facility] in a few weeks.  A1C, urine microalbuminuria, fasting lipid panel lipid panel, AST, creatinine/GFR and TSH ordered today.    Time spent reviewing chart,labs and DexcomG7 sensor download today = 5 minutes.  Time for video visit today = 16 minutes.  Time for documentation today = 15  minutes.    TOTAL TIME FOR VISIT TODAY = 36   minutes.    Vesta Arceo PA-C

## 2024-09-26 NOTE — NURSING NOTE
Current patient location: [place]    Is the patient currently in the state of [place]? YES    Visit mode:VIDEO    If the visit is dropped, the patient can be reconnected by: VIDEO VISIT: Text to cell phone:   Telephone Information:   Mobile [phone]       Will anyone else be joining the visit? NO  (If patient encounters technical issues they should call [phone] :[phone])    How would you like to obtain your AVS? MyChart    Are changes needed to the allergy or medication list? No  Sandy reported no changes to e-check in information for visit. VF did not review e-check in information again with Sandy due to this.       Are refills needed on medications prescribed by this physician? Discuss with provider    Rooming Documentation:  Not applicable    Reason for visit: RECHECK    [place] AZUL

## 2024-09-26 NOTE — LETTER
9/26/2024       RE: Sandy Person  [address] [facility] [address]     Dear Colleague,    Thank you for referring your patient, Sandy Person, to the [facility] [place] [facility]. Please see a copy of my visit note below.    Outcome for 09/17/24 2:20 PM: Data uploaded on Dexcom  Susan Freeman LPN   Outcome for 09/24/24 7:25 AM: Data obtained via Dexcom website  Bobbilynn Grossaint, VF     Patient is showing 4/5 MNCM met. Not on statin   Bobbi Grossaint, VF                  Time of start: 8:00 am   Time of end: 8:16 am   Total duration of video visit: 16 minutes.  Providers location: Off-site.  Patient's location: [place].    HPI  Sandy Person is a 44 year old female with type 2 diabetes mellitus. Video visit for diabetes follow up today.  Last visit with me was in April 2024 and with Sreedhar Joyner [facility] in September 2024.    Patient was started on Mounjaro earlier this year.  Sandy has lost weight and her glycemic control has improved.   Patient has a history of IUFD at 19 [id number] s/p dilation and evacuation on 1/31/2023.    Pt has a hx of type 2 diabetes dx 10 years ago.   She has no known retinopathy, nephropathy or neuropathy.  Sandy has a hx of obesity and questionable hx of PCOS.  For her diabetes, she is currently taking Mounjaro 12.5 mg subcutaneous once a week and Metformin 2000 mg daily.  Rybelsus and Glipizide were discontinued when she started Mounjaro.    Most recent A1C was 6.2 % on 3/8/2024.   I reviewed and scanned her DexcomG7 sensor download data in her her chart below.  Her blood sugars are excellent at this time.  Glucose in target range 98 % of the time with no hypoglycemia.  Average glucose 118 with estimated A1C 6.1 %.  On ROS today, she reports 40 lb weight loss since taking Mounjaro.  Some lightheadedness especially with posture change.    LMP -now.  Pt not trying to get pregnant at this time.  Pt denies  blurred vision, n/v, shortness of breath at rest, fever, chest pain, abd pain, constipation, diarrhea, blood in stool or melena.    No dysuria.  Patient has no symptoms of diabetic neuropathy and denies foot ulcers.    Diabetes Care  Retinopathy: none; pt seen by Oph in Aug 2023.  She plans to schedule her annual diabetic eye exam.  Nephropathy:none; urine microalbuminuria negative in 10/2023.  Pt is taking Benicar for HTN.  I asked patient to HOLD Benicar for now and check her BP every other day at home and if BP is consistently > 130/80 to notify me.  Neuropathy:none.  Foot Exam:no exam today.  Taking aspirin: no.  Lipids: LDL 96 in 10/2023.  CAD: no.  Insulin: none.  DM meds: Metformin and Mounjaro.    Testing:  DexcomG7 sensor.            ROS  See under HPI.    Allergies  No Known Allergies    Medications  Current Outpatient Medications   Medication Sig Dispense Refill     blood glucose (NO BRAND SPECIFIED) lancets standard Use to test blood sugar 4 times daily or as directed. 100 each 3     blood glucose (NO BRAND SPECIFIED) test strip Use to test blood sugar 4 times daily or as directed. 400 strip 3     blood glucose monitoring (NO BRAND SPECIFIED) meter device kit Use to test blood sugar 4 times daily or as directed. 1 kit 0     budesonide (PULMICORT) 0.5 MG/2ML neb solution Add one ampule into [employer]Med sinus rinse bottle with saline mixture and irrigate nose daily (Patient not taking: Reported on 7/23/2024) 180 mL 3     clobetasol (TEMOVATE) 0.05 % external solution Apply topically 2 times daily (Patient not taking: Reported on 7/23/2024) 50 mL 3     clobetasol propionate (OLUX) 0.05 % external foam Apply 2 times daily to scalp 50 g 3     Continuous Glucose Sensor (DEXCOM G7 SENSOR) MISC 1 each every 10 days Change sensor every 10 days 3 each 5     fluticasone (FLONASE) 50 MCG/ACT nasal spray Spray 2 sprays into both nostrils daily (Patient not taking: Reported on 7/23/2024) 15.8 mL 6     hydrOXYzine HCl  (ATARAX) 10 MG tablet Take 1 tablet (10 mg) by mouth 3 times daily as needed for itching 90 tablet 11     ipratropium (ATROVENT) 0.06 % nasal spray Spray 2 sprays into both nostrils 2 times daily (Patient not taking: Reported on 7/23/2024) 15 mL 6     ketoconazole (NIZORAL) 2 % external shampoo Apply topically daily as needed for itching or irritation 120 mL 11     magnesium oxide 400 MG CAPS Take 400 mg by mouth daily       metFORMIN (GLUCOPHAGE) 500 MG tablet TAKE 2 TABLETS BY MOUTH TWICE DAILY 360 tablet 3     olmesartan (BENICAR) 5 MG tablet Take 1 tablet (5 mg) by mouth daily 90 tablet 3     predniSONE (DELTASONE) 20 MG tablet Take 1 tablet (20 mg) by mouth 2 times daily 10 tablet 0     tirzepatide (MOUNJARO) 12.5 MG/0.5ML pen Inject 12.5 mg subcutaneously every 7 days 2 mL 1   Metformin 500 mg 2 tab po BID.      Family History  family history includes Cancer in her father, maternal grandmother, and mother; Colon Cancer in her father and paternal grandfather; Diabetes in her father, maternal grandmother, mother, and paternal grandmother; Heart Disease in her father and mother.    Social History   reports that she has never smoked. She has been exposed to tobacco smoke. She has never used smokeless tobacco. She reports that she does not currently use alcohol. She reports that she does not use drugs.     Past Medical History  Past Medical History:   Diagnosis Date     Abnormal Pap smear of cervix      Diabetes (H)      History of human papillomavirus infection    Obesity.    Past Surgical History:   Procedure Laterality Date     COLONOSCOPY N/A 9/17/2024    Procedure: Colonoscopy;  Surgeon: Kaylin Bryant MD;  Location:  GI     COLPOSCOPY       DILATION AND EVACUATION N/A 01/31/2023    Procedure: AND EVACUATION OF UTERUS;  Surgeon: Carolin Valladares MD;  Location: [facility] OR     REMOVE DILATORS, CERVICAL N/A 01/31/2023    Procedure: REMOVE DILATORS, CERVICAL;  Surgeon: Carolin Valladares MD;  Location: [facility] OR      TONSILLECTOMY         Physical Exam    No exam today.      RESULTS  Creatinine   Date Value Ref Range Status   03/25/2024 0.71 0.51 - 0.95 mg/dL Final     GFR Estimate   Date Value Ref Range Status   03/25/2024 >90 >60 mL/min/1.73m2 Final     Hemoglobin A1C   Date Value Ref Range Status   03/08/2024 6.2 (H) 0.0 - 5.6 % Final     Comment:     Normal <5.7%   Prediabetes 5.7-6.4%    Diabetes 6.5% or higher     Note: Adopted from ADA consensus guidelines.     Potassium   Date Value Ref Range Status   03/25/2024 4.7 3.4 - 5.3 mmol/L Final     ALT   Date Value Ref Range Status   12/05/2023 33 0 - 50 U/L Final     Comment:     Reference intervals for this test were updated on 6/12/2023 to more accurately reflect our healthy population. There may be differences in the flagging of prior results with similar values performed with this method. Interpretation of those prior results can be made in the context of the updated reference intervals.       AST   Date Value Ref Range Status   12/05/2023 31 0 - 45 U/L Final     Comment:     Reference intervals for this test were updated on 6/12/2023 to more accurately reflect our healthy population. There may be differences in the flagging of prior results with similar values performed with this method. Interpretation of those prior results can be made in the context of the updated reference intervals.     TSH   Date Value Ref Range Status   10/17/2023 1.18 0.30 - 4.20 uIU/mL Final   03/06/2022 1.17 0.40 - 4.00 mU/L Final       ASSESSMENT/PLAN:    1.  TYPE 2 DIABETES MELLITUS: Sandy has lost 40 lbs since taking Mounjaro and her glycemic control is excellent at this time.    She may consider increasing Mounjaro 15 mg subcutaneous once a week next month.  Sandy does have follow-up with Sreedhar Joyner [facility] in a few weeks.  For now, continue Mounjaro 12.5 mg subcutaneous once a week and current Metformin dose.  She is not trying to get pregnant at this time and is aware that she will need to  stop Mounjaro 3 months prior to trying to conceive.  Pt has menses today.  Sandy is tolerating Mounjaro well.  Patient denies nausea, vomiting, abdominal pain, constipation or diarrhea at this time.    Again, I reviewed how Mounjaro works and possible side effects of the drug including n/v, GI distress, constipation, hypoglycemia and rare risk of pancreatitis.   Pt denies hx of pancreatitis or gastroparesis.  Sandy was seen by Oph in Aug 2023 without retinopathy per patient.  She plans to schedule her annual diabetic eye exam.  She denies hx of nephropathy. Her urine microalbuminuria was negative in Oct 2023 with creat 0.71 with GFR > 90 mL/min in March 2024.  No sx of neuropathy. Denies foot ulcers/sores.  TSH normal in Oct 2023.     2.  WEIGHT:  Encourage pt to continue to eat healthy and exercise.  Continue to titrate Mounjaro.    3. HTN: Pt taking Benicar.  In view of lightheadedness with posture change, I asked Sandy to HOLD Benicar and to check her blood pressure at home every other day and if her blood pressure is consistently > 130/80 to notify me.  She has lost 40 pounds and may no longer need antihypertensive medication.    4.  HEALTH MAINTENANCE: She tells me she needs to find a new primary care provider.  Encouraged her to see a primary care provider annually for health maintenance.    5.  FOLLOW UP: With me in 6 months.  If patient has primary care provider by next visit, I mentioned to Sandy that we may graduate her from the endocrine/diabetes clinic.  Patient has follow-up with Sreedhar Joyner [facility] in a few weeks.  A1C, urine microalbuminuria, fasting lipid panel lipid panel, AST, creatinine/GFR and TSH ordered today.    Time spent reviewing chart,labs and DexcomG7 sensor download today = 5 minutes.  Time for video visit today = 16 minutes.  Time for documentation today = 15  minutes.    TOTAL TIME FOR VISIT TODAY = 36   minutes.    Vesta Arceo PA-C          Again, thank you for allowing me to participate in  the care of your patient.      Sincerely,    Vesta Arceo PA-C

## 2024-10-01 ENCOUNTER — TELEPHONE (OUTPATIENT)
Dept: OTOLARYNGOLOGY | Facility: CLINIC | Age: 44
End: 2024-10-01
Payer: COMMERCIAL

## 2024-10-01 NOTE — TELEPHONE ENCOUNTER
Patient has  canceled for 10/24/2024 and rescheduled surgery to 11/21/2024 in [facility]  Preop  11/13/2024  Postop 11/25/2024

## 2024-10-01 NOTE — TELEPHONE ENCOUNTER
[facility] Call Center    Phone Message    May a detailed message be left on voicemail: yes     Reason for Call: Other: Per pt she needs to reschedule her surgery. Please call to discuss. Thank you     Action Taken: Message routed to:  [facility] ([facility] ENT    Travel Screening: Not Applicable     Date of Service:

## 2024-10-09 ENCOUNTER — TELEPHONE (OUTPATIENT)
Dept: ENDOCRINOLOGY | Facility: CLINIC | Age: 44
End: 2024-10-09
Payer: COMMERCIAL

## 2024-10-09 NOTE — TELEPHONE ENCOUNTER
Left Voicemail (1st Attempt) and Sent Mychart (1st Attempt) for the patient to call back and schedule the following:    Appointment type: Return diabetes  Provider: Cait Arceo  Return date: 6 months  Specialty phone number: [phone]  Additional appointment(s) needed: Appointment with me in 6 months. Labs ordered today.   Additonal Notes: NA

## 2024-10-15 NOTE — TELEPHONE ENCOUNTER
Patient confirmed scheduled appointment:  Date: 03/25  Time: 8:00  Visit type: return diabetes  Provider: Adis  Location: [facility]  Testing/imaging: labs 10/24  Additional notes:   Spoke with patient and she is familiar with the location for the lab    Per checkout comments: Appointment with me in 6 months. Labs ordered today.     Noelle Lui on 10/15/2024 at 3:57 PM

## 2024-10-24 ENCOUNTER — LAB (OUTPATIENT)
Dept: LAB | Facility: CLINIC | Age: 44
End: 2024-10-24
Payer: COMMERCIAL

## 2024-10-24 DIAGNOSIS — E11.9 TYPE 2 DIABETES MELLITUS WITHOUT COMPLICATION, WITH LONG-TERM CURRENT USE OF INSULIN (H): ICD-10-CM

## 2024-10-24 DIAGNOSIS — Z79.4 TYPE 2 DIABETES MELLITUS WITHOUT COMPLICATION, WITH LONG-TERM CURRENT USE OF INSULIN (H): ICD-10-CM

## 2024-10-24 LAB
AST SERPL W P-5'-P-CCNC: 19 U/L (ref 0–45)
CHOLEST SERPL-MCNC: 162 MG/DL
CREAT SERPL-MCNC: 0.83 MG/DL (ref 0.51–0.95)
CREAT UR-MCNC: 115 MG/DL
EGFRCR SERPLBLD CKD-EPI 2021: 89 ML/MIN/1.73M2
EST. AVERAGE GLUCOSE BLD GHB EST-MCNC: 117 MG/DL
FASTING STATUS PATIENT QL REPORTED: YES
HBA1C MFR BLD: 5.7 % (ref 0–5.6)
HDLC SERPL-MCNC: 48 MG/DL
LDLC SERPL CALC-MCNC: 91 MG/DL
MICROALBUMIN UR-MCNC: <12 MG/L
MICROALBUMIN/CREAT UR: NORMAL MG/G{CREAT}
NONHDLC SERPL-MCNC: 114 MG/DL
TRIGL SERPL-MCNC: 113 MG/DL
TSH SERPL DL<=0.005 MIU/L-ACNC: 0.97 UIU/ML (ref 0.3–4.2)

## 2024-10-24 PROCEDURE — 84443 ASSAY THYROID STIM HORMONE: CPT

## 2024-10-24 PROCEDURE — 80061 LIPID PANEL: CPT

## 2024-10-24 PROCEDURE — 82565 ASSAY OF CREATININE: CPT

## 2024-10-24 PROCEDURE — 83036 HEMOGLOBIN GLYCOSYLATED A1C: CPT

## 2024-10-24 PROCEDURE — 82043 UR ALBUMIN QUANTITATIVE: CPT

## 2024-10-24 PROCEDURE — 82570 ASSAY OF URINE CREATININE: CPT

## 2024-10-24 PROCEDURE — 84450 TRANSFERASE (AST) (SGOT): CPT

## 2024-10-24 PROCEDURE — 36415 COLL VENOUS BLD VENIPUNCTURE: CPT

## 2024-11-06 ENCOUNTER — MYC MEDICAL ADVICE (OUTPATIENT)
Dept: PHARMACY | Facility: CLINIC | Age: 44
End: 2024-11-06
Payer: COMMERCIAL

## 2024-11-06 DIAGNOSIS — E11.65 TYPE 2 DIABETES MELLITUS WITH HYPERGLYCEMIA, UNSPECIFIED WHETHER LONG TERM INSULIN USE (H): ICD-10-CM

## 2024-11-12 RX ORDER — TRIAMCINOLONE ACETONIDE 1 MG/G
CREAM TOPICAL
COMMUNITY
Start: 2024-01-20

## 2024-11-13 ENCOUNTER — OFFICE VISIT (OUTPATIENT)
Dept: FAMILY MEDICINE | Facility: CLINIC | Age: 44
End: 2024-11-13
Payer: COMMERCIAL

## 2024-11-13 VITALS
OXYGEN SATURATION: 98 % | DIASTOLIC BLOOD PRESSURE: 74 MMHG | RESPIRATION RATE: 19 BRPM | TEMPERATURE: 97.4 F | BODY MASS INDEX: 32.18 KG/M2 | HEIGHT: 64 IN | SYSTOLIC BLOOD PRESSURE: 130 MMHG | HEART RATE: 83 BPM | WEIGHT: 188.5 LBS

## 2024-11-13 DIAGNOSIS — Z01.818 PREOP GENERAL PHYSICAL EXAM: Primary | ICD-10-CM

## 2024-11-13 DIAGNOSIS — J31.0 CHRONIC RHINITIS: ICD-10-CM

## 2024-11-13 DIAGNOSIS — J34.2 DEVIATED NASAL SEPTUM: ICD-10-CM

## 2024-11-13 LAB
ANION GAP SERPL CALCULATED.3IONS-SCNC: 10 MMOL/L (ref 7–15)
BUN SERPL-MCNC: 15.1 MG/DL (ref 6–20)
CALCIUM SERPL-MCNC: 9.3 MG/DL (ref 8.8–10.4)
CHLORIDE SERPL-SCNC: 105 MMOL/L (ref 98–107)
CREAT SERPL-MCNC: 0.8 MG/DL (ref 0.51–0.95)
EGFRCR SERPLBLD CKD-EPI 2021: >90 ML/MIN/1.73M2
GLUCOSE SERPL-MCNC: 105 MG/DL (ref 70–99)
HCO3 SERPL-SCNC: 23 MMOL/L (ref 22–29)
HGB BLD-MCNC: 12.6 G/DL (ref 11.7–15.7)
POTASSIUM SERPL-SCNC: 4.4 MMOL/L (ref 3.4–5.3)
SODIUM SERPL-SCNC: 138 MMOL/L (ref 135–145)

## 2024-11-13 PROCEDURE — 90656 IIV3 VACC NO PRSV 0.5 ML IM: CPT | Performed by: STUDENT IN AN ORGANIZED HEALTH CARE EDUCATION/TRAINING PROGRAM

## 2024-11-13 PROCEDURE — 90471 IMMUNIZATION ADMIN: CPT | Performed by: STUDENT IN AN ORGANIZED HEALTH CARE EDUCATION/TRAINING PROGRAM

## 2024-11-13 PROCEDURE — 99214 OFFICE O/P EST MOD 30 MIN: CPT | Mod: 25 | Performed by: STUDENT IN AN ORGANIZED HEALTH CARE EDUCATION/TRAINING PROGRAM

## 2024-11-13 PROCEDURE — 90472 IMMUNIZATION ADMIN EACH ADD: CPT | Performed by: STUDENT IN AN ORGANIZED HEALTH CARE EDUCATION/TRAINING PROGRAM

## 2024-11-13 PROCEDURE — 36415 COLL VENOUS BLD VENIPUNCTURE: CPT | Performed by: STUDENT IN AN ORGANIZED HEALTH CARE EDUCATION/TRAINING PROGRAM

## 2024-11-13 PROCEDURE — 90636 HEP A/HEP B VACC ADULT IM: CPT | Performed by: STUDENT IN AN ORGANIZED HEALTH CARE EDUCATION/TRAINING PROGRAM

## 2024-11-13 PROCEDURE — 85018 HEMOGLOBIN: CPT | Performed by: STUDENT IN AN ORGANIZED HEALTH CARE EDUCATION/TRAINING PROGRAM

## 2024-11-13 PROCEDURE — 80048 BASIC METABOLIC PNL TOTAL CA: CPT | Performed by: STUDENT IN AN ORGANIZED HEALTH CARE EDUCATION/TRAINING PROGRAM

## 2024-11-13 ASSESSMENT — PAIN SCALES - GENERAL: PAINLEVEL_OUTOF10: NO PAIN (0)

## 2024-11-13 NOTE — NURSING NOTE
Prior to immunization administration, verified patients identity using patient s name and date of birth. Please see Immunization Activity for additional information.     Screening Questionnaire for Adult Immunization    Are you sick today?   No   Do you have allergies to medications, food, a vaccine component or latex?   No   Have you ever had a serious reaction after receiving a vaccination?   No   Do you have a long-term health problem with heart, lung, kidney, or metabolic disease (e.g., diabetes), asthma, a blood disorder, no spleen, complement component deficiency, a cochlear implant, or a spinal fluid leak?  Are you on long-term aspirin therapy?   Yes   Do you have cancer, leukemia, HIV/AIDS, or any other immune system problem?   No   Do you have a parent, brother, or sister with an immune system problem?   Yes   In the past 3 months, have you taken medications that affect  your immune system, such as prednisone, other steroids, or anticancer drugs; drugs for the treatment of rheumatoid arthritis, Crohn s disease, or psoriasis; or have you had radiation treatments?   No   Have you had a seizure, or a brain or other nervous system problem?   No   During the past year, have you received a transfusion of blood or blood    products, or been given immune (gamma) globulin or antiviral drug?   No   For women: Are you pregnant or is there a chance you could become       pregnant during the next month?   No   Have you received any vaccinations in the past 4 weeks?   No     Immunization questionnaire was positive for at least one answer.  Perlitaied Jayden.      Patient instructed to remain in clinic for 15 minutes afterwards, and to report any adverse reactions.     Screening performed by Elysia Perez MA on 11/13/2024 at 8:21 AM.

## 2024-11-13 NOTE — PATIENT INSTRUCTIONS
Hold  -HOLD metformin the day of surgery  -HOLD tirzepatide 7 days prior to surgery  -GCM-be prepared to remove this prior to surgery  -avoid topicals 24hrs prior    Dr. Carpenter      Patient Education   Preparing for Your Surgery  For Adults  Getting started  In most cases, a nurse will call to review your health history and instructions. They will give you an arrival time based on your scheduled surgery time. Please be ready to share:  Your doctor's clinic name and phone number  Your medical, surgical, and anesthesia history  A list of allergies and sensitivities  A list of medicines, including herbal treatments and over-the-counter drugs  Whether the patient has a legal guardian (ask how to send us the papers in advance)  Note: You may not receive a call if you were seen at our PAC (Preoperative Assessment Center).  Please tell us if you're pregnant--or if there's any chance you might be pregnant. Some surgeries may injure a fetus (unborn baby), so they require a pregnancy test. Surgeries that are safe for a fetus don't always need a test, and you can choose whether to have one.   Preparing for surgery  Within 10 to 30 days of surgery: Have a pre-op exam (sometimes called an H&P, or History and Physical). This can be done at a clinic or pre-operative center.  If you're having a , you may not need this exam. Talk to your care team.  At your pre-op exam, talk to your care team about all medicines you take. (This includes CBD oil and any drugs, such as THC, marijuana, and other forms of cannabis.) If you need to stop any medicine before surgery, ask when to start taking it again.  This is for your safety. Many medicines and drugs can make you bleed too much during surgery. Some change how well surgery (anesthesia) drugs work.  Call your insurance company to let them know you're having surgery. (If you don't have insurance, call [phone].)  Call your clinic if there's any change in your health. This  includes a scrape or scratch near the surgery site, or any signs of a cold (sore throat, runny nose, cough, rash, fever).  Eating and drinking guidelines  For your safety: Unless your surgeon tells you otherwise, follow the guidelines below.  Eat and drink as normal until 8 hours before you arrive for surgery. After that, no food or milk. You can spit out gum when you arrive.  Drink clear liquids until 2 hours before you arrive. These are liquids you can see through, like water, Gatorade, and Propel Water. They also include plain black coffee and tea (no cream or milk).  No alcohol for 24 hours before you arrive. The night before surgery, stop any drinks that contain THC.  If your care team tells you to take medicine on the morning of surgery, it's okay to take it with a sip of water. No other medicines or drugs are allowed (including CBD oil)--follow your care team's instructions.  If you have questions the day of surgery, call your hospital or surgery center.   Preventing infection  Shower or bathe the night before and the morning of surgery. Follow the instructions your clinic gave you. (If no instructions, use regular soap.)  Don't shave or clip hair near your surgery site. We'll remove the hair if needed.  Don't smoke or vape the morning of surgery. No chewing tobacco for 6 hours before you arrive. A nicotine patch is okay. You may spit out nicotine gum when you arrive.  For some surgeries, the surgeon will tell you to fully quit smoking and nicotine.  We will make every effort to keep you safe from infection. We will:  Clean our hands often with soap and water (or an alcohol-based hand rub).  Clean the skin at your surgery site with a special soap that kills germs.  Give you a special gown to keep you warm. (Cold raises the risk of infection.)  Wear hair covers, masks, gowns, and gloves during surgery.  Give antibiotic medicine, if prescribed. Not all surgeries need this medicine.  What to bring on the day of  surgery  Photo ID and insurance card  Copy of your health care directive, if you have one  Glasses and hearing aids (bring cases)  You can't wear contacts during surgery  Inhaler and eye drops, if you use them (tell us about these when you arrive)  CPAP machine or breathing device, if you use them  A few personal items, if spending the night  If you have . . .  A pacemaker, ICD (cardiac defibrillator), or other implant: Bring the ID card.  An implanted stimulator: Bring the remote control.  A legal guardian: Bring a copy of the certified (court-stamped) guardianship papers.  Please remove any jewelry, including body piercings. Leave jewelry and other valuables at home.  If you're going home the day of surgery  You must have a responsible adult drive you home. They should stay with you overnight as well.  If you don't have someone to stay with you, and you aren't safe to go home alone, we may keep you overnight. Insurance often won't pay for this.  After surgery  If it's hard to control your pain or you need more pain medicine, please call your surgeon's office.  Questions?   If you have any questions for your care team, list them here:   ____________________________________________________________________________________________________________________________________________________________________________________________________________________________________________________________  For informational purposes only. Not to replace the advice of your health care provider. Copyright   2003, 2019 [facility]. All rights reserved. Clinically reviewed by Kenneth Balderas MD. [employer] [id number] - REV 08/24.

## 2024-11-13 NOTE — PROGRESS NOTES
Preoperative Evaluation  [id number] [facility]  [address]  [place] [address]  Phone: [phone]  Fax: [phone]  Primary Provider: Physician No Ref-Primary  Pre-op Performing Provider: German Carpenter DO    Nov 13, 2024         11/10/2024   Surgical Information   What procedure is being done? septoplasty    Facility or Hospital where procedure/surgery will be performed: [facility]    Who is doing the procedure / surgery? Dr. Miles Hansen    Date of surgery / procedure: November 21    Time of surgery / procedure: unknown    Where do you plan to recover after surgery? at home with family        Patient-reported     Fax number for surgical facility: Note does not need to be faxed, will be available electronically in Epic.    Assessment & Plan     The proposed surgical procedure is considered INTERMEDIATE risk.    Preop general physical exam  Deviated nasal septum  Chronic rhinitis  44yr female with PMH well controlled DM2 here for pre-op for septoplasty due to deviated septum and chronic rhinitis. Medically optimized. Vitals WNL, exam unremarkable. Hgb, BMP normal.  - Hemoglobin  - Basic metabolic panel       - No identified additional risk factors other than previously addressed    Antiplatelet or Anticoagulation Medication Instructions   - Patient is on no antiplatelet or anticoagulation medications.    Additional Medication Instructions  -HOLD metformin the day of surgery  -HOLD tirzepatide 7 days prior to surgery  -GCM-be prepared to remove this prior to surgery  -avoid topicals 24hrs prior    Recommendation  Approval given to proceed with proposed procedure      Flu and twinrix given      Subjective   Sandy is a 44 year old, presenting for the following:  Pre-Op Exam          11/13/2024     7:51 AM   Additional Questions   Roomed by Elisa AREVALO related to upcoming procedure:     44yr female with PMH well  controlled DM2 here for pre-op for septoplasty.    Chronic sinusitis  Curved septum        11/10/2024   Pre-Op Questionnaire   Have you ever had a heart attack or stroke? No    Have you ever had surgery on your heart or blood vessels, such as a stent placement, a coronary artery bypass, or surgery on an artery in your head, neck, heart, or legs? No    Do you have chest pain with activity? No    Do you have a history of heart failure? No    Do you currently have a cold, bronchitis or symptoms of other infection? No    Do you have a cough, shortness of breath, or wheezing? No    Do you or anyone in your family have previous history of blood clots? No    Do you or does anyone in your family have a serious bleeding problem such as prolonged bleeding following surgeries or cuts? No    Have you ever had problems with anemia or been told to take iron pills? No    Have you had any abnormal blood loss such as black, tarry or bloody stools, or abnormal vaginal bleeding? No    Have you ever had a blood transfusion? No    Are you willing to have a blood transfusion if it is medically needed before, during, or after your surgery? Yes    Have you or any of your relatives ever had problems with anesthesia? No    Do you have sleep apnea, excessive snoring or daytime drowsiness? No    Do you have any artifical heart valves or other implanted medical devices like a pacemaker, defibrillator, or continuous glucose monitor? No    Do you have artificial joints? No    Are you allergic to latex? No        Patient-reported     Health Care Directive  Patient does not have a Health Care Directive: deferred    Preoperative Review of    reviewed - not currently on controlled substances      Patient Active Problem List    Diagnosis Date Noted    Deviated nasal septum 2024     Priority: Medium    Nasal obstruction 2024     Priority: Medium    Vasomotor rhinitis 2024     Priority: Medium    Missed  with fetal demise  before 20 completed weeks of gestation 01/30/2023     Priority: Medium    Impingement syndrome of left shoulder 06/08/2017     Priority: Medium      Past Medical History:   Diagnosis Date    Abnormal Pap smear of cervix     Diabetes (H)     History of human papillomavirus infection      Past Surgical History:   Procedure Laterality Date    COLONOSCOPY N/A 9/17/2024    Procedure: Colonoscopy;  Surgeon: Kaylin Bryant MD;  Location:  GI    COLPOSCOPY      DILATION AND EVACUATION N/A 01/31/2023    Procedure: AND EVACUATION OF UTERUS;  Surgeon: Carolin Valladares MD;  Location: [facility] OR    REMOVE DILATORS, CERVICAL N/A 01/31/2023    Procedure: REMOVE DILATORS, CERVICAL;  Surgeon: Carolin Valladares MD;  Location: [facility] OR    TONSILLECTOMY       Current Outpatient Medications   Medication Sig Dispense Refill    blood glucose (NO BRAND SPECIFIED) lancets standard Use to test blood sugar 4 times daily or as directed. 100 each 3    blood glucose (NO BRAND SPECIFIED) test strip Use to test blood sugar 4 times daily or as directed. 400 strip 3    blood glucose monitoring (NO BRAND SPECIFIED) meter device kit Use to test blood sugar 4 times daily or as directed. 1 kit 0    Continuous Glucose Sensor (DEXCOM G7 SENSOR) MISC 1 each every 10 days Change sensor every 10 days 3 each 5    ketoconazole (NIZORAL) 2 % external shampoo Apply topically daily as needed for itching or irritation 120 mL 11    metFORMIN (GLUCOPHAGE) 500 MG tablet TAKE 2 TABLETS BY MOUTH TWICE DAILY 360 tablet 3    Tirzepatide 15 MG/0.5ML SOAJ Inject 0.5 mLs (15 mg) subcutaneously every 7 days. 6 mL 3    triamcinolone (KENALOG) 0.1 % external cream APPLY TOPICALLY DAILY FOR 14 DAYS - DO NOT USE ON FACE         No Known Allergies     Social History     Tobacco Use    Smoking status: Never     Passive exposure: Past    Smokeless tobacco: Never   Substance Use Topics    Alcohol use: Not Currently       History   Drug Use Unknown           Objective    /74  "(BP Location: Right arm, Patient Position: Sitting, Cuff Size: Adult Regular)   Pulse 83   Temp 97.4  F (36.3  C) (Temporal)   Resp 19   Ht 1.626 m (5' 4\")   Wt 85.5 kg (188 lb 8 oz)   LMP 10/19/2024 (Approximate)   SpO2 98%   BMI 32.36 kg/m     Estimated body mass index is 32.36 kg/m  as calculated from the following:    Height as of this encounter: 1.626 m (5' 4\").    Weight as of this encounter: 85.5 kg (188 lb 8 oz).    Physical Exam  GENERAL: alert and no distress  EYES: Eyes grossly normal to inspection, PERRL and conjunctivae and sclerae normal  HENT: nose and mouth without ulcers or lesions  NECK: no adenopathy, no asymmetry, masses, or scars  RESP: lungs clear to auscultation - no rales, rhonchi or wheezes  CV: regular rate and rhythm, normal S1 S2, no S3 or S4, no murmur, click or rub, no peripheral edema  ABDOMEN: soft, nontender, no hepatosplenomegaly, no masses and bowel sounds normal  MS: no gross musculoskeletal defects noted, no edema  SKIN: no suspicious lesions or rashes  NEURO: Normal strength and tone, mentation intact and speech normal  PSYCH: mentation appears normal, affect normal/bright    Hemoglobin   Date Value Ref Range Status   11/13/2024 12.6 11.7 - 15.7 g/dL Final     Last Comprehensive Metabolic Panel:  Lab Results   Component Value Date     11/13/2024    POTASSIUM 4.4 11/13/2024    CHLORIDE 105 11/13/2024    CO2 23 11/13/2024    ANIONGAP 10 11/13/2024     (H) 11/13/2024    BUN 15.1 11/13/2024    CR 0.80 11/13/2024    GFRESTIMATED >90 11/13/2024    ALONSO 9.3 11/13/2024           Diagnostics  No EKG required, no history of coronary heart disease, significant arrhythmia, peripheral arterial disease or other structural heart disease.    Revised Cardiac Risk Index (RCRI)  The patient has the following serious cardiovascular risks for perioperative complications:   - No serious cardiac risks = 0 points     RCRI Interpretation: 0 points: Class I (very low risk - 0.4% " complication rate)         Signed Electronically by: German Carpenter DO  A copy of this evaluation report is provided to the requesting physician.

## 2024-11-17 ENCOUNTER — ANESTHESIA EVENT (OUTPATIENT)
Dept: SURGERY | Facility: AMBULATORY SURGERY CENTER | Age: 44
End: 2024-11-17
Payer: COMMERCIAL

## 2024-11-18 NOTE — PROGRESS NOTES
History of Present Illness - Sandy Person is a 44 year old female who is status post septoplasty with Clarifix neurectomy on 11/21/2024.  The patient tells me that other than the sense of congestion, they have had no problems.  No headaches, fevers, chills, or change in vision.    BP (!) 147/86 (BP Location: Right arm, Patient Position: Sitting, Cuff Size: Adult Regular)   Pulse 73   LMP 10/19/2024 (Approximate)   SpO2 98%     General - The patient is well nourished and well developed, and appears to have good nutritional status.  Alert and oriented to person and place, answers questions and cooperates with examination appropriately.   Head and Face - Normocephalic and atraumatic, with no gross asymmetry noted of the contour of the facial features.  The facial nerve is intact, with strong symmetric movements.  Eyes - Extraocular movements intact, and the pupils were reactive to light.  Sclera were not icteric or injected, conjunctiva were pink and moist.  Mouth - Examination of the oral cavity shows pink, healthy, moist mucosa.  No lesions or ulceration noted.  The dentition are in good repair.  The tongue is mobile and midline.  Nose - After removing the splints and debris, everything looks great.  The incision is well healed and the turbinates are well lateralized. No sign of synechiae or infection.    A/P - Sandy Person has had a nice result from septoplasty.  The position of the septum and nasal tip look good.  I will see the patient in one month for another follow up.  I cautioned them to avoid any trauma to the nose, hard blowing, or twisting.

## 2024-11-21 ENCOUNTER — HOSPITAL ENCOUNTER (OUTPATIENT)
Facility: AMBULATORY SURGERY CENTER | Age: 44
End: 2024-11-21
Attending: OTOLARYNGOLOGY | Admitting: OTOLARYNGOLOGY
Payer: COMMERCIAL

## 2024-11-21 ENCOUNTER — ANESTHESIA (OUTPATIENT)
Dept: SURGERY | Facility: AMBULATORY SURGERY CENTER | Age: 44
End: 2024-11-21
Payer: COMMERCIAL

## 2024-11-21 VITALS
DIASTOLIC BLOOD PRESSURE: 97 MMHG | SYSTOLIC BLOOD PRESSURE: 171 MMHG | HEART RATE: 85 BPM | OXYGEN SATURATION: 98 % | TEMPERATURE: 97.2 F | RESPIRATION RATE: 16 BRPM

## 2024-11-21 DIAGNOSIS — G89.18 ACUTE POST-OPERATIVE PAIN: ICD-10-CM

## 2024-11-21 DIAGNOSIS — J34.2 DEVIATED NASAL SEPTUM: Primary | ICD-10-CM

## 2024-11-21 LAB — GLUCOSE BLDC GLUCOMTR-MCNC: 104 MG/DL (ref 70–99)

## 2024-11-21 RX ORDER — ACETAMINOPHEN 325 MG/1
975 TABLET ORAL ONCE
Status: ACTIVE | OUTPATIENT
Start: 2024-11-21

## 2024-11-21 RX ORDER — ACETAMINOPHEN 325 MG/1
975 TABLET ORAL ONCE
Status: COMPLETED | OUTPATIENT
Start: 2024-11-21 | End: 2024-11-21

## 2024-11-21 RX ORDER — NALOXONE HYDROCHLORIDE 0.4 MG/ML
0.1 INJECTION, SOLUTION INTRAMUSCULAR; INTRAVENOUS; SUBCUTANEOUS
Status: ACTIVE | OUTPATIENT
Start: 2024-11-21

## 2024-11-21 RX ORDER — HYDRALAZINE HYDROCHLORIDE 20 MG/ML
2.5-5 INJECTION INTRAMUSCULAR; INTRAVENOUS EVERY 10 MIN PRN
Status: ACTIVE | OUTPATIENT
Start: 2024-11-21

## 2024-11-21 RX ORDER — CEFAZOLIN SODIUM 2 G/50ML
2 SOLUTION INTRAVENOUS SEE ADMIN INSTRUCTIONS
Status: ACTIVE | OUTPATIENT
Start: 2024-11-21

## 2024-11-21 RX ORDER — OXYCODONE HYDROCHLORIDE 5 MG/1
5 TABLET ORAL
Status: COMPLETED | OUTPATIENT
Start: 2024-11-21 | End: 2024-11-21

## 2024-11-21 RX ORDER — DEXAMETHASONE SODIUM PHOSPHATE 4 MG/ML
4 INJECTION, SOLUTION INTRA-ARTICULAR; INTRALESIONAL; INTRAMUSCULAR; INTRAVENOUS; SOFT TISSUE
Status: ACTIVE | OUTPATIENT
Start: 2024-11-21

## 2024-11-21 RX ORDER — PROPOFOL 10 MG/ML
INJECTION, EMULSION INTRAVENOUS PRN
Status: DISCONTINUED | OUTPATIENT
Start: 2024-11-21 | End: 2024-11-21

## 2024-11-21 RX ORDER — DIPHENHYDRAMINE HYDROCHLORIDE 50 MG/ML
25 INJECTION INTRAMUSCULAR; INTRAVENOUS EVERY 6 HOURS PRN
Status: ACTIVE | OUTPATIENT
Start: 2024-11-21

## 2024-11-21 RX ORDER — PROPOFOL 10 MG/ML
INJECTION, EMULSION INTRAVENOUS CONTINUOUS PRN
Status: DISCONTINUED | OUTPATIENT
Start: 2024-11-21 | End: 2024-11-21

## 2024-11-21 RX ORDER — FENTANYL CITRATE 50 UG/ML
25 INJECTION, SOLUTION INTRAMUSCULAR; INTRAVENOUS
Status: ACTIVE | OUTPATIENT
Start: 2024-11-21

## 2024-11-21 RX ORDER — SODIUM CHLORIDE, SODIUM LACTATE, POTASSIUM CHLORIDE, CALCIUM CHLORIDE 600; 310; 30; 20 MG/100ML; MG/100ML; MG/100ML; MG/100ML
INJECTION, SOLUTION INTRAVENOUS CONTINUOUS
Status: DISPENSED | OUTPATIENT
Start: 2024-11-21

## 2024-11-21 RX ORDER — ONDANSETRON 2 MG/ML
4 INJECTION INTRAMUSCULAR; INTRAVENOUS EVERY 30 MIN PRN
Status: ACTIVE | OUTPATIENT
Start: 2024-11-21

## 2024-11-21 RX ORDER — ONDANSETRON 2 MG/ML
INJECTION INTRAMUSCULAR; INTRAVENOUS PRN
Status: DISCONTINUED | OUTPATIENT
Start: 2024-11-21 | End: 2024-11-21

## 2024-11-21 RX ORDER — FENTANYL CITRATE 50 UG/ML
25 INJECTION, SOLUTION INTRAMUSCULAR; INTRAVENOUS EVERY 5 MIN PRN
Status: ACTIVE | OUTPATIENT
Start: 2024-11-21

## 2024-11-21 RX ORDER — FENTANYL CITRATE 50 UG/ML
50 INJECTION, SOLUTION INTRAMUSCULAR; INTRAVENOUS EVERY 5 MIN PRN
Status: ACTIVE | OUTPATIENT
Start: 2024-11-21

## 2024-11-21 RX ORDER — FENTANYL CITRATE 50 UG/ML
INJECTION, SOLUTION INTRAMUSCULAR; INTRAVENOUS PRN
Status: DISCONTINUED | OUTPATIENT
Start: 2024-11-21 | End: 2024-11-21

## 2024-11-21 RX ORDER — LABETALOL HYDROCHLORIDE 5 MG/ML
10 INJECTION, SOLUTION INTRAVENOUS
Status: ACTIVE | OUTPATIENT
Start: 2024-11-21

## 2024-11-21 RX ORDER — MEPERIDINE HYDROCHLORIDE 25 MG/ML
12.5 INJECTION INTRAMUSCULAR; INTRAVENOUS; SUBCUTANEOUS EVERY 5 MIN PRN
Status: ACTIVE | OUTPATIENT
Start: 2024-11-21

## 2024-11-21 RX ORDER — DIPHENHYDRAMINE HCL 25 MG
25 CAPSULE ORAL EVERY 6 HOURS PRN
Status: DISPENSED | OUTPATIENT
Start: 2024-11-21

## 2024-11-21 RX ORDER — ONDANSETRON 4 MG/1
4 TABLET, ORALLY DISINTEGRATING ORAL EVERY 30 MIN PRN
Status: ACTIVE | OUTPATIENT
Start: 2024-11-21

## 2024-11-21 RX ORDER — LIDOCAINE 40 MG/G
CREAM TOPICAL
Status: DISPENSED | OUTPATIENT
Start: 2024-11-21

## 2024-11-21 RX ORDER — CEFAZOLIN SODIUM 2 G/50ML
2 SOLUTION INTRAVENOUS
Status: COMPLETED | OUTPATIENT
Start: 2024-11-21 | End: 2024-11-21

## 2024-11-21 RX ORDER — LIDOCAINE HYDROCHLORIDE AND EPINEPHRINE BITARTRATE 20; .01 MG/ML; MG/ML
INJECTION, SOLUTION SUBCUTANEOUS PRN
Status: DISCONTINUED | OUTPATIENT
Start: 2024-11-21 | End: 2024-11-21 | Stop reason: HOSPADM

## 2024-11-21 RX ORDER — LIDOCAINE HYDROCHLORIDE 20 MG/ML
INJECTION, SOLUTION INFILTRATION; PERINEURAL PRN
Status: DISCONTINUED | OUTPATIENT
Start: 2024-11-21 | End: 2024-11-21

## 2024-11-21 RX ORDER — LORAZEPAM 2 MG/ML
.5-1 INJECTION INTRAMUSCULAR
Status: DISPENSED | OUTPATIENT
Start: 2024-11-21

## 2024-11-21 RX ORDER — OXYCODONE AND ACETAMINOPHEN 5; 325 MG/1; MG/1
1 TABLET ORAL EVERY 4 HOURS PRN
Qty: 40 TABLET | Refills: 0 | Status: SHIPPED | OUTPATIENT
Start: 2024-11-21

## 2024-11-21 RX ORDER — DEXAMETHASONE SODIUM PHOSPHATE 4 MG/ML
10 INJECTION, SOLUTION INTRA-ARTICULAR; INTRALESIONAL; INTRAMUSCULAR; INTRAVENOUS; SOFT TISSUE ONCE
Status: COMPLETED | OUTPATIENT
Start: 2024-11-21 | End: 2024-11-21

## 2024-11-21 RX ORDER — KETOROLAC TROMETHAMINE 30 MG/ML
15 INJECTION, SOLUTION INTRAMUSCULAR; INTRAVENOUS
Status: ACTIVE | OUTPATIENT
Start: 2024-11-21

## 2024-11-21 RX ORDER — COCAINE HYDROCHLORIDE 40 MG/ML
SOLUTION NASAL PRN
Status: DISCONTINUED | OUTPATIENT
Start: 2024-11-21 | End: 2024-11-21 | Stop reason: HOSPADM

## 2024-11-21 RX ORDER — ALBUTEROL SULFATE 0.83 MG/ML
2.5 SOLUTION RESPIRATORY (INHALATION) EVERY 4 HOURS PRN
Status: ACTIVE | OUTPATIENT
Start: 2024-11-21

## 2024-11-21 RX ORDER — LIDOCAINE HYDROCHLORIDE AND EPINEPHRINE 10; 10 MG/ML; UG/ML
INJECTION, SOLUTION INFILTRATION; PERINEURAL PRN
Status: DISCONTINUED | OUTPATIENT
Start: 2024-11-21 | End: 2024-11-21 | Stop reason: HOSPADM

## 2024-11-21 RX ORDER — OXYCODONE HYDROCHLORIDE 5 MG/1
10 TABLET ORAL
Status: ACTIVE | OUTPATIENT
Start: 2024-11-21

## 2024-11-21 RX ADMIN — FENTANYL CITRATE 50 MCG: 50 INJECTION, SOLUTION INTRAMUSCULAR; INTRAVENOUS at 10:17

## 2024-11-21 RX ADMIN — OXYCODONE HYDROCHLORIDE 5 MG: 5 TABLET ORAL at 11:39

## 2024-11-21 RX ADMIN — DEXAMETHASONE SODIUM PHOSPHATE 10 MG: 4 INJECTION, SOLUTION INTRA-ARTICULAR; INTRALESIONAL; INTRAMUSCULAR; INTRAVENOUS; SOFT TISSUE at 10:25

## 2024-11-21 RX ADMIN — ACETAMINOPHEN 975 MG: 325 TABLET ORAL at 09:31

## 2024-11-21 RX ADMIN — SODIUM CHLORIDE, SODIUM LACTATE, POTASSIUM CHLORIDE, CALCIUM CHLORIDE: 600; 310; 30; 20 INJECTION, SOLUTION INTRAVENOUS at 09:33

## 2024-11-21 RX ADMIN — Medication 0.5 MG: at 10:33

## 2024-11-21 RX ADMIN — PROPOFOL 150 MCG/KG/MIN: 10 INJECTION, EMULSION INTRAVENOUS at 10:17

## 2024-11-21 RX ADMIN — ONDANSETRON 4 MG: 2 INJECTION INTRAMUSCULAR; INTRAVENOUS at 10:25

## 2024-11-21 RX ADMIN — PROPOFOL 200 MG: 10 INJECTION, EMULSION INTRAVENOUS at 10:17

## 2024-11-21 RX ADMIN — FENTANYL CITRATE 50 MCG: 50 INJECTION, SOLUTION INTRAMUSCULAR; INTRAVENOUS at 10:55

## 2024-11-21 RX ADMIN — LIDOCAINE HYDROCHLORIDE 100 MG: 20 INJECTION, SOLUTION INFILTRATION; PERINEURAL at 10:17

## 2024-11-21 RX ADMIN — Medication 40 MG: at 10:17

## 2024-11-21 RX ADMIN — CEFAZOLIN SODIUM 2 G: 2 SOLUTION INTRAVENOUS at 10:14

## 2024-11-21 RX ADMIN — FENTANYL CITRATE 25 MCG: 50 INJECTION, SOLUTION INTRAMUSCULAR; INTRAVENOUS at 11:40

## 2024-11-21 RX ADMIN — PROPOFOL 40 MG: 10 INJECTION, EMULSION INTRAVENOUS at 11:13

## 2024-11-21 RX ADMIN — FENTANYL CITRATE 25 MCG: 50 INJECTION, SOLUTION INTRAMUSCULAR; INTRAVENOUS at 11:59

## 2024-11-21 NOTE — ANESTHESIA PROCEDURE NOTES
Airway       Patient location during procedure: OR       Procedure Start/Stop Times: 11/21/2024 10:21 AM  Staff -        Performed By: CRNAIndications and Patient Condition       Indications for airway management: jeremias-procedural       Induction type:intravenous       Mask difficulty assessment: 1 - vent by mask    Final Airway Details       Final airway type: endotracheal airway       Successful airway: ETT - single, Oral and ERICA  Endotracheal Airway Details        ETT size (mm): 7.0       Cuffed: yes       Successful intubation technique: direct laryngoscopy       DL Blade Type: MAC 3       Grade View of Cords: 1 (small mouth opening)       Adjucts: stylet       Position: Center    Post intubation assessment        Placement verified by: capnometry, equal breath sounds and chest rise        Number of attempts at approach: 1       Ease of procedure: easy       Dentition: Intact and Unchanged    Medication(s) Administered   Medication Administration Time: 11/21/2024 10:21 AM

## 2024-11-21 NOTE — ANESTHESIA PREPROCEDURE EVALUATION
Anesthesia Pre-Procedure Evaluation    Patient: Sandy Person   MRN: [id number] : 1980        Procedure : Procedure(s):  ENDOSCOPIC SEPTOPLASTY, NOSE, WITH TURBINOPLASTY, CLARIFIX DEVICE          Past Medical History:   Diagnosis Date    Abnormal Pap smear of cervix     Diabetes (H)     History of human papillomavirus infection       Past Surgical History:   Procedure Laterality Date    COLONOSCOPY N/A 2024    Procedure: Colonoscopy;  Surgeon: Kaylin Bryant MD;  Location: [facility]    COLPOSCOPY      DILATION AND EVACUATION N/A 2023    Procedure: AND EVACUATION OF UTERUS;  Surgeon: Carolin Valladares MD;  Location: [facility]    REMOVE DILATORS, CERVICAL N/A 2023    Procedure: REMOVE DILATORS, CERVICAL;  Surgeon: Carolin Valladares MD;  Location: [facility] OR    TONSILLECTOMY        No Known Allergies   Social History     Tobacco Use    Smoking status: Never     Passive exposure: Past    Smokeless tobacco: Never   Substance Use Topics    Alcohol use: Not Currently      Wt Readings from Last 1 Encounters:   24 85.5 kg (188 lb 8 oz)        Anesthesia Evaluation            ROS/MED HX  ENT/Pulmonary:  - neg pulmonary ROS     Neurologic:  - neg neurologic ROS     Cardiovascular:  - neg cardiovascular ROS     METS/Exercise Tolerance:     Hematologic:  - neg hematologic  ROS     Musculoskeletal:  - neg musculoskeletal ROS     GI/Hepatic:  - neg GI/hepatic ROS     Renal/Genitourinary:  - neg Renal ROS     Endo:  - neg endo ROS   (+)               Obesity,       Psychiatric/Substance Use:  - neg psychiatric ROS     Infectious Disease:  - neg infectious disease ROS     Malignancy:  - neg malignancy ROS     Other:  - neg other ROS          Physical Exam    Airway  airway exam normal      Mallampati: II   TM distance: > 3 FB   Neck ROM: full   Mouth opening: > 3 cm    Respiratory Devices and Support         Dental       (+) Completely normal teeth      Cardiovascular          Rhythm and rate: regular and  "normal     Pulmonary   pulmonary exam normal        breath sounds clear to auscultation           OUTSIDE LABS:  CBC:   Lab Results   Component Value Date    WBC 7.9 12/05/2023    WBC 13.8 (H) 01/31/2023    HGB 12.6 11/13/2024    HGB 12.9 12/05/2023    HCT 39.2 12/05/2023    HCT 38.6 01/31/2023     12/05/2023     01/31/2023     BMP:   Lab Results   Component Value Date     11/13/2024     03/25/2024    POTASSIUM 4.4 11/13/2024    POTASSIUM 4.7 03/25/2024    CHLORIDE 105 11/13/2024    CHLORIDE 104 03/25/2024    CO2 23 11/13/2024    CO2 23 03/25/2024    BUN 15.1 11/13/2024    BUN 10.2 03/25/2024    CR 0.80 11/13/2024    CR 0.83 10/24/2024     (H) 11/21/2024     (H) 11/13/2024     COAGS: No results found for: \"PTT\", \"INR\", \"FIBR\"  POC: No results found for: \"BGM\", \"HCG\", \"HCGS\"  HEPATIC:   Lab Results   Component Value Date    ALBUMIN 3.8 12/05/2023    PROTTOTAL 6.6 12/05/2023    ALT 33 12/05/2023    AST 19 10/24/2024    ALKPHOS 55 12/05/2023    BILITOTAL 0.3 12/05/2023     OTHER:   Lab Results   Component Value Date    A1C 5.7 (H) 10/24/2024    ALONSO 9.3 11/13/2024    TSH 0.97 10/24/2024       Anesthesia Plan    ASA Status:  1    NPO Status:  NPO Appropriate    Anesthesia Type: General.     - Airway: ETT   Induction: Intravenous.   Maintenance: Balanced.        Consents    Anesthesia Plan(s) and associated risks, benefits, and realistic alternatives discussed. Questions answered and patient/representative(s) expressed understanding.     - Discussed:     - Discussed with:  Patient      - Extended Intubation/Ventilatory Support Discussed: No.      - Patient is DNR/DNI Status: No     Use of blood products discussed: No .     Postoperative Care    Pain management: IV analgesics, Oral pain medications, Multi-modal analgesia.   PONV prophylaxis: Background Propofol Infusion, Ondansetron (or other 5HT-3)     Comments:               Lucian Webb MD    I have reviewed the pertinent " "notes and labs in the chart from the past 30 days and (re)examined the patient.  Any updates or changes from those notes are reflected in this note.                         # Obesity: Estimated body mass index is 32.36 kg/m  as calculated from the following:    Height as of 11/13/24: 1.626 m (5' 4\").    Weight as of 11/13/24: 85.5 kg (188 lb 8 oz).             "

## 2024-11-21 NOTE — OP NOTE
PREOPERATIVE DIAGNOSES:   1. Deviated nasal septum.   2. Turbinate hypertrophy.   3. Nasal obstruction.   4. Vasomotor rhinitis  POSTOPERATIVE DIAGNOSES:   1. Deviated nasal septum.   2. Turbinate hypertrophy.   3. Nasal obstruction.   4. Vasomotor rhinitis  PROCEDURES PERFORMED:   1. Endoscopically assisted septoplasty with reimplantation of cartilage.   2. Bilateral submucous resection of inferior turbinates.   3. Clarifix neurectomy  SURGEON: Miles Hansen MD   ASSISTANT: None  BLOOD LOSS: 10 mL.   COMPLICATIONS: None.   SPECIMENS: None.   ANESTHESIA: GETA.   INDICATIONS: Sandy Person  presented to me with a lifelong history of chronic nasal obstruction and chronic rhinitis and post nasal drainage.  On evaluation, the patient had severely deviated septum and turbinate hypertrophy. Therefore, my recommendation was for the above-named procedures. Preoperatively, risks discussed included the risks of infection, bleeding, the risks of general anesthesia, possible injury to the eyes, base of skull and tear duct system, and possible failure of the surgery to achieve the desired result. The patient understood these risks and possible outcomes and wished to proceed.   OPERATIVE PROCEDURE: After being taken to the operating room and induction of general endotracheal tube anesthesia, the bed was rotated 90 degrees.  After that, he was prepped and draped in the normal clean fashion. I began by applying topical anesthetic in the form of 2 cottonoids on each side of the nose which had been soaked with a total of 4 mL of 4% liquid cocaine. In addition, I injected 0.25% Marcaine with 1:100,000 epinephrine into the base of the columella as well as the anterior insertion of the inferior turbinates bilaterally in preparation for later submucous resection.   I removed the cottonoids from the right side of the nose and entered the right nasal cavity.   I made a septoplasty incision in the right nasal vestibule in a traditional open  fashion. I then dissected down onto the left side of the cartilaginous septum through the right-sided incision. I then was able to start a mucoperichondrial pocket directly on the left side of the cartilaginous septum and inserted 0-degree endoscope to form my pocket and under direct endoscopic visualization. After I completely elevated the mucoperichondrium off the left side of the septum, I then made a hemitransfixion incision through the cartilage approximately 1.5 cm back from the anterior edge. I broke over to the right side and raised a submucoperiosteal flap on the entire right side of the nasal septum under direct endoscopic visualization. I was able to carefully tease the mucoperichondrium off the large rightward-pointing spur. After this was done, I used a swivel knife to remove the entire rhomboid portion of the cartilaginous septum, and I removed it in one large piece. It was brought to the back table and crushed in 2 pieces and then reinserted back into the mucoperichondrial pocket. I laid the flaps back together and this significantly improved the left nasal airway. I then closed my septoplasty incision with 3 simple interrupted 4-0 chromic gut sutures.     I proceeded to the final components of the operation, which was submucous resection of inferior turbinates. I switched to a 2 inferior turbinate blade and started on the left side. I made a stab incision at the anterior insertion of the left inferior turbinate and raised a submucoperiosteal tunnel along the medial surface of the left inferior turbinate bone. I then slowly withdrew the shaver blade as I ran the shaver to perform my submucous resection.   I then performed the same procedure on the right side, once again using the 2 mm turbinate blade to make a stab incision at the anterior insertion of the right inferior turbinate blade. I raised a submucoperiosteal tunnel along the entire medial surface of the right inferior turbinate bone and slowly  withdrew the shaver as I ran the shaver function to perform submucous resection.   At this point we moved on to the final step in the procedure, using the Clarifix device.  After loading and preparing the device it was first used on the LEFT side the cryo disk was placed up against the lateral wall at the sphenopalatine point of entry.  It was deployed with liquid nitrogen and held in place for 30 seconds.  We repeated the process on the RIGHT side, once again deploying the cold probe for 30 seconds at the entry point of the sphenopalatine.  At this point, the entire procedure was now complete. I reinspected both sides of the nose and there was good hemostasis.  Kiser splints were placed bilaterally and fixed in place with a single prolene suture through and through the anterior nasal septum.  All instruments were accounted for and all counts were correct. The patient's bed was rotated 90 degrees back to the care of anesthesia. He was awakened, extubated and sent to the recovery room in good condition.

## 2024-11-21 NOTE — DISCHARGE INSTRUCTIONS
Instructions for Septoplasty    Recovery - Everyone recovers differently from a general anesthetic.  Symptoms such as fatigue, nausea, lightheadedness, and sometimes a low grade fever (up to 100 degrees) are not unusual.  As your body removes the anesthetic drugs from circulation, these symptoms will resolve.  Your nose will be sore and throbbing after surgery, and you may even have symptoms similar to a sinus infection with headache, congestion, and pressure.  These will resolve with healing.  For several days you may experience bloody drainage from the nose, please use the drip pad as necessary for this.  If there is persistent bleeding, please call the office during business hours or the on call ENT physician after hours.  It is common for the front teeth to ache after septoplasty and sinus surgery.  This resolves with healing.  There are no diet restrictions after septoplasty, and you can resume your home medications.  Please blow your nose very very gently for two weeks after surgery.  Limit your activity to no strenuous activities until I see you for the first follow up visit, and sleep with your head elevated.    Medications - You were sent home with narcotic pain medication.  If you can tolerate the discomfort during your recovery by using just plain Tylenol or ibuprofen (advil), please do so.  However, do not hesitate to use the stronger pain medication if needed.  If you were sent home with an antibiotic, it is primarily used to help the healing process.  If it causes loose bowel movements or other signs of intolerance, it is appropriate to discontinue it.      Complications - Problems related to septoplasty almost always are detected during the operation, and special instruction will be given in that situation.  However, unexpected things can happen.  If you experience persistent bleeding, fevers, changes in vision, and severe headaches, this may be the sign of an infection.  Any of these symptoms should  be called into my office or to the on call ENT if after hours.   The most common non-emergency distant complication of septoplasty is the septum healing crooked.  Although rare, this can happen.  There may be small plastic pieces placed inside your nose during surgery (splints).  These help to promote the septum into healing in its straightened position, and will be removed at the follow up visit.    Follow up - As mentioned, splints may be removed at the follow up visit.  This is not as bad as it sounds.  And afterwards, the improvement you will expereince in breathing from the septoplasty is usually dramatic and immediate.  I will then see you 4 to 6 weeks after that visit to make sure that everything has healed appropriately.    If there are any questions or issues with the above, or if there are other issues that concern you, always feel free to call the clinic and I am happy to speak with you as soon as I can.    Fareed Hansen MD  Otolaryngology  [facility]  Business Hours [phone]/ After Hours and Weekends ENT Specialty Access number ([phone])     Tylenol 975 mg was given at 930am.    You should not take more then 4,000 mg of tylenol/acetaminophen in a 24 hour period.

## 2024-11-21 NOTE — ANESTHESIA CARE TRANSFER NOTE
Patient: Sandy Person    Procedure: Procedure(s):  ENDOSCOPIC SEPTOPLASTY, NOSE, WITH TURBINOPLASTY, CLARIFIX DEVICE       Diagnosis: Deviated nasal septum [J34.2]  Nasal obstruction [J34.89]  Vasomotor rhinitis [J30.0]  Diagnosis Additional Information: No value filed.    Anesthesia Type:   No value filed.     Note:    Oropharynx: oropharynx clear of all foreign objects and spontaneously breathing  Level of Consciousness: awake and drowsy  Oxygen Supplementation: room air    Independent Airway: airway patency satisfactory and stable  Dentition: dentition unchanged  Vital Signs Stable: post-procedure vital signs reviewed and stable  Report to RN Given: handoff report given  Patient transferred to: PACU    Handoff Report: Identifed the Patient, Identified the Reponsible Provider, Reviewed the pertinent medical history, Discussed the surgical course, Reviewed Intra-OP anesthesia mangement and issues during anesthesia, Set expectations for post-procedure period and Allowed opportunity for questions and acknowledgement of understanding      Vitals:  Vitals Value Taken Time   /107 11/21/24 1132   Temp 98.3  F (36.8  C) 11/21/24 1133   Pulse 90 11/21/24 1135   Resp 21 11/21/24 1135   SpO2 98 % 11/21/24 1135   Vitals shown include unfiled device data.    Electronically Signed By: BARBER Ramirez CRNA  November 21, 2024  11:36 AM

## 2024-11-21 NOTE — ANESTHESIA POSTPROCEDURE EVALUATION
Patient: Sandy Person    Procedure: Procedure(s):  ENDOSCOPIC SEPTOPLASTY, NOSE, WITH TURBINOPLASTY, CLARIFIX DEVICE       Anesthesia Type:  No value filed.    Note:  Disposition: Outpatient   Postop Pain Control: Uneventful            Sign Out: Well controlled pain   PONV: No   Neuro/Psych: Uneventful            Sign Out: Acceptable/Baseline neuro status   Airway/Respiratory: Uneventful            Sign Out: Acceptable/Baseline resp. status   CV/Hemodynamics: Uneventful            Sign Out: Acceptable CV status   Other NRE: NONE   DID A NON-ROUTINE EVENT OCCUR? No           Last vitals:  Vitals Value Taken Time   /90 11/21/24 1200   Temp 98.3  F (36.8  C) 11/21/24 1133   Pulse 85 11/21/24 1200   Resp 18 11/21/24 1200   SpO2 96 % 11/21/24 1200       Electronically Signed By: Lucian Webb MD  November 21, 2024  2:48 PM

## 2024-11-25 ENCOUNTER — OFFICE VISIT (OUTPATIENT)
Dept: OTOLARYNGOLOGY | Facility: CLINIC | Age: 44
End: 2024-11-25
Payer: COMMERCIAL

## 2024-11-25 VITALS — SYSTOLIC BLOOD PRESSURE: 147 MMHG | HEART RATE: 73 BPM | DIASTOLIC BLOOD PRESSURE: 86 MMHG | OXYGEN SATURATION: 98 %

## 2024-11-25 DIAGNOSIS — J30.0 VASOMOTOR RHINITIS: ICD-10-CM

## 2024-11-25 DIAGNOSIS — J34.2 DEVIATED NASAL SEPTUM: Primary | ICD-10-CM

## 2024-11-25 PROCEDURE — 99024 POSTOP FOLLOW-UP VISIT: CPT | Performed by: OTOLARYNGOLOGY

## 2024-11-25 NOTE — LETTER
11/25/2024      Sandy Person  [address] [facility] [address]      Dear Colleague,    Thank you for referring your patient, Sandy Person, to the [facility]. Please see a copy of my visit note below.    History of Present Illness - Sandy Person is a 44 year old female who is status post septoplasty with Clarifix neurectomy on 11/21/2024.  The patient tells me that other than the sense of congestion, they have had no problems.  No headaches, fevers, chills, or change in vision.    BP (!) 147/86 (BP Location: Right arm, Patient Position: Sitting, Cuff Size: Adult Regular)   Pulse 73   LMP 10/19/2024 (Approximate)   SpO2 98%     General - The patient is well nourished and well developed, and appears to have good nutritional status.  Alert and oriented to person and place, answers questions and cooperates with examination appropriately.   Head and Face - Normocephalic and atraumatic, with no gross asymmetry noted of the contour of the facial features.  The facial nerve is intact, with strong symmetric movements.  Eyes - Extraocular movements intact, and the pupils were reactive to light.  Sclera were not icteric or injected, conjunctiva were pink and moist.  Mouth - Examination of the oral cavity shows pink, healthy, moist mucosa.  No lesions or ulceration noted.  The dentition are in good repair.  The tongue is mobile and midline.  Nose - After removing the splints and debris, everything looks great.  The incision is well healed and the turbinates are well lateralized. No sign of synechiae or infection.    A/P - Sandy Person has had a nice result from septoplasty.  The position of the septum and nasal tip look good.  I will see the patient in one month for another follow up.  I cautioned them to avoid any trauma to the nose, hard blowing, or twisting.      Again, thank you for allowing me to participate in the care of your patient.        Sincerely,        Miles Hansen MD

## 2024-11-27 ENCOUNTER — TELEPHONE (OUTPATIENT)
Dept: ENDOCRINOLOGY | Facility: CLINIC | Age: 44
End: 2024-11-27
Payer: COMMERCIAL

## 2024-11-27 NOTE — TELEPHONE ENCOUNTER
Current PA expires 12/12/24. Please start proactive PA through [employer]              Please route determinations to the Pharm Diabetes pool ([id number]).      Thank you!    Diabetes Care Services Team   [facility]  [address] [place] [address]

## 2024-12-02 DIAGNOSIS — E11.9 TYPE 2 DIABETES MELLITUS WITHOUT COMPLICATION, WITHOUT LONG-TERM CURRENT USE OF INSULIN (H): ICD-10-CM

## 2024-12-05 ENCOUNTER — TELEPHONE (OUTPATIENT)
Dept: ENDOCRINOLOGY | Facility: CLINIC | Age: 44
End: 2024-12-05
Payer: COMMERCIAL

## 2024-12-05 RX ORDER — ACYCLOVIR 400 MG/1
TABLET ORAL
Qty: 3 EACH | Refills: 5 | Status: SHIPPED | OUTPATIENT
Start: 2024-12-05

## 2024-12-05 NOTE — TELEPHONE ENCOUNTER
Please start proactive prior authorization, expires 12/31/24.    Thank you!    Diabetes Care Services Team   [facility]  [address] [place] [address]    
Hello,    Actually, it seems like PA does not  until 25 for Dexcom G7 sensors. Can disregard the previous request.     Thank you!     Diabetes Care Services Team   [facility]  [address] [place] [address]  
[employer]

## 2024-12-16 ENCOUNTER — OFFICE VISIT (OUTPATIENT)
Dept: FAMILY MEDICINE | Facility: CLINIC | Age: 44
End: 2024-12-16
Payer: COMMERCIAL

## 2024-12-16 VITALS
RESPIRATION RATE: 16 BRPM | SYSTOLIC BLOOD PRESSURE: 135 MMHG | BODY MASS INDEX: 31.58 KG/M2 | HEIGHT: 64 IN | TEMPERATURE: 97.5 F | HEART RATE: 74 BPM | OXYGEN SATURATION: 100 % | WEIGHT: 185 LBS | DIASTOLIC BLOOD PRESSURE: 92 MMHG

## 2024-12-16 DIAGNOSIS — Z00.00 ROUTINE GENERAL MEDICAL EXAMINATION AT A HEALTH CARE FACILITY: Primary | ICD-10-CM

## 2024-12-16 DIAGNOSIS — B00.1 RECURRENT COLD SORES: ICD-10-CM

## 2024-12-16 DIAGNOSIS — E78.5 DYSLIPIDEMIA, GOAL LDL BELOW 70: ICD-10-CM

## 2024-12-16 DIAGNOSIS — E11.9 TYPE 2 DIABETES MELLITUS WITHOUT COMPLICATION, WITHOUT LONG-TERM CURRENT USE OF INSULIN (H): ICD-10-CM

## 2024-12-16 DIAGNOSIS — E11.65 TYPE 2 DIABETES MELLITUS WITH HYPERGLYCEMIA, UNSPECIFIED WHETHER LONG TERM INSULIN USE (H): ICD-10-CM

## 2024-12-16 DIAGNOSIS — R03.0 ELEVATED BLOOD PRESSURE READING WITHOUT DIAGNOSIS OF HYPERTENSION: ICD-10-CM

## 2024-12-16 PROCEDURE — 99214 OFFICE O/P EST MOD 30 MIN: CPT | Mod: 25 | Performed by: INTERNAL MEDICINE

## 2024-12-16 PROCEDURE — 91320 SARSCV2 VAC 30MCG TRS-SUC IM: CPT | Performed by: INTERNAL MEDICINE

## 2024-12-16 PROCEDURE — 90480 ADMN SARSCOV2 VAC 1/ONLY CMP: CPT | Performed by: INTERNAL MEDICINE

## 2024-12-16 PROCEDURE — 99396 PREV VISIT EST AGE 40-64: CPT | Performed by: INTERNAL MEDICINE

## 2024-12-16 RX ORDER — ATORVASTATIN CALCIUM 20 MG/1
20 TABLET, FILM COATED ORAL DAILY
Qty: 90 TABLET | Refills: 4 | Status: SHIPPED | OUTPATIENT
Start: 2024-12-16

## 2024-12-16 RX ORDER — VALACYCLOVIR HYDROCHLORIDE 1 G/1
2000 TABLET, FILM COATED ORAL 2 TIMES DAILY
Qty: 12 TABLET | Refills: 3 | Status: SHIPPED | OUTPATIENT
Start: 2024-12-16 | End: 2024-12-17

## 2024-12-16 SDOH — HEALTH STABILITY: PHYSICAL HEALTH: ON AVERAGE, HOW MANY DAYS PER WEEK DO YOU ENGAGE IN MODERATE TO STRENUOUS EXERCISE (LIKE A BRISK WALK)?: 3 DAYS

## 2024-12-16 ASSESSMENT — PAIN SCALES - GENERAL: PAINLEVEL_OUTOF10: NO PAIN (0)

## 2024-12-16 ASSESSMENT — SOCIAL DETERMINANTS OF HEALTH (SDOH): HOW OFTEN DO YOU GET TOGETHER WITH FRIENDS OR RELATIVES?: TWICE A WEEK

## 2024-12-16 NOTE — PATIENT INSTRUCTIONS
Start the statin, let me know if you have muscle aches from it    You can try gradually weaning metformin (wean by 1 tablet daily every 2 weeks) and if you don't notice a change in your estimated A1C, then you can stay off or stay at a lower dose.    Please buy a blood pressure cuff, check [email or web address] if you have questions which one to buy (should go on the upper arm).    Check your blood pressure twice each morning (if you drink coffee, before you have coffee ideally) and twice daily each evening and write down your results, do this for 3 days. Send your results via [employer]) or bring to your next visit. Goal blood pressure is less than 130/80.     Home Blood Pressure Monitoring:    To prepare to take your blood pressure:  Do not consume any caffeinated beverages (tea, coffee, soda), do not smoke, do not exercise for 30 minutes before measuring your blood pressure.  Sit quietly for at least 5 minutes before starting to measure your blood pressure.     To measure your blood pressure:  Sit with both feet flat on the floor and your back supported. Do not stand, do not lie down.  Place the cuff on your arm above your elbow so that your elbow can bend comfortably.  Your arm should be resting on a table.  Pull the cuff tight enough to stay in place and allow for your fingers to fit between your arm and the cuff.  Position the cuff so that the cord lies down the inside of your arm.  Do not talk while you are using the machine.  Press the START button. It will squeeze your arm and then release slowly.   When you see the numbers on the screen, you are done.   Write the numbers down and the time of day so that you can track what they are.      Hypertension is the major risk factor for chronic kidney disease and stroke.  A systolic blood pressure (the upper number) of 150 is associated with an 8-fold increased risk of stroke compared to a systolic blood pressure of 110.      Hypertension can be treated with diet, exercise,  and medication.  Some patients need medication to lower their blood pressure.    The DASH diet can help lower blood pressure.  It is a plant-based diet that focuses on fruits, vegetables, whole grains, low-fat dairy products, and very little meat.  It includes one serving of nuts, seeds, or legumes per day.  Sodiums is limited to 2400 mg per day.          Diet Change    To help reduce blood pressure, it is recommended that individuals reduce their sodium content to 2,300 mg or 1,500 mg. Below are alternatives to high-sodium and high-fat foods.  Reducing Salt Content    Foods high in salt (sodium) Low-salt alternatives   smoked, cured, salted, and canned meat, fish, poultry unsalted fresh or frozen beef, lamb, pork, fish, poultry   regular hard and processed cheese  regular peanut butter low-sodium cheese  low-sodium peanut butter   crackers with salted tops unsalted crackers   regular canned and dehydrated soups  low-sodium soups, broths, bouillons   regular canned vegetables fresh and frozen vegetables    salted snack foods unsalted snack foods       Reducing Fat Content    Food category Foods high in fat Lower fat alternatives   Dairy  whole milk  ice cream    cheese  skim, 1%, 2% milk  sorbet, sherbert, frozen yogurt  low- or reduced-fat cheese   Pasta ramen noodles  pasta with cream sauce  granola rice  pasta with tomato sauce  reduced fat granola   Meat, fish, poultry ground beef  chicken or turkey with skin  hot dogs  meier sausage   oil packed tuna   whole eggs low-fat, extra-lean meats,  skinless chicken or turkey    low-fat hot dog    turkey meier  water-packed tuna  egg whites, egg substitute   Baked goods croissants   donuts  muffins,   party crackers  cake, cookies hard rolls, English muffins  bagels  reduced fat muffins  low-fat crackers  kami food cake   Snacks and sweets nuts  ice cream popcorn, fruits, vegetables  frozen yogurt, pudding bars   Fats, oils, and salad dressings butter,  margarine  mayonnaise  salad dressings  oils, shortening, lard light margarine  light mayonnaise, mustard  fat free salad dressing  nonstick cooking spray     What are the changes that you plan to make in your diet?    Reduce salt by:_________________________________________________________    Reduce saturated fat by:__________________________________________    Reducing salt content data from  Alternatives to High-Sodium Foods,  by the U.S. Food and Drug Administration, n.d. Retrieved January 9, 2007, from http://www.fda.gov/fdac/foodlabel/sodtabl.html. Reducing fat content data from  The Practical Guide: Identification, Evaluation and Treatment of Overweight and Obesity in Adults  (NIH Publication No. ), by the National Institutes of Health, 2000. Retrieved January 9, 2007, from http://www.nhlbi.nih.gov/ guidelines/obesity/prctgd_c.pdf.      Patient Education   Preventive Care Advice   This is general advice given by our system to help you stay healthy. However, your care team may have specific advice just for you. Please talk to your care team about your preventive care needs.  Nutrition  Eat 5 or more servings of fruits and vegetables each day.  Try wheat bread, brown rice and whole grain pasta (instead of white bread, rice, and pasta).  Get enough calcium and vitamin D. Check the label on foods and aim for 100% of the RDA (recommended daily allowance).  Lifestyle  Exercise at least 150 minutes each week  (30 minutes a day, 5 days a week).  Do muscle strengthening activities 2 days a week. These help control your weight and prevent disease.  No smoking.  Wear sunscreen to prevent skin cancer.  Have a dental exam and cleaning every 6 months.  Yearly exams  See your health care team every year to talk about:  Any changes in your health.  Any medicines your care team has prescribed.  Preventive care, family planning, and ways to prevent chronic diseases.  Shots (vaccines)   HPV shots (up to age 26), if  you've never had them before.  Hepatitis B shots (up to age 59), if you've never had them before.  COVID-19 shot: Get this shot when it's due.  Flu shot: Get a flu shot every year.  Tetanus shot: Get a tetanus shot every 10 years.  Pneumococcal, hepatitis A, and RSV shots: Ask your care team if you need these based on your risk.  Shingles shot (for age 50 and up)  General health tests  Diabetes screening:  Starting at age 35, Get screened for diabetes at least every 3 years.  If you are younger than age 35, ask your care team if you should be screened for diabetes.  Cholesterol test: At age 39, start having a cholesterol test every 5 years, or more often if advised.  Bone density scan (DEXA): At age 50, ask your care team if you should have this scan for osteoporosis (brittle bones).  Hepatitis C: Get tested at least once in your life.  STIs (sexually transmitted infections)  Before age 24: Ask your care team if you should be screened for STIs.  After age 24: Get screened for STIs if you're at risk. You are at risk for STIs (including HIV) if:  You are sexually active with more than one person.  You don't use condoms every time.  You or a partner was diagnosed with a sexually transmitted infection.  If you are at risk for HIV, ask about PrEP medicine to prevent HIV.  Get tested for HIV at least once in your life, whether you are at risk for HIV or not.  Cancer screening tests  Cervical cancer screening: If you have a cervix, begin getting regular cervical cancer screening tests starting at age 21.  Breast cancer scan (mammogram): If you've ever had breasts, begin having regular mammograms starting at age 40. This is a scan to check for breast cancer.  Colon cancer screening: It is important to start screening for colon cancer at age 45.  Have a colonoscopy test every 10 years (or more often if you're at risk) Or, ask your provider about stool tests like a FIT test every year or Cologuard test every 3 years.  To  learn more about your testing options, visit:   .  For help making a decision, visit:   https://bit.ly/em07584.  Prostate cancer screening test: If you have a prostate, ask your care team if a prostate cancer screening test (PSA) at age 55 is right for you.  Lung cancer screening: If you are a current or former smoker ages 50 to 80, ask your care team if ongoing lung cancer screenings are right for you.  For informational purposes only. Not to replace the advice of your health care provider. Copyright   2023 [facility]. All rights reserved. Clinically reviewed by the [facility]. [employer] [id number] - REV 01/24.

## 2024-12-16 NOTE — PROGRESS NOTES
"Preventive Care Visit  [facility]  Amita Lombardo DO, Internal Medicine  Dec 16, 2024      Assessment & Plan     (Z00.00) Routine general medical examination at a health care facility  (primary encounter diagnosis)  Comment:   Mammo: 2/24/24  Pap: 7/22/21- repeat in 5 years  Colonoscopy: 9/17/24- NIL, repeat in 5 years  Immunizations: COVID  Labs: Lipids, diabetes- last A1C 5.7 on 10/24/24  Discussed healthy lifestyle and aging recommendations including regular exercise, adequate and regular sleep, 5+ fruits and veggies daily.  Fam history of cancer- met with genetics counselor, insufficient genetic cancer to do the screening    (E11.9) Type 2 diabetes mellitus without complication, without long-term current use of insulin (H)  Comment: Has been very well controlled on tirzepatide and metformin- would be interested in trying to wean metformin.  Wears CGM.  Plan: metFORMIN (GLUCOPHAGE) 500 MG tablet, FOOT EXAM    (B00.1) Recurrent cold sores  Comment: Try prn Valtrex- prescribed.  Plan: valACYclovir (VALTREX) 1000 mg tablet    (E78.5) Dyslipidemia, goal LDL below 70  Comment: Recommend atorvastatin 20 mg daily; repeat labs in 3 months.  Plan: atorvastatin (LIPITOR) 20 MG tablet      Elevated blood pressures without diagnosis hypertension  - Recommend checking BP's at home    Patient has been advised of split billing requirements and indicates understanding: Yes        BMI  Estimated body mass index is 31.97 kg/m  as calculated from the following:    Height as of this encounter: 1.62 m (5' 3.78\").    Weight as of this encounter: 83.9 kg (185 lb).   Weight management plan: Discussed healthy diet and exercise guidelines    Counseling  Appropriate preventive services were addressed with this patient via screening, questionnaire, or discussion as appropriate for fall prevention, nutrition, physical activity, Tobacco-use cessation, social engagement, weight loss and cognition.  " Checklist reviewing preventive services available has been given to the patient.  Reviewed patient's diet, addressing concerns and/or questions.   She is at risk for lack of exercise and has been provided with information to increase physical activity for the benefit of her well-being.       Patient Instructions   Start the statin, let me know if you have muscle aches from it    You can try gradually weaning metformin (wean by 1 tablet daily every 2 weeks) and if you don't notice a change in your estimated A1C, then you can stay off or stay at a lower dose.    Please buy a blood pressure cuff, check validatebp.org if you have questions which one to buy (should go on the upper arm).    Check your blood pressure twice each morning (if you drink coffee, before you have coffee ideally) and twice daily each evening and write down your results, do this for 3 days. Send your results via [employer] or bring to your next visit. Goal blood pressure is less than 130/80.     Home Blood Pressure Monitoring:    To prepare to take your blood pressure:  Do not consume any caffeinated beverages (tea, coffee, soda), do not smoke, do not exercise for 30 minutes before measuring your blood pressure.  Sit quietly for at least 5 minutes before starting to measure your blood pressure.     To measure your blood pressure:  Sit with both feet flat on the floor and your back supported. Do not stand, do not lie down.  Place the cuff on your arm above your elbow so that your elbow can bend comfortably.  Your arm should be resting on a table.  Pull the cuff tight enough to stay in place and allow for your fingers to fit between your arm and the cuff.  Position the cuff so that the cord lies down the inside of your arm.  Do not talk while you are using the machine.  Press the START button. It will squeeze your arm and then release slowly.   When you see the numbers on the screen, you are done.   Write the numbers down and the time of day so that you  can track what they are.      Hypertension is the major risk factor for chronic kidney disease and stroke.  A systolic blood pressure (the upper number) of 150 is associated with an 8-fold increased risk of stroke compared to a systolic blood pressure of 110.      Hypertension can be treated with diet, exercise, and medication.  Some patients need medication to lower their blood pressure.    The DASH diet can help lower blood pressure.  It is a plant-based diet that focuses on fruits, vegetables, whole grains, low-fat dairy products, and very little meat.  It includes one serving of nuts, seeds, or legumes per day.  Sodiums is limited to 2400 mg per day.          Diet Change    To help reduce blood pressure, it is recommended that individuals reduce their sodium content to 2,300 mg or 1,500 mg. Below are alternatives to high-sodium and high-fat foods.  Reducing Salt Content    Foods high in salt (sodium) Low-salt alternatives   smoked, cured, salted, and canned meat, fish, poultry unsalted fresh or frozen beef, lamb, pork, fish, poultry   regular hard and processed cheese  regular peanut butter low-sodium cheese  low-sodium peanut butter   crackers with salted tops unsalted crackers   regular canned and dehydrated soups  low-sodium soups, broths, bouillons   regular canned vegetables fresh and frozen vegetables    salted snack foods unsalted snack foods       Reducing Fat Content    Food category Foods high in fat Lower fat alternatives   Dairy  whole milk  ice cream    cheese  skim, 1%, 2% milk  sorbet, sherbert, frozen yogurt  low- or reduced-fat cheese   Pasta ramen noodles  pasta with cream sauce  granola rice  pasta with tomato sauce  reduced fat granola   Meat, fish, poultry ground beef  chicken or turkey with skin  hot dogs  meier sausage   oil packed tuna   whole eggs low-fat, extra-lean meats,  skinless chicken or turkey    low-fat hot dog    turkey meier  water-packed tuna  egg whites, egg substitute    Baked goods croissants   donuts  muffins,   party crackers  cake, cookies hard rolls, English muffins  bagels  reduced fat muffins  low-fat crackers  kami food cake   Snacks and sweets nuts  ice cream popcorn, fruits, vegetables  frozen yogurt, pudding bars   Fats, oils, and salad dressings butter, margarine  mayonnaise  salad dressings  oils, shortening, lard light margarine  light mayonnaise, mustard  fat free salad dressing  nonstick cooking spray     What are the changes that you plan to make in your diet?    Reduce salt by:_________________________________________________________    Reduce saturated fat by:__________________________________________    Reducing salt content data from  Alternatives to High-Sodium Foods,  by the U.S. Food and Drug Administration, n.d. Retrieved January 9, 2007, from http://www.fda.gov/fdac/foodlabel/sodtabl.html. Reducing fat content data from  The Practical Guide: Identification, Evaluation and Treatment of Overweight and Obesity in Adults  (NIH Publication No. ), by the National Institutes of Health, 2000. Retrieved January 9, 2007, from http://www.nhlbi.nih.gov/ guidelines/obesity/prctgd_c.pdf.      Patient Education  Preventive Care Advice   This is general advice given by our system to help you stay healthy. However, your care team may have specific advice just for you. Please talk to your care team about your preventive care needs.  Nutrition  Eat 5 or more servings of fruits and vegetables each day.  Try wheat bread, brown rice and whole grain pasta (instead of white bread, rice, and pasta).  Get enough calcium and vitamin D. Check the label on foods and aim for 100% of the RDA (recommended daily allowance).  Lifestyle  Exercise at least 150 minutes each week  (30 minutes a day, 5 days a week).  Do muscle strengthening activities 2 days a week. These help control your weight and prevent disease.  No smoking.  Wear sunscreen to prevent skin cancer.  Have a dental  exam and cleaning every 6 months.  Yearly exams  See your health care team every year to talk about:  Any changes in your health.  Any medicines your care team has prescribed.  Preventive care, family planning, and ways to prevent chronic diseases.  Shots (vaccines)   HPV shots (up to age 26), if you've never had them before.  Hepatitis B shots (up to age 59), if you've never had them before.  COVID-19 shot: Get this shot when it's due.  Flu shot: Get a flu shot every year.  Tetanus shot: Get a tetanus shot every 10 years.  Pneumococcal, hepatitis A, and RSV shots: Ask your care team if you need these based on your risk.  Shingles shot (for age 50 and up)  General health tests  Diabetes screening:  Starting at age 35, Get screened for diabetes at least every 3 years.  If you are younger than age 35, ask your care team if you should be screened for diabetes.  Cholesterol test: At age 39, start having a cholesterol test every 5 years, or more often if advised.  Bone density scan (DEXA): At age 50, ask your care team if you should have this scan for osteoporosis (brittle bones).  Hepatitis C: Get tested at least once in your life.  STIs (sexually transmitted infections)  Before age 24: Ask your care team if you should be screened for STIs.  After age 24: Get screened for STIs if you're at risk. You are at risk for STIs (including HIV) if:  You are sexually active with more than one person.  You don't use condoms every time.  You or a partner was diagnosed with a sexually transmitted infection.  If you are at risk for HIV, ask about PrEP medicine to prevent HIV.  Get tested for HIV at least once in your life, whether you are at risk for HIV or not.  Cancer screening tests  Cervical cancer screening: If you have a cervix, begin getting regular cervical cancer screening tests starting at age 21.  Breast cancer scan (mammogram): If you've ever had breasts, begin having regular mammograms starting at age 40. This is a scan  to check for breast cancer.  Colon cancer screening: It is important to start screening for colon cancer at age 45.  Have a colonoscopy test every 10 years (or more often if you're at risk) Or, ask your provider about stool tests like a FIT test every year or Cologuard test every 3 years.  To learn more about your testing options, visit:   .  For help making a decision, visit:   https://bit.ly/cr78973.  Prostate cancer screening test: If you have a prostate, ask your care team if a prostate cancer screening test (PSA) at age 55 is right for you.  Lung cancer screening: If you are a current or former smoker ages 50 to 80, ask your care team if ongoing lung cancer screenings are right for you.  For informational purposes only. Not to replace the advice of your health care provider. Copyright   2023 [employer]. All rights reserved. Clinically reviewed by the [facility] Transitions Program. HEROZ [id number] - REV 01/24.         Indira Delgado is a 44 year old, presenting for the following:  Physical        12/16/2024     9:37 AM   Additional Questions   Roomed by Chica AREVALO  Here to establish care and for preventive visit.    No concerns with diabetes- wears CGM- very well controlled.    Health Care Directive  Patient does not have a Health Care Directive: Discussed advance care planning with patient; information given to patient to review.      12/16/2024   General Health   How would you rate your overall physical health? Good   Feel stress (tense, anxious, or unable to sleep) Only a little      (!) STRESS CONCERN      12/16/2024   Nutrition   Three or more servings of calcium each day? Yes   Diet: Other   If other, please elaborate: high protein lower carb   How many servings of fruit and vegetables per day? (!) 2-3   How many sweetened beverages each day? 0-1            12/16/2024   Exercise   Days per week of moderate/strenous exercise 3 days            12/16/2024   Social  Factors   Frequency of gathering with friends or relatives Twice a week   Worry food won't last until get money to buy more No   Food not last or not have enough money for food? No   Do you have housing? (Housing is defined as stable permanent housing and does not include staying ouside in a car, in a tent, in an abandoned building, in an overnight shelter, or couch-surfing.) Yes   Are you worried about losing your housing? No   Lack of transportation? No   Unable to get utilities (heat,electricity)? No            12/16/2024   Dental   Dentist two times every year? Yes            12/16/2024   TB Screening   Were you born outside of the [place]? No              Today's PHQ-2 Score:       9/26/2024     7:57 AM   PHQ-2 ( 1999 Pf[employer])   Q1: Little interest or pleasure in doing things 0   Q2: Feeling down, depressed or hopeless 0   PHQ-2 Score 0         12/16/2024   Substance Use   Alcohol more than 3/day or more than 7/wk No   Do you use any other substances recreationally? No        Social History     Tobacco Use    Smoking status: Never     Passive exposure: Past    Smokeless tobacco: Never   Vaping Use    Vaping status: Never Used   Substance Use Topics    Alcohol use: Yes     Comment: At most 1 drink per week    Drug use: Never           2/22/2024   LAST FHS-7 RESULTS   1st degree relative breast or ovarian cancer No   Any relative bilateral breast cancer No   Any male have breast cancer No   Any ONE woman have BOTH breast AND ovarian cancer No   Any woman with breast cancer before 50yrs No   2 or more relatives with breast AND/OR ovarian cancer No   2 or more relatives with breast AND/OR bowel cancer No           Mammogram Screening - Mammogram every 1-2 years updated in Health Maintenance based on mutual decision making        12/16/2024   STI Screening   New sexual partner(s) since last STI/HIV test? No        History of abnormal Pap smear: YES - reflected in Problem List and Health Maintenance accordingly         "Latest Ref Rng & Units 7/22/2021     4:47 PM   PAP / HPV   PAP  Negative for Intraepithelial Lesion or Malignancy (NILM)    HPV 16 DNA Negative Negative    HPV 18 DNA Negative Negative    Other HR HPV Negative Negative      ASCVD Risk   The 10-year ASCVD risk score (Saray DAWN, et al., 2019) is: 1.4%    Values used to calculate the score:      Age: 44 years      Sex: Female      Is Non- : No      Diabetic: Yes      Tobacco smoker: No      Systolic Blood Pressure: 135 mmHg      Is BP treated: No      HDL Cholesterol: 48 mg/dL      Total Cholesterol: 162 mg/dL        12/16/2024   Contraception/Family Planning   Questions about contraception or family planning No           Reviewed and updated as needed this visit by Provider   Tobacco   Meds     Fam Hx            Past Medical History:   Diagnosis Date    Abnormal Pap smear of cervix     Diabetes (H)     History of human papillomavirus infection      Past Surgical History:   Procedure Laterality Date    COLONOSCOPY N/A 9/17/2024    Procedure: Colonoscopy;  Surgeon: Kaylin Bryant MD;  Location:  GI    COLPOSCOPY      DILATION AND EVACUATION N/A 01/31/2023    Procedure: AND EVACUATION OF UTERUS;  Surgeon: Carolin Valladares MD;  Location: [facility]    REMOVE DILATORS, CERVICAL N/A 01/31/2023    Procedure: REMOVE DILATORS, CERVICAL;  Surgeon: Carolin Valladares MD;  Location: [facility] OR    SEPTOPLASTY, TURBINOPLASTY, COMBINED Bilateral 11/21/2024    Procedure: ENDOSCOPIC SEPTOPLASTY, NOSE, WITH TURBINOPLASTY, CLARIFIX DEVICE;  Surgeon: Miles Hansen MD;  Location: [facility] OR    TONSILLECTOMY       Lab work is in process         Objective    Exam  BP (!) 135/92 (BP Location: Right arm, Patient Position: Sitting, Cuff Size: Adult Regular)   Pulse 74   Temp 97.5  F (36.4  C) (Temporal)   Resp 16   Ht 1.62 m (5' 3.78\")   Wt 83.9 kg (185 lb)   LMP 10/19/2024 (Approximate)   SpO2 100%   BMI 31.97 kg/m     Estimated body mass index is " "31.97 kg/m  as calculated from the following:    Height as of this encounter: 1.62 m (5' 3.78\").    Weight as of this encounter: 83.9 kg (185 lb).    Physical Exam  GENERAL: alert and no distress  EYES: Eyes grossly normal to inspection, PERRL and conjunctivae and sclerae normal  HENT: ear canals and TM's normal, nose and mouth without ulcers or lesions  NECK: no adenopathy, no asymmetry, masses, or scars  RESP: lungs clear to auscultation - no rales, rhonchi or wheezes  BREAST: normal without masses, tenderness or nipple discharge and no palpable axillary masses or adenopathy  CV: regular rate and rhythm, normal S1 S2, no S3 or S4, no murmur, click or rub, no peripheral edema  ABDOMEN: soft, nontender, no hepatosplenomegaly, no masses and bowel sounds normal  MS: no gross musculoskeletal defects noted, no edema  SKIN: no suspicious lesions or rashes  Diabetic foot exam: Normal pulses, normal monofilament,  no sores, calluses, cracks. Left 5th digit extended posteriorly.  NEURO: Normal strength and tone, mentation intact and speech normal  PSYCH: mentation appears normal, affect normal/bright        Signed Electronically by: Amita Lombardo,     "

## 2025-02-09 ENCOUNTER — HEALTH MAINTENANCE LETTER (OUTPATIENT)
Age: 45
End: 2025-02-09

## 2025-02-17 ENCOUNTER — MYC MEDICAL ADVICE (OUTPATIENT)
Dept: FAMILY MEDICINE | Facility: CLINIC | Age: 45
End: 2025-02-17
Payer: COMMERCIAL

## 2025-02-18 ENCOUNTER — MYC MEDICAL ADVICE (OUTPATIENT)
Dept: FAMILY MEDICINE | Facility: CLINIC | Age: 45
End: 2025-02-18
Payer: COMMERCIAL

## 2025-02-20 NOTE — TELEPHONE ENCOUNTER
Dr. Lombardo - see MyChart regarding possible statin related pain and mounjaro dosing. Appreciate advisement.    DIEGO Walsh, BSN, PHN, AMB-BC (she/her)  [facility] RN

## 2025-02-20 NOTE — TELEPHONE ENCOUNTER
Dr. Lombardo-please review and advise.  Most recent BP reading entered into patient reported vitals.    Thank you!  LULI BucioN, RN-[facility]

## 2025-02-25 NOTE — TELEPHONE ENCOUNTER
Relayed POC to patient via [employer].    LULI OsullivanN, RN (she/her)  [facility] RN

## 2025-02-25 NOTE — TELEPHONE ENCOUNTER
These are beautiful blood pressures- I do notice that the diastolic (bottom number) is slightly high but don't recommend starting a medication for this.  Ok to stop checking at home and each time she is in the office, it may be higher and so I may recommend a similar thing each year where I ask for some home checks to make sure still in the normal range.    Thank you!     Amita Lombardo, DO  Internal Medicine - Pediatrics Physician  [facility]

## 2025-02-25 NOTE — TELEPHONE ENCOUNTER
I'm so sorry for the long delay- YES- I recommend stopping the atorvastatin and waiting until the muscle pain has resolved (most common side effect with this class).  Then, once muscle aches are gone, I would recommend replacing it with a different medication to see if the muscle aches return or not (pravastatin is a little less effective than atorvastatin but much less likely to cause side effects)- I recommend staying off a statin until we meet again next month for another check in.    Thank you!     Amita Lombardo, DO  Internal Medicine - Pediatrics Physician  [facility]

## 2025-02-25 NOTE — TELEPHONE ENCOUNTER
Relayed POC to patient via [employer].    LULI OsullivanN, RN (she/her)  [facility] RN

## 2025-03-10 ENCOUNTER — LAB (OUTPATIENT)
Dept: LAB | Facility: CLINIC | Age: 45
End: 2025-03-10
Payer: COMMERCIAL

## 2025-03-10 DIAGNOSIS — E11.9 TYPE 2 DIABETES MELLITUS WITHOUT COMPLICATION, WITHOUT LONG-TERM CURRENT USE OF INSULIN (H): ICD-10-CM

## 2025-03-10 LAB
EST. AVERAGE GLUCOSE BLD GHB EST-MCNC: 117 MG/DL
HBA1C MFR BLD: 5.7 % (ref 0–5.6)

## 2025-03-10 PROCEDURE — 83036 HEMOGLOBIN GLYCOSYLATED A1C: CPT

## 2025-03-10 PROCEDURE — 36415 COLL VENOUS BLD VENIPUNCTURE: CPT

## 2025-03-11 ENCOUNTER — ANCILLARY PROCEDURE (OUTPATIENT)
Dept: MAMMOGRAPHY | Facility: CLINIC | Age: 45
End: 2025-03-11
Attending: INTERNAL MEDICINE
Payer: COMMERCIAL

## 2025-03-11 DIAGNOSIS — Z12.31 VISIT FOR SCREENING MAMMOGRAM: ICD-10-CM

## 2025-03-11 PROCEDURE — 77063 BREAST TOMOSYNTHESIS BI: CPT | Mod: TC | Performed by: RADIOLOGY

## 2025-03-11 PROCEDURE — 77067 SCR MAMMO BI INCL CAD: CPT | Mod: TC | Performed by: RADIOLOGY

## 2025-03-20 ENCOUNTER — OFFICE VISIT (OUTPATIENT)
Dept: DERMATOLOGY | Facility: CLINIC | Age: 45
End: 2025-03-20
Payer: COMMERCIAL

## 2025-03-20 DIAGNOSIS — L40.9 SCALP PSORIASIS: Primary | ICD-10-CM

## 2025-03-20 DIAGNOSIS — L24.9 IRRITANT DERMATITIS: ICD-10-CM

## 2025-03-20 RX ORDER — CLOBETASOL PROPIONATE 0.5 MG/ML
SOLUTION TOPICAL
Qty: 50 ML | Refills: 4 | Status: SHIPPED | OUTPATIENT
Start: 2025-03-20

## 2025-03-20 NOTE — PROGRESS NOTES
[facility] Dermatology Note  Encounter Date: Mar 20, 2025  Office Visit      Dermatology Problem List:    # Scalp psoriasis  - clobetasol solution  # Irritant dermatitis - 2/2 adhesive on dexcom glucose monitor  ____________________________________________    Assessment & Plan:    # Psoriasis of scalp - well controlled on clobetasol alone, only uses for 3-4 days with infrequent flares  - reviewed PSO flare up triggers, changes in weather/stress etc. Advised to monitor for s/sx of PSO arthritis  - continue to follow with PCP for monitoring of any co morbidities/metabolic diease  - Continue clobetasol solution or foam BID prn for flares. Refilled Rx   - can stop keto shampoo as she did not find it particularly helpful  - reminded patient of steroid edu  - Follow up in a year - otherwise if stable, ok for patient to have this filled by her PCP so long as her condition remains stable and does not worsen and treatment is helpful.     # Irritant dermatitis - 2/2 adhesive on dexcom glucose monitor  - Discussed the etiology of this condition as well as treatment and their side effects  - move location of dexcom monitor when able, consider asking pharmacy/PCP if she can switch /brands as her old brand did not cause this irritation  - ok to use her clobetasol solution there periodically for itching once she removes the monitor - reminded of steroid edu  - Please start a daily moisturizer to your skin. Some good examples are: Cerave, Cetaphil, Vanicream, Vaseline, Aquaphor. It is best to put it on after you get out of the shower.    Procedures Performed:   None     Follow-up: 1 year(s) in-person, or earlier for new or changing lesions    Staff and scribe:     Scribe Disclosure:   I, JEANNETTE REEVES, am serving as a scribe; to document services personally performed by Daksha Smyth PA-C -based on data collection and the provider's statements to me.     Provider Disclosure:  I agree with above  History, Review of Systems, Physical exam and Plan.  I have reviewed the content of the documentation and have edited it as needed. I have personally performed the services documented here and the documentation accurately represents those services and the decisions I have made.      Electronically signed by:     All risks, benefits and alternatives were discussed with patient.  Patient is in agreement and understands the assessment and plan.  All questions were answered.    Daksha Smyth PA-C, MPAS  [facility]: Phone: [phone], Fax: [phone]  [facility]: Phone: [phone],  Fax: [phone]  [facility]: Phone: [phone], Fax: [phone]  ____________________________________________    CC: Derm Problem (1.  Follow up psoriasis on scalp sx are better, occasional flare up/2.  Rash where dexcom is placed)    Reviewed patients past medical history and pertinent chart review prior to patient's visit today.     HPI:  Ms. Sandy Person is a 44 year old female who presents today as a return patient for psoriasis follow up.     Today patient reported that psoriasis on her scalp have improved although she does have a few occasional flares. She uses clobetasol as needed for flares. Only needs to use it for 3-4 days for flare to resolve. Flares are infrequent. No joint pain.     She also reported a rash where her glueose monitor, dexcom is placed. Did not get this with previous version of her monitor, but they have switched adhesive/brands.    Patient is otherwise feeling well, without additional concerns.    Labs:  N/A    Physical Exam:  Vitals: There were no vitals taken for this visit.  SKIN: Focused examination of areas performed was performed.   - minimal erythema at the base of her scalp. No scaling    - no nail changes  - No other lesions of concern on areas examined.     Medications:  Current  Outpatient Medications   Medication Sig Dispense Refill    atorvastatin (LIPITOR) 20 MG tablet Take 1 tablet (20 mg) by mouth daily. 90 tablet 4    blood glucose (NO BRAND SPECIFIED) lancets standard Use to test blood sugar 4 times daily or as directed. 100 each 3    blood glucose (NO BRAND SPECIFIED) test strip Use to test blood sugar 4 times daily or as directed. 400 strip 3    blood glucose monitoring (NO BRAND SPECIFIED) meter device kit Use to test blood sugar 4 times daily or as directed. 1 kit 0    Continuous Glucose Sensor (DEXCOM G7 SENSOR) MISC CHANGE EVERY 10 DAYS 3 each 5    ketoconazole (NIZORAL) 2 % external shampoo Apply topically daily as needed for itching or irritation 120 mL 11    metFORMIN (GLUCOPHAGE) 500 MG tablet Take 2 tablets (1,000 mg) by mouth 2 times daily (with meals). 360 tablet 3    Tirzepatide 15 MG/0.5ML SOAJ Inject 0.5 mLs (15 mg) subcutaneously every 7 days. 6 mL 3    triamcinolone (KENALOG) 0.1 % external cream APPLY TOPICALLY DAILY FOR 14 DAYS - DO NOT USE ON FACE      valACYclovir (VALTREX) 1000 mg tablet Take 2 tablets (2,000 mg) by mouth 2 times daily for 1 day. 12 tablet 3     No current facility-administered medications for this visit.      Past Medical/Surgical History:   Patient Active Problem List   Diagnosis    Missed  with fetal demise before 20 completed weeks of gestation    Impingement syndrome of left shoulder    Deviated nasal septum    Nasal obstruction    Vasomotor rhinitis     Past Medical History:   Diagnosis Date    Abnormal Pap smear of cervix     Diabetes (H)     History of human papillomavirus infection

## 2025-03-20 NOTE — LETTER
3/20/2025      Sandy Person  [address] [place] [address]      Dear Colleague,    Thank you for referring your patient, Sandy Person, to the [facility]. Please see a copy of my visit note below.    [facility] Dermatology Note  Encounter Date: Mar 20, 2025  Office Visit      Dermatology Problem List:    # Scalp psoriasis  - clobetasol solution  # Irritant dermatitis - 2/2 adhesive on dexcom glucose monitor  ____________________________________________    Assessment & Plan:    # Psoriasis of scalp - well controlled on clobetasol alone, only uses for 3-4 days with infrequent flares  - reviewed PSO flare up triggers, changes in weather/stress etc. Advised to monitor for s/sx of PSO arthritis  - continue to follow with PCP for monitoring of any co morbidities/metabolic diease  - Continue clobetasol solution or foam BID prn for flares. Refilled Rx   - can stop keto shampoo as she did not find it particularly helpful  - reminded patient of steroid edu  - Follow up in a year - otherwise if stable, ok for patient to have this filled by her PCP so long as her condition remains stable and does not worsen and treatment is helpful.     # Irritant dermatitis - 2/2 adhesive on dexcom glucose monitor  - Discussed the etiology of this condition as well as treatment and their side effects  - move location of dexcom monitor when able, consider asking pharmacy/PCP if she can switch /brands as her old brand did not cause this irritation  - ok to use her clobetasol solution there periodically for itching once she removes the monitor - reminded of steroid edu  - Please start a daily moisturizer to your skin. Some good examples are: Cerave, Cetaphil, Vanicream, Vaseline, Aquaphor. It is best to put it on after you get out of the shower.    Procedures Performed:   None     Follow-up: 1 year(s) in-person, or earlier for new or changing lesions    Staff and scribe:      Scribe Disclosure:   I, JEANNETTE LEANDRO, am serving as a scribe; to document services personally performed by Daksha Smyth PA-C -based on data collection and the provider's statements to me.     Provider Disclosure:  I agree with above History, Review of Systems, Physical exam and Plan.  I have reviewed the content of the documentation and have edited it as needed. I have personally performed the services documented here and the documentation accurately represents those services and the decisions I have made.      Electronically signed by:     All risks, benefits and alternatives were discussed with patient.  Patient is in agreement and understands the assessment and plan.  All questions were answered.    Daksha Smyth PA-C, [facility]  [facility]: Phone: [phone], Fax: [phone]  [facility]: Phone: [phone],  Fax: [phone]  [facility]: Phone: [phone], Fax: [phone]  ____________________________________________    CC: Derm Problem (1.  Follow up psoriasis on scalp sx are better, occasional flare up/2.  Rash where dexcom is placed)    Reviewed patients past medical history and pertinent chart review prior to patient's visit today.     HPI:  Ms. Sandy Person is a 44 year old female who presents today as a return patient for psoriasis follow up.     Today patient reported that psoriasis on her scalp have improved although she does have a few occasional flares. She uses clobetasol as needed for flares. Only needs to use it for 3-4 days for flare to resolve. Flares are infrequent. No joint pain.     She also reported a rash where her glueose monitor, dexcom is placed. Did not get this with previous version of her monitor, but they have switched adhesive/brands.    Patient is otherwise feeling well, without additional concerns.    Labs:  N/A    Physical Exam:  Vitals: There were no  vitals taken for this visit.  SKIN: Focused examination of areas performed was performed.   - minimal erythema at the base of her scalp. No scaling    - no nail changes  - No other lesions of concern on areas examined.     Medications:  Current Outpatient Medications   Medication Sig Dispense Refill     atorvastatin (LIPITOR) 20 MG tablet Take 1 tablet (20 mg) by mouth daily. 90 tablet 4     blood glucose (NO BRAND SPECIFIED) lancets standard Use to test blood sugar 4 times daily or as directed. 100 each 3     blood glucose (NO BRAND SPECIFIED) test strip Use to test blood sugar 4 times daily or as directed. 400 strip 3     blood glucose monitoring (NO BRAND SPECIFIED) meter device kit Use to test blood sugar 4 times daily or as directed. 1 kit 0     Continuous Glucose Sensor (DEXCOM G7 SENSOR) MISC CHANGE EVERY 10 DAYS 3 each 5     ketoconazole (NIZORAL) 2 % external shampoo Apply topically daily as needed for itching or irritation 120 mL 11     metFORMIN (GLUCOPHAGE) 500 MG tablet Take 2 tablets (1,000 mg) by mouth 2 times daily (with meals). 360 tablet 3     Tirzepatide 15 MG/0.5ML SOAJ Inject 0.5 mLs (15 mg) subcutaneously every 7 days. 6 mL 3     triamcinolone (KENALOG) 0.1 % external cream APPLY TOPICALLY DAILY FOR 14 DAYS - DO NOT USE ON FACE       valACYclovir (VALTREX) 1000 mg tablet Take 2 tablets (2,000 mg) by mouth 2 times daily for 1 day. 12 tablet 3     No current facility-administered medications for this visit.      Past Medical/Surgical History:   Patient Active Problem List   Diagnosis     Missed  with fetal demise before 20 completed weeks of gestation     Impingement syndrome of left shoulder     Deviated nasal septum     Nasal obstruction     Vasomotor rhinitis     Past Medical History:   Diagnosis Date     Abnormal Pap smear of cervix      Diabetes (H)      History of human papillomavirus infection                         Again, thank you for allowing me to participate in the care of  your patient.        Sincerely,        Daksha Smyth PA-C    Electronically signed

## 2025-03-23 ENCOUNTER — TELEPHONE (OUTPATIENT)
Dept: FAMILY MEDICINE | Facility: CLINIC | Age: 45
End: 2025-03-23
Payer: COMMERCIAL

## 2025-03-23 NOTE — TELEPHONE ENCOUNTER
Called and left MyChart for appointment to be rescheduled. Can use the hold spot for rescheduled Patients.     Chica Bray

## 2025-06-22 ENCOUNTER — HEALTH MAINTENANCE LETTER (OUTPATIENT)
Age: 45
End: 2025-06-22

## 2025-07-02 ENCOUNTER — RESULTS FOLLOW-UP (OUTPATIENT)
Dept: FAMILY MEDICINE | Facility: CLINIC | Age: 45
End: 2025-07-02

## 2025-07-02 ENCOUNTER — OFFICE VISIT (OUTPATIENT)
Dept: FAMILY MEDICINE | Facility: CLINIC | Age: 45
End: 2025-07-02
Payer: COMMERCIAL

## 2025-07-02 VITALS
WEIGHT: 189 LBS | DIASTOLIC BLOOD PRESSURE: 82 MMHG | SYSTOLIC BLOOD PRESSURE: 128 MMHG | TEMPERATURE: 97.2 F | OXYGEN SATURATION: 100 % | HEART RATE: 74 BPM | HEIGHT: 63 IN | BODY MASS INDEX: 33.49 KG/M2 | RESPIRATION RATE: 16 BRPM

## 2025-07-02 DIAGNOSIS — E11.9 TYPE 2 DIABETES MELLITUS WITHOUT COMPLICATION, WITHOUT LONG-TERM CURRENT USE OF INSULIN (H): Primary | ICD-10-CM

## 2025-07-02 DIAGNOSIS — E78.5 DYSLIPIDEMIA, GOAL LDL BELOW 70: ICD-10-CM

## 2025-07-02 DIAGNOSIS — J32.0 CHRONIC MAXILLARY SINUSITIS: ICD-10-CM

## 2025-07-02 LAB
CHOLEST SERPL-MCNC: 162 MG/DL
EST. AVERAGE GLUCOSE BLD GHB EST-MCNC: 114 MG/DL
FASTING STATUS PATIENT QL REPORTED: NO
HBA1C MFR BLD: 5.6 % (ref 0–5.6)
HDLC SERPL-MCNC: 48 MG/DL
LDLC SERPL CALC-MCNC: 87 MG/DL
NONHDLC SERPL-MCNC: 114 MG/DL
TRIGL SERPL-MCNC: 136 MG/DL

## 2025-07-02 PROCEDURE — 3044F HG A1C LEVEL LT 7.0%: CPT | Performed by: INTERNAL MEDICINE

## 2025-07-02 PROCEDURE — 80061 LIPID PANEL: CPT | Performed by: INTERNAL MEDICINE

## 2025-07-02 PROCEDURE — 99214 OFFICE O/P EST MOD 30 MIN: CPT | Performed by: INTERNAL MEDICINE

## 2025-07-02 PROCEDURE — 36415 COLL VENOUS BLD VENIPUNCTURE: CPT | Performed by: INTERNAL MEDICINE

## 2025-07-02 PROCEDURE — 83036 HEMOGLOBIN GLYCOSYLATED A1C: CPT | Performed by: INTERNAL MEDICINE

## 2025-07-02 PROCEDURE — 1126F AMNT PAIN NOTED NONE PRSNT: CPT | Performed by: INTERNAL MEDICINE

## 2025-07-02 PROCEDURE — 3074F SYST BP LT 130 MM HG: CPT | Performed by: INTERNAL MEDICINE

## 2025-07-02 PROCEDURE — 3079F DIAST BP 80-89 MM HG: CPT | Performed by: INTERNAL MEDICINE

## 2025-07-02 RX ORDER — PRAVASTATIN SODIUM 20 MG
20 TABLET ORAL DAILY
Qty: 90 TABLET | Refills: 4 | Status: SHIPPED | OUTPATIENT
Start: 2025-07-02

## 2025-07-02 RX ORDER — BLOOD SUGAR DIAGNOSTIC
STRIP MISCELLANEOUS
Qty: 100 STRIP | Refills: 4 | Status: SHIPPED | OUTPATIENT
Start: 2025-07-02

## 2025-07-02 RX ORDER — LANCETS
EACH MISCELLANEOUS
Qty: 100 EACH | Refills: 4 | Status: SHIPPED | OUTPATIENT
Start: 2025-07-02

## 2025-07-02 RX ORDER — BLOOD-GLUCOSE METER
1 EACH MISCELLANEOUS PRN
Qty: 1 KIT | Refills: 0 | Status: SHIPPED | OUTPATIENT
Start: 2025-07-02

## 2025-07-02 ASSESSMENT — PAIN SCALES - GENERAL: PAINLEVEL_OUTOF10: NO PAIN (0)

## 2025-07-02 NOTE — PROGRESS NOTES
"  Assessment & Plan     (E11.9) Type 2 diabetes mellitus without complication, without long-term current use of insulin (H)  (primary encounter diagnosis)  Comment: A1C at goal, recheck today; cont tirzepatide and metformin.  Plan: Blood Glucose Monitoring Suppl (ACCU-CHEK GUIDE        ME) w/Device KIT, blood glucose (ACCU-CHEK         GUIDE TEST) test strip, blood glucose         monitoring (SOFTCLIX) lancets, Hemoglobin A1c,         Lipid panel reflex to direct LDL Non-fasting    (E78.5) Dyslipidemia, goal LDL below 70  Comment: Tolerating pravastatin- no muscle aches, check labs today.  Plan: Hemoglobin A1c, Lipid panel reflex to direct         LDL Non-fasting    (J32.0) Chronic maxillary sinusitis  Comment: Sx of sinus pressure, green drainage since April- not improved with antihistamines, nasal Flonase, sinus washes.  Plan: amoxicillin-clavulanate (AUGMENTIN) 875-125 MG         tablet             BMI  Estimated body mass index is 33.07 kg/m  as calculated from the following:    Height as of this encounter: 1.61 m (5' 3.39\").    Weight as of this encounter: 85.7 kg (189 lb).         Indira Delgado is a 45 year old, presenting for the following health issues:  Diabetes        7/2/2025     9:17 AM   Additional Questions   Roomed by Chica Bray     History of Present Illness       Diabetes:   She presents for follow up of diabetes.   She is checking home blood glucose with a continuous glucose monitor.   She checks blood glucose before meals, after meals, before and after meals and at bedtime.  Blood glucose is never over 200 and never under 70. She is aware of hypoglycemia symptoms including shakiness and other.   She is concerned about other.    She is not experiencing numbness or burning in feet, excessive thirst, blurry vision, weight changes or redness, sores or blisters on feet.           She eats 2-3 servings of fruits and vegetables daily.She consumes 0 sweetened beverage(s) daily.She exercises with " "enough effort to increase her heart rate 20 to 29 minutes per day.  She exercises with enough effort to increase her heart rate 3 or less days per week.   She is taking medications regularly.        - Pravastatin?- muscle aches with atorvastatin- recheck lipids  - BP's at home normal  - Last saw 3/28/25- lowered metformin (3/10/25- A1C 5.7)    Had huge sinus infection in April- having sinus pressure/pain- has tasken Allegra and Zyrtec- hasn't helped at all, still having green drainage.        Objective    /82 (BP Location: Right arm, Patient Position: Sitting, Cuff Size: Adult Large)   Pulse 74   Temp 97.2  F (36.2  C) (Temporal)   Resp 16   Ht 1.61 m (5' 3.39\")   Wt 85.7 kg (189 lb)   SpO2 100%   BMI 33.07 kg/m    Body mass index is 33.07 kg/m .  Physical Exam   GENERAL: alert and no distress  PSYCH: mentation appears normal, affect normal/bright            Signed Electronically by: Amita Lombardo, DO    "

## 2025-07-14 ENCOUNTER — TELEPHONE (OUTPATIENT)
Dept: FAMILY MEDICINE | Facility: CLINIC | Age: 45
End: 2025-07-14
Payer: COMMERCIAL

## 2025-07-14 DIAGNOSIS — E11.9 TYPE 2 DIABETES MELLITUS WITHOUT COMPLICATION, WITHOUT LONG-TERM CURRENT USE OF INSULIN (H): Primary | ICD-10-CM

## 2025-07-14 NOTE — TELEPHONE ENCOUNTER
[facility]  Fax:[phone]  Spec: [phone]  [place]: [phone]

## 2025-07-17 RX ORDER — BLOOD-GLUCOSE METER
1 EACH MISCELLANEOUS ONCE
Qty: 1 KIT | Refills: 0 | Status: SHIPPED | OUTPATIENT
Start: 2025-07-17 | End: 2025-07-17

## 2025-07-17 RX ORDER — LANCETS
100 EACH MISCELLANEOUS 4 TIMES DAILY PRN
Qty: 100 EACH | Refills: 4 | Status: SHIPPED | OUTPATIENT
Start: 2025-07-17

## 2025-07-17 RX ORDER — BLOOD-GLUCOSE METER
KIT MISCELLANEOUS
Qty: 1 KIT | Refills: 0 | Status: CANCELLED | OUTPATIENT
Start: 2025-07-17

## 2025-07-17 NOTE — TELEPHONE ENCOUNTER
Elham -- it looks like patient has a CGM. Do you want to send one of the requested alternatives?     Glory Guerrero RN  [facility]

## 2025-07-17 NOTE — TELEPHONE ENCOUNTER
Writer contacted pt., no answer, left vm informing pt that writer will also send a tv[employer]ass message. If pt has questions, she can call clinic and ask to speak with a nurse or respond to the tv[employer] messsage.    Bandar Esteban, LULIN, PHN, RN-[facility]

## 2025-07-17 NOTE — TELEPHONE ENCOUNTER
Retail Pharmacy Prior Authorization Team   Phone: [phone]    PA Initiation    Medication: ACCU-CHEK GUIDE TEST VI STRP  Insurance Company: "[employer] - Phone [phone] Fax [phone]  Pharmacy Filling the Rx: [facility] - [place], [place] - [facility] [address]  Filling Pharmacy Phone: [phone]  Filling Pharmacy Fax:    Start Date: 7/17/2025    CHERISE OLSEN (Key: [id number])

## 2025-07-22 ENCOUNTER — MYC MEDICAL ADVICE (OUTPATIENT)
Dept: FAMILY MEDICINE | Facility: CLINIC | Age: 45
End: 2025-07-22
Payer: COMMERCIAL

## 2025-07-22 DIAGNOSIS — E78.5 DYSLIPIDEMIA, GOAL LDL BELOW 70: Primary | ICD-10-CM

## 2025-07-22 DIAGNOSIS — T46.6X5A MYALGIA DUE TO STATIN: ICD-10-CM

## 2025-07-22 DIAGNOSIS — M79.10 MYALGIA DUE TO STATIN: ICD-10-CM

## 2025-07-23 RX ORDER — PRAVASTATIN SODIUM 10 MG
10 TABLET ORAL DAILY
Qty: 30 TABLET | Refills: 1 | Status: SHIPPED | OUTPATIENT
Start: 2025-07-23

## 2025-07-23 NOTE — TELEPHONE ENCOUNTER
"Covering clinician/Elham -- please advise. Hold med? Cards referral pended    Pcp comment on medication, \"Started atorvastatin but had myalgias- stopped and they improved- trial pravastatin.  If myalgias return, stop and refer to preventive cardiology.\"    Glory Guerrero RN  [facility]    "

## 2025-07-23 NOTE — TELEPHONE ENCOUNTER
Slack message sent to patient.  Pended referral; hold for PCP.  Charlotte MCCOY, BSN, PHN, RN-[facility]  [phone]

## 2025-07-23 NOTE — TELEPHONE ENCOUNTER
RN: Can you let her know that yes, I would go back to lower dose for now (I can send) and then wait for Dr. Lombardo to return and let her know if needs to see prev cards.

## 2025-07-30 NOTE — TELEPHONE ENCOUNTER
Thank you! I recommend seeing preventive cardiology- I'm placing that referral now and they will call you to schedule an appointment.

## 2025-07-30 NOTE — TELEPHONE ENCOUNTER
TrepUp message sent to patient.  Charlotte MCCOY BSN, PHN, RN-[facility]  [phone]

## 2025-07-31 ENCOUNTER — PATIENT OUTREACH (OUTPATIENT)
Dept: CARE COORDINATION | Facility: CLINIC | Age: 45
End: 2025-07-31
Payer: COMMERCIAL

## 2025-08-04 ENCOUNTER — PATIENT OUTREACH (OUTPATIENT)
Dept: CARE COORDINATION | Facility: CLINIC | Age: 45
End: 2025-08-04
Payer: COMMERCIAL

## (undated) DEVICE — TUBING SUCTION VACUUM COLLECTION 6FTX3/8" 022310

## (undated) DEVICE — SUCTION VACUUM CANISTER STANDARD W/LID&CAPS 003987-901

## (undated) DEVICE — SYR 01ML TBC SLIP TIP W/O NDL

## (undated) DEVICE — SPLINT INTRANASAL POSISEPX GEL SPONGE 9210584

## (undated) DEVICE — SUCTION CANNULA UTERINE 12MM CVD 022112-10

## (undated) DEVICE — Device

## (undated) DEVICE — BLADE TURBINATE INFERIOR 2MM ROTATE  1882040HR

## (undated) DEVICE — LINEN TOWEL PACK X5 5464

## (undated) DEVICE — GLOVE BIOGEL PI MICRO SZ 7.5 48575

## (undated) DEVICE — SU PROLENE 2-0 SH 30" 8833H

## (undated) DEVICE — KIT ENDO TURNOVER/PROCEDURE W/CLEAN A SCOPE LINERS 103888

## (undated) DEVICE — NDL DENTAL 27GA LONG 8881400058

## (undated) DEVICE — TUBING SUCTION 10'X3/16" N510

## (undated) DEVICE — NDL 18GA 1.5" 305196

## (undated) DEVICE — SWAB PROCTO 16" 2/PK 32-046

## (undated) DEVICE — PAD CHUX UNDERPAD 30X36" P3036C

## (undated) DEVICE — DRAPE SPLIT EENT 76X124" 3X28" 9447

## (undated) DEVICE — SU CHROMIC 4-0 G-3DA 18" 793G

## (undated) DEVICE — GLOVE BIOGEL PI MICRO SZ 6.5 48565

## (undated) DEVICE — SPECIMEN CONTAINER 4OZ

## (undated) DEVICE — GOWN XLG DISP 9545

## (undated) DEVICE — PREP DYNA-HEX 4% CHG SCRUB 4OZ BOTTLE MDS098710

## (undated) DEVICE — ANTIFOG SOLUTION W/FOAM PAD CF-1001

## (undated) DEVICE — GLOVE BIOGEL PI MICRO INDICATOR UNDERGLOVE SZ 7.0 48970

## (undated) DEVICE — LINEN GOWN X4 5410

## (undated) DEVICE — SPONGE COTTONOID 1/2X3" 80-1407

## (undated) DEVICE — ENDO SHEATH STORZ SHARPSITE ENDOSCRUB 0DEG 4MM 1912000

## (undated) DEVICE — SOL WATER IRRIG 1000ML BOTTLE 07139-09

## (undated) DEVICE — SOL NACL 0.9% IRRIG 1000ML BOTTLE 2F7124

## (undated) DEVICE — SOL WATER IRRIG 1000ML BOTTLE 2F7114

## (undated) DEVICE — PACK MINOR SBA15MIFSE

## (undated) DEVICE — LINEN LEG DRAPE 5457

## (undated) DEVICE — DRAPE TIBURON TOP SHEET 100X60" 29352

## (undated) DEVICE — SPECIMEN TRAP VACUUM SUCTION SAFETOUCH 003853-902

## (undated) DEVICE — NDL SPINAL 25GA 3.5" QUINCKE 405180

## (undated) RX ORDER — SIMETHICONE 40MG/0.6ML
SUSPENSION, DROPS(FINAL DOSAGE FORM)(ML) ORAL
Status: DISPENSED
Start: 2024-09-17

## (undated) RX ORDER — LIDOCAINE HYDROCHLORIDE AND EPINEPHRINE BITARTRATE 20; .01 MG/ML; MG/ML
INJECTION, SOLUTION SUBCUTANEOUS
Status: DISPENSED
Start: 2024-11-21

## (undated) RX ORDER — DOXYCYCLINE 100 MG/10ML
INJECTION, POWDER, LYOPHILIZED, FOR SOLUTION INTRAVENOUS
Status: DISPENSED
Start: 2023-01-31

## (undated) RX ORDER — PROPOFOL 10 MG/ML
INJECTION, EMULSION INTRAVENOUS
Status: DISPENSED
Start: 2024-11-21

## (undated) RX ORDER — FENTANYL CITRATE 50 UG/ML
INJECTION, SOLUTION INTRAMUSCULAR; INTRAVENOUS
Status: DISPENSED
Start: 2024-11-21

## (undated) RX ORDER — COCAINE HYDROCHLORIDE 40 MG/ML
SOLUTION NASAL
Status: DISPENSED
Start: 2024-11-21

## (undated) RX ORDER — ACETAMINOPHEN 325 MG/1
TABLET ORAL
Status: DISPENSED
Start: 2023-01-31

## (undated) RX ORDER — ONDANSETRON 2 MG/ML
INJECTION INTRAMUSCULAR; INTRAVENOUS
Status: DISPENSED
Start: 2023-01-31

## (undated) RX ORDER — VASOPRESSIN 20 U/ML
INJECTION PARENTERAL
Status: DISPENSED
Start: 2023-01-31

## (undated) RX ORDER — FENTANYL CITRATE 50 UG/ML
INJECTION, SOLUTION INTRAMUSCULAR; INTRAVENOUS
Status: DISPENSED
Start: 2023-01-31

## (undated) RX ORDER — PROPOFOL 10 MG/ML
INJECTION, EMULSION INTRAVENOUS
Status: DISPENSED
Start: 2023-01-31

## (undated) RX ORDER — ONDANSETRON 2 MG/ML
INJECTION INTRAMUSCULAR; INTRAVENOUS
Status: DISPENSED
Start: 2024-11-21

## (undated) RX ORDER — ACETAMINOPHEN 325 MG/1
TABLET ORAL
Status: DISPENSED
Start: 2024-11-21

## (undated) RX ORDER — FENTANYL CITRATE 0.05 MG/ML
INJECTION, SOLUTION INTRAMUSCULAR; INTRAVENOUS
Status: DISPENSED
Start: 2024-11-21

## (undated) RX ORDER — DEXAMETHASONE SODIUM PHOSPHATE 4 MG/ML
INJECTION, SOLUTION INTRA-ARTICULAR; INTRALESIONAL; INTRAMUSCULAR; INTRAVENOUS; SOFT TISSUE
Status: DISPENSED
Start: 2024-11-21

## (undated) RX ORDER — OXYCODONE HYDROCHLORIDE 5 MG/1
TABLET ORAL
Status: DISPENSED
Start: 2024-11-21

## (undated) RX ORDER — FENTANYL CITRATE 50 UG/ML
INJECTION, SOLUTION INTRAMUSCULAR; INTRAVENOUS
Status: DISPENSED
Start: 2024-09-17

## (undated) RX ORDER — CEFAZOLIN SODIUM 2 G/50ML
SOLUTION INTRAVENOUS
Status: DISPENSED
Start: 2024-11-21

## (undated) RX ORDER — LIDOCAINE HYDROCHLORIDE 10 MG/ML
INJECTION, SOLUTION EPIDURAL; INFILTRATION; INTRACAUDAL; PERINEURAL
Status: DISPENSED
Start: 2023-01-31